# Patient Record
Sex: FEMALE | Race: BLACK OR AFRICAN AMERICAN | NOT HISPANIC OR LATINO | URBAN - METROPOLITAN AREA
[De-identification: names, ages, dates, MRNs, and addresses within clinical notes are randomized per-mention and may not be internally consistent; named-entity substitution may affect disease eponyms.]

---

## 2017-04-08 ENCOUNTER — EMERGENCY (EMERGENCY)
Facility: HOSPITAL | Age: 72
LOS: 0 days | Discharge: HOME | End: 2017-04-09
Attending: EMERGENCY MEDICINE
Payer: MEDICARE

## 2017-04-08 VITALS
OXYGEN SATURATION: 100 % | RESPIRATION RATE: 17 BRPM | WEIGHT: 253.09 LBS | SYSTOLIC BLOOD PRESSURE: 135 MMHG | DIASTOLIC BLOOD PRESSURE: 93 MMHG | HEIGHT: 66 IN | HEART RATE: 60 BPM | TEMPERATURE: 98 F

## 2017-04-08 DIAGNOSIS — R73.03 PREDIABETES: ICD-10-CM

## 2017-04-08 DIAGNOSIS — E78.5 HYPERLIPIDEMIA, UNSPECIFIED: ICD-10-CM

## 2017-04-08 DIAGNOSIS — K52.9 NONINFECTIVE GASTROENTERITIS AND COLITIS, UNSPECIFIED: ICD-10-CM

## 2017-04-08 DIAGNOSIS — E66.9 OBESITY, UNSPECIFIED: ICD-10-CM

## 2017-04-08 DIAGNOSIS — G40.909 EPILEPSY, UNSPECIFIED, NOT INTRACTABLE, WITHOUT STATUS EPILEPTICUS: ICD-10-CM

## 2017-04-08 DIAGNOSIS — K57.90 DIVERTICULOSIS OF INTESTINE, PART UNSPECIFIED, WITHOUT PERFORATION OR ABSCESS WITHOUT BLEEDING: ICD-10-CM

## 2017-04-08 DIAGNOSIS — I10 ESSENTIAL (PRIMARY) HYPERTENSION: ICD-10-CM

## 2017-04-08 DIAGNOSIS — Z91.013 ALLERGY TO SEAFOOD: ICD-10-CM

## 2017-04-08 DIAGNOSIS — Z98.89 OTHER SPECIFIED POSTPROCEDURAL STATES: Chronic | ICD-10-CM

## 2017-04-08 DIAGNOSIS — R10.9 UNSPECIFIED ABDOMINAL PAIN: ICD-10-CM

## 2017-04-08 LAB
ALBUMIN SERPL ELPH-MCNC: 3.5 G/DL — SIGNIFICANT CHANGE UP (ref 3.3–5)
ALP SERPL-CCNC: 139 U/L — HIGH (ref 40–120)
ALT FLD-CCNC: 21 U/L — SIGNIFICANT CHANGE UP (ref 12–78)
ANION GAP SERPL CALC-SCNC: 11 MMOL/L — SIGNIFICANT CHANGE UP (ref 5–17)
ANISOCYTOSIS BLD QL: SLIGHT — SIGNIFICANT CHANGE UP
APTT BLD: 33.8 SEC — SIGNIFICANT CHANGE UP (ref 27.5–37.4)
AST SERPL-CCNC: 16 U/L — SIGNIFICANT CHANGE UP (ref 15–37)
BILIRUB SERPL-MCNC: 0.4 MG/DL — SIGNIFICANT CHANGE UP (ref 0.2–1.2)
BUN SERPL-MCNC: 34 MG/DL — HIGH (ref 7–23)
CALCIUM SERPL-MCNC: 8.8 MG/DL — SIGNIFICANT CHANGE UP (ref 8.5–10.1)
CHLORIDE SERPL-SCNC: 103 MMOL/L — SIGNIFICANT CHANGE UP (ref 96–108)
CK MB BLD-MCNC: <1.1 % — SIGNIFICANT CHANGE UP (ref 0–3.5)
CK MB CFR SERPL CALC: <0.5 NG/ML — SIGNIFICANT CHANGE UP (ref 0.5–3.6)
CK SERPL-CCNC: 46 U/L — SIGNIFICANT CHANGE UP (ref 26–192)
CO2 SERPL-SCNC: 28 MMOL/L — SIGNIFICANT CHANGE UP (ref 22–31)
CREAT SERPL-MCNC: 1.4 MG/DL — HIGH (ref 0.5–1.3)
EOSINOPHIL NFR BLD AUTO: 2 % — SIGNIFICANT CHANGE UP (ref 0–6)
GLUCOSE SERPL-MCNC: 182 MG/DL — HIGH (ref 70–99)
HCT VFR BLD CALC: 34 % — LOW (ref 34.5–45)
HGB BLD-MCNC: 11.7 G/DL — SIGNIFICANT CHANGE UP (ref 11.5–15.5)
HYPOCHROMIA BLD QL: SLIGHT — SIGNIFICANT CHANGE UP
INR BLD: 1.11 RATIO — SIGNIFICANT CHANGE UP (ref 0.88–1.16)
LACTATE SERPL-SCNC: 1 MMOL/L — SIGNIFICANT CHANGE UP (ref 0.7–2)
LIDOCAIN IGE QN: 185 U/L — SIGNIFICANT CHANGE UP (ref 73–393)
LYMPHOCYTES # BLD AUTO: 15 % — SIGNIFICANT CHANGE UP (ref 13–44)
MACROCYTES BLD QL: SLIGHT — SIGNIFICANT CHANGE UP
MCHC RBC-ENTMCNC: 24 PG — LOW (ref 27–34)
MCHC RBC-ENTMCNC: 34.4 GM/DL — SIGNIFICANT CHANGE UP (ref 32–36)
MCV RBC AUTO: 69.7 FL — LOW (ref 80–100)
MICROCYTES BLD QL: SLIGHT — SIGNIFICANT CHANGE UP
MONOCYTES NFR BLD AUTO: 3 % — SIGNIFICANT CHANGE UP (ref 2–14)
NEUTROPHILS NFR BLD AUTO: 76 % — SIGNIFICANT CHANGE UP (ref 43–77)
PLAT MORPH BLD: NORMAL — SIGNIFICANT CHANGE UP
PLATELET # BLD AUTO: 272 K/UL — SIGNIFICANT CHANGE UP (ref 150–400)
POTASSIUM SERPL-MCNC: 4.3 MMOL/L — SIGNIFICANT CHANGE UP (ref 3.5–5.3)
POTASSIUM SERPL-SCNC: 4.3 MMOL/L — SIGNIFICANT CHANGE UP (ref 3.5–5.3)
PROT SERPL-MCNC: 7.9 GM/DL — SIGNIFICANT CHANGE UP (ref 6–8.3)
PROTHROM AB SERPL-ACNC: 12.1 SEC — SIGNIFICANT CHANGE UP (ref 9.8–12.7)
RBC # BLD: 4.87 M/UL — SIGNIFICANT CHANGE UP (ref 3.8–5.2)
RBC # FLD: 16.5 % — HIGH (ref 11–15)
RBC BLD AUTO: ABNORMAL
SODIUM SERPL-SCNC: 142 MMOL/L — SIGNIFICANT CHANGE UP (ref 135–145)
TROPONIN I SERPL-MCNC: <.015 NG/ML — SIGNIFICANT CHANGE UP (ref 0.01–0.04)
VARIANT LYMPHS # BLD: 4 % — SIGNIFICANT CHANGE UP (ref 0–6)
WBC # BLD: 12.1 K/UL — HIGH (ref 3.8–10.5)
WBC # FLD AUTO: 12.1 K/UL — HIGH (ref 3.8–10.5)

## 2017-04-08 PROCEDURE — 99284 EMERGENCY DEPT VISIT MOD MDM: CPT

## 2017-04-08 RX ORDER — ONDANSETRON 8 MG/1
4 TABLET, FILM COATED ORAL ONCE
Qty: 0 | Refills: 0 | Status: COMPLETED | OUTPATIENT
Start: 2017-04-08 | End: 2017-04-08

## 2017-04-08 RX ORDER — IOHEXOL 300 MG/ML
30 INJECTION, SOLUTION INTRAVENOUS ONCE
Qty: 0 | Refills: 0 | Status: COMPLETED | OUTPATIENT
Start: 2017-04-08 | End: 2017-04-08

## 2017-04-08 RX ORDER — LIDOCAINE 4 G/100G
10 CREAM TOPICAL ONCE
Qty: 0 | Refills: 0 | Status: COMPLETED | OUTPATIENT
Start: 2017-04-08 | End: 2017-04-08

## 2017-04-08 RX ORDER — SODIUM CHLORIDE 9 MG/ML
1000 INJECTION INTRAMUSCULAR; INTRAVENOUS; SUBCUTANEOUS ONCE
Qty: 0 | Refills: 0 | Status: COMPLETED | OUTPATIENT
Start: 2017-04-08 | End: 2017-04-08

## 2017-04-08 RX ORDER — PANTOPRAZOLE SODIUM 20 MG/1
40 TABLET, DELAYED RELEASE ORAL ONCE
Qty: 0 | Refills: 0 | Status: COMPLETED | OUTPATIENT
Start: 2017-04-08 | End: 2017-04-08

## 2017-04-08 RX ORDER — SODIUM CHLORIDE 9 MG/ML
3 INJECTION INTRAMUSCULAR; INTRAVENOUS; SUBCUTANEOUS ONCE
Qty: 0 | Refills: 0 | Status: COMPLETED | OUTPATIENT
Start: 2017-04-08 | End: 2017-04-08

## 2017-04-08 RX ADMIN — PANTOPRAZOLE SODIUM 40 MILLIGRAM(S): 20 TABLET, DELAYED RELEASE ORAL at 22:37

## 2017-04-08 RX ADMIN — ONDANSETRON 4 MILLIGRAM(S): 8 TABLET, FILM COATED ORAL at 22:36

## 2017-04-08 RX ADMIN — Medication 30 MILLILITER(S): at 22:37

## 2017-04-08 RX ADMIN — IOHEXOL 30 MILLILITER(S): 300 INJECTION, SOLUTION INTRAVENOUS at 22:34

## 2017-04-08 RX ADMIN — SODIUM CHLORIDE 1000 MILLILITER(S): 9 INJECTION INTRAMUSCULAR; INTRAVENOUS; SUBCUTANEOUS at 22:36

## 2017-04-08 RX ADMIN — LIDOCAINE 10 MILLILITER(S): 4 CREAM TOPICAL at 22:37

## 2017-04-08 RX ADMIN — SODIUM CHLORIDE 3 MILLILITER(S): 9 INJECTION INTRAMUSCULAR; INTRAVENOUS; SUBCUTANEOUS at 22:34

## 2017-04-09 VITALS
RESPIRATION RATE: 17 BRPM | DIASTOLIC BLOOD PRESSURE: 73 MMHG | TEMPERATURE: 99 F | SYSTOLIC BLOOD PRESSURE: 139 MMHG | OXYGEN SATURATION: 97 % | HEART RATE: 72 BPM

## 2017-04-09 PROCEDURE — 74177 CT ABD & PELVIS W/CONTRAST: CPT | Mod: 26

## 2017-04-09 NOTE — ED PROVIDER NOTE - MEDICAL DECISION MAKING DETAILS
Patient with colitis. Labs and imaging reviewed. Patient received zofran, protonix, viscous lidocaine, maalox and ivfs. Patient currently feels well and states she is ready to go home. Patient is to follow up with pmd. Discussed results and outcome of testing with the patient.  Patient advised to please follow up with their primary care doctor within the next 24 hours and return to the Emergency Department for worsening symptoms or any other concerns.  Patient advised that their doctor may call  to follow up on the specific results of the tests performed today in the emergency department.

## 2017-04-09 NOTE — ED PROVIDER NOTE - OBJECTIVE STATEMENT
Pertinent PMH/PSH/FHx/SHx and Review of Systems contained within:  71 yo f with PMH of HTN, HLD and seizures presents in ED c/o 1 hour history of nbnb vomitus associated with watery, nonbloody diarrhea. No aggravating or relieving factors, No fever/chills, No photophobia/eye pain/changes in vision, No ear pain/sore throat/dysphagia, No chest pain/palpitations, no SOB/cough/wheeze/stridor, no dysuria/frequency/discharge, No neck/back pain, no rash, no changes in neurological status/function.

## 2017-07-14 ENCOUNTER — EMERGENCY (EMERGENCY)
Facility: HOSPITAL | Age: 72
LOS: 0 days | Discharge: HOME | End: 2017-07-14
Attending: EMERGENCY MEDICINE
Payer: COMMERCIAL

## 2017-07-14 VITALS
HEIGHT: 66 IN | HEART RATE: 56 BPM | TEMPERATURE: 98 F | WEIGHT: 251.11 LBS | RESPIRATION RATE: 16 BRPM | SYSTOLIC BLOOD PRESSURE: 118 MMHG | OXYGEN SATURATION: 97 % | DIASTOLIC BLOOD PRESSURE: 57 MMHG

## 2017-07-14 DIAGNOSIS — S39.012A STRAIN OF MUSCLE, FASCIA AND TENDON OF LOWER BACK, INITIAL ENCOUNTER: ICD-10-CM

## 2017-07-14 DIAGNOSIS — R73.03 PREDIABETES: ICD-10-CM

## 2017-07-14 DIAGNOSIS — E66.9 OBESITY, UNSPECIFIED: ICD-10-CM

## 2017-07-14 DIAGNOSIS — M54.9 DORSALGIA, UNSPECIFIED: ICD-10-CM
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
## 2017-07-14 DIAGNOSIS — E78.5 HYPERLIPIDEMIA, UNSPECIFIED: ICD-10-CM
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
## 2017-07-14 DIAGNOSIS — Z98.89 OTHER SPECIFIED POSTPROCEDURAL STATES: Chronic | ICD-10-CM

## 2017-07-14 DIAGNOSIS — G40.909 EPILEPSY, UNSPECIFIED, NOT INTRACTABLE, WITHOUT STATUS EPILEPTICUS: ICD-10-CM

## 2017-07-14 DIAGNOSIS — I10 ESSENTIAL (PRIMARY) HYPERTENSION: ICD-10-CM

## 2017-07-14 DIAGNOSIS — Z91.013 ALLERGY TO SEAFOOD: ICD-10-CM

## 2017-07-14 PROCEDURE — 99284 EMERGENCY DEPT VISIT MOD MDM: CPT

## 2017-07-14 RX ORDER — DOCUSATE SODIUM 100 MG
1 CAPSULE ORAL
Qty: 14 | Refills: 0
Start: 2017-07-14 | End: 2017-07-21

## 2017-07-14 RX ORDER — METHOCARBAMOL 500 MG/1
1 TABLET, FILM COATED ORAL
Qty: 15 | Refills: 0 | OUTPATIENT
Start: 2017-07-14 | End: 2017-07-19

## 2017-07-14 RX ORDER — IBUPROFEN 200 MG
1 TABLET ORAL
Qty: 15 | Refills: 0 | OUTPATIENT
Start: 2017-07-14 | End: 2017-07-19

## 2017-07-14 RX ORDER — OXYCODONE AND ACETAMINOPHEN 5; 325 MG/1; MG/1
1 TABLET ORAL ONCE
Qty: 0 | Refills: 0 | Status: DISCONTINUED | OUTPATIENT
Start: 2017-07-14 | End: 2017-07-14

## 2017-07-14 RX ORDER — METHOCARBAMOL 500 MG/1
750 TABLET, FILM COATED ORAL ONCE
Qty: 0 | Refills: 0 | Status: COMPLETED | OUTPATIENT
Start: 2017-07-14 | End: 2017-07-14

## 2017-07-14 RX ORDER — KETOROLAC TROMETHAMINE 30 MG/ML
30 SYRINGE (ML) INJECTION ONCE
Qty: 0 | Refills: 0 | Status: DISCONTINUED | OUTPATIENT
Start: 2017-07-14 | End: 2017-07-14

## 2017-07-14 RX ADMIN — Medication 30 MILLIGRAM(S): at 18:42

## 2017-07-14 RX ADMIN — OXYCODONE AND ACETAMINOPHEN 1 TABLET(S): 5; 325 TABLET ORAL at 18:14

## 2017-07-14 RX ADMIN — Medication 30 MILLIGRAM(S): at 18:14

## 2017-07-14 RX ADMIN — OXYCODONE AND ACETAMINOPHEN 1 TABLET(S): 5; 325 TABLET ORAL at 18:42

## 2017-07-14 RX ADMIN — METHOCARBAMOL 750 MILLIGRAM(S): 500 TABLET, FILM COATED ORAL at 18:13

## 2017-07-14 NOTE — ED PROVIDER NOTE - OBJECTIVE STATEMENT
Pertinent PMH/PSH/FHx/SHx and Review of Systems contained within:  72F hx of chronic back pain, htn, seizure disorder pw back pain for 1 week. 1 week before that she was carrying heavy boxes. no numbness, tingling or weakness of the lower ext  Fh and Sh not otherwise contributory  ROS otherwise negative

## 2017-07-14 NOTE — ED ADULT NURSE NOTE - OBJECTIVE STATEMENT
Patient stated that her back has been hurting for about 7 days, denies injury, denies fall, denies trauma.  Pain of 9 on a scale of 0 to 10

## 2017-07-30 ENCOUNTER — INPATIENT (INPATIENT)
Facility: HOSPITAL | Age: 72
LOS: 4 days | Discharge: HOME | End: 2017-08-04
Attending: INTERNAL MEDICINE | Admitting: INTERNAL MEDICINE
Payer: COMMERCIAL

## 2017-07-30 VITALS
DIASTOLIC BLOOD PRESSURE: 77 MMHG | OXYGEN SATURATION: 97 % | SYSTOLIC BLOOD PRESSURE: 141 MMHG | HEART RATE: 64 BPM | TEMPERATURE: 103 F | WEIGHT: 214.95 LBS | HEIGHT: 66 IN | RESPIRATION RATE: 20 BRPM

## 2017-07-30 DIAGNOSIS — Z98.89 OTHER SPECIFIED POSTPROCEDURAL STATES: Chronic | ICD-10-CM

## 2017-07-30 LAB
ALBUMIN SERPL ELPH-MCNC: 3.2 G/DL — LOW (ref 3.3–5)
ALP SERPL-CCNC: 133 U/L — HIGH (ref 40–120)
ALT FLD-CCNC: 17 U/L — SIGNIFICANT CHANGE UP (ref 12–78)
ANION GAP SERPL CALC-SCNC: 11 MMOL/L — SIGNIFICANT CHANGE UP (ref 5–17)
APPEARANCE UR: CLEAR — SIGNIFICANT CHANGE UP
APTT BLD: 35.2 SEC — SIGNIFICANT CHANGE UP (ref 27.5–37.4)
AST SERPL-CCNC: 18 U/L — SIGNIFICANT CHANGE UP (ref 15–37)
BACTERIA # UR AUTO: ABNORMAL
BASOPHILS # BLD AUTO: 0.1 K/UL — SIGNIFICANT CHANGE UP (ref 0–0.2)
BASOPHILS NFR BLD AUTO: 1.5 % — SIGNIFICANT CHANGE UP (ref 0–2)
BILIRUB SERPL-MCNC: 0.4 MG/DL — SIGNIFICANT CHANGE UP (ref 0.2–1.2)
BILIRUB UR-MCNC: NEGATIVE — SIGNIFICANT CHANGE UP
BUN SERPL-MCNC: 17 MG/DL — SIGNIFICANT CHANGE UP (ref 7–23)
CALCIUM SERPL-MCNC: 8.3 MG/DL — LOW (ref 8.5–10.1)
CHLORIDE SERPL-SCNC: 103 MMOL/L — SIGNIFICANT CHANGE UP (ref 96–108)
CK MB BLD-MCNC: <0.6 % — SIGNIFICANT CHANGE UP (ref 0–3.5)
CK MB CFR SERPL CALC: <0.5 NG/ML — SIGNIFICANT CHANGE UP (ref 0.5–3.6)
CK SERPL-CCNC: 85 U/L — SIGNIFICANT CHANGE UP (ref 26–192)
CO2 SERPL-SCNC: 24 MMOL/L — SIGNIFICANT CHANGE UP (ref 22–31)
COLOR SPEC: YELLOW — SIGNIFICANT CHANGE UP
CREAT SERPL-MCNC: 0.91 MG/DL — SIGNIFICANT CHANGE UP (ref 0.5–1.3)
DIFF PNL FLD: ABNORMAL
EOSINOPHIL # BLD AUTO: 0 K/UL — SIGNIFICANT CHANGE UP (ref 0–0.5)
EOSINOPHIL NFR BLD AUTO: 0.1 % — SIGNIFICANT CHANGE UP (ref 0–6)
EPI CELLS # UR: SIGNIFICANT CHANGE UP
GLUCOSE SERPL-MCNC: 160 MG/DL — HIGH (ref 70–99)
GLUCOSE UR QL: NEGATIVE MG/DL — SIGNIFICANT CHANGE UP
HCT VFR BLD CALC: 37.5 % — SIGNIFICANT CHANGE UP (ref 34.5–45)
HGB BLD-MCNC: 12.1 G/DL — SIGNIFICANT CHANGE UP (ref 11.5–15.5)
KETONES UR-MCNC: NEGATIVE — SIGNIFICANT CHANGE UP
LACTATE SERPL-SCNC: 1.4 MMOL/L — SIGNIFICANT CHANGE UP (ref 0.7–2)
LEUKOCYTE ESTERASE UR-ACNC: ABNORMAL
LYMPHOCYTES # BLD AUTO: 0.8 K/UL — LOW (ref 1–3.3)
LYMPHOCYTES # BLD AUTO: 16.3 % — SIGNIFICANT CHANGE UP (ref 13–44)
MCHC RBC-ENTMCNC: 23.9 PG — LOW (ref 27–34)
MCHC RBC-ENTMCNC: 32.2 GM/DL — SIGNIFICANT CHANGE UP (ref 32–36)
MCV RBC AUTO: 74.1 FL — LOW (ref 80–100)
MONOCYTES # BLD AUTO: 0.6 K/UL — SIGNIFICANT CHANGE UP (ref 0–0.9)
MONOCYTES NFR BLD AUTO: 12.4 % — SIGNIFICANT CHANGE UP (ref 2–14)
NEUTROPHILS # BLD AUTO: 3.6 K/UL — SIGNIFICANT CHANGE UP (ref 1.8–7.4)
NEUTROPHILS NFR BLD AUTO: 69.6 % — SIGNIFICANT CHANGE UP (ref 43–77)
NITRITE UR-MCNC: NEGATIVE — SIGNIFICANT CHANGE UP
NT-PROBNP SERPL-SCNC: 829 PG/ML — HIGH (ref 0–125)
PH UR: 7 — SIGNIFICANT CHANGE UP (ref 5–8)
PLATELET # BLD AUTO: 173 K/UL — SIGNIFICANT CHANGE UP (ref 150–400)
POTASSIUM SERPL-MCNC: 3.7 MMOL/L — SIGNIFICANT CHANGE UP (ref 3.5–5.3)
POTASSIUM SERPL-SCNC: 3.7 MMOL/L — SIGNIFICANT CHANGE UP (ref 3.5–5.3)
PROT SERPL-MCNC: 7.3 GM/DL — SIGNIFICANT CHANGE UP (ref 6–8.3)
PROT UR-MCNC: 30 MG/DL
RBC # BLD: 5.06 M/UL — SIGNIFICANT CHANGE UP (ref 3.8–5.2)
RBC # FLD: 15.7 % — HIGH (ref 11–15)
RBC CASTS # UR COMP ASSIST: ABNORMAL /HPF (ref 0–4)
SODIUM SERPL-SCNC: 138 MMOL/L — SIGNIFICANT CHANGE UP (ref 135–145)
SP GR SPEC: 1 — LOW (ref 1.01–1.02)
TROPONIN I SERPL-MCNC: <.015 NG/ML — SIGNIFICANT CHANGE UP (ref 0.01–0.04)
UROBILINOGEN FLD QL: NEGATIVE MG/DL — SIGNIFICANT CHANGE UP
WBC # BLD: 5.2 K/UL — SIGNIFICANT CHANGE UP (ref 3.8–10.5)
WBC # FLD AUTO: 5.2 K/UL — SIGNIFICANT CHANGE UP (ref 3.8–10.5)
WBC UR QL: SIGNIFICANT CHANGE UP

## 2017-07-30 PROCEDURE — 74177 CT ABD & PELVIS W/CONTRAST: CPT | Mod: 26

## 2017-07-30 PROCEDURE — 99285 EMERGENCY DEPT VISIT HI MDM: CPT

## 2017-07-30 PROCEDURE — 71010: CPT | Mod: 26

## 2017-07-30 RX ORDER — ACETAMINOPHEN 500 MG
650 TABLET ORAL ONCE
Qty: 0 | Refills: 0 | Status: COMPLETED | OUTPATIENT
Start: 2017-07-30 | End: 2017-07-30

## 2017-07-30 RX ORDER — IOHEXOL 300 MG/ML
30 INJECTION, SOLUTION INTRAVENOUS ONCE
Qty: 0 | Refills: 0 | Status: COMPLETED | OUTPATIENT
Start: 2017-07-30 | End: 2017-07-30

## 2017-07-30 RX ORDER — FUROSEMIDE 40 MG
20 TABLET ORAL ONCE
Qty: 0 | Refills: 0 | Status: COMPLETED | OUTPATIENT
Start: 2017-07-30 | End: 2017-07-30

## 2017-07-30 RX ORDER — SODIUM CHLORIDE 9 MG/ML
3 INJECTION INTRAMUSCULAR; INTRAVENOUS; SUBCUTANEOUS ONCE
Qty: 0 | Refills: 0 | Status: COMPLETED | OUTPATIENT
Start: 2017-07-30 | End: 2017-07-30

## 2017-07-30 RX ORDER — PIPERACILLIN AND TAZOBACTAM 4; .5 G/20ML; G/20ML
3.38 INJECTION, POWDER, LYOPHILIZED, FOR SOLUTION INTRAVENOUS ONCE
Qty: 0 | Refills: 0 | Status: COMPLETED | OUTPATIENT
Start: 2017-07-30 | End: 2017-07-30

## 2017-07-30 RX ORDER — SODIUM CHLORIDE 9 MG/ML
1000 INJECTION INTRAMUSCULAR; INTRAVENOUS; SUBCUTANEOUS
Qty: 0 | Refills: 0 | Status: COMPLETED | OUTPATIENT
Start: 2017-07-30 | End: 2017-07-30

## 2017-07-30 RX ADMIN — Medication 650 MILLIGRAM(S): at 19:49

## 2017-07-30 RX ADMIN — SODIUM CHLORIDE 3 MILLILITER(S): 9 INJECTION INTRAMUSCULAR; INTRAVENOUS; SUBCUTANEOUS at 19:57

## 2017-07-30 RX ADMIN — SODIUM CHLORIDE 2000 MILLILITER(S): 9 INJECTION INTRAMUSCULAR; INTRAVENOUS; SUBCUTANEOUS at 20:15

## 2017-07-30 RX ADMIN — PIPERACILLIN AND TAZOBACTAM 200 GRAM(S): 4; .5 INJECTION, POWDER, LYOPHILIZED, FOR SOLUTION INTRAVENOUS at 20:15

## 2017-07-30 RX ADMIN — IOHEXOL 30 MILLILITER(S): 300 INJECTION, SOLUTION INTRAVENOUS at 20:51

## 2017-07-30 RX ADMIN — SODIUM CHLORIDE 2000 MILLILITER(S): 9 INJECTION INTRAMUSCULAR; INTRAVENOUS; SUBCUTANEOUS at 19:40

## 2017-07-30 NOTE — ED ADULT NURSE NOTE - OBJECTIVE STATEMENT
pt c/o abd pain since yesterday.  pt has rectal temp 103.5 71 y/o female PMH HTN, Obesity, Seizure D/O, HLD, OA, Diverticulosis p/w c/o lower abd pain a/w decreased appetite and fever x1dy, dtr reports 99.9 oral temp at home. Patient alert and oriented x 4, respirations even and unlabored, skin non-diaphoretic, abd soft, non-distended, (+)lower abd tenderness, (+)BS x 4. Currently tolerating po well, drinking for CT scan. Lying comfortably on stretcher, daughter at bedside, CT pending, will continue to monitor for safety.

## 2017-07-30 NOTE — ED PROVIDER NOTE - PHYSICAL EXAMINATION
General:     NAD, well-nourished, well-appearing  Head:     NC/AT, EOMI, oral mucosa moist  Neck:     trachea midline  Lungs:     CTA b/l, no w/r/r  CVS:     S1S2, RRR, no m/g/r  Abd:     +BS, ttp @ LLQ = RLQ, no rebound or guarding, s/nd, no organomegaly  Ext:    2+ radial and pedal pulses, no c/c/e  Neuro: AAOx2, no sensory/motor deficits

## 2017-07-30 NOTE — ED ADULT TRIAGE NOTE - CHIEF COMPLAINT QUOTE
pt states, " I have abdominal pain that started yesterday" denies diarrhea, no vomiting, admits to cough, pt with some confusion in triage and high fever

## 2017-07-30 NOTE — ED ADULT TRIAGE NOTE - SPO2 (%)
Aox4. Tejon. bilat hearing aids. RA, lungs clear. Sat's 100%. SB/junctional per tele. HR 40's, occasionally drops to upper 30's. Pt denies chest pain, pressure, or palpitations. Dr Mariah Boateng notified of new consult.  Order to hold bumex and metoprolol at this t 97

## 2017-07-30 NOTE — ED PROVIDER NOTE - OBJECTIVE STATEMENT
72yoF; with pmh signif for HTN, DM, Dementia, HLD, hx of colitis in past; now p/w abd pain.  abd pain--epigastric abd cramping, radiating to bilateral lower abdomen pain x2-3 days, associated with n/v (x2 episodes).  denies diarrhea. c/o fever and chills. denies sick contacts. denies travel. denies unusual PO intake. cough, non-productive. denies headache. family denies ams.

## 2017-07-31 DIAGNOSIS — E78.2 MIXED HYPERLIPIDEMIA: ICD-10-CM

## 2017-07-31 DIAGNOSIS — R73.03 PREDIABETES: ICD-10-CM

## 2017-07-31 DIAGNOSIS — Z29.9 ENCOUNTER FOR PROPHYLACTIC MEASURES, UNSPECIFIED: ICD-10-CM

## 2017-07-31 DIAGNOSIS — R11.2 NAUSEA WITH VOMITING, UNSPECIFIED: ICD-10-CM

## 2017-07-31 DIAGNOSIS — I10 ESSENTIAL (PRIMARY) HYPERTENSION: ICD-10-CM

## 2017-07-31 DIAGNOSIS — E66.09 OTHER OBESITY DUE TO EXCESS CALORIES: ICD-10-CM

## 2017-07-31 DIAGNOSIS — J18.9 PNEUMONIA, UNSPECIFIED ORGANISM: ICD-10-CM

## 2017-07-31 LAB
ANION GAP SERPL CALC-SCNC: 10 MMOL/L — SIGNIFICANT CHANGE UP (ref 5–17)
BUN SERPL-MCNC: 16 MG/DL — SIGNIFICANT CHANGE UP (ref 7–23)
CALCIUM SERPL-MCNC: 8.8 MG/DL — SIGNIFICANT CHANGE UP (ref 8.5–10.1)
CHLORIDE SERPL-SCNC: 102 MMOL/L — SIGNIFICANT CHANGE UP (ref 96–108)
CO2 SERPL-SCNC: 26 MMOL/L — SIGNIFICANT CHANGE UP (ref 22–31)
CREAT SERPL-MCNC: 1.12 MG/DL — SIGNIFICANT CHANGE UP (ref 0.5–1.3)
GLUCOSE SERPL-MCNC: 150 MG/DL — HIGH (ref 70–99)
HCT VFR BLD CALC: 34.4 % — LOW (ref 34.5–45)
HGB BLD-MCNC: 11.4 G/DL — LOW (ref 11.5–15.5)
MCHC RBC-ENTMCNC: 24.3 PG — LOW (ref 27–34)
MCHC RBC-ENTMCNC: 33 GM/DL — SIGNIFICANT CHANGE UP (ref 32–36)
MCV RBC AUTO: 73.5 FL — LOW (ref 80–100)
PLATELET # BLD AUTO: 178 K/UL — SIGNIFICANT CHANGE UP (ref 150–400)
POTASSIUM SERPL-MCNC: 3.7 MMOL/L — SIGNIFICANT CHANGE UP (ref 3.5–5.3)
POTASSIUM SERPL-SCNC: 3.7 MMOL/L — SIGNIFICANT CHANGE UP (ref 3.5–5.3)
RBC # BLD: 4.68 M/UL — SIGNIFICANT CHANGE UP (ref 3.8–5.2)
RBC # FLD: 16.3 % — HIGH (ref 11–15)
SODIUM SERPL-SCNC: 138 MMOL/L — SIGNIFICANT CHANGE UP (ref 135–145)
TROPONIN I SERPL-MCNC: <.015 NG/ML — SIGNIFICANT CHANGE UP (ref 0.01–0.04)
WBC # BLD: 5.1 K/UL — SIGNIFICANT CHANGE UP (ref 3.8–10.5)
WBC # FLD AUTO: 5.1 K/UL — SIGNIFICANT CHANGE UP (ref 3.8–10.5)

## 2017-07-31 PROCEDURE — 93010 ELECTROCARDIOGRAM REPORT: CPT

## 2017-07-31 PROCEDURE — 12345: CPT | Mod: NC

## 2017-07-31 PROCEDURE — 99223 1ST HOSP IP/OBS HIGH 75: CPT

## 2017-07-31 RX ORDER — DEXTROSE 50 % IN WATER 50 %
1 SYRINGE (ML) INTRAVENOUS ONCE
Qty: 0 | Refills: 0 | Status: DISCONTINUED | OUTPATIENT
Start: 2017-07-31 | End: 2017-08-04

## 2017-07-31 RX ORDER — LISINOPRIL 2.5 MG/1
40 TABLET ORAL DAILY
Qty: 0 | Refills: 0 | Status: DISCONTINUED | OUTPATIENT
Start: 2017-07-31 | End: 2017-08-04

## 2017-07-31 RX ORDER — HEPARIN SODIUM 5000 [USP'U]/ML
5000 INJECTION INTRAVENOUS; SUBCUTANEOUS EVERY 8 HOURS
Qty: 0 | Refills: 0 | Status: DISCONTINUED | OUTPATIENT
Start: 2017-07-31 | End: 2017-08-04

## 2017-07-31 RX ORDER — AMLODIPINE BESYLATE 2.5 MG/1
10 TABLET ORAL DAILY
Qty: 0 | Refills: 0 | Status: DISCONTINUED | OUTPATIENT
Start: 2017-07-31 | End: 2017-08-04

## 2017-07-31 RX ORDER — GLUCAGON INJECTION, SOLUTION 0.5 MG/.1ML
1 INJECTION, SOLUTION SUBCUTANEOUS ONCE
Qty: 0 | Refills: 0 | Status: DISCONTINUED | OUTPATIENT
Start: 2017-07-31 | End: 2017-08-04

## 2017-07-31 RX ORDER — FAMOTIDINE 10 MG/ML
20 INJECTION INTRAVENOUS
Qty: 0 | Refills: 0 | Status: DISCONTINUED | OUTPATIENT
Start: 2017-07-31 | End: 2017-08-01

## 2017-07-31 RX ORDER — SIMVASTATIN 20 MG/1
10 TABLET, FILM COATED ORAL AT BEDTIME
Qty: 0 | Refills: 0 | Status: DISCONTINUED | OUTPATIENT
Start: 2017-07-31 | End: 2017-08-04

## 2017-07-31 RX ORDER — INSULIN LISPRO 100/ML
VIAL (ML) SUBCUTANEOUS
Qty: 0 | Refills: 0 | Status: DISCONTINUED | OUTPATIENT
Start: 2017-07-31 | End: 2017-08-04

## 2017-07-31 RX ORDER — IPRATROPIUM/ALBUTEROL SULFATE 18-103MCG
3 AEROSOL WITH ADAPTER (GRAM) INHALATION EVERY 6 HOURS
Qty: 0 | Refills: 0 | Status: DISCONTINUED | OUTPATIENT
Start: 2017-07-31 | End: 2017-08-01

## 2017-07-31 RX ORDER — OXYCODONE AND ACETAMINOPHEN 5; 325 MG/1; MG/1
1 TABLET ORAL EVERY 8 HOURS
Qty: 0 | Refills: 0 | Status: DISCONTINUED | OUTPATIENT
Start: 2017-07-31 | End: 2017-08-01

## 2017-07-31 RX ORDER — DEXTROSE 50 % IN WATER 50 %
25 SYRINGE (ML) INTRAVENOUS ONCE
Qty: 0 | Refills: 0 | Status: DISCONTINUED | OUTPATIENT
Start: 2017-07-31 | End: 2017-08-04

## 2017-07-31 RX ORDER — DEXTROSE 50 % IN WATER 50 %
12.5 SYRINGE (ML) INTRAVENOUS ONCE
Qty: 0 | Refills: 0 | Status: DISCONTINUED | OUTPATIENT
Start: 2017-07-31 | End: 2017-08-04

## 2017-07-31 RX ORDER — ACETAMINOPHEN 500 MG
650 TABLET ORAL EVERY 6 HOURS
Qty: 0 | Refills: 0 | Status: DISCONTINUED | OUTPATIENT
Start: 2017-07-31 | End: 2017-08-04

## 2017-07-31 RX ORDER — OXCARBAZEPINE 300 MG/1
300 TABLET, FILM COATED ORAL
Qty: 0 | Refills: 0 | Status: DISCONTINUED | OUTPATIENT
Start: 2017-07-31 | End: 2017-08-04

## 2017-07-31 RX ORDER — SODIUM CHLORIDE 9 MG/ML
1000 INJECTION INTRAMUSCULAR; INTRAVENOUS; SUBCUTANEOUS
Qty: 0 | Refills: 0 | Status: DISCONTINUED | OUTPATIENT
Start: 2017-07-31 | End: 2017-07-31

## 2017-07-31 RX ORDER — INSULIN LISPRO 100/ML
VIAL (ML) SUBCUTANEOUS AT BEDTIME
Qty: 0 | Refills: 0 | Status: DISCONTINUED | OUTPATIENT
Start: 2017-07-31 | End: 2017-08-04

## 2017-07-31 RX ORDER — DOCUSATE SODIUM 100 MG
100 CAPSULE ORAL
Qty: 0 | Refills: 0 | Status: DISCONTINUED | OUTPATIENT
Start: 2017-07-31 | End: 2017-08-01

## 2017-07-31 RX ORDER — METOCLOPRAMIDE HCL 10 MG
10 TABLET ORAL EVERY 8 HOURS
Qty: 0 | Refills: 0 | Status: DISCONTINUED | OUTPATIENT
Start: 2017-07-31 | End: 2017-08-04

## 2017-07-31 RX ORDER — CARVEDILOL PHOSPHATE 80 MG/1
6.25 CAPSULE, EXTENDED RELEASE ORAL EVERY 12 HOURS
Qty: 0 | Refills: 0 | Status: DISCONTINUED | OUTPATIENT
Start: 2017-07-31 | End: 2017-08-04

## 2017-07-31 RX ORDER — SODIUM CHLORIDE 9 MG/ML
1000 INJECTION, SOLUTION INTRAVENOUS
Qty: 0 | Refills: 0 | Status: DISCONTINUED | OUTPATIENT
Start: 2017-07-31 | End: 2017-08-04

## 2017-07-31 RX ORDER — FUROSEMIDE 40 MG
40 TABLET ORAL DAILY
Qty: 0 | Refills: 0 | Status: DISCONTINUED | OUTPATIENT
Start: 2017-07-31 | End: 2017-08-04

## 2017-07-31 RX ADMIN — Medication 650 MILLIGRAM(S): at 08:41

## 2017-07-31 RX ADMIN — OXCARBAZEPINE 300 MILLIGRAM(S): 300 TABLET, FILM COATED ORAL at 06:52

## 2017-07-31 RX ADMIN — CARVEDILOL PHOSPHATE 6.25 MILLIGRAM(S): 80 CAPSULE, EXTENDED RELEASE ORAL at 18:24

## 2017-07-31 RX ADMIN — Medication 100 MILLIGRAM(S): at 08:38

## 2017-07-31 RX ADMIN — SIMVASTATIN 10 MILLIGRAM(S): 20 TABLET, FILM COATED ORAL at 21:44

## 2017-07-31 RX ADMIN — Medication 1: at 11:14

## 2017-07-31 RX ADMIN — FAMOTIDINE 20 MILLIGRAM(S): 10 INJECTION INTRAVENOUS at 18:24

## 2017-07-31 RX ADMIN — Medication 100 MILLIGRAM(S): at 18:24

## 2017-07-31 RX ADMIN — OXCARBAZEPINE 300 MILLIGRAM(S): 300 TABLET, FILM COATED ORAL at 18:24

## 2017-07-31 RX ADMIN — Medication 100 MILLIGRAM(S): at 06:52

## 2017-07-31 RX ADMIN — SODIUM CHLORIDE 80 MILLILITER(S): 9 INJECTION INTRAMUSCULAR; INTRAVENOUS; SUBCUTANEOUS at 07:20

## 2017-07-31 RX ADMIN — HEPARIN SODIUM 5000 UNIT(S): 5000 INJECTION INTRAVENOUS; SUBCUTANEOUS at 14:01

## 2017-07-31 RX ADMIN — LISINOPRIL 40 MILLIGRAM(S): 2.5 TABLET ORAL at 06:51

## 2017-07-31 RX ADMIN — HEPARIN SODIUM 5000 UNIT(S): 5000 INJECTION INTRAVENOUS; SUBCUTANEOUS at 21:44

## 2017-07-31 RX ADMIN — CARVEDILOL PHOSPHATE 6.25 MILLIGRAM(S): 80 CAPSULE, EXTENDED RELEASE ORAL at 06:52

## 2017-07-31 RX ADMIN — AMLODIPINE BESYLATE 10 MILLIGRAM(S): 2.5 TABLET ORAL at 06:52

## 2017-07-31 NOTE — H&P ADULT - ASSESSMENT
72 year old woman with PMH HTN, HLD, Dementia, and preDM presents with complaint of nausea and vomiting with mild abdominal pain which started yesterday and cough for the last few days; signs, symptoms, and work up consistent with Pneumonia.  Patient will require admission for at least 2 midnights as detailed below:

## 2017-07-31 NOTE — H&P ADULT - PROBLEM SELECTOR PLAN 1
Continue IV Levaquin  O2 via NC@ 2L/Min, keep sat >94% at all times.  Blood Cx 2 sets  Sputum cx   Albuterol/Atrovent 1 unit neb Q6hrs PRN  Monitor CBC Qdaily.  Follow up official CXR report in am.  Tylenol 650mg po q6hrs PRN for fever.  Robitussin 10ml PO Q8hrs PRN for cough.

## 2017-07-31 NOTE — ED ADULT NURSE REASSESSMENT NOTE - NS ED NURSE REASSESS COMMENT FT1
Dr. Montalvo at bedside, admission consult in progress, patient remains in stable condition, safety maintained, will continue to monitor.

## 2017-07-31 NOTE — CHART NOTE - NSCHARTNOTEFT_GEN_A_CORE
Pt seen/examined at bed side  h/p, a/p, labs/imaging , med reconciliation reviewed.    a/p  1- Acute bronchitis  c/w levaquin, f/u cultures.    2. h/o non ischemic CMP     lasix, check TTE    3.HTN     c/w norvasc, coreg and ACEI    4. HLD     c/w statin    d/w patient and grand daughter by bed side.

## 2017-07-31 NOTE — H&P ADULT - NSHPPHYSICALEXAM_GEN_ALL_CORE
GENERAL: NAD, well-groomed, Obese  HEAD:  Atraumatic, Normocephalic  EYES: EOMI, PERRLA, conjunctiva and sclera clear  ENMT: No tonsillar erythema, exudates, or enlargement; Moist mucous membranes, No lesions  NECK: Supple, No JVD, Normal thyroid  NERVOUS SYSTEM:  Alert & Oriented X3, Good concentration  CHEST/LUNG: Clear to auscultation bilaterally; No rales, rhonchi, wheezing, or rubs  HEART: Regular rate and rhythm; No murmurs, rubs, or gallops  ABDOMEN: Soft, mild diffuse tenderness without rebound or guarding, Nondistended; Bowel sounds present  EXTREMITIES: No clubbing, cyanosis or edema  SKIN: no rashes or lesions  PSYCH: normal affect and behavior

## 2017-07-31 NOTE — H&P ADULT - NSHPLABSRESULTS_GEN_ALL_CORE
Vital Signs Last 24 Hrs  T(C): 37.2 (2017 05:28), Max: 39.7 (2017 19:50)  T(F): 98.9 (2017 05:28), Max: 103.5 (2017 19:50)  HR: 61 (2017 05:28) (53 - 83)  BP: 143/65 (2017 05:28) (141/77 - 221/95)  BP(mean): --  RR: 15 (2017 05:28) (12 - 23)  SpO2: 97% (2017 05:28) (95% - 97%)        LABS:                        12.1   5.2   )-----------( 173      ( 2017 19:47 )             37.5         138  |  103  |  17  ----------------------------<  160<H>  3.7   |  24  |  0.91    Ca    8.3<L>      2017 20:55    TPro  7.3  /  Alb  3.2<L>  /  TBili  0.4  /  DBili  x   /  AST  18  /  ALT  17  /  AlkPhos  133<H>      PTT - ( 2017 20:55 )  PTT:35.2 sec  Urinalysis Basic - ( 2017 20:23 )    Color: Yellow / Appearance: Clear / S.005 / pH: x  Gluc: x / Ketone: Negative  / Bili: Negative / Urobili: Negative mg/dL   Blood: x / Protein: 30 mg/dL / Nitrite: Negative   Leuk Esterase: Small / RBC: 6-10 /HPF / WBC 0-2   Sq Epi: x / Non Sq Epi: Few / Bacteria: x    CXR: b/l infiltrates, official reading pending     CT ab/plv:  IMPRESSION: No bowel obstruction, evident bowel inflammation or   discernible acute abnormality.

## 2017-07-31 NOTE — H&P ADULT - HISTORY OF PRESENT ILLNESS
72 year old woman with PMH HTN, HLD, Dementia, and preDM presents with complaint of nausea and vomiting with mild abdominal pain which started yesterday and cough for the last few days.  She also complains of fever and chills while at home.  She denies sputum production, wheezing, chest pain, SOB, neurological complaints, diarrhea.    In the ED, CXR read by [facility] as b/l infiltrates, official read pending.  Febrile, leukocytosis with left shift.

## 2017-08-01 DIAGNOSIS — J40 BRONCHITIS, NOT SPECIFIED AS ACUTE OR CHRONIC: ICD-10-CM

## 2017-08-01 DIAGNOSIS — N17.9 ACUTE KIDNEY FAILURE, UNSPECIFIED: ICD-10-CM

## 2017-08-01 DIAGNOSIS — E11.9 TYPE 2 DIABETES MELLITUS WITHOUT COMPLICATIONS: ICD-10-CM

## 2017-08-01 DIAGNOSIS — I50.9 HEART FAILURE, UNSPECIFIED: ICD-10-CM

## 2017-08-01 DIAGNOSIS — A09 INFECTIOUS GASTROENTERITIS AND COLITIS, UNSPECIFIED: ICD-10-CM

## 2017-08-01 DIAGNOSIS — R10.30 LOWER ABDOMINAL PAIN, UNSPECIFIED: ICD-10-CM

## 2017-08-01 LAB — HBA1C BLD-MCNC: 6.6 % — HIGH (ref 4–5.6)

## 2017-08-01 PROCEDURE — 99233 SBSQ HOSP IP/OBS HIGH 50: CPT

## 2017-08-01 RX ORDER — TIOTROPIUM BROMIDE 18 UG/1
1 CAPSULE ORAL; RESPIRATORY (INHALATION) DAILY
Qty: 0 | Refills: 0 | Status: DISCONTINUED | OUTPATIENT
Start: 2017-08-01 | End: 2017-08-04

## 2017-08-01 RX ORDER — OXYCODONE AND ACETAMINOPHEN 5; 325 MG/1; MG/1
1 TABLET ORAL EVERY 4 HOURS
Qty: 0 | Refills: 0 | Status: DISCONTINUED | OUTPATIENT
Start: 2017-08-01 | End: 2017-08-04

## 2017-08-01 RX ORDER — METRONIDAZOLE 500 MG
TABLET ORAL
Qty: 0 | Refills: 0 | Status: DISCONTINUED | OUTPATIENT
Start: 2017-08-01 | End: 2017-08-02

## 2017-08-01 RX ORDER — PANTOPRAZOLE SODIUM 20 MG/1
40 TABLET, DELAYED RELEASE ORAL ONCE
Qty: 0 | Refills: 0 | Status: COMPLETED | OUTPATIENT
Start: 2017-08-01 | End: 2017-08-01

## 2017-08-01 RX ORDER — METRONIDAZOLE 500 MG
500 TABLET ORAL ONCE
Qty: 0 | Refills: 0 | Status: COMPLETED | OUTPATIENT
Start: 2017-08-01 | End: 2017-08-01

## 2017-08-01 RX ORDER — METRONIDAZOLE 500 MG
500 TABLET ORAL EVERY 8 HOURS
Qty: 0 | Refills: 0 | Status: DISCONTINUED | OUTPATIENT
Start: 2017-08-01 | End: 2017-08-02

## 2017-08-01 RX ORDER — ALBUTEROL 90 UG/1
1 AEROSOL, METERED ORAL EVERY 4 HOURS
Qty: 0 | Refills: 0 | Status: DISCONTINUED | OUTPATIENT
Start: 2017-08-01 | End: 2017-08-04

## 2017-08-01 RX ORDER — PANTOPRAZOLE SODIUM 20 MG/1
40 TABLET, DELAYED RELEASE ORAL
Qty: 0 | Refills: 0 | Status: DISCONTINUED | OUTPATIENT
Start: 2017-08-01 | End: 2017-08-04

## 2017-08-01 RX ORDER — LACTOBACILLUS ACIDOPHILUS 100MM CELL
1 CAPSULE ORAL
Qty: 0 | Refills: 0 | Status: DISCONTINUED | OUTPATIENT
Start: 2017-08-01 | End: 2017-08-04

## 2017-08-01 RX ORDER — IPRATROPIUM/ALBUTEROL SULFATE 18-103MCG
3 AEROSOL WITH ADAPTER (GRAM) INHALATION EVERY 6 HOURS
Qty: 0 | Refills: 0 | Status: DISCONTINUED | OUTPATIENT
Start: 2017-08-01 | End: 2017-08-04

## 2017-08-01 RX ORDER — SODIUM CHLORIDE 9 MG/ML
1000 INJECTION, SOLUTION INTRAVENOUS
Qty: 0 | Refills: 0 | Status: DISCONTINUED | OUTPATIENT
Start: 2017-08-01 | End: 2017-08-03

## 2017-08-01 RX ADMIN — OXCARBAZEPINE 300 MILLIGRAM(S): 300 TABLET, FILM COATED ORAL at 19:01

## 2017-08-01 RX ADMIN — HEPARIN SODIUM 5000 UNIT(S): 5000 INJECTION INTRAVENOUS; SUBCUTANEOUS at 17:37

## 2017-08-01 RX ADMIN — Medication 3 MILLILITER(S): at 02:28

## 2017-08-01 RX ADMIN — Medication 100 MILLIGRAM(S): at 05:21

## 2017-08-01 RX ADMIN — CARVEDILOL PHOSPHATE 6.25 MILLIGRAM(S): 80 CAPSULE, EXTENDED RELEASE ORAL at 05:21

## 2017-08-01 RX ADMIN — OXCARBAZEPINE 300 MILLIGRAM(S): 300 TABLET, FILM COATED ORAL at 05:21

## 2017-08-01 RX ADMIN — Medication 650 MILLIGRAM(S): at 23:44

## 2017-08-01 RX ADMIN — AMLODIPINE BESYLATE 10 MILLIGRAM(S): 2.5 TABLET ORAL at 05:21

## 2017-08-01 RX ADMIN — Medication 650 MILLIGRAM(S): at 00:17

## 2017-08-01 RX ADMIN — Medication 100 MILLIGRAM(S): at 02:17

## 2017-08-01 RX ADMIN — OXYCODONE AND ACETAMINOPHEN 1 TABLET(S): 5; 325 TABLET ORAL at 16:07

## 2017-08-01 RX ADMIN — Medication 1: at 11:38

## 2017-08-01 RX ADMIN — Medication 1: at 16:14

## 2017-08-01 RX ADMIN — OXYCODONE AND ACETAMINOPHEN 1 TABLET(S): 5; 325 TABLET ORAL at 09:35

## 2017-08-01 RX ADMIN — SIMVASTATIN 10 MILLIGRAM(S): 20 TABLET, FILM COATED ORAL at 23:51

## 2017-08-01 RX ADMIN — SODIUM CHLORIDE 75 MILLILITER(S): 9 INJECTION, SOLUTION INTRAVENOUS at 11:31

## 2017-08-01 RX ADMIN — Medication 100 MILLIGRAM(S): at 09:14

## 2017-08-01 RX ADMIN — Medication 1 TABLET(S): at 17:30

## 2017-08-01 RX ADMIN — Medication 100 MILLIGRAM(S): at 21:38

## 2017-08-01 RX ADMIN — HEPARIN SODIUM 5000 UNIT(S): 5000 INJECTION INTRAVENOUS; SUBCUTANEOUS at 05:20

## 2017-08-01 RX ADMIN — Medication 3 MILLILITER(S): at 17:16

## 2017-08-01 RX ADMIN — OXYCODONE AND ACETAMINOPHEN 1 TABLET(S): 5; 325 TABLET ORAL at 10:30

## 2017-08-01 RX ADMIN — LISINOPRIL 40 MILLIGRAM(S): 2.5 TABLET ORAL at 05:21

## 2017-08-01 RX ADMIN — HEPARIN SODIUM 5000 UNIT(S): 5000 INJECTION INTRAVENOUS; SUBCUTANEOUS at 21:38

## 2017-08-01 RX ADMIN — Medication 100 MILLIGRAM(S): at 17:35

## 2017-08-01 RX ADMIN — Medication 40 MILLIGRAM(S): at 05:21

## 2017-08-01 RX ADMIN — CARVEDILOL PHOSPHATE 6.25 MILLIGRAM(S): 80 CAPSULE, EXTENDED RELEASE ORAL at 17:30

## 2017-08-01 RX ADMIN — Medication 1 TABLET(S): at 11:34

## 2017-08-01 RX ADMIN — FAMOTIDINE 20 MILLIGRAM(S): 10 INJECTION INTRAVENOUS at 05:21

## 2017-08-01 RX ADMIN — OXYCODONE AND ACETAMINOPHEN 1 TABLET(S): 5; 325 TABLET ORAL at 17:00

## 2017-08-01 RX ADMIN — PANTOPRAZOLE SODIUM 40 MILLIGRAM(S): 20 TABLET, DELAYED RELEASE ORAL at 11:34

## 2017-08-01 NOTE — PROGRESS NOTE ADULT - PROBLEM SELECTOR PLAN 6
check TTE  LVEF low as per Stress test form 2015, also CXR with congestive changes + cardiomegaly  lasix for diuresis  cardio consult Dr. Marie

## 2017-08-01 NOTE — CONSULT NOTE ADULT - SUBJECTIVE AND OBJECTIVE BOX
Patient is a 72y old  Female who presents with a chief complaint of nausea, vomiting,HPI:  72 year old woman with PMH HTN, HLD, Dementia, and preDM presents with complaint of nausea and vomiting with mild abdominal pain which started one day prior to admission. She said she had no cough until she came to ER. In ER it was very cold and she started to develop cough. Denies any shortness of breath patient also had fever in ER      PAST MEDICAL & SURGICAL HISTORY:  Obesity  Borderline diabetes  Diverticulosis  Hyperlipidemia  Seizure  Hypertension  History of appendectomy  No Past Surgical History    FAMILY HISTORY:  No pertinent family history in first degree relatives  Allergies    allergy to [place] (Swelling)  No Known Drug Allergies  [place] (Unknown)    Intolerances  lactose (Stomach Upset)    MEDICATIONS  (STANDING):  lisinopril 40 milliGRAM(s) Oral daily  OXcarbazepine 300 milliGRAM(s) Oral two times a day  simvastatin 10 milliGRAM(s) Oral at bedtime  carvedilol 6.25 milliGRAM(s) Oral every 12 hours  amLODIPine   Tablet 10 milliGRAM(s) Oral daily  levoFLOXacin IVPB 750 milliGRAM(s) IV Intermittent every 24 hours  heparin  Injectable 5000 Unit(s) SubCutaneous every 8 hours  insulin lispro (HumaLOG) corrective regimen sliding scale   SubCutaneous three times a day before meals  insulin lispro (HumaLOG) corrective regimen sliding scale   SubCutaneous at bedtime  dextrose 5%. 1000 milliLiter(s) (50 mL/Hr) IV Continuous <Continuous>  dextrose 50% Injectable 12.5 Gram(s) IV Push once  dextrose 50% Injectable 25 Gram(s) IV Push once  dextrose 50% Injectable 25 Gram(s) IV Push once  furosemide    Tablet 40 milliGRAM(s) Oral daily  pantoprazole    Tablet 40 milliGRAM(s) Oral before breakfast  lactobacillus acidophilus 1 Tablet(s) Oral three times a day with meals  dextrose 5% + sodium chloride 0.9%. 1000 milliLiter(s) (75 mL/Hr) IV Continuous <Continuous>  ALBUTerol/ipratropium for Nebulization 3 milliLiter(s) Nebulizer every 6 hours  ALBUTerol    90 MICROgram(s) HFA Inhaler 1 Puff(s) Inhalation every 4 hours  tiotropium 18 MICROgram(s) Capsule 1 Capsule(s) Inhalation daily  metroNIDAZOLE  IVPB   IV Intermittent   metroNIDAZOLE  IVPB 500 milliGRAM(s) IV Intermittent every 8 hours    MEDICATIONS  (PRN):  acetaminophen   Tablet 650 milliGRAM(s) Oral every 6 hours PRN For Temp greater than 38 C (100.4 F)  metoclopramide Injectable 10 milliGRAM(s) IV Push every 8 hours PRN nausea/vomiting  dextrose Gel 1 Dose(s) Oral once PRN Blood Glucose LESS THAN 70 milliGRAM(s)/deciliter  glucagon  Injectable 1 milliGRAM(s) IntraMuscular once PRN Glucose LESS THAN 70 milligrams/deciliter  guaiFENesin   Syrup  (Sugar-Free) 100 milliGRAM(s) Oral every 6 hours PRN Cough  oxyCODONE    5 mG/acetaminophen 325 mG 1 Tablet(s) Oral every 4 hours PRN Moderate Pain (4 - 6)    ROS  constitutional - denies any fever, weight loss or weight gain , normal appetite   pulmonary- c/o cough ,non productive , no shortness of breath   cardiovascular- denies any chest pain  GI- c/o abdominal pain  Neuro- denies any dizziness or weakness    Vital Signs Last 24 Hrs  T(C): 37.3 (01 Aug 2017 17:04), Max: 38.3 (01 Aug 2017 00:14)  T(F): 99.2 (01 Aug 2017 17:04), Max: 100.9 (01 Aug 2017 00:14)  HR: 52 (01 Aug 2017 17:18) (52 - 69)  BP: 161/61 (01 Aug 2017 17:04) (98/61 - 167/100)  BP(mean): --  RR: 18 (01 Aug 2017 17:04) (17 - 18)  SpO2: 95% (01 Aug 2017 17:18) (95% - 100%)    Physical Exam  patient lying in bed in no respiratory distress  HEENT - EOMI, PERRLA ,neck supple , No JVD  lungs - decreased BS, no wheezing , ronchi , rales at the base  COR- [id number] , no murmer  Abd- soft, BS+, no tenderness, no guarding   ext- ecc neg, good pulses  Neuro - Alert , oriented X3   Skin- No rashes     PTT - ( 30 Jul 2017 20:55 )  PTT:35.2 secCARDIAC MARKERS ( 31 Jul 2017 08:00 )  <.015 ng/mL / x     / x     / x     / x      CARDIAC MARKERS ( 30 Jul 2017 20:55 )  <.015 ng/mL / x     / 85 U/L / x     / <0.5 ng/mL                          11.4   5.1   )-----------( 178      ( 31 Jul 2017 08:00 )             34.4   07-31    138  |  102  |  16  ----------------------------<  150<H>  3.7   |  26  |  1.12    Ca    8.8      31 Jul 2017 08:00    TPro  7.3  /  Alb  3.2<L>  /  TBili  0.4  /  DBili  x   /  AST  18  /  ALT  17  /  AlkPhos  133<H>  07-30  CXR- increase vascular markings

## 2017-08-01 NOTE — DIETITIAN INITIAL EVALUATION ADULT. - NS AS NUTRI INTERV ED CONTENT
Recommended modifications/Nutrition relationship to health/disease/Nutritional counseling/educational materials provided

## 2017-08-01 NOTE — DIETITIAN INITIAL EVALUATION ADULT. - ENERGY NEEDS
Height (cm): 167.64 (07-30)  Weight (kg): 97.5 (07-30)  BMI (kg/m2): 34.7 (07-30)  IBW:  59.1 +/- 10%     % IBW:  165%      UBW: 113.6 kg (unknown time)   %UBW: 86%

## 2017-08-01 NOTE — DIETITIAN INITIAL EVALUATION ADULT. - NS AS NUTRI INTERV MEDICAL AND FOOD SUPPLEMENTS
When medically appropriate, add Glucerna x 2/day (provides 440 kcal, 20 g protein)/Commercial beverage

## 2017-08-01 NOTE — CONSULT NOTE ADULT - SUBJECTIVE AND OBJECTIVE BOX
HPI:  HPI:  72 year old woman with PMH HTN, HLD, Dementia, and preDM presents with complaint of nausea and vomiting with mild abdominal pain which started yesterday and cough for the last few days.  She also complains of fever and chills while at home.  She denies sputum production, wheezing, chest pain, SOB, neurological complaints, diarrhea.    In the ED, CXR read by [facility] as b/l infiltrates, official read pending.  Febrile, leukocytosis with left shift. (2017 06:17)      Chief Complaint:  Patient is a 72y old  Female who presents with a chief complaint of nausea, vomiting, cough (2017 06:17)      Review of Systems:    General:  No wt loss, fevers, chills, night sweats  see above         Social History/Family History  SOCHX:   tobacco,  -  alcohol    FMHX: FA/MO  - contributory       Discussed with:  PMD, Family    Physical Exam:    Vital Signs:  Vital Signs Last 24 Hrs  T(C): 37.3 (01 Aug 2017 17:04), Max: 38.3 (01 Aug 2017 00:14)  T(F): 99.2 (01 Aug 2017 17:04), Max: 100.9 (01 Aug 2017 00:14)  HR: 52 (01 Aug 2017 17:18) (52 - 69)  BP: 161/61 (01 Aug 2017 17:04) (98/61 - 167/100)  BP(mean): --  RR: 18 (01 Aug 2017 17:04) (17 - 18)  SpO2: 95% (01 Aug 2017 17:18) (95% - 100%)  Daily     Daily Weight in k.1 (01 Aug 2017 14:27)  I&O's Summary    01 Aug 2017 07:01  -  01 Aug 2017 19:54  --------------------------------------------------------  IN: 670 mL / OUT: 0 mL / NET: 670 mL      Chest:  min rhonchi BL bases  Cardiovascular:  Regular rhythm, S1, S2, no murmur/rub/S3/S4, no carotid/femoral/abdominal bruit, radial/pedal pulses 2+, no edema  Abdomen:  Soft, non-tender, non-distended, normoactive bowel sounds, no HSM      Laboratory:                          11.4   5.1   )-----------( 178      ( 2017 08:00 )             34.4         138  |  102  |  16  ----------------------------<  150<H>  3.7   |  26  |  1.12    Ca    8.8      2017 08:00    TPro  7.3  /  Alb  3.2<L>  /  TBili  0.4  /  DBili  x   /  AST  18  /  ALT  17  /  AlkPhos  133<H>  07-      CARDIAC MARKERS ( 2017 08:00 )  <.015 ng/mL / x     / x     / x     / x      CARDIAC MARKERS ( 2017 20:55 )  <.015 ng/mL / x     / 85 U/L / x     / <0.5 ng/mL      CAPILLARY BLOOD GLUCOSE  160 (01 Aug 2017 11:37)  115 (01 Aug 2017 08:03)  161 (2017 21:39)        LIVER FUNCTIONS - ( 2017 20:55 )  Alb: 3.2 g/dL / Pro: 7.3 gm/dL / ALK PHOS: 133 U/L / ALT: 17 U/L / AST: 18 U/L / GGT: x           PTT - ( 2017 20:55 )  PTT:35.2 sec  Urinalysis Basic - ( 2017 20:23 )    Color: Yellow / Appearance: Clear / S.005 / pH: x  Gluc: x / Ketone: Negative  / Bili: Negative / Urobili: Negative mg/dL   Blood: x / Protein: 30 mg/dL / Nitrite: Negative   Leuk Esterase: Small / RBC: 6-10 /HPF / WBC 0-2   Sq Epi: x / Non Sq Epi: Few / Bacteria: x          Assessment:  Clinical CHF, congestive changes on CXRY  Stress test 3/2015 with EF 47%, and states ?cardiomyopathy  No SOB, but abd pain,   BNP is low  No echo recorded in chart, echo ordered  Likely acute on chronic diastolic CHF  Medical management appropriate for now  Patient may benefit from cardiac angiogram given her risk factors.   Await echo first to d/w patient

## 2017-08-01 NOTE — PROGRESS NOTE ADULT - PROBLEM SELECTOR PLAN 10
?diarrhea, r/o c- diff  as per flow sheet RN documented on 7/31, " Dr. king notified of 3 LBM" but I was never notified about it , as daughter also concerned about diarrhea, h/o colitis in past as per daughter, and pt with lower abd. pain, will check stool for c-diff and will add on Flagyl empirically.

## 2017-08-01 NOTE — DIETITIAN INITIAL EVALUATION ADULT. - FACTORS AFF FOOD INTAKE
pain/persistent lack of appetite/persistent nausea/other (specify)/mental status - possible depression

## 2017-08-01 NOTE — DIETITIAN INITIAL EVALUATION ADULT. - PERTINENT LABORATORY DATA
07-31 Na138 mmol/L Glu 150 mg/dL<H> K+ 3.7 mmol/L Cr  1.12 mg/dL BUN 16 mg/dL Phos n/a   Alb n/a   PAB n/a, GFR; (8/1) HgbA1c 6.6H

## 2017-08-01 NOTE — PROGRESS NOTE ADULT - ASSESSMENT
72 year old woman with PMH HTN, HLD, Dementia, and  DM HbA1C 6.6 (not on any meds for dm) presents with complaint of nausea and vomiting with mild abdominal pain x 1 day, + cough for the last few days; being admitted and Rx for clinical pna, although CXR only with cardiomegaly and congestive changes ,  Ct abd with LLL atelectasis,  Pt also with supra pubic pain, nausea, ?etiology of abd. pain

## 2017-08-01 NOTE — DIETITIAN INITIAL EVALUATION ADULT. - OTHER INFO
Pt seen for consult BMI > 40 (not correct). Pt lives c granddaughter who does shopping, cooking, & assists c ADL. Pt daughter reports pt Pre-DM/borderline DM?; no meds taken at this time. Elevated A1c (6.6%) places pt in DM category. Wt loss reported as noted but no specific timeframe provided; decreased appetite also noted; pt daughter concerned about possible depression; lost  3 years ago & some close friends recently; SW made aware of situation.  Discussed DM diet c pt & daughter; what are CHOs, progressive nature of disease, importance of SMBG, portion control, whole grains vs. processed foods, etc. Pt made NPO pending cardiology & pulmonary testing. Denies any chew/swallowing difficulty; some N/V continues; medication given.

## 2017-08-01 NOTE — PROGRESS NOTE ADULT - SUBJECTIVE AND OBJECTIVE BOX
CHIEF COMPLAINT/INTERVAL HISTORY:    Patient is a 72y old  Female who presents with a chief complaint of nausea, vomiting, cough (2017 06:17)      HPI:  72 year old woman with PMH HTN, HLD, Dementia, and preDM presents with complaint of nausea and vomiting with mild abdominal pain which started yesterday and cough for the last few days.  She also complains of fever and chills while at home.  She denies sputum production, wheezing, chest pain, SOB, neurological complaints, diarrhea.    In the ED, CXR read by [facility] as b/l infiltrates, official read pending.  Febrile, leukocytosis with left shift. (2017 06:17)      SUBJECTIVE & OBJECTIVE: Pt seen and examined at bedside.  c/o supra pubic pain 810,this am,  completely relieved with percocet, also gives h/o constipation at home, but had 1 soft BM yesterday, + temp. , + cough, + white phelgm but denies sob, h/o 30 smoking, quit . Denies h/o asthma or copd.  Denies vomiting but c/o nausea and choking on food.  As per daughter by bed side,  diarrhea,  but as per patient only 1 soft BM yesterday, no diarrhea.    Vital Signs Last 24 Hrs  T(C): 36.8 (01 Aug 2017 11:49), Max: 38.3 (01 Aug 2017 00:14)  T(F): 98.2 (01 Aug 2017 11:49), Max: 100.9 (01 Aug 2017 00:14)  HR: 56 (01 Aug 2017 11:49) (56 - 69)  BP: 98/61 (01 Aug 2017 11:49) (98/61 - 187/89)  BP(mean): --  ABP: --  ABP(mean): --  RR: 18 (01 Aug 2017 11:49) (16 - 18)  SpO2: 97% (01 Aug 2017 11:49) (97% - 100%)        MEDICATIONS  (STANDING):  lisinopril 40 milliGRAM(s) Oral daily  OXcarbazepine 300 milliGRAM(s) Oral two times a day  simvastatin 10 milliGRAM(s) Oral at bedtime  carvedilol 6.25 milliGRAM(s) Oral every 12 hours  amLODIPine   Tablet 10 milliGRAM(s) Oral daily  docusate sodium 100 milliGRAM(s) Oral two times a day  levoFLOXacin IVPB 750 milliGRAM(s) IV Intermittent every 24 hours  heparin  Injectable 5000 Unit(s) SubCutaneous every 8 hours  insulin lispro (HumaLOG) corrective regimen sliding scale   SubCutaneous three times a day before meals  insulin lispro (HumaLOG) corrective regimen sliding scale   SubCutaneous at bedtime  dextrose 5%. 1000 milliLiter(s) (50 mL/Hr) IV Continuous <Continuous>  dextrose 50% Injectable 12.5 Gram(s) IV Push once  dextrose 50% Injectable 25 Gram(s) IV Push once  dextrose 50% Injectable 25 Gram(s) IV Push once  furosemide    Tablet 40 milliGRAM(s) Oral daily  pantoprazole    Tablet 40 milliGRAM(s) Oral before breakfast  lactobacillus acidophilus 1 Tablet(s) Oral three times a day with meals  dextrose 5% + sodium chloride 0.9%. 1000 milliLiter(s) (75 mL/Hr) IV Continuous <Continuous>  ALBUTerol/ipratropium for Nebulization 3 milliLiter(s) Nebulizer every 6 hours  ALBUTerol    90 MICROgram(s) HFA Inhaler 1 Puff(s) Inhalation every 4 hours  tiotropium 18 MICROgram(s) Capsule 1 Capsule(s) Inhalation daily    MEDICATIONS  (PRN):  oxyCODONE    5 mG/acetaminophen 325 mG 1 Tablet(s) Oral every 8 hours PRN Moderate Pain (4 - 6)  acetaminophen   Tablet 650 milliGRAM(s) Oral every 6 hours PRN For Temp greater than 38 C (100.4 F)  metoclopramide Injectable 10 milliGRAM(s) IV Push every 8 hours PRN nausea/vomiting  dextrose Gel 1 Dose(s) Oral once PRN Blood Glucose LESS THAN 70 milliGRAM(s)/deciliter  glucagon  Injectable 1 milliGRAM(s) IntraMuscular once PRN Glucose LESS THAN 70 milligrams/deciliter  guaiFENesin   Syrup  (Sugar-Free) 100 milliGRAM(s) Oral every 6 hours PRN Cough        PHYSICAL EXAM:    GENERAL: NAD,  well-developed  HEAD:  Atraumatic, Normocephalic  EYES: EOMI, PERRLA, conjunctiva and sclera clear  ENMT: Moist mucous membranes  NECK: Supple, No JVD  NERVOUS SYSTEM:  Alert & Oriented X3 , Motor Strength 5/5 B/L upper and lower extremities;  CHEST/LUNG: Clear to auscultation bilaterally; No rales, rhonchi, wheezing, or rubs  HEART: Regular rate and rhythm;  ABDOMEN: Soft, Nontender, Nondistended; Bowel sounds present  EXTREMITIES:  No cyanosis, or edema    LABS:                        11.4   5.1   )-----------( 178      ( 2017 08:00 )             34.4         138  |  102  |  16  ----------------------------<  150<H>  3.7   |  26  |  1.12    Ca    8.8      2017 08:00    TPro  7.3  /  Alb  3.2<L>  /  TBili  0.4  /  DBili  x   /  AST  18  /  ALT  17  /  AlkPhos  133<H>      PTT - ( 2017 20:55 )  PTT:35.2 sec  Urinalysis Basic - ( 2017 20:23 )    Color: Yellow / Appearance: Clear / S.005 / pH: x  Gluc: x / Ketone: Negative  / Bili: Negative / Urobili: Negative mg/dL   Blood: x / Protein: 30 mg/dL / Nitrite: Negative   Leuk Esterase: Small / RBC: 6-10 /HPF / WBC 0-2   Sq Epi: x / Non Sq Epi: Few / Bacteria: x        CAPILLARY BLOOD GLUCOSE  160 (01 Aug 2017 11:37)  115 (01 Aug 2017 08:03)  161 (2017 21:39)  115 (2017 16:40)    < from: CT Abdomen and Pelvis w/ Oral Cont and w/ IV Cont (17 @ 22:01) >  LOWER CHEST: Linear subsegmental left lower lobe atelectasis.    HEPATOBILIARY: Stable segment 4A hepatic cyst measuring 5.7 x 3.9 x 5 cm   (TR x AP x CC). Liver, gallbladder and bile ducts otherwise within normal   limits.  PANCREAS: Within normal limits.  SPLEEN: Within normal limits.  ADRENALS:Within normal limits.    KIDNEYS/URETERS/BLADDER: Within normal limits.  REPRODUCTIVE ORGANS: Within normal limits.    BOWEL/PERITONEUM: Portions of the gastrointestinal tract are collapsed,   limiting evaluation. No bowel obstruction, inflammation or   pneumoperitoneum.  Appendectomy.    VESSELS:  Within normal limits.  LYMPHATICS/RETROPERITONEUM: No lymphadenopathy. No retroperitoneal   hematoma.      SOFT TISSUES: Within normal limits.  BONES: Within normal limits.    IMPRESSION: No bowel obstruction, evident bowel inflammation or   discernible acute abnormality.

## 2017-08-01 NOTE — DIETITIAN INITIAL EVALUATION ADULT. - NS AS NUTRI INTERV MEALS SNACK
When medically appropriate, advance to solid diet; recommend CCHO c snack + Low Na diets/Carbohydrate - modified diet

## 2017-08-01 NOTE — DIETITIAN INITIAL EVALUATION ADULT. - PERTINENT MEDS FT
Heparin, Levaquin, Norvasc, Coreg, Colace, Pepcid, Lasix, Humalog, Zestril, Reglan, Zocor, Trileptal

## 2017-08-02 ENCOUNTER — TRANSCRIPTION ENCOUNTER (OUTPATIENT)
Age: 72
End: 2017-08-02

## 2017-08-02 DIAGNOSIS — R78.81 BACTEREMIA: ICD-10-CM

## 2017-08-02 DIAGNOSIS — R50.9 FEVER, UNSPECIFIED: ICD-10-CM

## 2017-08-02 DIAGNOSIS — T39.394D: ICD-10-CM

## 2017-08-02 DIAGNOSIS — I10 ESSENTIAL (PRIMARY) HYPERTENSION: ICD-10-CM

## 2017-08-02 LAB
-  CANDIDA ALBICANS: SIGNIFICANT CHANGE UP
-  CANDIDA GLABRATA: SIGNIFICANT CHANGE UP
-  CANDIDA KRUSEI: SIGNIFICANT CHANGE UP
-  CANDIDA PARAPSILOSIS: SIGNIFICANT CHANGE UP
-  CANDIDA TROPICALIS: SIGNIFICANT CHANGE UP
-  COAGULASE NEGATIVE STAPHYLOCOCCUS: SIGNIFICANT CHANGE UP
-  K. PNEUMONIAE GROUP: SIGNIFICANT CHANGE UP
-  KPC RESISTANCE GENE: SIGNIFICANT CHANGE UP
-  STREPTOCOCCUS SP. (NOT GRP A, B OR S PNEUMONIAE): SIGNIFICANT CHANGE UP
A BAUMANNII DNA SPEC QL NAA+PROBE: SIGNIFICANT CHANGE UP
ALBUMIN SERPL ELPH-MCNC: 3.1 G/DL — LOW (ref 3.3–5)
ALP SERPL-CCNC: 109 U/L — SIGNIFICANT CHANGE UP (ref 40–120)
ALT FLD-CCNC: 54 U/L — SIGNIFICANT CHANGE UP (ref 12–78)
ANION GAP SERPL CALC-SCNC: 13 MMOL/L — SIGNIFICANT CHANGE UP (ref 5–17)
AST SERPL-CCNC: 79 U/L — HIGH (ref 15–37)
BILIRUB SERPL-MCNC: 0.5 MG/DL — SIGNIFICANT CHANGE UP (ref 0.2–1.2)
BUN SERPL-MCNC: 29 MG/DL — HIGH (ref 7–23)
CALCIUM SERPL-MCNC: 8.8 MG/DL — SIGNIFICANT CHANGE UP (ref 8.5–10.1)
CHLORIDE SERPL-SCNC: 100 MMOL/L — SIGNIFICANT CHANGE UP (ref 96–108)
CO2 SERPL-SCNC: 25 MMOL/L — SIGNIFICANT CHANGE UP (ref 22–31)
CREAT SERPL-MCNC: 1.58 MG/DL — HIGH (ref 0.5–1.3)
E CLOAC COMP DNA BLD POS QL NAA+PROBE: SIGNIFICANT CHANGE UP
E COLI DNA BLD POS QL NAA+NON-PROBE: SIGNIFICANT CHANGE UP
ENTEROCOC DNA BLD POS QL NAA+NON-PROBE: SIGNIFICANT CHANGE UP
ENTEROCOC DNA BLD POS QL NAA+NON-PROBE: SIGNIFICANT CHANGE UP
GLUCOSE SERPL-MCNC: 175 MG/DL — HIGH (ref 70–99)
GP B STREP DNA BLD POS QL NAA+NON-PROBE: SIGNIFICANT CHANGE UP
HAEM INFLU DNA BLD POS QL NAA+NON-PROBE: SIGNIFICANT CHANGE UP
HCT VFR BLD CALC: 34.4 % — LOW (ref 34.5–45)
HGB BLD-MCNC: 11.2 G/DL — LOW (ref 11.5–15.5)
K OXYTOCA DNA BLD POS QL NAA+NON-PROBE: SIGNIFICANT CHANGE UP
L MONOCYTOG DNA BLD POS QL NAA+NON-PROBE: SIGNIFICANT CHANGE UP
MCHC RBC-ENTMCNC: 24.2 PG — LOW (ref 27–34)
MCHC RBC-ENTMCNC: 32.6 GM/DL — SIGNIFICANT CHANGE UP (ref 32–36)
MCV RBC AUTO: 74 FL — LOW (ref 80–100)
METHOD TYPE: SIGNIFICANT CHANGE UP
MRSA SPEC QL CULT: SIGNIFICANT CHANGE UP
MSSA DNA SPEC QL NAA+PROBE: SIGNIFICANT CHANGE UP
N MEN ISLT CULT: SIGNIFICANT CHANGE UP
P AERUGINOSA DNA BLD POS NAA+NON-PROBE: SIGNIFICANT CHANGE UP
PLATELET # BLD AUTO: 142 K/UL — LOW (ref 150–400)
POTASSIUM SERPL-MCNC: 3.6 MMOL/L — SIGNIFICANT CHANGE UP (ref 3.5–5.3)
POTASSIUM SERPL-SCNC: 3.6 MMOL/L — SIGNIFICANT CHANGE UP (ref 3.5–5.3)
PROCALCITONIN SERPL-MCNC: 0.58 NG/ML — HIGH (ref 0–0.04)
PROT SERPL-MCNC: 7 GM/DL — SIGNIFICANT CHANGE UP (ref 6–8.3)
PROTEUS SP DNA BLD POS QL NAA+NON-PROBE: SIGNIFICANT CHANGE UP
RBC # BLD: 4.64 M/UL — SIGNIFICANT CHANGE UP (ref 3.8–5.2)
RBC # FLD: 15.1 % — HIGH (ref 11–15)
S MARCESCENS DNA BLD POS NAA+NON-PROBE: SIGNIFICANT CHANGE UP
S PNEUM DNA BLD POS QL NAA+NON-PROBE: SIGNIFICANT CHANGE UP
S PYO DNA BLD POS QL NAA+NON-PROBE: SIGNIFICANT CHANGE UP
SODIUM SERPL-SCNC: 138 MMOL/L — SIGNIFICANT CHANGE UP (ref 135–145)
WBC # BLD: 4 K/UL — SIGNIFICANT CHANGE UP (ref 3.8–10.5)
WBC # FLD AUTO: 4 K/UL — SIGNIFICANT CHANGE UP (ref 3.8–10.5)

## 2017-08-02 PROCEDURE — 99223 1ST HOSP IP/OBS HIGH 75: CPT

## 2017-08-02 PROCEDURE — 76856 US EXAM PELVIC COMPLETE: CPT | Mod: 26

## 2017-08-02 PROCEDURE — 99233 SBSQ HOSP IP/OBS HIGH 50: CPT

## 2017-08-02 PROCEDURE — 76700 US EXAM ABDOM COMPLETE: CPT | Mod: 26

## 2017-08-02 RX ORDER — VANCOMYCIN HCL 1 G
1000 VIAL (EA) INTRAVENOUS ONCE
Qty: 0 | Refills: 0 | Status: DISCONTINUED | OUTPATIENT
Start: 2017-08-02 | End: 2017-08-02

## 2017-08-02 RX ORDER — CEFTRIAXONE 500 MG/1
1 INJECTION, POWDER, FOR SOLUTION INTRAMUSCULAR; INTRAVENOUS EVERY 24 HOURS
Qty: 0 | Refills: 0 | Status: DISCONTINUED | OUTPATIENT
Start: 2017-08-03 | End: 2017-08-03

## 2017-08-02 RX ADMIN — AMLODIPINE BESYLATE 10 MILLIGRAM(S): 2.5 TABLET ORAL at 05:28

## 2017-08-02 RX ADMIN — Medication 1 TABLET(S): at 17:08

## 2017-08-02 RX ADMIN — Medication 1: at 13:06

## 2017-08-02 RX ADMIN — Medication 40 MILLIGRAM(S): at 05:28

## 2017-08-02 RX ADMIN — Medication 3 MILLILITER(S): at 00:13

## 2017-08-02 RX ADMIN — Medication 3 MILLILITER(S): at 05:42

## 2017-08-02 RX ADMIN — HEPARIN SODIUM 5000 UNIT(S): 5000 INJECTION INTRAVENOUS; SUBCUTANEOUS at 05:27

## 2017-08-02 RX ADMIN — CARVEDILOL PHOSPHATE 6.25 MILLIGRAM(S): 80 CAPSULE, EXTENDED RELEASE ORAL at 17:08

## 2017-08-02 RX ADMIN — OXYCODONE AND ACETAMINOPHEN 1 TABLET(S): 5; 325 TABLET ORAL at 11:00

## 2017-08-02 RX ADMIN — PANTOPRAZOLE SODIUM 40 MILLIGRAM(S): 20 TABLET, DELAYED RELEASE ORAL at 08:15

## 2017-08-02 RX ADMIN — CARVEDILOL PHOSPHATE 6.25 MILLIGRAM(S): 80 CAPSULE, EXTENDED RELEASE ORAL at 05:28

## 2017-08-02 RX ADMIN — OXCARBAZEPINE 300 MILLIGRAM(S): 300 TABLET, FILM COATED ORAL at 05:28

## 2017-08-02 RX ADMIN — OXYCODONE AND ACETAMINOPHEN 1 TABLET(S): 5; 325 TABLET ORAL at 10:07

## 2017-08-02 RX ADMIN — Medication 1 TABLET(S): at 08:16

## 2017-08-02 RX ADMIN — LISINOPRIL 40 MILLIGRAM(S): 2.5 TABLET ORAL at 05:28

## 2017-08-02 RX ADMIN — HEPARIN SODIUM 5000 UNIT(S): 5000 INJECTION INTRAVENOUS; SUBCUTANEOUS at 13:11

## 2017-08-02 RX ADMIN — OXCARBAZEPINE 300 MILLIGRAM(S): 300 TABLET, FILM COATED ORAL at 17:08

## 2017-08-02 RX ADMIN — SODIUM CHLORIDE 75 MILLILITER(S): 9 INJECTION, SOLUTION INTRAVENOUS at 05:28

## 2017-08-02 RX ADMIN — HEPARIN SODIUM 5000 UNIT(S): 5000 INJECTION INTRAVENOUS; SUBCUTANEOUS at 22:08

## 2017-08-02 RX ADMIN — Medication 3 MILLILITER(S): at 23:16

## 2017-08-02 RX ADMIN — Medication 1 TABLET(S): at 13:09

## 2017-08-02 RX ADMIN — Medication 1: at 08:14

## 2017-08-02 RX ADMIN — Medication 3 MILLILITER(S): at 17:01

## 2017-08-02 RX ADMIN — Medication 100 MILLIGRAM(S): at 05:27

## 2017-08-02 RX ADMIN — SIMVASTATIN 10 MILLIGRAM(S): 20 TABLET, FILM COATED ORAL at 22:08

## 2017-08-02 RX ADMIN — Medication 100 MILLIGRAM(S): at 05:29

## 2017-08-02 NOTE — CONSULT NOTE ADULT - PROBLEM SELECTOR RECOMMENDATION 9
continue diuretics   get ECHO
Avoid NSAIDS medications,   Recommend Protonix IV.  Maalox.  pain control
with N,V,D and abdo pain  with normal WBC  liekly viral   d/c current antibiotics  add rocephin from tomorrow  blood c/s coag neg staph -contaminant  CT abdo -no colitis/diverticulitis etc

## 2017-08-02 NOTE — CONSULT NOTE ADULT - PROBLEM SELECTOR PROBLEM 1
Other congestive heart failure
Gastritis due to nonsteroidal anti-inflammatory drug (NSAID), undetermined intent, subsequent encounter
Fever

## 2017-08-02 NOTE — CONSULT NOTE ADULT - PROBLEM SELECTOR PROBLEM 3
Bronchitis
Type 2 diabetes mellitus without complication, without long-term current use of insulin
Coagulase negative Staphylococcus bacteremia

## 2017-08-02 NOTE — CONSULT NOTE ADULT - PROBLEM SELECTOR RECOMMENDATION 4
patient feeling better   continue Antibiotics as per primary team   Thank you for this consult  will f/u the patient with you
Medical management.
today   follow trend

## 2017-08-02 NOTE — PROGRESS NOTE ADULT - SUBJECTIVE AND OBJECTIVE BOX
Assessment:  Clinical CHF, congestive changes on CXRY  Stress test 3/2015 with EF 47%, and states ?cardiomyopathy  No SOB, but abd pain,   BNP is low  No echo recorded in chart, echo ordered  Likely acute on chronic diastolic CHF  Medical management appropriate for now  Await echo first to d/w patient

## 2017-08-02 NOTE — CONSULT NOTE ADULT - PROBLEM SELECTOR RECOMMENDATION 3
no evidence of Pneumonia on CXR   patient c/o cough - likely brchitis  continue levaquin   patient afebrile now
Medical management.
contaminant  d/c vanco

## 2017-08-02 NOTE — CONSULT NOTE ADULT - PROBLEM SELECTOR RECOMMENDATION 2
weight reduction
recommend Sputum culture.  R/O early Pneumonia.Patient wit + blood culture Gram+ cocci.  ID consult
possible

## 2017-08-02 NOTE — PROGRESS NOTE ADULT - PROBLEM SELECTOR PLAN 9
?diarrhea, r/o c- diff  resolved as per staff  pt started on IV flagyl empirically for ?diarrhea, h/o colitis in past as per daughter but no e/o colitis on CT abd.

## 2017-08-02 NOTE — CONSULT NOTE ADULT - SUBJECTIVE AND OBJECTIVE BOX
Surgeon:  Lokesh Evans MD,DO.    [phone]    Consult requesting by: Sanjuana Read.    HISTORY OF PRESENT ILLNESS:  72y Obese female patient  with multiple co-morbidities came to Hospital  with complaint of abdominal pain, associated with nausea and vomiting and diarrhea for 2 times, she mention one episode of black stool.  Patient  states she visited ER on 7/14 for back pain and was prescribed     PAST MEDICAL & SURGICAL HISTORY:  Obesity  Borderline diabetes  Diverticulosis  Hyperlipidemia  Seizure  Hypertension  History of appendectomy  No Past Surgical History      MEDICATIONS  (STANDING):  lisinopril 40 milliGRAM(s) Oral daily  OXcarbazepine 300 milliGRAM(s) Oral two times a day  simvastatin 10 milliGRAM(s) Oral at bedtime  carvedilol 6.25 milliGRAM(s) Oral every 12 hours  amLODIPine   Tablet 10 milliGRAM(s) Oral daily  levoFLOXacin IVPB 750 milliGRAM(s) IV Intermittent every 24 hours  heparin  Injectable 5000 Unit(s) SubCutaneous every 8 hours  insulin lispro (HumaLOG) corrective regimen sliding scale   SubCutaneous three times a day before meals  insulin lispro (HumaLOG) corrective regimen sliding scale   SubCutaneous at bedtime  dextrose 5%. 1000 milliLiter(s) (50 mL/Hr) IV Continuous <Continuous>  dextrose 50% Injectable 12.5 Gram(s) IV Push once  dextrose 50% Injectable 25 Gram(s) IV Push once  dextrose 50% Injectable 25 Gram(s) IV Push once  furosemide    Tablet 40 milliGRAM(s) Oral daily  pantoprazole    Tablet 40 milliGRAM(s) Oral before breakfast  lactobacillus acidophilus 1 Tablet(s) Oral three times a day with meals  dextrose 5% + sodium chloride 0.9%. 1000 milliLiter(s) (75 mL/Hr) IV Continuous <Continuous>  ALBUTerol/ipratropium for Nebulization 3 milliLiter(s) Nebulizer every 6 hours  ALBUTerol    90 MICROgram(s) HFA Inhaler 1 Puff(s) Inhalation every 4 hours  tiotropium 18 MICROgram(s) Capsule 1 Capsule(s) Inhalation daily  metroNIDAZOLE  IVPB   IV Intermittent   metroNIDAZOLE  IVPB 500 milliGRAM(s) IV Intermittent every 8 hours    MEDICATIONS  (PRN):  acetaminophen   Tablet 650 milliGRAM(s) Oral every 6 hours PRN For Temp greater than 38 C (100.4 F)  metoclopramide Injectable 10 milliGRAM(s) IV Push every 8 hours PRN nausea/vomiting  dextrose Gel 1 Dose(s) Oral once PRN Blood Glucose LESS THAN 70 milliGRAM(s)/deciliter  glucagon  Injectable 1 milliGRAM(s) IntraMuscular once PRN Glucose LESS THAN 70 milligrams/deciliter  guaiFENesin   Syrup  (Sugar-Free) 100 milliGRAM(s) Oral every 6 hours PRN Cough  oxyCODONE    5 mG/acetaminophen 325 mG 1 Tablet(s) Oral every 4 hours PRN Moderate Pain (4 - 6)    Antiplatelet therapy:                               Allergies    allergy to Lobster (Swelling)  No Known Drug Allergies  seaford (Unknown)    Intolerances    lactose (Stomach Upset)      SOCIAL HISTORY:  Smoker: [ ] Yes  [ ] No        PACK YEARS:                           ETOH use: [ ] Yes  [ ] No              FREQUENCY / QUANTITY:  elicit Drug use:  [ ] Yes  [ ] No  Occupation:  FAMILY HISTORY:  No pertinent family history in first degree relatives        REVIEW OF SYSTEMS:  CONSTITUTIONAL: No fever, weight loss, or fatigue  EYES: No eye pain, visual disturbances, or discharge  ENT:  No difficulty hearing, tinnitus, vertigo; No sinus or throat pain  NECK: No pain or stiffness  BREASTS: No pain, masses, or nipple discharge  RESPIRATORY: No cough, wheezing, chills or hemoptysis; No shortness of breath  CARDIOVASCULAR: No chest pain, palpitations, dizziness, or leg swelling  GASTROINTESTINAL: No abdominal or epigastric pain. No nausea, vomiting, or hematemesis; No diarrhea or constipation. No melena or hematochezia.  GENITOURINARY: No dysuria, frequency, hematuria, or incontinence  NEUROLOGICAL: No headaches, memory loss, loss of strength, numbness, or tremors  SKIN: No itching, burning, rashes, or lesions   LYMPH NODES: No enlarged glands  ENDOCRINE: No heat or cold intolerance; No hair loss  MUSCULOSKELETAL: No joint pain or swelling; No muscle, back, or extremity pain  PSYCHIATRIC: No depression, anxiety, mood swings, or difficulty sleeping  HEME/LYMPH: No easy bruising, or bleeding gums  ALLERGY AND IMMUNOLOGIC: No hives or eczema    PHYSICAL EXAM  Vital Signs Last 24 Hrs  T(C): 37.4 (02 Aug 2017 11:00), Max: 38.1 (01 Aug 2017 23:12)  T(F): 99.4 (02 Aug 2017 11:00), Max: 100.5 (01 Aug 2017 23:12)  HR: 60 (02 Aug 2017 11:00) (52 - 63)  BP: 148/78 (02 Aug 2017 11:00) (127/87 - 161/61)  BP(mean): --  RR: 17 (02 Aug 2017 11:00) (17 - 18)  SpO2: 100% (02 Aug 2017 11:00) (95% - 100%)  I&O's Summary    01 Aug 2017 07:01  -  02 Aug 2017 07:00  --------------------------------------------------------  IN: 1770 mL / OUT: 0 mL / NET: 1770 mL      General: Patient alert. Oriented, on NAD  HEENT: Normo cephalus, Conjuntiva anicteric, No JVD, Neck supple.  LUNGS: CTA B/L, no rales ,no wheezing  HEART: S1 S2 RRR, normal PMI  ABDOMEN:   RECTAL:   EXTREMITIES:  NEURO:    LABS:                        11.2   4.0   )-----------( 142      ( 02 Aug 2017 06:38 )             34.4     08-02    138  |  100  |  29<H>  ----------------------------<  175<H>  3.6   |  25  |  1.58<H>    Ca    8.8      02 Aug 2017 06:38    TPro  7.0  /  Alb  3.1<L>  /  TBili  0.5  /  DBili  x   /  AST  79<H>  /  ALT  54  /  AlkPhos  109  08-02        LIVER FUNCTIONS - ( 02 Aug 2017 06:38 )  Alb: 3.1 g/dL / Pro: 7.0 gm/dL / ALK PHOS: 109 U/L / ALT: 54 U/L / AST: 79 U/L / GGT: x             RADIOLOGY:    HEALTH ISSUES - PROBLEM Dx:  Bronchitis: Bronchitis  Diarrhea of infectious origin: Diarrhea of infectious origin  Lower abdominal pain: Lower abdominal pain  JOSE LUIS (acute kidney injury): JOSE LUIS (acute kidney injury)  Type 2 diabetes mellitus without complication, without long-term current use of insulin: Type 2 diabetes mellitus without complication, without long-term current use of insulin  Other congestive heart failure: Other congestive heart failure  Preventive measure: Preventive measure  Non morbid obesity due to excess calories: Non morbid obesity due to excess calories  Borderline diabetes: Borderline diabetes  Mixed hyperlipidemia: Mixed hyperlipidemia  Primary hypertension: Primary hypertension  Non-intractable vomiting with nausea, unspecified vomiting type: Non-intractable vomiting with nausea, unspecified vomiting type  Pneumonia of both lungs due to infectious organism, unspecified part of lung: Pneumonia of both lungs due to infectious organism, unspecified part of lung Surgeon:  Lokesh Evans MD,DO.    [phone]    Consult requesting by: Sanjuana Read.    HISTORY OF PRESENT ILLNESS:    72y Obese female patient  with multiple co-morbidities came to Hospital  with complaint of abdominal pain, associated with nausea and vomiting and diarrhea for 2 times, she mention one episode of black stool.  Patient  states she visited ER on 7/14 for back pain and was prescribed Ibuprofen 800 mg TID for 7 days and muscle relaxant medication.  She was feeling stomach upset since, abdominal pain after meal with nausea.  Denies fever or chills, pain has improved and has no more diarrhea.  Patient also mention non productive cough for few days.    PAST MEDICAL & SURGICAL HISTORY:  Obesity  DM, taking Metformin  Diverticulosis. Admitted to Hospital before due to Bleeding diverticular disease.  Last Colonoscopy two  years ago  Hyperlipidemia  Seizure  Hypertension.    History of appendectomy        MEDICATIONS  (STANDING):  lisinopril 40 milliGRAM(s) Oral daily  OXcarbazepine 300 milliGRAM(s) Oral two times a day  simvastatin 10 milliGRAM(s) Oral at bedtime  carvedilol 6.25 milliGRAM(s) Oral every 12 hours  amLODIPine   Tablet 10 milliGRAM(s) Oral daily  levoFLOXacin IVPB 750 milliGRAM(s) IV Intermittent every 24 hours  heparin  Injectable 5000 Unit(s) SubCutaneous every 8 hours  insulin lispro (HumaLOG) corrective regimen sliding scale   SubCutaneous three times a day before meals  insulin lispro (HumaLOG) corrective regimen sliding scale   SubCutaneous at bedtime  dextrose 5%. 1000 milliLiter(s) (50 mL/Hr) IV Continuous <Continuous>  dextrose 50% Injectable 12.5 Gram(s) IV Push once  dextrose 50% Injectable 25 Gram(s) IV Push once  dextrose 50% Injectable 25 Gram(s) IV Push once  furosemide    Tablet 40 milliGRAM(s) Oral daily  pantoprazole    Tablet 40 milliGRAM(s) Oral before breakfast  lactobacillus acidophilus 1 Tablet(s) Oral three times a day with meals  dextrose 5% + sodium chloride 0.9%. 1000 milliLiter(s) (75 mL/Hr) IV Continuous <Continuous>  ALBUTerol/ipratropium for Nebulization 3 milliLiter(s) Nebulizer every 6 hours  ALBUTerol    90 MICROgram(s) HFA Inhaler 1 Puff(s) Inhalation every 4 hours  tiotropium 18 MICROgram(s) Capsule 1 Capsule(s) Inhalation daily  metroNIDAZOLE  IVPB   IV Intermittent   metroNIDAZOLE  IVPB 500 milliGRAM(s) IV Intermittent every 8 hours    MEDICATIONS  (PRN):  acetaminophen   Tablet 650 milliGRAM(s) Oral every 6 hours PRN For Temp greater than 38 C (100.4 F)  metoclopramide Injectable 10 milliGRAM(s) IV Push every 8 hours PRN nausea/vomiting  dextrose Gel 1 Dose(s) Oral once PRN Blood Glucose LESS THAN 70 milliGRAM(s)/deciliter  glucagon  Injectable 1 milliGRAM(s) IntraMuscular once PRN Glucose LESS THAN 70 milligrams/deciliter  guaiFENesin   Syrup  (Sugar-Free) 100 milliGRAM(s) Oral every 6 hours PRN Cough  oxyCODONE    5 mG/acetaminophen 325 mG 1 Tablet(s) Oral every 4 hours PRN Moderate Pain (4 - 6)                            Allergies    allergy to Lobster (Swelling)  No Known Drug Allergies  seaford (Unknown)    Intolerances    lactose (Stomach Upset)      SOCIAL HISTORY:  Smoker: [ ] Yes  [ x] No        PACK YEARS:                           ETOH use: [ ] Yes  [x ] No              FREQUENCY / QUANTITY:  elicit Drug use:  [ ] Yes  x[ ] No  Occupation: retired  FAMILY HISTORY:  No pertinent family history in first degree relatives        REVIEW OF SYSTEMS:    CONSTITUTIONAL: No fever, weight loss, or fatigue,   EYES: No eye pain, visual disturbances, or discharge  ENT:  No difficulty hearing, tinnitus, vertigo; No sinus or throat pain  NECK: No pain or stiffness  BREASTS: No pain, masses, or nipple discharge  RESPIRATORY:  + cough, no wheezing, chills or hemoptysis; No shortness of breath  CARDIOVASCULAR: No chest pain, palpitations, dizziness, or leg swelling  GASTROINTESTINAL:  + abdominal epigastric and michael-umbilical pain. + nausea, + vomiting, no hematemesis;  + diarrhea.  ?? Melena ,  no hematochezia.  GENITOURINARY: No dysuria, frequency, hematuria, or incontinence  NEUROLOGICAL: No headaches, memory loss, loss of strength, numbness, or tremors  SKIN: No itching, burning, rashes, or lesions   LYMPH NODES: No enlarged glands  ENDOCRINE: No heat or cold intolerance; No hair loss  MUSCULOSKELETAL: No joint pain or swelling; + muscle, + back pain, no extremity pain  PSYCHIATRIC: No depression, anxiety, mood swings, or difficulty sleeping  HEME/LYMPH: No easy bruising, or bleeding gums  ALLERGY AND IMMUNOLOGIC: No hives or eczema    PHYSICAL EXAM  Vital Signs Last 24 Hrs  T(C): 37.4 (02 Aug 2017 11:00), Max: 38.1 (01 Aug 2017 23:12)  T(F): 99.4 (02 Aug 2017 11:00), Max: 100.5 (01 Aug 2017 23:12)  HR: 60 (02 Aug 2017 11:00) (52 - 63)  BP: 148/78 (02 Aug 2017 11:00) (127/87 - 161/61)  BP(mean): --  RR: 17 (02 Aug 2017 11:00) (17 - 18)  SpO2: 100% (02 Aug 2017 11:00) (95% - 100%)  I&O's Summary    01 Aug 2017 07:01  -  02 Aug 2017 07:00  --------------------------------------------------------  IN: 1770 mL / OUT: 0 mL / NET: 1770 mL      GENERAL :  Patient alert. Oriented, Obese patient on NAD  HEENT: Normo cephalus, Conjunctiva  anicteric, No JVD, Neck supple.  LUNGS: CTA B/L, no rales ,no wheezing  HEART: S1 S2 RRR, normal PMI  ABDOMEN: Obese, soft, + normoactive BS, Mild discomfort, tenderness at epigastrium, no guarding, no rebound, no Organomegaly.  RECTAL: Patient refused.  EXTREMITIES: No edema, no calf tenderness,  NEURO: AxAxOx 3    LABS:                        11.2   4.0   )-----------( 142      ( 02 Aug 2017 06:38 )             34.4     08-02    138  |  100  |  29<H>  ----------------------------<  175<H>  3.6   |  25  |  1.58<H>    Ca    8.8      02 Aug 2017 06:38    TPro  7.0  /  Alb  3.1<L>  /  TBili  0.5  /  DBili  x   /  AST  79<H>  /  ALT  54  /  AlkPhos  109  08-02        LIVER FUNCTIONS - ( 02 Aug 2017 06:38 )  Alb: 3.1 g/dL / Pro: 7.0 gm/dL / ALK PHOS: 109 U/L / ALT: 54 U/L / AST: 79 U/L / GGT: x             RADIOLOGY:  < from: CT Abdomen and Pelvis w/ Oral Cont and w/ IV Cont (07.30.17 @ 22:01) >  EXAM:  CT ABDOMEN AND PELVIS OC IC                            PROCEDURE DATE:  07/30/2017          INTERPRETATION:  CLINICAL INFORMATION: Abdominal pain.    TECHNIQUE: Contrast enhanced CT of the abdomen and pelvis was performed   with coronal and sagittal reformats. 96 cc Omnipaque 350 were   administered intravenously and 4 cc were discarded. Omnipaque 300 was   administered orally.     COMPARISON: CT abdomen and pelvis 4/8/2017.    FINDINGS:    LOWER CHEST: Linear subsegmental left lower lobe atelectasis.    HEPATOBILIARY: Stable segment 4A hepatic cyst measuring 5.7 x 3.9 x 5 cm   (TR x AP x CC). Liver, gallbladder and bile ducts otherwise within normal   limits.  PANCREAS: Within normal limits.  SPLEEN: Within normal limits.  ADRENALS:Within normal limits.    KIDNEYS/URETERS/BLADDER: Within normal limits.  REPRODUCTIVE ORGANS: Within normal limits.    BOWEL/PERITONEUM: Portions of the gastrointestinal tract are collapsed,   limiting evaluation. No bowel obstruction, inflammation or   pneumoperitoneum.  Appendectomy.    VESSELS:  Within normal limits.  LYMPHATICS/RETROPERITONEUM: No lymphadenopathy. No retroperitoneal   hematoma.      SOFT TISSUES: Within normal limits.  BONES: Within normal limits.    IMPRESSION: No bowel obstruction, evident bowel inflammation or   discernible acute abnormality.      PHILLIP SHIELDS M.D., ATTENDING RADIOLOGIST  This document has been electronically signed. Jul 30 2017 10:40PM        < from: Xray Chest 1 View AP/PA. (07.30.17 @ 20:00) >  EXAM:  CHEST SINGLE VIEW                            PROCEDURE DATE:  07/30/2017          INTERPRETATION:  cough      A single portable radiograph suggests cardiomegaly, although there may be   magnification from technique..     No acute congestive changes are seen.     Interstitial markings are increased    No pleural fluid is evident.     The osseous structures are grossly intact.    IMPRESSION:    Increased interstitial markings. Cardiomegaly..      BRENTON SALSE M.D., ATTENDING RADIOLOGIST  This document has been electronically signed. Jul 31 2017  7:53AM        < end of copied text >          HEALTH ISSUES - PROBLEM Dx:  Bronchitis: Bronchitis  Diarrhea of infectious origin: Diarrhea of infectious origin  Lower abdominal pain: Lower abdominal pain  JOSE LUIS (acute kidney injury): JOSE LUIS (acute kidney injury)  Type 2 diabetes mellitus without complication, without long-term current use of insulin: Type 2 diabetes mellitus without complication, without long-term current use of insulin  Other congestive heart failure: Other congestive heart failure  Preventive measure: Preventive measure  Non morbid obesity due to excess calories: Non morbid obesity due to excess calories  Borderline diabetes: Borderline diabetes  Mixed hyperlipidemia: Mixed hyperlipidemia  Primary hypertension: Primary hypertension  Non-intractable vomiting with nausea, unspecified vomiting type: Non-intractable vomiting with nausea, unspecified vomiting type  Pneumonia of both lungs due to infectious organism, unspecified part of lung: Pneumonia of both lungs due to infectious organism, unspecified part of lung

## 2017-08-02 NOTE — PROGRESS NOTE ADULT - SUBJECTIVE AND OBJECTIVE BOX
CHIEF COMPLAINT/INTERVAL HISTORY:    Patient is a 72y old  Female who presents with a chief complaint of nausea, vomiting, cough (31 Jul 2017 06:17)      HPI:  72 year old woman with PMH HTN, HLD, Dementia, and preDM presents with complaint of nausea and vomiting with mild abdominal pain which started yesterday and cough for the last few days.  She also complains of fever and chills while at home.  She denies sputum production, wheezing, chest pain, SOB, neurological complaints, diarrhea.    In the ED, CXR read by ileana as b/l infiltrates, official read pending.  Febrile, leukocytosis with left shift. (31 Jul 2017 06:17)      SUBJECTIVE & OBJECTIVE: Pt seen and examined at bedside.  c/o upper abdominal pain, crying and moaning due to pain,  No evidence of SOB.  As per RN no LBM since yesterday.  + temp spikes.     Vital Signs Last 24 Hrs  T(C): 37.4 (02 Aug 2017 11:00), Max: 38.1 (01 Aug 2017 23:12)  T(F): 99.4 (02 Aug 2017 11:00), Max: 100.5 (01 Aug 2017 23:12)  HR: 60 (02 Aug 2017 11:00) (52 - 63)  BP: 148/78 (02 Aug 2017 11:00) (127/87 - 161/61)  BP(mean): --  ABP: --  ABP(mean): --  RR: 17 (02 Aug 2017 11:00) (17 - 18)  SpO2: 100% (02 Aug 2017 11:00) (95% - 100%)        MEDICATIONS  (STANDING):  lisinopril 40 milliGRAM(s) Oral daily  OXcarbazepine 300 milliGRAM(s) Oral two times a day  simvastatin 10 milliGRAM(s) Oral at bedtime  carvedilol 6.25 milliGRAM(s) Oral every 12 hours  amLODIPine   Tablet 10 milliGRAM(s) Oral daily  levoFLOXacin IVPB 750 milliGRAM(s) IV Intermittent every 24 hours  heparin  Injectable 5000 Unit(s) SubCutaneous every 8 hours  insulin lispro (HumaLOG) corrective regimen sliding scale   SubCutaneous three times a day before meals  insulin lispro (HumaLOG) corrective regimen sliding scale   SubCutaneous at bedtime  dextrose 5%. 1000 milliLiter(s) (50 mL/Hr) IV Continuous <Continuous>  dextrose 50% Injectable 12.5 Gram(s) IV Push once  dextrose 50% Injectable 25 Gram(s) IV Push once  dextrose 50% Injectable 25 Gram(s) IV Push once  furosemide    Tablet 40 milliGRAM(s) Oral daily  pantoprazole    Tablet 40 milliGRAM(s) Oral before breakfast  lactobacillus acidophilus 1 Tablet(s) Oral three times a day with meals  dextrose 5% + sodium chloride 0.9%. 1000 milliLiter(s) (75 mL/Hr) IV Continuous <Continuous>  ALBUTerol/ipratropium for Nebulization 3 milliLiter(s) Nebulizer every 6 hours  ALBUTerol    90 MICROgram(s) HFA Inhaler 1 Puff(s) Inhalation every 4 hours  tiotropium 18 MICROgram(s) Capsule 1 Capsule(s) Inhalation daily  metroNIDAZOLE  IVPB   IV Intermittent   metroNIDAZOLE  IVPB 500 milliGRAM(s) IV Intermittent every 8 hours    MEDICATIONS  (PRN):  acetaminophen   Tablet 650 milliGRAM(s) Oral every 6 hours PRN For Temp greater than 38 C (100.4 F)  metoclopramide Injectable 10 milliGRAM(s) IV Push every 8 hours PRN nausea/vomiting  dextrose Gel 1 Dose(s) Oral once PRN Blood Glucose LESS THAN 70 milliGRAM(s)/deciliter  glucagon  Injectable 1 milliGRAM(s) IntraMuscular once PRN Glucose LESS THAN 70 milligrams/deciliter  guaiFENesin   Syrup  (Sugar-Free) 100 milliGRAM(s) Oral every 6 hours PRN Cough  oxyCODONE    5 mG/acetaminophen 325 mG 1 Tablet(s) Oral every 4 hours PRN Moderate Pain (4 - 6)        PHYSICAL EXAM:      GENERAL: NAD,  well-developed  HEAD:  Atraumatic, Normocephalic  EYES: EOMI, PERRLA, conjunctiva and sclera clear  ENMT: Moist mucous membranes  NECK: Supple, No JVD  NERVOUS SYSTEM:  Alert & Oriented X3 , Motor Strength 5/5 B/L upper and lower extremities;  CHEST/LUNG: Clear to auscultation bilaterally; No rales, rhonchi, wheezing, or rubs  HEART: Regular rate and rhythm;  ABDOMEN: Soft, Nontender, Nondistended; Bowel sounds present  EXTREMITIES:  No cyanosis, or edema      LABS:                        11.2   4.0   )-----------( 142      ( 02 Aug 2017 06:38 )             34.4     08-02    138  |  100  |  29<H>  ----------------------------<  175<H>  3.6   |  25  |  1.58<H>    Ca    8.8      02 Aug 2017 06:38    TPro  7.0  /  Alb  3.1<L>  /  TBili  0.5  /  DBili  x   /  AST  79<H>  /  ALT  54  /  AlkPhos  109  08-02          CAPILLARY BLOOD GLUCOSE  160 (02 Aug 2017 08:13)  130 (01 Aug 2017 21:36)

## 2017-08-02 NOTE — PROGRESS NOTE ADULT - ASSESSMENT
72 year old woman with PMH HTN, HLD, Dementia, and  DM HbA1C 6.6 (not on any meds for dm) presents with complaint of nausea and vomiting with mild abdominal pain x 1 day, + cough for the last few days;  admitted and Rx for clinical pna, although CXR only with cardiomegaly and congestive changes , likely bronchitis, Ct abd with LLL atelectasis,  Pt also with supra pubic pain, nausea, ?etiology of abd. pain, now with upper abd. pain ?likely gastritis

## 2017-08-02 NOTE — CHART NOTE - NSCHARTNOTEFT_GEN_A_CORE
Requested by RN to place IV brigitte patient. Pt difficult stick, unsuccessful attempted by RNs.   Pt tolerated procedure well. No hematoma or bleeding noted.   However, unable to get the IV brigitte  Pt and family agreed to take po fluids for now. diet restarted and pt tolerating well.

## 2017-08-02 NOTE — CONSULT NOTE ADULT - SUBJECTIVE AND OBJECTIVE BOX
Infectious Diseases - Attending at Dr. Carmichael    HPI:  72 year old woman with PMH HTN, HLD, Dementia, and preDM presents with complaint of nausea and vomiting (denied to me  but said yes on admission )with mild abdominal pain(lower abdomen )which started 5 days prior and fever for 4 days She denies sputum production, wheezing, chest pain, SOB, neurological complaints, also had two episodes of diarrhea which has resolved now. Abdo pain is much better today.ongoing shotrness of breath since february .Was taking NSAIDS and MR for back pain prior to admission     In the ED, CXR read by ileana as b/l infiltrates, official read pending.  Febrile, leukocytosis with left shift. (31 Jul 2017 06:17)      PAST MEDICAL & SURGICAL HISTORY:  Obesity  Borderline diabetes  Diverticulosis  Hyperlipidemia  Seizure  Hypertension  History of appendectomy  No Past Surgical History      Allergies    allergy to Lobster (Swelling)  No Known Drug Allergies  seafood (Unknown)    Intolerances    lactose (Stomach Upset)      FAMILY HISTORY:  No pertinent family history in first degree relatives      SOCIAL HISTORY:  no smoker  REVIEW OF SYSTEMS:    Constitutional: No fever, weight loss or fatigue  Eyes: No eye pain, visual disturbances, or discharge  ENT:  No difficulty hearing, tinnitus, vertigo; No sinus or throat pain  Neck: No pain or stiffness  Respiratory: No cough, wheezing, chills or hemoptysis  Cardiovascular: No chest pain, palpitations, shortness of breath, dizziness or leg swelling  Gastrointestinal: Lower abdo pain + ? nausea, vomiting + diarrhea (2 -3 episodes)No melena or hematochezia.  Genitourinary: No dysuria, frequency, hematuria or incontinence  Neurological: No headaches, memory loss, loss of strength, numbness or tremors  Skin: No itching, burning, rashes or lesions       MEDICATIONS  (STANDING):  lisinopril 40 milliGRAM(s) Oral daily  OXcarbazepine 300 milliGRAM(s) Oral two times a day  simvastatin 10 milliGRAM(s) Oral at bedtime  carvedilol 6.25 milliGRAM(s) Oral every 12 hours  amLODIPine   Tablet 10 milliGRAM(s) Oral daily  heparin  Injectable 5000 Unit(s) SubCutaneous every 8 hours  insulin lispro (HumaLOG) corrective regimen sliding scale   SubCutaneous three times a day before meals  insulin lispro (HumaLOG) corrective regimen sliding scale   SubCutaneous at bedtime  dextrose 5%. 1000 milliLiter(s) (50 mL/Hr) IV Continuous <Continuous>  dextrose 50% Injectable 12.5 Gram(s) IV Push once  dextrose 50% Injectable 25 Gram(s) IV Push once  dextrose 50% Injectable 25 Gram(s) IV Push once  furosemide    Tablet 40 milliGRAM(s) Oral daily  pantoprazole    Tablet 40 milliGRAM(s) Oral before breakfast  lactobacillus acidophilus 1 Tablet(s) Oral three times a day with meals  dextrose 5% + sodium chloride 0.9%. 1000 milliLiter(s) (75 mL/Hr) IV Continuous <Continuous>  ALBUTerol/ipratropium for Nebulization 3 milliLiter(s) Nebulizer every 6 hours  ALBUTerol    90 MICROgram(s) HFA Inhaler 1 Puff(s) Inhalation every 4 hours  tiotropium 18 MICROgram(s) Capsule 1 Capsule(s) Inhalation daily    MEDICATIONS  (PRN):  acetaminophen   Tablet 650 milliGRAM(s) Oral every 6 hours PRN For Temp greater than 38 C (100.4 F)  metoclopramide Injectable 10 milliGRAM(s) IV Push every 8 hours PRN nausea/vomiting  dextrose Gel 1 Dose(s) Oral once PRN Blood Glucose LESS THAN 70 milliGRAM(s)/deciliter  glucagon  Injectable 1 milliGRAM(s) IntraMuscular once PRN Glucose LESS THAN 70 milligrams/deciliter  guaiFENesin   Syrup  (Sugar-Free) 100 milliGRAM(s) Oral every 6 hours PRN Cough  oxyCODONE    5 mG/acetaminophen 325 mG 1 Tablet(s) Oral every 4 hours PRN Moderate Pain (4 - 6)  aluminum hydroxide/magnesium hydroxide/simethicone Suspension 30 milliLiter(s) Oral every 6 hours PRN Dyspepsia      Vital Signs Last 24 Hrs  T(C): 37.4 (02 Aug 2017 11:00), Max: 38.1 (01 Aug 2017 23:12)  T(F): 99.4 (02 Aug 2017 11:00), Max: 100.5 (01 Aug 2017 23:12)  HR: 60 (02 Aug 2017 11:00) (52 - 63)  BP: 148/78 (02 Aug 2017 11:00) (127/87 - 161/61)  BP(mean): --  RR: 17 (02 Aug 2017 11:00) (17 - 18)  SpO2: 100% (02 Aug 2017 11:00) (95% - 100%)    PHYSICAL EXAM:    Constitutional: NAD, well-groomed, well-developed  HEENT: PERRLA, EOMI, Normal Hearing, MMM  Neck: No LAD, No JVD  Back: Normal spine flexure, No CVA tenderness  Respiratory: CTAB/L  Cardiovascular: S1 and S2, RRR, no M/G/R  Gastrointestinal: BS+, soft, NT/ND  Extremities: No peripheral edema  Vascular: 2+ peripheral pulses  Neurological: A/O x 3, no focal deficits  Skin: No rashes      LABS:                        11.2   4.0   )-----------( 142      ( 02 Aug 2017 06:38 )             34.4     08-02    138  |  100  |  29<H>  ----------------------------<  175<H>  3.6   |  25  |  1.58<H>    Ca    8.8      02 Aug 2017 06:38    TPro  7.0  /  Alb  3.1<L>  /  TBili  0.5  /  DBili  x   /  AST  79<H>  /  ALT  54  /  AlkPhos  109  08-02          MICROBIOLOGY:  RECENT CULTURES:    Blood culture 1/4 coag neg staph    RADIOLOGY & ADDITIONAL STUDIES:  Ct reviewed

## 2017-08-02 NOTE — CONSULT NOTE ADULT - PROBLEM SELECTOR PROBLEM 2
Non morbid obesity due to excess calories
Bronchitis
Gastritis due to nonsteroidal anti-inflammatory drug (NSAID), undetermined intent, subsequent encounter

## 2017-08-03 ENCOUNTER — TRANSCRIPTION ENCOUNTER (OUTPATIENT)
Age: 72
End: 2017-08-03

## 2017-08-03 DIAGNOSIS — I50.23 ACUTE ON CHRONIC SYSTOLIC (CONGESTIVE) HEART FAILURE: ICD-10-CM

## 2017-08-03 LAB
ANION GAP SERPL CALC-SCNC: 10 MMOL/L — SIGNIFICANT CHANGE UP (ref 5–17)
BUN SERPL-MCNC: 25 MG/DL — HIGH (ref 7–23)
CALCIUM SERPL-MCNC: 8.7 MG/DL — SIGNIFICANT CHANGE UP (ref 8.5–10.1)
CHLORIDE SERPL-SCNC: 98 MMOL/L — SIGNIFICANT CHANGE UP (ref 96–108)
CO2 SERPL-SCNC: 27 MMOL/L — SIGNIFICANT CHANGE UP (ref 22–31)
CREAT SERPL-MCNC: 1.46 MG/DL — HIGH (ref 0.5–1.3)
CULTURE RESULTS: SIGNIFICANT CHANGE UP
GLUCOSE SERPL-MCNC: 190 MG/DL — HIGH (ref 70–99)
GRAM STN FLD: SIGNIFICANT CHANGE UP
HCT VFR BLD CALC: 35.6 % — SIGNIFICANT CHANGE UP (ref 34.5–45)
HGB BLD-MCNC: 11.3 G/DL — LOW (ref 11.5–15.5)
MCHC RBC-ENTMCNC: 23.2 PG — LOW (ref 27–34)
MCHC RBC-ENTMCNC: 31.8 GM/DL — LOW (ref 32–36)
MCV RBC AUTO: 73 FL — LOW (ref 80–100)
ORGANISM # SPEC MICROSCOPIC CNT: SIGNIFICANT CHANGE UP
ORGANISM # SPEC MICROSCOPIC CNT: SIGNIFICANT CHANGE UP
PLATELET # BLD AUTO: 151 K/UL — SIGNIFICANT CHANGE UP (ref 150–400)
POTASSIUM SERPL-MCNC: 3.4 MMOL/L — LOW (ref 3.5–5.3)
POTASSIUM SERPL-SCNC: 3.4 MMOL/L — LOW (ref 3.5–5.3)
RBC # BLD: 4.88 M/UL — SIGNIFICANT CHANGE UP (ref 3.8–5.2)
RBC # FLD: 15.8 % — HIGH (ref 11–15)
SODIUM SERPL-SCNC: 135 MMOL/L — SIGNIFICANT CHANGE UP (ref 135–145)
SPECIMEN SOURCE: SIGNIFICANT CHANGE UP
WBC # BLD: 3.9 K/UL — SIGNIFICANT CHANGE UP (ref 3.8–10.5)
WBC # FLD AUTO: 3.9 K/UL — SIGNIFICANT CHANGE UP (ref 3.8–10.5)

## 2017-08-03 PROCEDURE — 99233 SBSQ HOSP IP/OBS HIGH 50: CPT

## 2017-08-03 PROCEDURE — 93306 TTE W/DOPPLER COMPLETE: CPT | Mod: 26

## 2017-08-03 RX ORDER — PANTOPRAZOLE SODIUM 20 MG/1
1 TABLET, DELAYED RELEASE ORAL
Qty: 30 | Refills: 0 | OUTPATIENT
Start: 2017-08-03 | End: 2017-09-02

## 2017-08-03 RX ORDER — HYDRALAZINE HCL 50 MG
25 TABLET ORAL THREE TIMES A DAY
Qty: 0 | Refills: 0 | Status: DISCONTINUED | OUTPATIENT
Start: 2017-08-03 | End: 2017-08-04

## 2017-08-03 RX ORDER — SULINDAC 200 MG/1
1 TABLET ORAL
Qty: 0 | Refills: 0 | COMMUNITY

## 2017-08-03 RX ORDER — LACTOBACILLUS ACIDOPHILUS 100MM CELL
1 CAPSULE ORAL
Qty: 30 | Refills: 0 | OUTPATIENT
Start: 2017-08-03 | End: 2017-08-13

## 2017-08-03 RX ORDER — FUROSEMIDE 40 MG
1 TABLET ORAL
Qty: 15 | Refills: 0 | OUTPATIENT
Start: 2017-08-03 | End: 2017-08-18

## 2017-08-03 RX ORDER — POTASSIUM CHLORIDE 20 MEQ
20 PACKET (EA) ORAL ONCE
Qty: 0 | Refills: 0 | Status: COMPLETED | OUTPATIENT
Start: 2017-08-03 | End: 2017-08-03

## 2017-08-03 RX ADMIN — Medication 20 MILLIEQUIVALENT(S): at 18:55

## 2017-08-03 RX ADMIN — HEPARIN SODIUM 5000 UNIT(S): 5000 INJECTION INTRAVENOUS; SUBCUTANEOUS at 21:44

## 2017-08-03 RX ADMIN — HEPARIN SODIUM 5000 UNIT(S): 5000 INJECTION INTRAVENOUS; SUBCUTANEOUS at 05:55

## 2017-08-03 RX ADMIN — OXCARBAZEPINE 300 MILLIGRAM(S): 300 TABLET, FILM COATED ORAL at 05:54

## 2017-08-03 RX ADMIN — OXYCODONE AND ACETAMINOPHEN 1 TABLET(S): 5; 325 TABLET ORAL at 05:53

## 2017-08-03 RX ADMIN — LISINOPRIL 40 MILLIGRAM(S): 2.5 TABLET ORAL at 05:55

## 2017-08-03 RX ADMIN — CARVEDILOL PHOSPHATE 6.25 MILLIGRAM(S): 80 CAPSULE, EXTENDED RELEASE ORAL at 18:51

## 2017-08-03 RX ADMIN — OXYCODONE AND ACETAMINOPHEN 1 TABLET(S): 5; 325 TABLET ORAL at 01:25

## 2017-08-03 RX ADMIN — OXYCODONE AND ACETAMINOPHEN 1 TABLET(S): 5; 325 TABLET ORAL at 00:29

## 2017-08-03 RX ADMIN — Medication 1 TABLET(S): at 18:50

## 2017-08-03 RX ADMIN — SIMVASTATIN 10 MILLIGRAM(S): 20 TABLET, FILM COATED ORAL at 21:43

## 2017-08-03 RX ADMIN — Medication 3 MILLILITER(S): at 05:59

## 2017-08-03 RX ADMIN — Medication 1: at 11:47

## 2017-08-03 RX ADMIN — OXYCODONE AND ACETAMINOPHEN 1 TABLET(S): 5; 325 TABLET ORAL at 06:45

## 2017-08-03 RX ADMIN — Medication 1 TABLET(S): at 11:48

## 2017-08-03 RX ADMIN — PANTOPRAZOLE SODIUM 40 MILLIGRAM(S): 20 TABLET, DELAYED RELEASE ORAL at 11:49

## 2017-08-03 RX ADMIN — CEFTRIAXONE 100 GRAM(S): 500 INJECTION, POWDER, FOR SOLUTION INTRAMUSCULAR; INTRAVENOUS at 00:30

## 2017-08-03 RX ADMIN — OXCARBAZEPINE 300 MILLIGRAM(S): 300 TABLET, FILM COATED ORAL at 18:50

## 2017-08-03 RX ADMIN — Medication 40 MILLIGRAM(S): at 05:55

## 2017-08-03 RX ADMIN — SODIUM CHLORIDE 75 MILLILITER(S): 9 INJECTION, SOLUTION INTRAVENOUS at 00:29

## 2017-08-03 RX ADMIN — Medication 1: at 18:51

## 2017-08-03 RX ADMIN — AMLODIPINE BESYLATE 10 MILLIGRAM(S): 2.5 TABLET ORAL at 05:55

## 2017-08-03 RX ADMIN — HEPARIN SODIUM 5000 UNIT(S): 5000 INJECTION INTRAVENOUS; SUBCUTANEOUS at 14:10

## 2017-08-03 NOTE — DISCHARGE NOTE ADULT - CARE PROVIDER_API CALL
Erick Marie), Cardiology  [address] [facility]  [address] [place] [address]  Phone: [phone]  Fax: [phone]    PCP,   Follow with PCP Dr. Ping Pleitez at [facility]  Phone: (   )    -  Fax: (   )    -

## 2017-08-03 NOTE — PROGRESS NOTE ADULT - PROBLEM SELECTOR PLAN 8
JOSE LUIS vs CKD  monitor creat  pt advised to avoid NSAIDs as she has bene taking it chronically for Back pain. resolved, no e/o colitis on CT abd.

## 2017-08-03 NOTE — DISCHARGE NOTE ADULT - ADDITIONAL INSTRUCTIONS
Follow with PCP x 3 days  Follow with Cardiology x 1 week  Your hemoglobin A1C is 6.6, please follow with PCP and discuss with her about starting diabetes medicines, and eat diabetic diet. Follow with PCP x 3 days  Get BMP checked by PCP in 3 days to follow on kidney functions as you are on water pill and sometimes it can worsen kidney functions.  Follow with Cardiology/Dr. Marie x 1 week  Your hemoglobin A1C is 6.6, please follow with PCP and discuss with her about starting diabetes medicines, and eat diabetic diet.

## 2017-08-03 NOTE — PROGRESS NOTE ADULT - PROBLEM SELECTOR PLAN 10
s/p 1 dose of vanco  d/w ID, coag neg staph aureus, likely contaminant follow TTE , for chronic systolic CHf, no e/o acute decompensation at this time.  LVEF low as per Stress test form 2015, also CXR with congestive changes + cardiomegaly  lasix for diuresis  cardio consult Dr. Marie appreciated.  Supplement Potassium.

## 2017-08-03 NOTE — PROGRESS NOTE ADULT - SUBJECTIVE AND OBJECTIVE BOX
Patient is a 72y old  Female who presents with a chief complaint of nausea, vomiting, cough (31 Jul 2017 06:17)      INTERVAL HPI / OVERNIGHT EVENTS: fever and abdo pain resolved    MEDICATIONS  (STANDING):  lisinopril 40 milliGRAM(s) Oral daily  OXcarbazepine 300 milliGRAM(s) Oral two times a day  simvastatin 10 milliGRAM(s) Oral at bedtime  carvedilol 6.25 milliGRAM(s) Oral every 12 hours  amLODIPine   Tablet 10 milliGRAM(s) Oral daily  heparin  Injectable 5000 Unit(s) SubCutaneous every 8 hours  insulin lispro (HumaLOG) corrective regimen sliding scale   SubCutaneous three times a day before meals  insulin lispro (HumaLOG) corrective regimen sliding scale   SubCutaneous at bedtime  dextrose 5%. 1000 milliLiter(s) (50 mL/Hr) IV Continuous <Continuous>  dextrose 50% Injectable 12.5 Gram(s) IV Push once  dextrose 50% Injectable 25 Gram(s) IV Push once  dextrose 50% Injectable 25 Gram(s) IV Push once  furosemide    Tablet 40 milliGRAM(s) Oral daily  pantoprazole    Tablet 40 milliGRAM(s) Oral before breakfast  lactobacillus acidophilus 1 Tablet(s) Oral three times a day with meals  ALBUTerol/ipratropium for Nebulization 3 milliLiter(s) Nebulizer every 6 hours  ALBUTerol    90 MICROgram(s) HFA Inhaler 1 Puff(s) Inhalation every 4 hours  tiotropium 18 MICROgram(s) Capsule 1 Capsule(s) Inhalation daily  cefTRIAXone   IVPB 1 Gram(s) IV Intermittent every 24 hours  potassium chloride    Tablet ER 20 milliEquivalent(s) Oral once    MEDICATIONS  (PRN):  acetaminophen   Tablet 650 milliGRAM(s) Oral every 6 hours PRN For Temp greater than 38 C (100.4 F)  metoclopramide Injectable 10 milliGRAM(s) IV Push every 8 hours PRN nausea/vomiting  dextrose Gel 1 Dose(s) Oral once PRN Blood Glucose LESS THAN 70 milliGRAM(s)/deciliter  glucagon  Injectable 1 milliGRAM(s) IntraMuscular once PRN Glucose LESS THAN 70 milligrams/deciliter  guaiFENesin   Syrup  (Sugar-Free) 100 milliGRAM(s) Oral every 6 hours PRN Cough  oxyCODONE    5 mG/acetaminophen 325 mG 1 Tablet(s) Oral every 4 hours PRN Moderate Pain (4 - 6)  aluminum hydroxide/magnesium hydroxide/simethicone Suspension 30 milliLiter(s) Oral every 6 hours PRN Dyspepsia  hydrALAZINE 25 milliGRAM(s) Oral three times a day PRN Systolic blood pressure > 140      Vital Signs Last 24 Hrs  T(C): 36.7 (03 Aug 2017 11:32), Max: 37.7 (03 Aug 2017 00:05)  T(F): 98 (03 Aug 2017 11:32), Max: 99.8 (03 Aug 2017 00:05)  HR: 52 (03 Aug 2017 15:00) (50 - 65)  BP: 172/64 (03 Aug 2017 15:00) (129/59 - 183/67)  BP(mean): --  RR: 18 (03 Aug 2017 15:00) (17 - 18)  SpO2: 98% (03 Aug 2017 15:00) (95% - 99%)    PHYSICAL EXAM:  General :NAD  Constitutional:  well-groomed, well-developed  Respiratory: CTAB/L  Cardiovascular: S1 and S2, RRR, no M/G/R  Gastrointestinal: BS+, soft, NT/ND  Extremities: No peripheral edema  Vascular: 2+ peripheral pulses  Skin: No rashes      LABS:                        11.3   3.9   )-----------( 151      ( 03 Aug 2017 12:54 )             35.6     08-03    135  |  98  |  25<H>  ----------------------------<  190<H>  3.4<L>   |  27  |  1.46<H>    Ca    8.7      03 Aug 2017 12:54    TPro  7.0  /  Alb  3.1<L>  /  TBili  0.5  /  DBili  x   /  AST  79<H>  /  ALT  54  /  AlkPhos  109  08-02          MICROBIOLOGY:  RECENT CULTURES:        RADIOLOGY & ADDITIONAL STUDIES:

## 2017-08-03 NOTE — DISCHARGE NOTE ADULT - PRINCIPAL DIAGNOSIS
Acute on chronic systolic congestive heart failure Acute on chronic diastolic congestive heart failure

## 2017-08-03 NOTE — PROGRESS NOTE ADULT - PROBLEM SELECTOR PLAN 1
IV larissa Silva  PT eval
Pna vs acute bronchitis  IV levaquin pending cultures  Duoneb  Pulm. consult Dr. Lucia group  Check PCT in am.  Tylenol 650mg po q6hrs PRN for fever.  Robitussin 10ml PO Q8hrs PRN for cough.
s/p IV Rocephin, d/w ID , will DC Abx as likely viral illness.  Duoneb  PT eval pending
likely viral syndrome   resolved  d/c antibiotics   cleared for d/c home

## 2017-08-03 NOTE — PROGRESS NOTE ADULT - PROBLEM SELECTOR PROBLEM 7
Type 2 diabetes mellitus without complication, without long-term current use of insulin JOSE LUIS (acute kidney injury)

## 2017-08-03 NOTE — PROGRESS NOTE ADULT - SUBJECTIVE AND OBJECTIVE BOX
Patient seen and examined seated in chair.  Reports "I have not felt like eating" but denies n/v. Tolerating a small amount of liquids.  Pt states LLQ abdominal discomfort has subsided. No BMs, diarrhea or rectal bleeding.  Pt c/o dizziness at the present time. Denies cp, dyspnea, cough.    T(F): 98.4 (08-03-17 @ 05:37), Max: 99.8 (08-03-17 @ 00:05)  HR: 50 (08-03-17 @ 06:01) (50 - 65)  BP: 155/88 (08-03-17 @ 05:37) (129/59 - 183/67)  RR: 17 (08-03-17 @ 05:37) (17 - 18)  SpO2: 98% (08-03-17 @ 06:01) (95% - 100%)  Wt(kg): --  CAPILLARY BLOOD GLUCOSE  128 (02 Aug 2017 22:06)  113 (02 Aug 2017 16:49)  159 (02 Aug 2017 13:04)    PHYSICAL EXAM:  General: NAD, WDWN  Neuro:  Alert & oriented x 3  HEENT: NCAT, EOMI, conjunctiva clear  CV: +S1+S2 regular rate and rhythm  Lung: good air entry b/l lungs, b/l basilar crackles, no respiratory distress  Abdomen: soft, NTND. Normoactive BS  Extremities: no pedal edema or calf tenderness noted     LABS:                        11.2   4.0   )-----------( 142      ( 02 Aug 2017 06:38 )             34.4     08-02    138  |  100  |  29<H>  ----------------------------<  175<H>  3.6   |  25  |  1.58<H>    Ca    8.8      02 Aug 2017 06:38    TPro  7.0  /  Alb  3.1<L>  /  TBili  0.5  /  DBili  x   /  AST  79<H>  /  ALT  54  /  AlkPhos  109  08-02    BCx c CNS contaminant x 1    Impression: 72y Female PMH T2DM, HTN, admitted with abdominal pain likely 2/2 NSAID induced gastritis, bronchitis  No acute pathology seen on Abd US, CT or pelvic US    Plan:  -continue present medical management, VTE ppx heparin, IVF, abx per ID  -cont diet as tolerated, PPI, maalox prn. Monitor clinically  -cardio Follow up noted, patient to have Echo today  -Increase activity with PT (consult ordered), OOB, Ambulate  -Follow up am lab results - pending collection  -discussed with Dr Evans Patient seen and examined seated in chair.  Reports "I have not felt like eating" but denies n/v, or abdominal pain. Tolerating a small amount of liquids.  Pt states LLQ abdominal discomfort has subsided. No BMs, diarrhea or rectal bleeding.  Pt c/o dizziness at the present time. Denies cp, dyspnea, cough.    T(F): 98.4 (08-03-17 @ 05:37), Max: 99.8 (08-03-17 @ 00:05)  HR: 50 (08-03-17 @ 06:01) (50 - 65)  BP: 155/88 (08-03-17 @ 05:37) (129/59 - 183/67)  RR: 17 (08-03-17 @ 05:37) (17 - 18)  SpO2: 98% (08-03-17 @ 06:01) (95% - 100%)  Wt(kg): --  CAPILLARY BLOOD GLUCOSE  128 (02 Aug 2017 22:06)  113 (02 Aug 2017 16:49)  159 (02 Aug 2017 13:04)    PHYSICAL EXAM:  General: NAD, WDWN  Neuro:  Alert & oriented x 3  HEENT: NCAT, EOMI, conjunctiva clear  CV: +S1+S2 regular rate and rhythm  Lung: good air entry b/l lungs, b/l basilar crackles, no respiratory distress  Abdomen: soft, NTND. Normoactive BS  Extremities: no pedal edema or calf tenderness noted     LABS:                        11.2   4.0   )-----------( 142      ( 02 Aug 2017 06:38 )             34.4     08-02    138  |  100  |  29<H>  ----------------------------<  175<H>  3.6   |  25  |  1.58<H>    Ca    8.8      02 Aug 2017 06:38    TPro  7.0  /  Alb  3.1<L>  /  TBili  0.5  /  DBili  x   /  AST  79<H>  /  ALT  54  /  AlkPhos  109  08-02    BCx c CNS contaminant x 1    Impression: 72y Female PMH T2DM, HTN, admitted with abdominal pain likely 2/2 NSAID induced gastritis, bronchitis  No acute pathology seen on Abd US, CT or pelvic US    Plan:  -continue present medical management, VTE ppx heparin, IVF, abx per ID  -cont diet as tolerated, PPI, maalox prn. Monitor clinically  -cardio Follow up noted, patient to have Echo today  -Increase activity with PT (consult ordered), OOB, Ambulate  -Follow up am lab results - pending collection  -discussed with Dr Evans

## 2017-08-03 NOTE — PROGRESS NOTE ADULT - PROBLEM SELECTOR PLAN 6
follow TTE , for chronic systolic CHf, no e/o acute decompensation at this time.  LVEF low as per Stress test form 2015, also CXR with congestive changes + cardiomegaly  lasix for diuresis  cardio consult Dr. Marie appreciated.  Supplement Potassium. HbA1C 6.6, as per pt pre diabetic, not on any meds  monitor fsbs, insulin on SS

## 2017-08-03 NOTE — DISCHARGE NOTE ADULT - PLAN OF CARE
avoid fluid overload in future take medicines as directed  Follow with PCP and cardiology likely contaminant  Not active infection Take medicines as directed take medicines as directed  Avoid NSAIDs like motrin/aleve/advil likely viral bronchitis  follow with PCP Likely viral illness, resolved Follow with PCP x for hepatic cyst. take medicines as directed  Follow with PCP and cardiology  Get BMP checked by PCP in 3 days to follow on kidney functions as you are on water pill and sometimes it can worsen kidney functions.  Daily weight.

## 2017-08-03 NOTE — PHYSICAL THERAPY INITIAL EVALUATION ADULT - MODIFIED CLINICAL TEST OF SENSORY INTEGRATION IN BALANCE TEST
Barthel Index: Feeding Score _10__, Bathing Score _5__, Grooming Score _5__, Dressing Score _10__, Bowels Score _10__, Bladder Score _10__, Toilet Score _10__, Transfers Score _15__, Mobility Score _0__, Stairs Score _0__,     Total Score _75__

## 2017-08-03 NOTE — PROGRESS NOTE ADULT - PROBLEM SELECTOR PLAN 3
Continue with home meds lisinopril/norvasc/coreg
resolved

## 2017-08-03 NOTE — PROGRESS NOTE ADULT - PROBLEM SELECTOR PLAN 9
?diarrhea, r/o c- diff  resolved as per staff  pt started on IV flagyl empirically for ?diarrhea, h/o colitis in past as per daughter but no e/o colitis on CT abd. s/p 1 dose of vanco  d/w ID, coag neg staph aureus, likely contaminant

## 2017-08-03 NOTE — DISCHARGE NOTE ADULT - HOSPITAL COURSE
72 year old woman with PMH HTN, HLD, Dementia, and  DM HbA1C 6.6 (not on any meds for dm) presents with complaint of nausea and vomiting with mild abdominal pain x 1 day, + cough for the last few days;  admitted and Rx for clinical pna, started on Iv Abx, although CXR only with cardiomegaly and congestive changes , likely viral bronchitis, Ct abd with LLL atelectasis, pt with abd pain, but negative imaging except hepatic cyst, now abd pain improved with PPI and maalox, cardiology consulted,   Pt also seen by pulmonary/surgery/ID  now s/s resolved  Pt tolerating PO well.  stable for DC home with outpatient f/u. 72 year old woman with PMH HTN, HLD, Dementia, and h/o pre DM(  DM HbA1C 6.6 (not on any meds for dm) presents with complaint of nausea and vomiting with mild abdominal pain x 1 day, + cough for the last few days;  admitted and Rx for clinical pna, started on Iv Abx, although CXR only with cardiomegaly and congestive changes , likely viral bronchitis, started on diuretics for chf, Ct abd with LLL atelectasis, pt with abd pain, but negative imaging except hepatic cyst, surgery consulted, no surgical pathology found, now abd pain improved with PPI and maalox, cardiology consulted, echo done, Iv Abx d/c'd as per ID likely viral bronchitis and viral fever, no e/o bacterial infection, Blood cs + findings also likely contamination.  Pt also seen by pulmonary/surgery/ID  now s/s resolved  Pt tolerating PO well. Pt refusing to start any medicines for dm at this time, wants to follow and discuss with PCP  As per cardio/ID stable for DC home with outpatient f/u.  Pt eval, recommend DC home

## 2017-08-03 NOTE — PHYSICAL THERAPY INITIAL EVALUATION ADULT - ADDITIONAL COMMENTS
There are 5 steps to enter home with 1 handrail. There is a full flight to second floor where bedroom is with handrail. Pt crawls to ascend steps and uses step to pattern with 1 handrail to descend. Pt ambulates short distances in the community, uses a straight cane when out doors and drives.

## 2017-08-03 NOTE — DISCHARGE NOTE ADULT - PATIENT PORTAL LINK FT
“You can access the [facility] Patient Portal, offered by [facility], by registering with the following website: [email or web address]”

## 2017-08-03 NOTE — PROGRESS NOTE ADULT - PROBLEM SELECTOR PLAN 2
Reglan PRN  IVF  Protonix
Reglan PRN  IVF  Protonix  Advance diet as tolerated as Us abd/Ct abd no e/o any acute pathology
Resolved  sec to acute gastritis/NSAID induced  c/w PPI and maalox  DC IVF as now tolerates Po
contaminant

## 2017-08-03 NOTE — PROGRESS NOTE ADULT - ASSESSMENT
72 year old woman with PMH HTN, HLD, Dementia, and  DM HbA1C 6.6 (not on any meds for dm) presents with complaint of nausea and vomiting with mild abdominal pain x 1 day, + cough for the last few days;  admitted and Rx for clinical pna, started on Iv Abx, although CXR only with cardiomegaly and congestive changes , likely viral bronchitis, Ct abd with LLL atelectasis, pt with abd pain, but negative imaging except hepatic cyst, now abd pain improved with PPI and maalox, cardiology following for chronic chf, echo pending.

## 2017-08-03 NOTE — DISCHARGE NOTE ADULT - CARE PLAN
Principal Discharge DX:	Acute on chronic systolic congestive heart failure  Goal:	avoid fluid overload in future  Instructions for follow-up, activity and diet:	take medicines as directed  Follow with PCP and cardiology  Secondary Diagnosis:	Coagulase negative Staphylococcus bacteremia  Instructions for follow-up, activity and diet:	likely contaminant  Not active infection  Secondary Diagnosis:	Essential hypertension  Instructions for follow-up, activity and diet:	Take medicines as directed  Secondary Diagnosis:	Gastritis due to nonsteroidal anti-inflammatory drug (NSAID), undetermined intent, subsequent encounter  Instructions for follow-up, activity and diet:	take medicines as directed  Avoid NSAIDs like motrin/aleve/advil  Secondary Diagnosis:	Bronchitis  Instructions for follow-up, activity and diet:	likely viral bronchitis  follow with PCP  Secondary Diagnosis:	Fever, unspecified fever cause  Instructions for follow-up, activity and diet:	Likely viral illness, resolved  Secondary Diagnosis:	Hepatic cyst  Instructions for follow-up, activity and diet:	Follow with PCP x for hepatic cyst. Principal Discharge DX:	Acute on chronic diastolic congestive heart failure  Goal:	avoid fluid overload in future  Instructions for follow-up, activity and diet:	take medicines as directed  Follow with PCP and cardiology  Get BMP checked by PCP in 3 days to follow on kidney functions as you are on water pill and sometimes it can worsen kidney functions.  Daily weight.  Secondary Diagnosis:	Coagulase negative Staphylococcus bacteremia  Instructions for follow-up, activity and diet:	likely contaminant  Not active infection  Secondary Diagnosis:	Essential hypertension  Instructions for follow-up, activity and diet:	Take medicines as directed  Secondary Diagnosis:	Gastritis due to nonsteroidal anti-inflammatory drug (NSAID), undetermined intent, subsequent encounter  Instructions for follow-up, activity and diet:	take medicines as directed  Avoid NSAIDs like motrin/aleve/advil  Secondary Diagnosis:	Bronchitis  Instructions for follow-up, activity and diet:	likely viral bronchitis  follow with PCP  Secondary Diagnosis:	Fever, unspecified fever cause  Instructions for follow-up, activity and diet:	Likely viral illness, resolved  Secondary Diagnosis:	Hepatic cyst  Instructions for follow-up, activity and diet:	Follow with PCP x for hepatic cyst.

## 2017-08-03 NOTE — DISCHARGE NOTE ADULT - MEDICATION SUMMARY - MEDICATIONS TO STOP TAKING
I will STOP taking the medications listed below when I get home from the hospital:     mg oral tablet  -- 1 tab(s) by mouth every 8 hours, As Needed -for severe pain  -- Do not take this drug if you are pregnant.  It is very important that you take or use this exactly as directed.  Do not skip doses or discontinue unless directed by your doctor.  May cause drowsiness or dizziness.  Obtain medical advice before taking any non-prescription drugs as some may affect the action of this medication.  Take with food or milk.    Robaxin-750 oral tablet  -- 1 tab(s) by mouth every 8 hours, As Needed -for muscle spasm  -- May cause drowsiness.  Alcohol may intensify this effect.  Use care when operating dangerous machinery.    sulindac 200 mg oral tablet  -- 1 tab(s) by mouth 2 times a day

## 2017-08-03 NOTE — PROGRESS NOTE ADULT - PROBLEM SELECTOR PROBLEM 2
Coagulase negative Staphylococcus bacteremia
Non-intractable vomiting with nausea, unspecified vomiting type

## 2017-08-03 NOTE — PHYSICAL THERAPY INITIAL EVALUATION ADULT - GENERAL OBSERVATIONS, REHAB EVAL
Pt encountered seated in bedside chair, alert and oriented x4, IV in place, NAD, grand daughter present.

## 2017-08-03 NOTE — DISCHARGE NOTE ADULT - SECONDARY DIAGNOSIS.
Coagulase negative Staphylococcus bacteremia Essential hypertension Gastritis due to nonsteroidal anti-inflammatory drug (NSAID), undetermined intent, subsequent encounter Bronchitis Fever, unspecified fever cause Hepatic cyst

## 2017-08-03 NOTE — PROGRESS NOTE ADULT - SUBJECTIVE AND OBJECTIVE BOX
CHIEF COMPLAINT/INTERVAL HISTORY:    Patient is a 72y old  Female who presents with a chief complaint of nausea, vomiting, cough (31 Jul 2017 06:17)      HPI:  72 year old woman with PMH HTN, HLD, Dementia, and preDM presents with complaint of nausea and vomiting with mild abdominal pain which started yesterday and cough for the last few days.  She also complains of fever and chills while at home.  She denies sputum production, wheezing, chest pain, SOB, neurological complaints, diarrhea.    In the ED, CXR read by ileana as b/l infiltrates, official read pending.  Febrile, leukocytosis with left shift. (31 Jul 2017 06:17)      SUBJECTIVE & OBJECTIVE: Pt seen and examined at bedside.   feels much better today  Tolerating PO, started on regular diet this am.  Abd pain resolved  no more fever  no LBM  denies nausea/vomiting.    Vital Signs Last 24 Hrs  T(C): 36.7 (03 Aug 2017 11:32), Max: 37.7 (03 Aug 2017 00:05)  T(F): 98 (03 Aug 2017 11:32), Max: 99.8 (03 Aug 2017 00:05)  HR: 60 (03 Aug 2017 11:32) (50 - 65)  BP: 130/61 (03 Aug 2017 11:32) (129/59 - 183/67)  BP(mean): --  ABP: --  ABP(mean): --  RR: 18 (03 Aug 2017 11:32) (17 - 18)  SpO2: 98% (03 Aug 2017 11:32) (95% - 99%)        MEDICATIONS  (STANDING):  lisinopril 40 milliGRAM(s) Oral daily  OXcarbazepine 300 milliGRAM(s) Oral two times a day  simvastatin 10 milliGRAM(s) Oral at bedtime  carvedilol 6.25 milliGRAM(s) Oral every 12 hours  amLODIPine   Tablet 10 milliGRAM(s) Oral daily  heparin  Injectable 5000 Unit(s) SubCutaneous every 8 hours  insulin lispro (HumaLOG) corrective regimen sliding scale   SubCutaneous three times a day before meals  insulin lispro (HumaLOG) corrective regimen sliding scale   SubCutaneous at bedtime  dextrose 5%. 1000 milliLiter(s) (50 mL/Hr) IV Continuous <Continuous>  dextrose 50% Injectable 12.5 Gram(s) IV Push once  dextrose 50% Injectable 25 Gram(s) IV Push once  dextrose 50% Injectable 25 Gram(s) IV Push once  furosemide    Tablet 40 milliGRAM(s) Oral daily  pantoprazole    Tablet 40 milliGRAM(s) Oral before breakfast  lactobacillus acidophilus 1 Tablet(s) Oral three times a day with meals  dextrose 5% + sodium chloride 0.9%. 1000 milliLiter(s) (75 mL/Hr) IV Continuous <Continuous>  ALBUTerol/ipratropium for Nebulization 3 milliLiter(s) Nebulizer every 6 hours  ALBUTerol    90 MICROgram(s) HFA Inhaler 1 Puff(s) Inhalation every 4 hours  tiotropium 18 MICROgram(s) Capsule 1 Capsule(s) Inhalation daily  cefTRIAXone   IVPB 1 Gram(s) IV Intermittent every 24 hours    MEDICATIONS  (PRN):  acetaminophen   Tablet 650 milliGRAM(s) Oral every 6 hours PRN For Temp greater than 38 C (100.4 F)  metoclopramide Injectable 10 milliGRAM(s) IV Push every 8 hours PRN nausea/vomiting  dextrose Gel 1 Dose(s) Oral once PRN Blood Glucose LESS THAN 70 milliGRAM(s)/deciliter  glucagon  Injectable 1 milliGRAM(s) IntraMuscular once PRN Glucose LESS THAN 70 milligrams/deciliter  guaiFENesin   Syrup  (Sugar-Free) 100 milliGRAM(s) Oral every 6 hours PRN Cough  oxyCODONE    5 mG/acetaminophen 325 mG 1 Tablet(s) Oral every 4 hours PRN Moderate Pain (4 - 6)  aluminum hydroxide/magnesium hydroxide/simethicone Suspension 30 milliLiter(s) Oral every 6 hours PRN Dyspepsia    PHYSICAL EXAM:    GENERAL: NAD,  well-developed  HEAD:  Atraumatic, Normocephalic  EYES: EOMI, PERRLA, conjunctiva and sclera clear  ENMT: Moist mucous membranes  NECK: Supple, No JVD  NERVOUS SYSTEM:  Alert & Oriented X3 , Motor Strength 5/5 B/L upper and lower extremities;  CHEST/LUNG: Clear to auscultation bilaterally; No rales, rhonchi, wheezing, or rubs  HEART: Regular rate and rhythm;  ABDOMEN: Soft, Nontender, Nondistended; Bowel sounds present  EXTREMITIES:  No cyanosis, or edema    LABS:                        11.3   3.9   )-----------( 151      ( 03 Aug 2017 12:54 )             35.6     08-03    135  |  98  |  25<H>  ----------------------------<  190<H>  3.4<L>   |  27  |  1.46<H>    Ca    8.7      03 Aug 2017 12:54    TPro  7.0  /  Alb  3.1<L>  /  TBili  0.5  /  DBili  x   /  AST  79<H>  /  ALT  54  /  AlkPhos  109  08-02          CAPILLARY BLOOD GLUCOSE  128 (02 Aug 2017 22:06)  113 (02 Aug 2017 16:49)

## 2017-08-03 NOTE — PROGRESS NOTE ADULT - PROBLEM SELECTOR PLAN 7
HbA1C 6.6, as per pt pre diabetic, not on any meds  monitor fsbs, insulin on SS JOSE LUIS vs CKD  monitor creat  pt advised to avoid NSAIDs as she has bene taking it chronically for Back pain.

## 2017-08-03 NOTE — DISCHARGE NOTE ADULT - CONDITION (STATED IN TERMS THAT PERMIT A SPECIFIC MEASURABLE COMPARISON WITH CONDITION ON ADMISSION):
Patient is stable: tolerating diet, voiding, ambulating, pain improved.  Vital Signs Last 24 Hrs  T(C): 36.4 (04 Aug 2017 11:53), Max: 37.7 (03 Aug 2017 17:22)  T(F): 97.6 (04 Aug 2017 11:53), Max: 99.8 (03 Aug 2017 17:22)  HR: 51 (04 Aug 2017 11:53) (51 - 60)  BP: 149/60 (04 Aug 2017 11:53) (118/58 - 181/81)  BP(mean): --  RR: 16 (04 Aug 2017 11:53) (16 - 18)  SpO2: 98% (04 Aug 2017 11:53) (95% - 99%)  75 minutes spent in direct patient care including physical examination, discharge instructions , medication instructions and follow up, patient verbalized understanding and agreed.

## 2017-08-03 NOTE — DISCHARGE NOTE ADULT - PROVIDER TOKENS
TOKEN:'[id number]',FREE:[LAST:[PCP],PHONE:[(   )    -],FAX:[(   )    -],ADDRESS:[Follow with PCP Dr. Feng Nurse at [facility]]]

## 2017-08-04 VITALS
SYSTOLIC BLOOD PRESSURE: 149 MMHG | TEMPERATURE: 98 F | OXYGEN SATURATION: 98 % | RESPIRATION RATE: 16 BRPM | DIASTOLIC BLOOD PRESSURE: 60 MMHG | HEART RATE: 51 BPM

## 2017-08-04 PROCEDURE — 99239 HOSP IP/OBS DSCHRG MGMT >30: CPT

## 2017-08-04 RX ORDER — ASPIRIN/CALCIUM CARB/MAGNESIUM 324 MG
1 TABLET ORAL
Qty: 30 | Refills: 0
Start: 2017-08-04 | End: 2017-09-03

## 2017-08-04 RX ORDER — POTASSIUM CHLORIDE 20 MEQ
1 PACKET (EA) ORAL
Qty: 10 | Refills: 0 | OUTPATIENT
Start: 2017-08-04 | End: 2017-08-14

## 2017-08-04 RX ADMIN — Medication 1 TABLET(S): at 07:47

## 2017-08-04 RX ADMIN — LISINOPRIL 40 MILLIGRAM(S): 2.5 TABLET ORAL at 05:08

## 2017-08-04 RX ADMIN — PANTOPRAZOLE SODIUM 40 MILLIGRAM(S): 20 TABLET, DELAYED RELEASE ORAL at 07:47

## 2017-08-04 RX ADMIN — OXYCODONE AND ACETAMINOPHEN 1 TABLET(S): 5; 325 TABLET ORAL at 00:01

## 2017-08-04 RX ADMIN — OXCARBAZEPINE 300 MILLIGRAM(S): 300 TABLET, FILM COATED ORAL at 05:08

## 2017-08-04 RX ADMIN — HEPARIN SODIUM 5000 UNIT(S): 5000 INJECTION INTRAVENOUS; SUBCUTANEOUS at 05:11

## 2017-08-04 RX ADMIN — Medication 2: at 11:18

## 2017-08-04 RX ADMIN — AMLODIPINE BESYLATE 10 MILLIGRAM(S): 2.5 TABLET ORAL at 05:08

## 2017-08-04 RX ADMIN — Medication 1 TABLET(S): at 11:18

## 2017-08-04 RX ADMIN — CARVEDILOL PHOSPHATE 6.25 MILLIGRAM(S): 80 CAPSULE, EXTENDED RELEASE ORAL at 05:08

## 2017-08-04 RX ADMIN — Medication 25 MILLIGRAM(S): at 00:01

## 2017-08-04 RX ADMIN — Medication 40 MILLIGRAM(S): at 05:08

## 2017-08-04 RX ADMIN — OXYCODONE AND ACETAMINOPHEN 1 TABLET(S): 5; 325 TABLET ORAL at 01:01

## 2017-08-04 NOTE — PROGRESS NOTE ADULT - SUBJECTIVE AND OBJECTIVE BOX
Assessment:  Clinical CHF, congestive changes on CXRY  Stress test 3/2015 with EF 47%, and states ?cardiomyopathy  No SOB, but abd pain,   BNP is low  Likely acute on chronic diastolic CHF  Medical management appropriate for now  echo normal EF, but with noted valvular heart disease  Medical management to continue, no further cardiology intervention at this time

## 2017-08-05 LAB
CULTURE RESULTS: SIGNIFICANT CHANGE UP
SPECIMEN SOURCE: SIGNIFICANT CHANGE UP

## 2017-08-08 DIAGNOSIS — I11.0 HYPERTENSIVE HEART DISEASE WITH HEART FAILURE: ICD-10-CM

## 2017-08-08 DIAGNOSIS — K29.70 GASTRITIS, UNSPECIFIED, WITHOUT BLEEDING: ICD-10-CM

## 2017-08-08 DIAGNOSIS — Z91.013 ALLERGY TO SEAFOOD: ICD-10-CM

## 2017-08-08 DIAGNOSIS — J20.8 ACUTE BRONCHITIS DUE TO OTHER SPECIFIED ORGANISMS: ICD-10-CM

## 2017-08-08 DIAGNOSIS — R73.03 PREDIABETES: ICD-10-CM

## 2017-08-08 DIAGNOSIS — Z90.49 ACQUIRED ABSENCE OF OTHER SPECIFIED PARTS OF DIGESTIVE TRACT: ICD-10-CM

## 2017-08-08 DIAGNOSIS — I50.33 ACUTE ON CHRONIC DIASTOLIC (CONGESTIVE) HEART FAILURE: ICD-10-CM

## 2017-08-08 DIAGNOSIS — E78.2 MIXED HYPERLIPIDEMIA: ICD-10-CM

## 2017-08-08 DIAGNOSIS — I10 ESSENTIAL (PRIMARY) HYPERTENSION: ICD-10-CM

## 2017-08-08 DIAGNOSIS — G40.909 EPILEPSY, UNSPECIFIED, NOT INTRACTABLE, WITHOUT STATUS EPILEPTICUS: ICD-10-CM
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
## 2017-08-08 DIAGNOSIS — N17.9 ACUTE KIDNEY FAILURE, UNSPECIFIED: ICD-10-CM

## 2017-08-08 DIAGNOSIS — K57.90 DIVERTICULOSIS OF INTESTINE, PART UNSPECIFIED, WITHOUT PERFORATION OR ABSCESS WITHOUT BLEEDING: ICD-10-CM

## 2017-08-08 DIAGNOSIS — T39.395A ADVERSE EFFECT OF OTHER NONSTEROIDAL ANTI-INFLAMMATORY DRUGS [NSAID], INITIAL ENCOUNTER: ICD-10-CM

## 2017-08-08 DIAGNOSIS — E66.9 OBESITY, UNSPECIFIED: ICD-10-CM

## 2017-08-08 DIAGNOSIS — F03.90 UNSPECIFIED DEMENTIA, UNSPECIFIED SEVERITY, WITHOUT BEHAVIORAL DISTURBANCE, PSYCHOTIC DISTURBANCE, MOOD DISTURBANCE, AND ANXIETY: ICD-10-CM

## 2017-11-21 NOTE — PROGRESS NOTE ADULT - PROBLEM SELECTOR PROBLEM 1
Bronchitis
Bronchitis
Fever
Pneumonia of both lungs due to infectious organism, unspecified part of lung
21-Nov-2017 11:56

## 2017-11-30 NOTE — PROGRESS NOTE ADULT - NSHPATTENDINGPLANDISCUSS_GEN_ALL_CORE
Patient, Daughter, grand daughter. MD.
Patient and grand daughter by bed side.
patient and daughter by bed side.
patient's daughter and grand daughter by bed side.
Attending Only

## 2018-05-09 ENCOUNTER — APPOINTMENT (OUTPATIENT)
Dept: ORTHOPEDIC SURGERY | Facility: CLINIC | Age: 73
End: 2018-05-09

## 2018-05-10 ENCOUNTER — APPOINTMENT (OUTPATIENT)
Dept: ORTHOPEDIC SURGERY | Facility: CLINIC | Age: 73
End: 2018-05-10
Payer: MEDICARE

## 2018-05-10 VITALS
HEART RATE: 55 BPM | SYSTOLIC BLOOD PRESSURE: 145 MMHG | BODY MASS INDEX: 41.99 KG/M2 | WEIGHT: 252 LBS | DIASTOLIC BLOOD PRESSURE: 73 MMHG | HEIGHT: 65 IN

## 2018-05-10 DIAGNOSIS — Z86.39 PERSONAL HISTORY OF OTHER ENDOCRINE, NUTRITIONAL AND METABOLIC DISEASE: ICD-10-CM

## 2018-05-10 DIAGNOSIS — Z84.89 FAMILY HISTORY OF OTHER SPECIFIED CONDITIONS: ICD-10-CM

## 2018-05-10 DIAGNOSIS — Z80.9 FAMILY HISTORY OF MALIGNANT NEOPLASM, UNSPECIFIED: ICD-10-CM

## 2018-05-10 DIAGNOSIS — Z82.61 FAMILY HISTORY OF ARTHRITIS: ICD-10-CM

## 2018-05-10 DIAGNOSIS — Z78.9 OTHER SPECIFIED HEALTH STATUS: ICD-10-CM

## 2018-05-10 DIAGNOSIS — Z86.79 PERSONAL HISTORY OF OTHER DISEASES OF THE CIRCULATORY SYSTEM: ICD-10-CM

## 2018-05-10 DIAGNOSIS — Z87.19 PERSONAL HISTORY OF OTHER DISEASES OF THE DIGESTIVE SYSTEM: ICD-10-CM

## 2018-05-10 PROCEDURE — 73140 X-RAY EXAM OF FINGER(S): CPT | Mod: F5

## 2018-05-10 PROCEDURE — 99204 OFFICE O/P NEW MOD 45 MIN: CPT

## 2018-05-10 RX ORDER — PANTOPRAZOLE 40 MG/1
TABLET, DELAYED RELEASE ORAL
Refills: 0 | Status: ACTIVE | COMMUNITY

## 2018-05-10 RX ORDER — PROMETHAZINE HYDROCHLORIDE 50 MG/1
TABLET ORAL
Refills: 0 | Status: ACTIVE | COMMUNITY

## 2018-05-10 RX ORDER — OXCARBAZEPINE 150 MG/1
TABLET, FILM COATED ORAL
Refills: 0 | Status: ACTIVE | COMMUNITY

## 2018-05-25 ENCOUNTER — APPOINTMENT (OUTPATIENT)
Dept: ORTHOPEDIC SURGERY | Facility: CLINIC | Age: 73
End: 2018-05-25
Payer: MEDICARE

## 2018-05-25 VITALS
BODY MASS INDEX: 41.99 KG/M2 | WEIGHT: 252 LBS | HEART RATE: 56 BPM | HEIGHT: 65 IN | DIASTOLIC BLOOD PRESSURE: 67 MMHG | SYSTOLIC BLOOD PRESSURE: 134 MMHG

## 2018-05-25 PROCEDURE — 99213 OFFICE O/P EST LOW 20 MIN: CPT

## 2018-06-12 NOTE — PROGRESS NOTE ADULT - PROBLEM SELECTOR PROBLEM 6
36.5 Other congestive heart failure Type 2 diabetes mellitus without complication, without long-term current use of insulin

## 2018-06-15 ENCOUNTER — APPOINTMENT (OUTPATIENT)
Dept: ORTHOPEDIC SURGERY | Facility: CLINIC | Age: 73
End: 2018-06-15
Payer: MEDICARE

## 2018-06-15 VITALS
SYSTOLIC BLOOD PRESSURE: 150 MMHG | WEIGHT: 252 LBS | HEART RATE: 55 BPM | BODY MASS INDEX: 41.99 KG/M2 | HEIGHT: 65 IN | DIASTOLIC BLOOD PRESSURE: 70 MMHG

## 2018-06-15 DIAGNOSIS — M79.641 PAIN IN RIGHT HAND: ICD-10-CM

## 2018-06-15 PROCEDURE — 99213 OFFICE O/P EST LOW 20 MIN: CPT

## 2018-06-25 ENCOUNTER — RX RENEWAL (OUTPATIENT)
Age: 73
End: 2018-06-25

## 2018-07-07 NOTE — PROGRESS NOTE ADULT - PROBLEM SELECTOR PLAN 4
Patient states she has had multiple falls since being discharged and the last fall was 3 weeks ago where she did hit her head       Chrissy Madrigal RN  07/07/18 [id number]
Telebox : [phone] Sheri Cook, RN  07/07/18 1522
Continue with statin

## 2018-08-03 ENCOUNTER — RX RENEWAL (OUTPATIENT)
Age: 73
End: 2018-08-03

## 2018-08-13 ENCOUNTER — EMERGENCY (EMERGENCY)
Facility: HOSPITAL | Age: 73
LOS: 0 days | Discharge: HOME | End: 2018-08-13
Attending: EMERGENCY MEDICINE
Payer: COMMERCIAL

## 2018-08-13 VITALS
RESPIRATION RATE: 18 BRPM | TEMPERATURE: 98 F | WEIGHT: 255.96 LBS | OXYGEN SATURATION: 100 % | DIASTOLIC BLOOD PRESSURE: 74 MMHG | HEIGHT: 67 IN | SYSTOLIC BLOOD PRESSURE: 151 MMHG | HEART RATE: 60 BPM

## 2018-08-13 DIAGNOSIS — I10 ESSENTIAL (PRIMARY) HYPERTENSION: ICD-10-CM

## 2018-08-13 DIAGNOSIS — K57.90 DIVERTICULOSIS OF INTESTINE, PART UNSPECIFIED, WITHOUT PERFORATION OR ABSCESS WITHOUT BLEEDING: ICD-10-CM

## 2018-08-13 DIAGNOSIS — K52.9 NONINFECTIVE GASTROENTERITIS AND COLITIS, UNSPECIFIED: ICD-10-CM

## 2018-08-13 DIAGNOSIS — R10.9 UNSPECIFIED ABDOMINAL PAIN: ICD-10-CM

## 2018-08-13 DIAGNOSIS — Z91.013 ALLERGY TO SEAFOOD: ICD-10-CM

## 2018-08-13 DIAGNOSIS — E78.5 HYPERLIPIDEMIA, UNSPECIFIED: ICD-10-CM

## 2018-08-13 DIAGNOSIS — F03.90 UNSPECIFIED DEMENTIA WITHOUT BEHAVIORAL DISTURBANCE: ICD-10-CM

## 2018-08-13 DIAGNOSIS — Z98.89 OTHER SPECIFIED POSTPROCEDURAL STATES: Chronic | ICD-10-CM

## 2018-08-13 DIAGNOSIS — R73.03 PREDIABETES: ICD-10-CM

## 2018-08-13 LAB
ALBUMIN SERPL ELPH-MCNC: 3.6 G/DL — SIGNIFICANT CHANGE UP (ref 3.3–5)
ALP SERPL-CCNC: 133 U/L — HIGH (ref 40–120)
ALT FLD-CCNC: 19 U/L — SIGNIFICANT CHANGE UP (ref 12–78)
ANION GAP SERPL CALC-SCNC: 7 MMOL/L — SIGNIFICANT CHANGE UP (ref 5–17)
APPEARANCE UR: CLEAR — SIGNIFICANT CHANGE UP
AST SERPL-CCNC: 20 U/L — SIGNIFICANT CHANGE UP (ref 15–37)
BASOPHILS # BLD AUTO: 0.02 K/UL — SIGNIFICANT CHANGE UP (ref 0–0.2)
BASOPHILS NFR BLD AUTO: 0.2 % — SIGNIFICANT CHANGE UP (ref 0–2)
BILIRUB SERPL-MCNC: 0.4 MG/DL — SIGNIFICANT CHANGE UP (ref 0.2–1.2)
BILIRUB UR-MCNC: NEGATIVE — SIGNIFICANT CHANGE UP
BUN SERPL-MCNC: 28 MG/DL — HIGH (ref 7–23)
CALCIUM SERPL-MCNC: 8.6 MG/DL — SIGNIFICANT CHANGE UP (ref 8.5–10.1)
CHLORIDE SERPL-SCNC: 103 MMOL/L — SIGNIFICANT CHANGE UP (ref 96–108)
CO2 SERPL-SCNC: 30 MMOL/L — SIGNIFICANT CHANGE UP (ref 22–31)
COLOR SPEC: YELLOW — SIGNIFICANT CHANGE UP
CREAT SERPL-MCNC: 1.27 MG/DL — SIGNIFICANT CHANGE UP (ref 0.5–1.3)
DIFF PNL FLD: ABNORMAL
EOSINOPHIL # BLD AUTO: 0.05 K/UL — SIGNIFICANT CHANGE UP (ref 0–0.5)
EOSINOPHIL NFR BLD AUTO: 0.6 % — SIGNIFICANT CHANGE UP (ref 0–6)
GLUCOSE SERPL-MCNC: 130 MG/DL — HIGH (ref 70–99)
GLUCOSE UR QL: NEGATIVE MG/DL — SIGNIFICANT CHANGE UP
HCT VFR BLD CALC: 33.6 % — LOW (ref 34.5–45)
HGB BLD-MCNC: 11.4 G/DL — LOW (ref 11.5–15.5)
IMM GRANULOCYTES NFR BLD AUTO: 0.2 % — SIGNIFICANT CHANGE UP (ref 0–1.5)
KETONES UR-MCNC: NEGATIVE — SIGNIFICANT CHANGE UP
LACTATE SERPL-SCNC: 0.8 MMOL/L — SIGNIFICANT CHANGE UP (ref 0.7–2)
LEUKOCYTE ESTERASE UR-ACNC: ABNORMAL
LIDOCAIN IGE QN: 98 U/L — SIGNIFICANT CHANGE UP (ref 73–393)
LYMPHOCYTES # BLD AUTO: 1.35 K/UL — SIGNIFICANT CHANGE UP (ref 1–3.3)
LYMPHOCYTES # BLD AUTO: 16.4 % — SIGNIFICANT CHANGE UP (ref 13–44)
MCHC RBC-ENTMCNC: 23.8 PG — LOW (ref 27–34)
MCHC RBC-ENTMCNC: 33.9 GM/DL — SIGNIFICANT CHANGE UP (ref 32–36)
MCV RBC AUTO: 70 FL — LOW (ref 80–100)
MONOCYTES # BLD AUTO: 0.43 K/UL — SIGNIFICANT CHANGE UP (ref 0–0.9)
MONOCYTES NFR BLD AUTO: 5.2 % — SIGNIFICANT CHANGE UP (ref 2–14)
NEUTROPHILS # BLD AUTO: 6.34 K/UL — SIGNIFICANT CHANGE UP (ref 1.8–7.4)
NEUTROPHILS NFR BLD AUTO: 77.4 % — HIGH (ref 43–77)
NITRITE UR-MCNC: NEGATIVE — SIGNIFICANT CHANGE UP
PH UR: 7 — SIGNIFICANT CHANGE UP (ref 5–8)
PLATELET # BLD AUTO: 268 K/UL — SIGNIFICANT CHANGE UP (ref 150–400)
POTASSIUM SERPL-MCNC: 4 MMOL/L — SIGNIFICANT CHANGE UP (ref 3.5–5.3)
POTASSIUM SERPL-SCNC: 4 MMOL/L — SIGNIFICANT CHANGE UP (ref 3.5–5.3)
PROT SERPL-MCNC: 8.2 GM/DL — SIGNIFICANT CHANGE UP (ref 6–8.3)
PROT UR-MCNC: 15 MG/DL
RBC # BLD: 4.8 M/UL — SIGNIFICANT CHANGE UP (ref 3.8–5.2)
RBC # FLD: 17.1 % — HIGH (ref 10.3–14.5)
SODIUM SERPL-SCNC: 140 MMOL/L — SIGNIFICANT CHANGE UP (ref 135–145)
SP GR SPEC: 1 — LOW (ref 1.01–1.02)
UROBILINOGEN FLD QL: NEGATIVE MG/DL — SIGNIFICANT CHANGE UP
WBC # BLD: 8.21 K/UL — SIGNIFICANT CHANGE UP (ref 3.8–10.5)
WBC # FLD AUTO: 8.21 K/UL — SIGNIFICANT CHANGE UP (ref 3.8–10.5)

## 2018-08-13 PROCEDURE — 99284 EMERGENCY DEPT VISIT MOD MDM: CPT

## 2018-08-13 PROCEDURE — 74177 CT ABD & PELVIS W/CONTRAST: CPT | Mod: 26

## 2018-08-13 RX ORDER — SODIUM CHLORIDE 9 MG/ML
1000 INJECTION INTRAMUSCULAR; INTRAVENOUS; SUBCUTANEOUS ONCE
Qty: 0 | Refills: 0 | Status: COMPLETED | OUTPATIENT
Start: 2018-08-13 | End: 2018-08-13

## 2018-08-13 RX ORDER — ONDANSETRON 8 MG/1
1 TABLET, FILM COATED ORAL
Qty: 6 | Refills: 0 | OUTPATIENT
Start: 2018-08-13 | End: 2018-08-15

## 2018-08-13 RX ORDER — CIPROFLOXACIN LACTATE 400MG/40ML
500 VIAL (ML) INTRAVENOUS ONCE
Qty: 0 | Refills: 0 | Status: COMPLETED | OUTPATIENT
Start: 2018-08-13 | End: 2018-08-13

## 2018-08-13 RX ORDER — METRONIDAZOLE 500 MG
1 TABLET ORAL
Qty: 30 | Refills: 0 | OUTPATIENT
Start: 2018-08-13 | End: 2018-08-22

## 2018-08-13 RX ORDER — MOXIFLOXACIN HYDROCHLORIDE TABLETS, 400 MG 400 MG/1
1 TABLET, FILM COATED ORAL
Qty: 20 | Refills: 0
Start: 2018-08-13 | End: 2018-08-22

## 2018-08-13 RX ORDER — PANTOPRAZOLE SODIUM 20 MG/1
40 TABLET, DELAYED RELEASE ORAL ONCE
Qty: 0 | Refills: 0 | Status: COMPLETED | OUTPATIENT
Start: 2018-08-13 | End: 2018-08-13

## 2018-08-13 RX ORDER — ONDANSETRON 8 MG/1
8 TABLET, FILM COATED ORAL ONCE
Qty: 0 | Refills: 0 | Status: COMPLETED | OUTPATIENT
Start: 2018-08-13 | End: 2018-08-13

## 2018-08-13 RX ORDER — METRONIDAZOLE 500 MG
500 TABLET ORAL ONCE
Qty: 0 | Refills: 0 | Status: COMPLETED | OUTPATIENT
Start: 2018-08-13 | End: 2018-08-13

## 2018-08-13 RX ADMIN — Medication 500 MILLIGRAM(S): at 22:33

## 2018-08-13 RX ADMIN — SODIUM CHLORIDE 2000 MILLILITER(S): 9 INJECTION INTRAMUSCULAR; INTRAVENOUS; SUBCUTANEOUS at 20:00

## 2018-08-13 RX ADMIN — PANTOPRAZOLE SODIUM 40 MILLIGRAM(S): 20 TABLET, DELAYED RELEASE ORAL at 20:00

## 2018-08-13 RX ADMIN — ONDANSETRON 8 MILLIGRAM(S): 8 TABLET, FILM COATED ORAL at 20:00

## 2018-08-13 NOTE — ED PROVIDER NOTE - MEDICAL DECISION MAKING DETAILS
pt here with abd pain, n/v/d, hx colitis, will check labs, lactate, lipase, ivf, zofran, protonix, CT abd/pel

## 2018-08-13 NOTE — ED PROVIDER NOTE - OBJECTIVE STATEMENT
74 y/o female with PMH HTN, HLD, dementia, preDM, colitis here c/o abd pain, vomiting, diarrhea x 1 day. pt states she woke up this am with the sx. describes pain as constant, sharp, localized to LLQ, took pepto bismol with mild relief. pt states she gets these colitis flare ups about once a year, and sx feel similar to prior episdoes. pt also reports about 8 episodes of diarrhea +blood in stool, and 4 episodes of vomit, no blood. states her last colonoscopy was normal. denies recent abd surgeries. no fevers or chills. no recent travel or sick contacts.    ROS: No fever/chills. No eye pain/changes in vision, No ear pain/sore throat/dysphagia, No chest pain/palpitations. No SOB/cough/. ++ abdominal pain, N/V/D, . No dysuria/frequency/discharge, No headache. No Dizziness.    No rashes or breaks in skin. No numbness/tingling/weakness.

## 2018-08-13 NOTE — ED ADULT NURSE NOTE - NSIMPLEMENTINTERV_GEN_ALL_ED
Implemented All Universal Safety Interventions:  [place] to call system. Call bell, personal items and telephone within reach. Instruct patient to call for assistance. Room bathroom lighting operational. Non-slip footwear when patient is off stretcher. Physically safe environment: no spills, clutter or unnecessary equipment. Stretcher in lowest position, wheels locked, appropriate side rails in place.

## 2018-08-13 NOTE — ED PROVIDER NOTE - PROGRESS NOTE DETAILS
CT with colitis, labs otherwise ok, no white count, neg lactate, lipase wnl, pt given cipro/flagyl, pt states feeling better, tolerating PO, no vomiting episodes in ed, pt ok for dc with GI f/u  Discussed results and outcome of testing with the patient.  Patient advised to please follow up with their primary care doctor within the next 24 hours and return to the Emergency Department for worsening symptoms or any other concerns.  Patient advised that their doctor may call  to follow up on the specific results of the tests performed today in the emergency department.

## 2018-08-13 NOTE — ED PROVIDER NOTE - ATTENDING CONTRIBUTION TO CARE
Patient evaluated and seen with BISHNU Zapata agree with above history and physical - pt examined and seen by me personally - findings as seen:  Pt with colitis hx left lower quadrant abd pain, +nausea/vomiting otherwise with diarrhea. LLQ abd tenderness- CT noted colitis will dc with GI follow up with Dr. Magallanes

## 2018-08-13 NOTE — ED ADULT NURSE NOTE - OBJECTIVE STATEMENT
Pt hx of colitis presents to ED with lower abdominal pain, diarrhea ( 8 episodes) / bloody stools and vomiting onset 1pm. Unknown last colostomy. pt denies blood in stool, blood in urine

## 2018-08-13 NOTE — ED PROVIDER NOTE - PHYSICAL EXAMINATION
Gen: Alert, NAD, not toxic  Head: NC, AT, PERRL, EOMI, normal lids/conjunctiva  ENT: normal hearing, patent oropharynx without erythema/exudate, uvula midline  Neck: +supple, no tenderness/meningismus/JVD, +Trachea midline  Pulm: Bilateral BS, normal resp effort, no wheeze/stridor/retractions  CV: RRR, no M/R/G, +dist pulses  Abd: soft, ND, +BS, no hepatosplenomegaly +tenderness LLQ, no guarding  Mskel: no edema/erythema/cyanosis  Skin: no rash  Neuro: AAOx3, no sensory/motor deficits, CN 2-12 intact

## 2018-08-13 NOTE — ED ADULT TRIAGE NOTE - CHIEF COMPLAINT QUOTE
Pt hx of colitis presents to ED with lower abdominal pain, diarrhea ( 8 episodes) / bloody stools and vomiting onset 1pm.

## 2018-08-15 LAB
CULTURE RESULTS: SIGNIFICANT CHANGE UP
SPECIMEN SOURCE: SIGNIFICANT CHANGE UP

## 2018-09-10 ENCOUNTER — EMERGENCY (EMERGENCY)
Facility: HOSPITAL | Age: 73
LOS: 0 days | Discharge: HOME | End: 2018-09-10
Attending: EMERGENCY MEDICINE
Payer: COMMERCIAL

## 2018-09-10 VITALS
DIASTOLIC BLOOD PRESSURE: 69 MMHG | OXYGEN SATURATION: 99 % | RESPIRATION RATE: 14 BRPM | TEMPERATURE: 98 F | HEART RATE: 50 BPM | SYSTOLIC BLOOD PRESSURE: 140 MMHG

## 2018-09-10 VITALS
OXYGEN SATURATION: 97 % | DIASTOLIC BLOOD PRESSURE: 73 MMHG | TEMPERATURE: 97 F | RESPIRATION RATE: 17 BRPM | WEIGHT: 253.97 LBS | HEIGHT: 65 IN | HEART RATE: 55 BPM | SYSTOLIC BLOOD PRESSURE: 157 MMHG

## 2018-09-10 DIAGNOSIS — I10 ESSENTIAL (PRIMARY) HYPERTENSION: ICD-10-CM

## 2018-09-10 DIAGNOSIS — Z98.89 OTHER SPECIFIED POSTPROCEDURAL STATES: Chronic | ICD-10-CM

## 2018-09-10 DIAGNOSIS — R00.1 BRADYCARDIA, UNSPECIFIED: ICD-10-CM

## 2018-09-10 DIAGNOSIS — R42 DIZZINESS AND GIDDINESS: ICD-10-CM

## 2018-09-10 DIAGNOSIS — Z91.013 ALLERGY TO SEAFOOD: ICD-10-CM

## 2018-09-10 DIAGNOSIS — R73.03 PREDIABETES: ICD-10-CM

## 2018-09-10 DIAGNOSIS — E78.5 HYPERLIPIDEMIA, UNSPECIFIED: ICD-10-CM

## 2018-09-10 LAB
ACETONE SERPL-MCNC: NEGATIVE — SIGNIFICANT CHANGE UP
ALBUMIN SERPL ELPH-MCNC: 3.3 G/DL — SIGNIFICANT CHANGE UP (ref 3.3–5)
ALP SERPL-CCNC: 116 U/L — SIGNIFICANT CHANGE UP (ref 40–120)
ALT FLD-CCNC: 15 U/L — SIGNIFICANT CHANGE UP (ref 12–78)
ANION GAP SERPL CALC-SCNC: 8 MMOL/L — SIGNIFICANT CHANGE UP (ref 5–17)
APTT BLD: 36.3 SEC — SIGNIFICANT CHANGE UP (ref 27.5–37.4)
AST SERPL-CCNC: 10 U/L — LOW (ref 15–37)
BASOPHILS # BLD AUTO: 0.01 K/UL — SIGNIFICANT CHANGE UP (ref 0–0.2)
BASOPHILS NFR BLD AUTO: 0.2 % — SIGNIFICANT CHANGE UP (ref 0–2)
BILIRUB SERPL-MCNC: 0.4 MG/DL — SIGNIFICANT CHANGE UP (ref 0.2–1.2)
BUN SERPL-MCNC: 20 MG/DL — SIGNIFICANT CHANGE UP (ref 7–23)
CALCIUM SERPL-MCNC: 8.3 MG/DL — LOW (ref 8.5–10.1)
CHLORIDE SERPL-SCNC: 103 MMOL/L — SIGNIFICANT CHANGE UP (ref 96–108)
CK MB BLD-MCNC: <2.3 % — SIGNIFICANT CHANGE UP (ref 0–3.5)
CK MB CFR SERPL CALC: <1 NG/ML — SIGNIFICANT CHANGE UP (ref 0.5–3.6)
CK SERPL-CCNC: 44 U/L — SIGNIFICANT CHANGE UP (ref 26–192)
CO2 SERPL-SCNC: 30 MMOL/L — SIGNIFICANT CHANGE UP (ref 22–31)
CREAT SERPL-MCNC: 1.33 MG/DL — HIGH (ref 0.5–1.3)
D DIMER BLD IA.RAPID-MCNC: 232 NG/ML DDU — HIGH
EOSINOPHIL # BLD AUTO: 0.13 K/UL — SIGNIFICANT CHANGE UP (ref 0–0.5)
EOSINOPHIL NFR BLD AUTO: 2.3 % — SIGNIFICANT CHANGE UP (ref 0–6)
GLUCOSE BLDC GLUCOMTR-MCNC: 205 MG/DL — HIGH (ref 70–99)
GLUCOSE SERPL-MCNC: 226 MG/DL — HIGH (ref 70–99)
HCT VFR BLD CALC: 32.7 % — LOW (ref 34.5–45)
HGB BLD-MCNC: 10.9 G/DL — LOW (ref 11.5–15.5)
IMM GRANULOCYTES NFR BLD AUTO: 0.2 % — SIGNIFICANT CHANGE UP (ref 0–1.5)
INR BLD: 1.17 RATIO — HIGH (ref 0.88–1.16)
LYMPHOCYTES # BLD AUTO: 1.37 K/UL — SIGNIFICANT CHANGE UP (ref 1–3.3)
LYMPHOCYTES # BLD AUTO: 24.6 % — SIGNIFICANT CHANGE UP (ref 13–44)
MCHC RBC-ENTMCNC: 23.2 PG — LOW (ref 27–34)
MCHC RBC-ENTMCNC: 33.3 GM/DL — SIGNIFICANT CHANGE UP (ref 32–36)
MCV RBC AUTO: 69.6 FL — LOW (ref 80–100)
MONOCYTES # BLD AUTO: 0.4 K/UL — SIGNIFICANT CHANGE UP (ref 0–0.9)
MONOCYTES NFR BLD AUTO: 7.2 % — SIGNIFICANT CHANGE UP (ref 2–14)
NEUTROPHILS # BLD AUTO: 3.66 K/UL — SIGNIFICANT CHANGE UP (ref 1.8–7.4)
NEUTROPHILS NFR BLD AUTO: 65.5 % — SIGNIFICANT CHANGE UP (ref 43–77)
NRBC # BLD: 0 /100 WBCS — SIGNIFICANT CHANGE UP (ref 0–0)
NT-PROBNP SERPL-SCNC: 826 PG/ML — HIGH (ref 0–450)
PLATELET # BLD AUTO: 229 K/UL — SIGNIFICANT CHANGE UP (ref 150–400)
POTASSIUM SERPL-MCNC: 3.9 MMOL/L — SIGNIFICANT CHANGE UP (ref 3.5–5.3)
POTASSIUM SERPL-SCNC: 3.9 MMOL/L — SIGNIFICANT CHANGE UP (ref 3.5–5.3)
PROT SERPL-MCNC: 7.6 GM/DL — SIGNIFICANT CHANGE UP (ref 6–8.3)
PROTHROM AB SERPL-ACNC: 12.8 SEC — HIGH (ref 9.8–12.7)
RBC # BLD: 4.7 M/UL — SIGNIFICANT CHANGE UP (ref 3.8–5.2)
RBC # FLD: 17.7 % — HIGH (ref 10.3–14.5)
SODIUM SERPL-SCNC: 141 MMOL/L — SIGNIFICANT CHANGE UP (ref 135–145)
TROPONIN I SERPL-MCNC: <.015 NG/ML — SIGNIFICANT CHANGE UP (ref 0.01–0.04)
TROPONIN I SERPL-MCNC: <.015 NG/ML — SIGNIFICANT CHANGE UP (ref 0.01–0.04)
WBC # BLD: 5.58 K/UL — SIGNIFICANT CHANGE UP (ref 3.8–10.5)
WBC # FLD AUTO: 5.58 K/UL — SIGNIFICANT CHANGE UP (ref 3.8–10.5)

## 2018-09-10 PROCEDURE — 93010 ELECTROCARDIOGRAM REPORT: CPT

## 2018-09-10 PROCEDURE — 99285 EMERGENCY DEPT VISIT HI MDM: CPT

## 2018-09-10 PROCEDURE — 71275 CT ANGIOGRAPHY CHEST: CPT | Mod: 26

## 2018-09-10 PROCEDURE — 71045 X-RAY EXAM CHEST 1 VIEW: CPT | Mod: 26

## 2018-09-10 PROCEDURE — 70450 CT HEAD/BRAIN W/O DYE: CPT | Mod: 26

## 2018-09-10 RX ORDER — SODIUM CHLORIDE 9 MG/ML
250 INJECTION INTRAMUSCULAR; INTRAVENOUS; SUBCUTANEOUS ONCE
Qty: 0 | Refills: 0 | Status: COMPLETED | OUTPATIENT
Start: 2018-09-10 | End: 2018-09-10

## 2018-09-10 RX ORDER — ASPIRIN/CALCIUM CARB/MAGNESIUM 324 MG
325 TABLET ORAL ONCE
Qty: 0 | Refills: 0 | Status: COMPLETED | OUTPATIENT
Start: 2018-09-10 | End: 2018-09-10

## 2018-09-10 RX ADMIN — Medication 325 MILLIGRAM(S): at 14:25

## 2018-09-10 RX ADMIN — SODIUM CHLORIDE 250 MILLILITER(S): 9 INJECTION INTRAMUSCULAR; INTRAVENOUS; SUBCUTANEOUS at 14:25

## 2018-09-10 NOTE — ED PROVIDER NOTE - ST/T WAVE
No changes from the ekg on 9/10/18 and on 1/25/18 from the pmd's office No changes from the first ekg

## 2018-09-10 NOTE — ED PROVIDER NOTE - CONSTITUTIONAL, MLM
normal... Well appearing, well nourished, awake, alert, oriented to person, place, time/situation and in no apparent distress. Speaking in clear full sentences no nasal flaring no shoulders retraction no diaphoresis not holding her head/chest/abdomen, smiling appears very comfortable sitting up in the stretcher in a bright light room.

## 2018-09-10 NOTE — ED ADULT NURSE NOTE - OBJECTIVE STATEMENT
received pt sitting up on stretcher laert and oriented x4 c/o dizziness , denies any HA, or dubon in vision

## 2018-09-10 NOTE — ED ADULT NURSE NOTE - NURSING ED SKIN COLOR
Add 52 Modifier (Optional): no Medical Necessity Information: It is in your best interest to select a reason for this procedure from the list below. All of these items fulfill various CMS LCD requirements except the new and changing color options. Detail Level: Detailed Medical Necessity Clause: This procedure was medically necessary because the lesions that were treated were: itchy, and enlarging Anesthesia Volume In Cc: 1.5 normal for race Treatment Number (Will Not Render If 0): 0

## 2018-09-10 NOTE — ED PROVIDER NOTE - OBJECTIVE STATEMENT
73 years old male walked with grand daughter for low heart rate. Pt was visiting pmd for regular check up and became dizziness at office and pt had ekg showed sinus jayla 45. Pt however sts she is feeling better now more dizziness. Pt denies headache, recent trauma, blurred visions, light sensitivities, focal/distal weakness or numbness, cough, sob, chest pain, nausea, vomiting, fever, chills, abd pain, dysuria or irregular bowel movements. Pt also c/o generalized weakness for three to four weeks and sts she was diagnosed with colitis. 73 years old male walked with grand daughter for low heart rate. Pt was visiting pmd for regular check up and became dizziness at office and pt had ekg showed sinus jayla 45. Pt however sts she is feeling better now more dizziness. Pt denies headache, recent trauma, blurred visions, light sensitivities, focal/distal weakness or numbness, cough, sob, chest pain, nausea, vomiting, fever, chills, abd pain, dysuria or irregular bowel movements. Pt also c/o generalized weakness for three to four weeks and sts she was diagnosed with colitis. pt brought in two old ekg from 1/2018 and 9/2018 show sinus jayla no changes from the ekg here.

## 2018-09-10 NOTE — ED PROVIDER NOTE - NONTENDER LOCATION
periumbilical/left costovertebral angle/left upper quadrant/right upper quadrant/umbilical/right costovertebral angle/suprapubic/left lower quadrant/right lower quadrant

## 2018-09-10 NOTE — ED ADULT NURSE NOTE - NSIMPLEMENTINTERV_GEN_ALL_ED
Implemented All Fall Risk Interventions:  [place] to call system. Call bell, personal items and telephone within reach. Instruct patient to call for assistance. Room bathroom lighting operational. Non-slip footwear when patient is off stretcher. Physically safe environment: no spills, clutter or unnecessary equipment. Stretcher in lowest position, wheels locked, appropriate side rails in place. Provide visual cue, wrist band, yellow gown, etc. Monitor gait and stability. Monitor for mental status changes and reorient to person, place, and time. Review medications for side effects contributing to fall risk. Reinforce activity limits and safety measures with patient and family.

## 2018-09-10 NOTE — ED PROVIDER NOTE - PROGRESS NOTE DETAILS
Pt is smiling sitting up at the edge of the stretcher eating and tolerating full dinner now. Pt denies headache, dizziness, focal/distal weakness or numbness, cough, sob, chest pain, nausea, vomiting, abd pain. HR 56 bp 146/89. ct chest is negative for pulmonary embolism ct of head is normal 1st trop is negative and 2nd trop is pending. Pt sts she is feeling much better wants to be discharged pt is given and explained all test reports and advised to follow up with her pmd and Dr. Curtis cardiologist as soon as possible.

## 2018-09-10 NOTE — ED ADULT TRIAGE NOTE - CHIEF COMPLAINT QUOTE
pt was seen at her PCP office with c/o of dizziness and ekg revealed sinus jayla with flat T waves as per MD's note advised to come to the ED for further evaluation, denies CP + SOB acute onset

## 2019-02-08 ENCOUNTER — INPATIENT (INPATIENT)
Facility: HOSPITAL | Age: 74
LOS: 3 days | Discharge: HOME | End: 2019-02-12
Attending: INTERNAL MEDICINE | Admitting: INTERNAL MEDICINE
Payer: MEDICARE

## 2019-02-08 VITALS
RESPIRATION RATE: 16 BRPM | OXYGEN SATURATION: 100 % | DIASTOLIC BLOOD PRESSURE: 73 MMHG | HEART RATE: 51 BPM | TEMPERATURE: 98 F | SYSTOLIC BLOOD PRESSURE: 127 MMHG

## 2019-02-08 DIAGNOSIS — Z98.89 OTHER SPECIFIED POSTPROCEDURAL STATES: Chronic | ICD-10-CM

## 2019-02-08 LAB
ALBUMIN SERPL ELPH-MCNC: 3.8 G/DL — SIGNIFICANT CHANGE UP (ref 3.3–5)
ALP SERPL-CCNC: 113 U/L — SIGNIFICANT CHANGE UP (ref 40–120)
ALT FLD-CCNC: 11 U/L — SIGNIFICANT CHANGE UP (ref 4–33)
ANION GAP SERPL CALC-SCNC: 13 MMO/L — SIGNIFICANT CHANGE UP (ref 7–14)
APPEARANCE UR: SIGNIFICANT CHANGE UP
AST SERPL-CCNC: 29 U/L — SIGNIFICANT CHANGE UP (ref 4–32)
BACTERIA # UR AUTO: NEGATIVE — SIGNIFICANT CHANGE UP
BASOPHILS # BLD AUTO: 0.02 K/UL — SIGNIFICANT CHANGE UP (ref 0–0.2)
BASOPHILS NFR BLD AUTO: 0.3 % — SIGNIFICANT CHANGE UP (ref 0–2)
BILIRUB SERPL-MCNC: 0.3 MG/DL — SIGNIFICANT CHANGE UP (ref 0.2–1.2)
BILIRUB UR-MCNC: NEGATIVE — SIGNIFICANT CHANGE UP
BLOOD UR QL VISUAL: SIGNIFICANT CHANGE UP
BUN SERPL-MCNC: 23 MG/DL — SIGNIFICANT CHANGE UP (ref 7–23)
CALCIUM SERPL-MCNC: 9.1 MG/DL — SIGNIFICANT CHANGE UP (ref 8.4–10.5)
CHLORIDE SERPL-SCNC: 99 MMOL/L — SIGNIFICANT CHANGE UP (ref 98–107)
CO2 SERPL-SCNC: 28 MMOL/L — SIGNIFICANT CHANGE UP (ref 22–31)
COLOR SPEC: YELLOW — SIGNIFICANT CHANGE UP
CREAT SERPL-MCNC: 1.05 MG/DL — SIGNIFICANT CHANGE UP (ref 0.5–1.3)
EOSINOPHIL # BLD AUTO: 0.14 K/UL — SIGNIFICANT CHANGE UP (ref 0–0.5)
EOSINOPHIL NFR BLD AUTO: 2.2 % — SIGNIFICANT CHANGE UP (ref 0–6)
GLUCOSE SERPL-MCNC: 131 MG/DL — HIGH (ref 70–99)
GLUCOSE UR-MCNC: NEGATIVE — SIGNIFICANT CHANGE UP
HCT VFR BLD CALC: 33.8 % — LOW (ref 34.5–45)
HGB BLD-MCNC: 11.5 G/DL — SIGNIFICANT CHANGE UP (ref 11.5–15.5)
HYALINE CASTS # UR AUTO: NEGATIVE — SIGNIFICANT CHANGE UP
IMM GRANULOCYTES NFR BLD AUTO: 0.5 % — SIGNIFICANT CHANGE UP (ref 0–1.5)
KETONES UR-MCNC: NEGATIVE — SIGNIFICANT CHANGE UP
LEUKOCYTE ESTERASE UR-ACNC: SIGNIFICANT CHANGE UP
LYMPHOCYTES # BLD AUTO: 1.35 K/UL — SIGNIFICANT CHANGE UP (ref 1–3.3)
LYMPHOCYTES # BLD AUTO: 20.9 % — SIGNIFICANT CHANGE UP (ref 13–44)
MCHC RBC-ENTMCNC: 24 PG — LOW (ref 27–34)
MCHC RBC-ENTMCNC: 34 % — SIGNIFICANT CHANGE UP (ref 32–36)
MCV RBC AUTO: 70.6 FL — LOW (ref 80–100)
MONOCYTES # BLD AUTO: 0.47 K/UL — SIGNIFICANT CHANGE UP (ref 0–0.9)
MONOCYTES NFR BLD AUTO: 7.3 % — SIGNIFICANT CHANGE UP (ref 2–14)
NEUTROPHILS # BLD AUTO: 4.44 K/UL — SIGNIFICANT CHANGE UP (ref 1.8–7.4)
NEUTROPHILS NFR BLD AUTO: 68.8 % — SIGNIFICANT CHANGE UP (ref 43–77)
NITRITE UR-MCNC: NEGATIVE — SIGNIFICANT CHANGE UP
NRBC # FLD: 0 K/UL — LOW (ref 25–125)
PH UR: 6 — SIGNIFICANT CHANGE UP (ref 5–8)
PLATELET # BLD AUTO: 242 K/UL — SIGNIFICANT CHANGE UP (ref 150–400)
PMV BLD: 9.3 FL — SIGNIFICANT CHANGE UP (ref 7–13)
POTASSIUM SERPL-MCNC: 5 MMOL/L — SIGNIFICANT CHANGE UP (ref 3.5–5.3)
POTASSIUM SERPL-SCNC: 5 MMOL/L — SIGNIFICANT CHANGE UP (ref 3.5–5.3)
PROT SERPL-MCNC: 7.4 G/DL — SIGNIFICANT CHANGE UP (ref 6–8.3)
PROT UR-MCNC: 30 — SIGNIFICANT CHANGE UP
RBC # BLD: 4.79 M/UL — SIGNIFICANT CHANGE UP (ref 3.8–5.2)
RBC # FLD: 17.4 % — HIGH (ref 10.3–14.5)
RBC CASTS # UR COMP ASSIST: HIGH (ref 0–?)
SODIUM SERPL-SCNC: 140 MMOL/L — SIGNIFICANT CHANGE UP (ref 135–145)
SP GR SPEC: 1.03 — SIGNIFICANT CHANGE UP (ref 1–1.04)
SQUAMOUS # UR AUTO: SIGNIFICANT CHANGE UP
TROPONIN T, HIGH SENSITIVITY: 8 NG/L — SIGNIFICANT CHANGE UP (ref ?–14)
TROPONIN T, HIGH SENSITIVITY: 8 NG/L — SIGNIFICANT CHANGE UP (ref ?–14)
TSH SERPL-MCNC: 0.96 UIU/ML — SIGNIFICANT CHANGE UP (ref 0.27–4.2)
UROBILINOGEN FLD QL: NORMAL — SIGNIFICANT CHANGE UP
WBC # BLD: 6.45 K/UL — SIGNIFICANT CHANGE UP (ref 3.8–10.5)
WBC # FLD AUTO: 6.45 K/UL — SIGNIFICANT CHANGE UP (ref 3.8–10.5)
WBC UR QL: HIGH (ref 0–?)

## 2019-02-08 PROCEDURE — 70450 CT HEAD/BRAIN W/O DYE: CPT | Mod: 26

## 2019-02-08 PROCEDURE — 71046 X-RAY EXAM CHEST 2 VIEWS: CPT | Mod: 26

## 2019-02-08 RX ORDER — CARVEDILOL PHOSPHATE 80 MG/1
6.25 CAPSULE, EXTENDED RELEASE ORAL EVERY 12 HOURS
Qty: 0 | Refills: 0 | Status: DISCONTINUED | OUTPATIENT
Start: 2019-02-08 | End: 2019-02-12

## 2019-02-08 RX ORDER — LISINOPRIL 2.5 MG/1
40 TABLET ORAL DAILY
Qty: 0 | Refills: 0 | Status: DISCONTINUED | OUTPATIENT
Start: 2019-02-08 | End: 2019-02-10

## 2019-02-08 RX ORDER — ONDANSETRON 8 MG/1
4 TABLET, FILM COATED ORAL ONCE
Qty: 0 | Refills: 0 | Status: COMPLETED | OUTPATIENT
Start: 2019-02-08 | End: 2019-02-08

## 2019-02-08 RX ORDER — SODIUM CHLORIDE 9 MG/ML
500 INJECTION INTRAMUSCULAR; INTRAVENOUS; SUBCUTANEOUS ONCE
Qty: 0 | Refills: 0 | Status: COMPLETED | OUTPATIENT
Start: 2019-02-08 | End: 2019-02-08

## 2019-02-08 RX ORDER — SIMVASTATIN 20 MG/1
10 TABLET, FILM COATED ORAL AT BEDTIME
Qty: 0 | Refills: 0 | Status: DISCONTINUED | OUTPATIENT
Start: 2019-02-08 | End: 2019-02-12

## 2019-02-08 RX ORDER — OXCARBAZEPINE 300 MG/1
300 TABLET, FILM COATED ORAL
Qty: 0 | Refills: 0 | Status: DISCONTINUED | OUTPATIENT
Start: 2019-02-08 | End: 2019-02-12

## 2019-02-08 RX ORDER — AMLODIPINE BESYLATE 2.5 MG/1
10 TABLET ORAL DAILY
Qty: 0 | Refills: 0 | Status: DISCONTINUED | OUTPATIENT
Start: 2019-02-08 | End: 2019-02-10

## 2019-02-08 RX ADMIN — OXCARBAZEPINE 300 MILLIGRAM(S): 300 TABLET, FILM COATED ORAL at 18:21

## 2019-02-08 RX ADMIN — SIMVASTATIN 10 MILLIGRAM(S): 20 TABLET, FILM COATED ORAL at 21:54

## 2019-02-08 RX ADMIN — SODIUM CHLORIDE 500 MILLILITER(S): 9 INJECTION INTRAMUSCULAR; INTRAVENOUS; SUBCUTANEOUS at 12:39

## 2019-02-08 RX ADMIN — ONDANSETRON 4 MILLIGRAM(S): 8 TABLET, FILM COATED ORAL at 14:37

## 2019-02-08 RX ADMIN — Medication 100 MILLIGRAM(S): at 22:29

## 2019-02-08 NOTE — ED PROVIDER NOTE - PHYSICAL EXAMINATION
Gen: No acute distress, alert, cooperative  HENT: Normocephalic, atraumatic. External ears normal, no rhinorrhea, moist mucous membranes, normal conjunctiva  Eyes: PERRLA, EOMI    Resp: CTAB, non-labored, speaking without difficulty on room air, no wheeze  CV: rrr, no murmur  Abd: soft, NTND, no rebound or guarding  MSK: No CVAT bilaterally, no midline ttp  Skin: warm and dry  Neuro: AOx3, speech is fluent and appropriate, no focal deficits  Psych: Normal affect

## 2019-02-08 NOTE — ED CDU PROVIDER INITIAL DAY NOTE - PROGRESS NOTE DETAILS
Patient received at sign out from BISHNU Rahman and Dr. Aviles, patient transferred to CDU for tele, vitals q4, Echo in AM and frequent re-evals. Patient states she is feeling well at this time, has no complaints. Patient received at sign out from BISHNU Rahman and Dr. Aviles, patient transferred to CDU for tele, vitals q4, Echo in AM and frequent re-evals. Patient states she is feeling well at this time, has no complaints. Admits earlier today while at nail salon she felt lightheaded and little nauseous so rested her head on table at nail salon, supposedly patient lost consciousness so 911 called and patient transferred to ER. Admits to one episode of NBNB emesis. Pt denies cp, sob, headache, changes in vision, abdominal pain, dysuria, hematuria, LE swelling, recent travel, diarrhea, melena, hx of smoking or fhx of CAD. Pt states sx not consistent with prior seizures, last seizure many years ago as per patient. Denies any complaints. Will continue with current tx plan for tele, echo, vitals q4 and frequent re-evals.

## 2019-02-08 NOTE — ED CDU PROVIDER INITIAL DAY NOTE - OBJECTIVE STATEMENT
72 yo F, nonsmoker with PMH of HTN, HLD, bradycardia, seizures presents to ER c/o syncope episode while sitting at a nail salon. Pt states she was getting her nails done, felt sudden dizziness a/w nausea, vomited x1, and passed out for about 5 minutes, no seizure activity, no tongue biting, no fall, no head injury, not on anticoagulation. Denies any fever, chills, headache, neck pain, chest pain, sob, back pain, abdominal pain, dysuria, hematuria, recent travel, hx of DVT/PE, or any other complaints.

## 2019-02-08 NOTE — ED ADULT NURSE NOTE - OBJECTIVE STATEMENT
Pt BIBEMS from nail salon for syncopal episode. Pt currently Aox3. Ambulatory with cane at baseline. States she was feeling unwell x 2 weeks intermittently. Today had nausea and vomiting x 1 episode after waking up. Went to nail salon and "passed out" while sitting and getting nails done. Denies head injury. Pt doesn't recall the incident. Currently denies CP. C/o SOB, dizziness, and nausea. Hx: HTN, HLD, Seizures but hasn't had a seizures "in years." Evaluated by MD. Labs drawn & sent. 20g IV placed to R ac. Will continue to monitor.

## 2019-02-08 NOTE — ED ADULT NURSE NOTE - NSIMPLEMENTINTERV_GEN_ALL_ED
Implemented All Fall Risk Interventions:  [place] to call system. Call bell, personal items and telephone within reach. Instruct patient to call for assistance. Room bathroom lighting operational. Non-slip footwear when patient is off stretcher. Physically safe environment: no spills, clutter or unnecessary equipment. Stretcher in lowest position, wheels locked, appropriate side rails in place. Provide visual cue, wrist band, yellow gown, etc. Monitor gait and stability. Monitor for mental status changes and reorient to person, place, and time. Review medications for side effects contributing to fall risk. Reinforce activity limits and safety measures with patient and family.

## 2019-02-08 NOTE — ED CDU PROVIDER INITIAL DAY NOTE - ATTENDING CONTRIBUTION TO CARE
72 yo F, nonsmoker with PMH of HTN, HLD, bradycardia, seizures presents to ER c/o syncope episode while sitting at a nail salon. trop 8, CXR neg, EKG non-ischemic, sent to CDU for tele and echo.

## 2019-02-08 NOTE — ED ADULT TRIAGE NOTE - CHIEF COMPLAINT QUOTE
brought in by EMS for c/o dizziness, N/V, diaphoresis and SOB while at nail salon. Hx HTN, HLD, Seizures. Breathing unlabored. Speaking full sentences.

## 2019-02-08 NOTE — ED PROVIDER NOTE - MEDICAL DECISION MAKING DETAILS
72yo F hx htn, hld, bradycardia, seizures presenting after syncope with sob/nausea/dizzy before syncope. Feeling much better, just a bit "off". EKG, labs, trop, CTH, urine to look for cause. 72yo F hx htn, hld, bradycardia, seizures presenting after syncope with sob/nausea/dizzy before syncope. Feeling much better, just a bit "off". EKG, labs, trop, CTH, urine to look for infections cause.

## 2019-02-08 NOTE — ED CDU PROVIDER INITIAL DAY NOTE - MEDICAL DECISION MAKING DETAILS
74 yo F, nonsmoker with PMH of HTN, HLD, bradycardia, seizures presents to ER c/o syncope episode while sitting at a nail salon. trop 8, CXR neg, EKG non-ischemic, sent to CDU for tele and echo.

## 2019-02-08 NOTE — ED PROVIDER NOTE - ATTENDING CONTRIBUTION TO CARE
Pt was seen and evaluated by me. Pt is a 72 y/o female with PMHx of HTN, HLD, Bradycardia, and Seizures who presented to the ED today for syncope. Pt states over the past 2 weeks she has not been feeling well. Pt admits to intermittent dizziness and weakness, worse with stress. Pt notes today she was at the nail salon and was feeling nauseous when she passed out for around 1 minute she was told with no shaking or convulsions. Pt was sitting at the time and did not hit her head. Pt states after waking up having an episode of vomiting. Pt denies any headache, neck pain, back pain, fever, chills, SOB, chest pain, or abd pain. Lungs CTA b/l. RRR. Abd soft, non-tender. No focal deficits. PERRL.

## 2019-02-08 NOTE — ED PROVIDER NOTE - OBJECTIVE STATEMENT
72yo F hx htn, hld, bradycardia, seizures presenting after syncope at [facility]. Was sitting down, felt dizzy, nauseous, increased sweating, sob. Lost consciousness for 1 minute. Now feels "off", otherwise denies cp, sob, abdominal pain, dizzy, headache, dysuria, fevers. 72yo F hx htn, hld, bradycardia, seizures presenting after syncope at \[facility]". Was sitting down, felt dizzy, nauseous, increased sweating, vomiting x1. Lost consciousness for 1 minute. Now feels "off" and nauseous, otherwise denies cp, sob, abdominal pain, dizzy, headache, dysuria, fevers. Hasn't been feeling well for a couple weeks with +cough, but no fevers.

## 2019-02-09 DIAGNOSIS — R56.9 UNSPECIFIED CONVULSIONS: ICD-10-CM

## 2019-02-09 DIAGNOSIS — E78.5 HYPERLIPIDEMIA, UNSPECIFIED: ICD-10-CM

## 2019-02-09 DIAGNOSIS — R94.30 ABNORMAL RESULT OF CARDIOVASCULAR FUNCTION STUDY, UNSPECIFIED: ICD-10-CM

## 2019-02-09 DIAGNOSIS — I10 ESSENTIAL (PRIMARY) HYPERTENSION: ICD-10-CM

## 2019-02-09 DIAGNOSIS — Z29.9 ENCOUNTER FOR PROPHYLACTIC MEASURES, UNSPECIFIED: ICD-10-CM

## 2019-02-09 DIAGNOSIS — R55 SYNCOPE AND COLLAPSE: ICD-10-CM

## 2019-02-09 DIAGNOSIS — R00.1 BRADYCARDIA, UNSPECIFIED: ICD-10-CM

## 2019-02-09 DIAGNOSIS — R73.03 PREDIABETES: ICD-10-CM

## 2019-02-09 PROCEDURE — 74177 CT ABD & PELVIS W/CONTRAST: CPT | Mod: 26

## 2019-02-09 PROCEDURE — 93306 TTE W/DOPPLER COMPLETE: CPT | Mod: 26

## 2019-02-09 RX ORDER — HEPARIN SODIUM 5000 [USP'U]/ML
5000 INJECTION INTRAVENOUS; SUBCUTANEOUS EVERY 12 HOURS
Qty: 0 | Refills: 0 | Status: DISCONTINUED | OUTPATIENT
Start: 2019-02-09 | End: 2019-02-12

## 2019-02-09 RX ORDER — ASPIRIN/CALCIUM CARB/MAGNESIUM 324 MG
81 TABLET ORAL DAILY
Qty: 0 | Refills: 0 | Status: DISCONTINUED | OUTPATIENT
Start: 2019-02-09 | End: 2019-02-12

## 2019-02-09 RX ORDER — CEFTRIAXONE 500 MG/1
1 INJECTION, POWDER, FOR SOLUTION INTRAMUSCULAR; INTRAVENOUS ONCE
Qty: 0 | Refills: 0 | Status: COMPLETED | OUTPATIENT
Start: 2019-02-09 | End: 2019-02-09

## 2019-02-09 RX ADMIN — OXCARBAZEPINE 300 MILLIGRAM(S): 300 TABLET, FILM COATED ORAL at 19:36

## 2019-02-09 RX ADMIN — CEFTRIAXONE 100 GRAM(S): 500 INJECTION, POWDER, FOR SOLUTION INTRAMUSCULAR; INTRAVENOUS at 02:53

## 2019-02-09 RX ADMIN — OXCARBAZEPINE 300 MILLIGRAM(S): 300 TABLET, FILM COATED ORAL at 06:52

## 2019-02-09 RX ADMIN — HEPARIN SODIUM 5000 UNIT(S): 5000 INJECTION INTRAVENOUS; SUBCUTANEOUS at 22:25

## 2019-02-09 RX ADMIN — CARVEDILOL PHOSPHATE 6.25 MILLIGRAM(S): 80 CAPSULE, EXTENDED RELEASE ORAL at 19:02

## 2019-02-09 RX ADMIN — Medication 100 MILLIGRAM(S): at 22:48

## 2019-02-09 RX ADMIN — Medication 81 MILLIGRAM(S): at 22:24

## 2019-02-09 RX ADMIN — SIMVASTATIN 10 MILLIGRAM(S): 20 TABLET, FILM COATED ORAL at 22:24

## 2019-02-09 NOTE — ED CDU PROVIDER SUBSEQUENT DAY NOTE - HISTORY
As per Initial CDU note:  74 yo F, nonsmoker with PMH of HTN, HLD, bradycardia, seizures presents to ER c/o syncope episode while sitting at a nail salon. Pt states she was getting her nails done and felt lightheaded, so she rested her head on table, bystander called 911, patient does not recall loc. Admits afterward in ambulance had +nausea, vomited x1, no seizure activity, no tongue biting, no fall, no head injury, not on anticoagulation. Denies any fever, chills, headache, neck pain, chest pain, sob, back pain, abdominal pain, dysuria, hematuria, recent travel, hx of DVT/PE, or any other complaints. Patient currently feeling well without any complaints at this time. Pt transferred to CDU for; tele, vitals q4, echo in am and frequent re-evals.

## 2019-02-09 NOTE — H&P ADULT - HISTORY OF PRESENT ILLNESS
77F, ambulates wit5h a cane at times, history of HTN, Dyslipidemia, bradycardia, seizures, last seizure 10 years ago, experienced an atraumatic syncopal episode while sitting at a nail salon. Pt states felt lightheaded, so she rested her head on table, bystander called 911. After coming too the pt experienced nausea and vomit x 1 episode. Denies bowel/ bladder incontinence, tongue biting, fall, head/ body injury, chest pain, palpitations, chills, HA, diaphoresis, dysuria. The pt has never had a syncopal episode in the past. In the Ed, the pt had Trop = 8--8, EKG SB @ 57b/ min,   CT HEAD = No acute findings.  CT Abd & Pelvis= No acute findings  TTE = 1. Mitral annular calcification, otherwise normal mitral valve. Mild mitral regurgitation.  2. Mildly calcified trileaflet aortic valve with normal opening. Moderate aortic regurgitation.  3. Mildly dilated left atrium.  4. Normal left ventricular internal dimensions and wall thicknesses.  5. Mild global left ventricular systolic dysfunction. LVEF 45-50%.  The TTE shows worsening EF from TTE 8/3/17 and the pt is admitted to the hospital. Currently the pt has no complaints    6. Mild diastolic dysfunction (Stage I).  7. The right ventricle is not well visualized; grossly normal right ventricular systolic function  The TTE shows worsening EF from 8/3/17. The pt is admitted to the hospital and currently has no complaints.

## 2019-02-09 NOTE — H&P ADULT - NEGATIVE MUSCULOSKELETAL SYMPTOMS
no arthritis/no muscle cramps/no joint swelling/no muscle weakness/no myalgia/no stiffness/no neck pain/no arm pain R/no arm pain L

## 2019-02-09 NOTE — ED CDU PROVIDER SUBSEQUENT DAY NOTE - PHYSICAL EXAMINATION
Vital signs reviewed.   CONSTITUTIONAL: Well-appearing; well-nourished; in no apparent distress. Non-toxic appearing.   HEAD: Normocephalic, atraumatic.  EYES: PERRL, EOM intact, conjunctiva and sclera WNL.  ENT: normal nose; no rhinorrhea;   NECK:Trachea midline   CARD: Normal S1, S2; no murmurs, rubs, or gallops noted.  RESP: Normal chest excursion with respiration; breath sounds clear and equal bilaterally; no wheezes, rhonchi, or rales.  ABD/GI: soft, non-distended; non-tender  EXT/MS: moves all extremities; distal pulses are normal, no pedal edema.  SKIN: Normal for age and race; warm; dry; good turgor; no apparent lesions or exudate noted.  NEURO: Awake, alert, oriented x 3, no gross deficits  PSYCH: Normal mood; appropriate affect.

## 2019-02-09 NOTE — ED CDU PROVIDER DISPOSITION NOTE - CLINICAL COURSE
Impression:  syncope with TTE demonstrates diminished cardiac function.    Plan:  Case d/w cardiology, who will admit for further evaluation.

## 2019-02-09 NOTE — ED CDU PROVIDER SUBSEQUENT DAY NOTE - PROGRESS NOTE DETAILS
Patient resting comfortably, no complaints at this time. Remains sinus jayla on tele,  stable. Pt UA +, patient denies any complaints. Rocephin given. Pt pending Echo in AM. Will continue with current tx plan and monitor closely. pt feeling well, has been ambulating. given echo read called cards to discuss case. Dr. John states can admit to his service. pt agrees with plan, currently asymptomatic. denies cp , sob, cough, palpitations.

## 2019-02-09 NOTE — ED CDU PROVIDER SUBSEQUENT DAY NOTE - MEDICAL DECISION MAKING DETAILS
73 y.o female with PMHx of HTN, HLD, DM, seizure disorder who presents to ED for syncopal episode. Transferred to CDU for; tele, echo, vitals q4 and frequent re-eval.

## 2019-02-09 NOTE — H&P ADULT - NEGATIVE OPHTHALMOLOGIC SYMPTOMS
no discharge L/no blurred vision R/no discharge R/no lacrimation L/no lacrimation R/no photophobia/no blurred vision L

## 2019-02-09 NOTE — H&P ADULT - GASTROINTESTINAL DETAILS
no rebound tenderness/no masses palpable/nontender/soft/bowel sounds normal/no rigidity/no guarding/no bruit

## 2019-02-09 NOTE — ED CDU PROVIDER DISPOSITION NOTE - ATTENDING CONTRIBUTION TO CARE
MD Pickard:  I have personally performed a face to face diagnostic evaluation on this patient with the PA.  I have reviewed the ACP note and agree with the history, exam, and plan of care, except as noted.  History and Exam by me shows

## 2019-02-09 NOTE — H&P ADULT - NSHPLABSRESULTS_GEN_ALL_CORE
CT HEAD = No acute findings.  CT Abd & Pelvis= No acute findings  TTE = 1. Mitral annular calcification, otherwise normal mitral valve. Mild mitral regurgitation.  2. Mildly calcified trileaflet aortic valve with normal opening. Moderate aortic regurgitation.  3. Mildly dilated left atrium.  4. Normal left ventricular internal dimensions and wall thicknesses.  5. Mild global left ventricular systolic dysfunction. LVEF 45-50%.  6. Mild diastolic dysfunction (Stage I).  7. The right ventricle is not well visualized; grossly normal right ventricular systolic function.  Trop = 8--> 8  Glucose= 131  A1C= 5.7  BUN/ Creatinine= 23/ 1.05   SB @ 57b/ min, QT/ QTC= 458/ 445

## 2019-02-09 NOTE — H&P ADULT - RS GEN PE MLT RESP DETAILS PC
good air movement/no subcutaneous emphysema/breath sounds equal/respirations non-labored/no wheezes/no chest wall tenderness/no intercostal retractions/airway patent/clear to auscultation bilaterally/no rhonchi/no rales

## 2019-02-09 NOTE — H&P ADULT - NEGATIVE NEUROLOGICAL SYMPTOMS
no focal seizures/no hemiparesis/no facial palsy/no paresthesias/no transient paralysis/no weakness/no generalized seizures/no headache/no confusion/no difficulty walking

## 2019-02-09 NOTE — ED CDU PROVIDER SUBSEQUENT DAY NOTE - ATTENDING CONTRIBUTION TO CARE
MD Pickard:  I have personally performed a face to face diagnostic evaluation on this patient with the PA.  I have reviewed the ACP note and agree with the history, exam, and plan of care, except as noted.  History and Exam by me shows 72yo F, sent to CDU [facility] echo [facility]p [facility]ope [facility] hospitals.  Patient has MHx HTN, HLD, bradycardia, Sz on oxcarbazepine, who passed out while getting her nails done.  Onset 10am.  Was sitting down, felt dizzy, nauseous, diaphoretic, vomiting x1.  Duration LOC ~ 1 minute.  Still feels slightly light-headed, but otherwise denies cp, sob, abdominal pain, dizzy, headache, dysuria, fevers.   While in main ED:  HST-Trop = 8 x 2, CT head = microvascular changes, CXR unremarkable, CMP/CBC unremarkable, UA = ++UTI (received 1 dose ceftriaxone).  Sent to CDU for echo and tele.  TTE is grossly wnl, with grade 1 diastolic disfunction.  VS: wnl.  Physical Exam: elderly adult F, obese, NAD, NCAT, PERRL, EOMI, neck supple, RRR, CTA B, Abd: +RLQ ttp,  Ext: no edema, Neuro: AAOx3, ambulates w/o diff, strength 5/5 & symmetric throughout.   Impression:  possible vasovagal syncope in patient with UTI.  Although patient's HR 50s, she has never been hypotensive in the ED.  No new BP meds.  Patient has significant RLQ pain on my exam.  Plan:  agree with abx for UTI, but RLQ pain merits CT abd to r/o appy.  If CT does not show any acute abnormality, we will d/c home on PO abx, f/u PMD, strict return precautions. MD Pickard:  I have personally performed a face to face diagnostic evaluation on this patient with the PA.  I have reviewed the ACP note and agree with the history, exam, and plan of care, except as noted.  History and Exam by me shows 74yo F, sent to CDU for echo s/p syncope at [facility].  Patient has MHx HTN, HLD, bradycardia, Sz on oxcarbazepine, who passed out while getting her nails done.  Onset 10am.  Was sitting down, felt dizzy, nauseous, diaphoretic, vomiting x1.  Duration LOC ~ 1 minute.  Still feels slightly light-headed, but otherwise denies cp, sob, abdominal pain, dizzy, headache, dysuria, fevers.   While in main ED:  HST-Trop = 8 x 2, CT head = microvascular changes, CXR unremarkable, CMP/CBC unremarkable, UA = ++UTI (received 1 dose ceftriaxone).  Sent to CDU for echo and tele.    VS: wnl.  Physical Exam: elderly adult F, obese, NAD, NCAT, PERRL, EOMI, neck supple, RRR, CTA B, Abd: +RLQ ttp,  Ext: no edema, Neuro: AAOx3, ambulates w/o diff, strength 5/5 & symmetric throughout.   Impression:  possible vasovagal syncope in patient with UTI.  Although patient's HR 50s, she has never been hypotensive in the ED.  No new BP meds.  Patient has significant RLQ pain on my exam.  Plan:  CT abd unremarkable, but TTE demonstrates diminished cardiac function.  Case d/w cardiology, who will admit for further evaluation.

## 2019-02-09 NOTE — H&P ADULT - PROBLEM SELECTOR PLAN 2
F/U TSH.  Consider EP for PPM consult if pt become continues to be bradycardic with dizziness, another syncopal episode or chest pain.

## 2019-02-10 LAB
BACTERIA UR CULT: SIGNIFICANT CHANGE UP
CHOLEST SERPL-MCNC: 142 MG/DL — SIGNIFICANT CHANGE UP (ref 120–199)
HBA1C BLD-MCNC: 5.7 % — HIGH (ref 4–5.6)
HCT VFR BLD CALC: 32.9 % — LOW (ref 34.5–45)
HDLC SERPL-MCNC: 39 MG/DL — LOW (ref 45–65)
HGB BLD-MCNC: 10.7 G/DL — LOW (ref 11.5–15.5)
LIPID PNL WITH DIRECT LDL SERPL: 101 MG/DL — SIGNIFICANT CHANGE UP
MCHC RBC-ENTMCNC: 23.1 PG — LOW (ref 27–34)
MCHC RBC-ENTMCNC: 32.5 % — SIGNIFICANT CHANGE UP (ref 32–36)
MCV RBC AUTO: 70.9 FL — LOW (ref 80–100)
NRBC # FLD: 0 K/UL — LOW (ref 25–125)
PLATELET # BLD AUTO: 207 K/UL — SIGNIFICANT CHANGE UP (ref 150–400)
PMV BLD: 9.2 FL — SIGNIFICANT CHANGE UP (ref 7–13)
RBC # BLD: 4.64 M/UL — SIGNIFICANT CHANGE UP (ref 3.8–5.2)
RBC # FLD: 17.1 % — HIGH (ref 10.3–14.5)
SPECIMEN SOURCE: SIGNIFICANT CHANGE UP
TRIGL SERPL-MCNC: 80 MG/DL — SIGNIFICANT CHANGE UP (ref 10–149)
TSH SERPL-MCNC: 0.74 UIU/ML — SIGNIFICANT CHANGE UP (ref 0.27–4.2)
WBC # BLD: 4.59 K/UL — SIGNIFICANT CHANGE UP (ref 3.8–10.5)
WBC # FLD AUTO: 4.59 K/UL — SIGNIFICANT CHANGE UP (ref 3.8–10.5)

## 2019-02-10 RX ORDER — LISINOPRIL 2.5 MG/1
40 TABLET ORAL ONCE
Qty: 0 | Refills: 0 | Status: COMPLETED | OUTPATIENT
Start: 2019-02-10 | End: 2019-02-10

## 2019-02-10 RX ORDER — LISINOPRIL 2.5 MG/1
40 TABLET ORAL DAILY
Qty: 0 | Refills: 0 | Status: DISCONTINUED | OUTPATIENT
Start: 2019-02-11 | End: 2019-02-12

## 2019-02-10 RX ORDER — ACETAMINOPHEN 500 MG
650 TABLET ORAL ONCE
Qty: 0 | Refills: 0 | Status: COMPLETED | OUTPATIENT
Start: 2019-02-10 | End: 2019-02-10

## 2019-02-10 RX ORDER — AMLODIPINE BESYLATE 2.5 MG/1
10 TABLET ORAL DAILY
Qty: 0 | Refills: 0 | Status: DISCONTINUED | OUTPATIENT
Start: 2019-02-10 | End: 2019-02-12

## 2019-02-10 RX ORDER — LISINOPRIL 2.5 MG/1
40 TABLET ORAL DAILY
Qty: 0 | Refills: 0 | Status: DISCONTINUED | OUTPATIENT
Start: 2019-02-10 | End: 2019-02-10

## 2019-02-10 RX ORDER — SODIUM CHLORIDE 9 MG/ML
500 INJECTION INTRAMUSCULAR; INTRAVENOUS; SUBCUTANEOUS ONCE
Qty: 0 | Refills: 0 | Status: COMPLETED | OUTPATIENT
Start: 2019-02-10 | End: 2019-02-10

## 2019-02-10 RX ORDER — SODIUM CHLORIDE 9 MG/ML
500 INJECTION INTRAMUSCULAR; INTRAVENOUS; SUBCUTANEOUS
Qty: 0 | Refills: 0 | Status: DISCONTINUED | OUTPATIENT
Start: 2019-02-10 | End: 2019-02-11

## 2019-02-10 RX ADMIN — SODIUM CHLORIDE 500 MILLILITER(S): 9 INJECTION INTRAMUSCULAR; INTRAVENOUS; SUBCUTANEOUS at 06:07

## 2019-02-10 RX ADMIN — HEPARIN SODIUM 5000 UNIT(S): 5000 INJECTION INTRAVENOUS; SUBCUTANEOUS at 19:10

## 2019-02-10 RX ADMIN — SODIUM CHLORIDE 75 MILLILITER(S): 9 INJECTION INTRAMUSCULAR; INTRAVENOUS; SUBCUTANEOUS at 10:24

## 2019-02-10 RX ADMIN — HEPARIN SODIUM 5000 UNIT(S): 5000 INJECTION INTRAVENOUS; SUBCUTANEOUS at 07:08

## 2019-02-10 RX ADMIN — SODIUM CHLORIDE 75 MILLILITER(S): 9 INJECTION INTRAMUSCULAR; INTRAVENOUS; SUBCUTANEOUS at 21:52

## 2019-02-10 RX ADMIN — LISINOPRIL 40 MILLIGRAM(S): 2.5 TABLET ORAL at 23:15

## 2019-02-10 RX ADMIN — OXCARBAZEPINE 300 MILLIGRAM(S): 300 TABLET, FILM COATED ORAL at 19:10

## 2019-02-10 RX ADMIN — Medication 650 MILLIGRAM(S): at 23:15

## 2019-02-10 RX ADMIN — OXCARBAZEPINE 300 MILLIGRAM(S): 300 TABLET, FILM COATED ORAL at 07:09

## 2019-02-10 RX ADMIN — CARVEDILOL PHOSPHATE 6.25 MILLIGRAM(S): 80 CAPSULE, EXTENDED RELEASE ORAL at 19:10

## 2019-02-10 RX ADMIN — Medication 81 MILLIGRAM(S): at 11:49

## 2019-02-10 RX ADMIN — SIMVASTATIN 10 MILLIGRAM(S): 20 TABLET, FILM COATED ORAL at 21:52

## 2019-02-10 NOTE — CHART NOTE - NSCHARTNOTEFT_GEN_A_CORE
ANISA HOMEROALEXANDRA  [id number] 73y    Provider called by nurse stating patient was orthostatic positive with a hx of decreased EF. Provider evaluated patient for volume status and fluid overload. Patient appeared to be euvolemic on clinical exam. Patient was not in respiratory distress or with any dyspnea. 500ccs ordered for orthostasis. Will continue to monitor patient.

## 2019-02-10 NOTE — CHART NOTE - NSCHARTNOTEFT_GEN_A_CORE
Patient noted to have /78 with repeat of 182/88, she was asymptomatic at the time.  Did not receive her daily Lisinopril during the day.  Will order Lisinopril 40 mg x 1 and monitor for reaction to Rx.    T(C): 36.6 (02-10-19 @ 21:37), Max: 36.7 (02-10-19 @ 05:00)  HR: 54 (02-10-19 @ 22:17) (52 - 64)  BP: 182/88 (02-10-19 @ 22:17) (131/54 - 182/88)  RR: 18 (02-10-19 @ 21:37) (16 - 18)  SpO2: 100% (02-10-19 @ 21:37) (98% - 100%)  Wt(kg): --

## 2019-02-11 LAB
ANION GAP SERPL CALC-SCNC: 9 MMO/L — SIGNIFICANT CHANGE UP (ref 7–14)
BUN SERPL-MCNC: 19 MG/DL — SIGNIFICANT CHANGE UP (ref 7–23)
CALCIUM SERPL-MCNC: 8.9 MG/DL — SIGNIFICANT CHANGE UP (ref 8.4–10.5)
CHLORIDE SERPL-SCNC: 104 MMOL/L — SIGNIFICANT CHANGE UP (ref 98–107)
CO2 SERPL-SCNC: 27 MMOL/L — SIGNIFICANT CHANGE UP (ref 22–31)
CREAT SERPL-MCNC: 0.89 MG/DL — SIGNIFICANT CHANGE UP (ref 0.5–1.3)
GLUCOSE SERPL-MCNC: 94 MG/DL — SIGNIFICANT CHANGE UP (ref 70–99)
HCT VFR BLD CALC: 32.5 % — LOW (ref 34.5–45)
HGB BLD-MCNC: 11 G/DL — LOW (ref 11.5–15.5)
MAGNESIUM SERPL-MCNC: 2.4 MG/DL — SIGNIFICANT CHANGE UP (ref 1.6–2.6)
MCHC RBC-ENTMCNC: 24 PG — LOW (ref 27–34)
MCHC RBC-ENTMCNC: 33.8 % — SIGNIFICANT CHANGE UP (ref 32–36)
MCV RBC AUTO: 70.8 FL — LOW (ref 80–100)
NRBC # FLD: 0 K/UL — LOW (ref 25–125)
PHOSPHATE SERPL-MCNC: 2.9 MG/DL — SIGNIFICANT CHANGE UP (ref 2.5–4.5)
PLATELET # BLD AUTO: 210 K/UL — SIGNIFICANT CHANGE UP (ref 150–400)
PMV BLD: 9.2 FL — SIGNIFICANT CHANGE UP (ref 7–13)
POTASSIUM SERPL-MCNC: 4 MMOL/L — SIGNIFICANT CHANGE UP (ref 3.5–5.3)
POTASSIUM SERPL-SCNC: 4 MMOL/L — SIGNIFICANT CHANGE UP (ref 3.5–5.3)
RBC # BLD: 4.59 M/UL — SIGNIFICANT CHANGE UP (ref 3.8–5.2)
RBC # FLD: 17.2 % — HIGH (ref 10.3–14.5)
SODIUM SERPL-SCNC: 140 MMOL/L — SIGNIFICANT CHANGE UP (ref 135–145)
WBC # BLD: 4.22 K/UL — SIGNIFICANT CHANGE UP (ref 3.8–10.5)
WBC # FLD AUTO: 4.22 K/UL — SIGNIFICANT CHANGE UP (ref 3.8–10.5)

## 2019-02-11 RX ORDER — BENZOCAINE AND MENTHOL 5; 1 G/100ML; G/100ML
1 LIQUID ORAL THREE TIMES A DAY
Qty: 0 | Refills: 0 | Status: DISCONTINUED | OUTPATIENT
Start: 2019-02-11 | End: 2019-02-12

## 2019-02-11 RX ADMIN — SODIUM CHLORIDE 75 MILLILITER(S): 9 INJECTION INTRAMUSCULAR; INTRAVENOUS; SUBCUTANEOUS at 08:33

## 2019-02-11 RX ADMIN — OXCARBAZEPINE 300 MILLIGRAM(S): 300 TABLET, FILM COATED ORAL at 17:17

## 2019-02-11 RX ADMIN — SIMVASTATIN 10 MILLIGRAM(S): 20 TABLET, FILM COATED ORAL at 21:54

## 2019-02-11 RX ADMIN — Medication 81 MILLIGRAM(S): at 14:16

## 2019-02-11 RX ADMIN — BENZOCAINE AND MENTHOL 1 LOZENGE: 5; 1 LIQUID ORAL at 21:54

## 2019-02-11 RX ADMIN — CARVEDILOL PHOSPHATE 6.25 MILLIGRAM(S): 80 CAPSULE, EXTENDED RELEASE ORAL at 17:44

## 2019-02-11 RX ADMIN — OXCARBAZEPINE 300 MILLIGRAM(S): 300 TABLET, FILM COATED ORAL at 05:22

## 2019-02-11 RX ADMIN — Medication 200 MILLIGRAM(S): at 08:32

## 2019-02-11 RX ADMIN — LISINOPRIL 40 MILLIGRAM(S): 2.5 TABLET ORAL at 05:22

## 2019-02-11 RX ADMIN — HEPARIN SODIUM 5000 UNIT(S): 5000 INJECTION INTRAVENOUS; SUBCUTANEOUS at 05:22

## 2019-02-11 RX ADMIN — AMLODIPINE BESYLATE 10 MILLIGRAM(S): 2.5 TABLET ORAL at 05:22

## 2019-02-11 RX ADMIN — CARVEDILOL PHOSPHATE 6.25 MILLIGRAM(S): 80 CAPSULE, EXTENDED RELEASE ORAL at 14:16

## 2019-02-11 RX ADMIN — Medication 650 MILLIGRAM(S): at 00:15

## 2019-02-11 NOTE — CHART NOTE - NSCHARTNOTEFT_GEN_A_CORE
Called by Cardio fellow in stress lab, Dr. Triana. Patient will require resting images in the AM for nuclear stress test.

## 2019-02-11 NOTE — PROGRESS NOTE ADULT - PROBLEM SELECTOR PLAN 1
< from: Transthoracic Echocardiogram (02.09.19 @ 08:48) >    Mitral annular calcification, otherwise normal mitral valve. Mild mitral regurgitation.  2. Mildly calcified trileaflet aortic valve with normal opening. Moderate aortic regurgitation.  Mildly dilated left atrium. Normal left ventricular internal dimensions and wall thicknesses.  Mild global left ventricular systolic dysfunction. LVEF  45-50% . Mild diastolic dysfunction (Stage I). The right ventricle is not well visualized; grossly normal right ventricular systolic function.  ct head neg

## 2019-02-11 NOTE — PROGRESS NOTE ADULT - SUBJECTIVE AND OBJECTIVE BOX
SUBJECTIVE / OVERNIGHT EVENTS: pt denies chest pain, shortness of breath, want togo home      MEDICATIONS  (STANDING):  amLODIPine   Tablet 10 milliGRAM(s) Oral daily  aspirin enteric coated 81 milliGRAM(s) Oral daily  carvedilol 6.25 milliGRAM(s) Oral every 12 hours  heparin  Injectable 5000 Unit(s) SubCutaneous every 12 hours  lisinopril 40 milliGRAM(s) Oral daily  OXcarbazepine 300 milliGRAM(s) Oral two times a day  simvastatin 10 milliGRAM(s) Oral at bedtime    MEDICATIONS  (PRN):  benzocaine 15 mG/menthol 3.6 mG (Sugar-Free) Lozenge 1 Lozenge Oral three times a day PRN cough  guaiFENesin    Syrup 200 milliGRAM(s) Oral every 6 hours PRN Cough    Vital Signs Last 24 Hrs  T(C): 36.7 (11 Feb 2019 17:21), Max: 36.7 (11 Feb 2019 08:20)  T(F): 98 (11 Feb 2019 17:21), Max: 98 (11 Feb 2019 08:20)  HR: 56 (11 Feb 2019 17:21) (41 - 64)  BP: 170/81 (11 Feb 2019 17:21) (160/96 - 191/88)  BP(mean): --  RR: 18 (11 Feb 2019 17:21) (17 - 18)  SpO2: 100% (11 Feb 2019 17:21) (99% - 100%)    CAPILLARY BLOOD GLUCOSE        I&O's Summary      Constitutional: No fever, fatigue  Skin: No rash.  Eyes: No recent vision problems or eye pain.  ENT: No congestion, ear pain, or sore throat.  Cardiovascular: No chest pain or palpation.  Respiratory: No cough, shortness of breath, congestion, or wheezing.  Gastrointestinal: No abdominal pain, nausea, vomiting, or diarrhea.  Genitourinary: No dysuria.  Musculoskeletal: No joint swelling.  Neurologic: No headache.    PHYSICAL EXAM:  GENERAL: NAD  EYES: EOMI, PERRLA  NECK: Supple, No JVD  CHEST/LUNG: dec breath sounds at bases  HEART:  S1 , S2 +  ABDOMEN: soft , bs+  EXTREMITIES:trace edema+    NEUROLOGY:alert awake oriented      LABS:                        11.0   4.22  )-----------( 210      ( 11 Feb 2019 06:08 )             32.5     02-11    140  |  104  |  19  ----------------------------<  94  4.0   |  27  |  0.89    Ca    8.9      11 Feb 2019 06:08  Phos  2.9     02-11  Mg     2.4     02-11                RADIOLOGY & ADDITIONAL TESTS:    Imaging Personally Reviewed:    Consultant(s) Notes Reviewed:      Care Discussed with Consultants/Other Providers:

## 2019-02-12 ENCOUNTER — TRANSCRIPTION ENCOUNTER (OUTPATIENT)
Age: 74
End: 2019-02-12

## 2019-02-12 VITALS — WEIGHT: 255.96 LBS

## 2019-02-12 LAB
ANION GAP SERPL CALC-SCNC: 13 MMO/L — SIGNIFICANT CHANGE UP (ref 7–14)
BACTERIA UR CULT: SIGNIFICANT CHANGE UP
BUN SERPL-MCNC: 22 MG/DL — SIGNIFICANT CHANGE UP (ref 7–23)
CALCIUM SERPL-MCNC: 9.3 MG/DL — SIGNIFICANT CHANGE UP (ref 8.4–10.5)
CHLORIDE SERPL-SCNC: 102 MMOL/L — SIGNIFICANT CHANGE UP (ref 98–107)
CO2 SERPL-SCNC: 27 MMOL/L — SIGNIFICANT CHANGE UP (ref 22–31)
CREAT SERPL-MCNC: 0.96 MG/DL — SIGNIFICANT CHANGE UP (ref 0.5–1.3)
GLUCOSE SERPL-MCNC: 111 MG/DL — HIGH (ref 70–99)
HCT VFR BLD CALC: 34.5 % — SIGNIFICANT CHANGE UP (ref 34.5–45)
HGB BLD-MCNC: 11.6 G/DL — SIGNIFICANT CHANGE UP (ref 11.5–15.5)
MAGNESIUM SERPL-MCNC: 2.4 MG/DL — SIGNIFICANT CHANGE UP (ref 1.6–2.6)
MCHC RBC-ENTMCNC: 23.8 PG — LOW (ref 27–34)
MCHC RBC-ENTMCNC: 33.6 % — SIGNIFICANT CHANGE UP (ref 32–36)
MCV RBC AUTO: 70.7 FL — LOW (ref 80–100)
NRBC # FLD: 0 K/UL — LOW (ref 25–125)
PHOSPHATE SERPL-MCNC: 2.8 MG/DL — SIGNIFICANT CHANGE UP (ref 2.5–4.5)
PLATELET # BLD AUTO: 226 K/UL — SIGNIFICANT CHANGE UP (ref 150–400)
PMV BLD: 8.9 FL — SIGNIFICANT CHANGE UP (ref 7–13)
POTASSIUM SERPL-MCNC: 4 MMOL/L — SIGNIFICANT CHANGE UP (ref 3.5–5.3)
POTASSIUM SERPL-SCNC: 4 MMOL/L — SIGNIFICANT CHANGE UP (ref 3.5–5.3)
RBC # BLD: 4.88 M/UL — SIGNIFICANT CHANGE UP (ref 3.8–5.2)
RBC # FLD: 17 % — HIGH (ref 10.3–14.5)
SODIUM SERPL-SCNC: 142 MMOL/L — SIGNIFICANT CHANGE UP (ref 135–145)
SPECIMEN SOURCE: SIGNIFICANT CHANGE UP
WBC # BLD: 5.87 K/UL — SIGNIFICANT CHANGE UP (ref 3.8–10.5)
WBC # FLD AUTO: 5.87 K/UL — SIGNIFICANT CHANGE UP (ref 3.8–10.5)

## 2019-02-12 RX ORDER — CARVEDILOL PHOSPHATE 80 MG/1
6.25 CAPSULE, EXTENDED RELEASE ORAL EVERY 12 HOURS
Qty: 0 | Refills: 0 | Status: DISCONTINUED | OUTPATIENT
Start: 2019-02-12 | End: 2019-02-12

## 2019-02-12 RX ADMIN — OXCARBAZEPINE 300 MILLIGRAM(S): 300 TABLET, FILM COATED ORAL at 05:00

## 2019-02-12 RX ADMIN — Medication 81 MILLIGRAM(S): at 13:39

## 2019-02-12 RX ADMIN — AMLODIPINE BESYLATE 10 MILLIGRAM(S): 2.5 TABLET ORAL at 05:00

## 2019-02-12 RX ADMIN — HEPARIN SODIUM 5000 UNIT(S): 5000 INJECTION INTRAVENOUS; SUBCUTANEOUS at 05:00

## 2019-02-12 RX ADMIN — LISINOPRIL 40 MILLIGRAM(S): 2.5 TABLET ORAL at 05:00

## 2019-02-12 NOTE — DISCHARGE NOTE ADULT - PLAN OF CARE
Prevent repeat syncopal events Please follow up with your primary doctor in 1-2 weeks. Continue your medications as prescribed. Low sodium and fat diet, continue anti-hypertensive medications, and follow up with primary care physician. Low fat diet, exercise daily and continue current medications. Follow up with primary care physician and cardiologist for management. Low salt diet, fluid restriction to 1500 ml daily, monitor your fluid intake and weight daily, exercise as tolerated 30 minutes daily, and follow up with your physician within 1 to 2 weeks.

## 2019-02-12 NOTE — DISCHARGE NOTE ADULT - MEDICATION SUMMARY - MEDICATIONS TO TAKE
I will START or STAY ON the medications listed below when I get home from the hospital:    Aspir 81 oral delayed release tablet  -- 1 tab(s) by mouth once a day, Take with food  -- Swallow whole.  Do not crush.  Take with food or milk.    -- Indication: For Need for prophylactic measure    lisinopril 40 mg oral tablet  -- 1 tab(s) by mouth once a day  -- Indication: For Essential hypertension    OXcarbazepine 300 mg oral tablet  -- 1 tab(s) by mouth 2 times a day  -- Indication: For Seizure    simvastatin 10 mg oral tablet  -- 1 tab(s) by mouth once a day (at bedtime)  -- Indication: For Hyperlipidemia    carvedilol 6.25 mg oral tablet  -- 1 tab(s) by mouth 2 times a day  -- Indication: For Hypertension    amLODIPine 10 mg oral tablet  -- 1 tab(s) by mouth once a day  -- Indication: For Hypertension

## 2019-02-12 NOTE — DISCHARGE NOTE ADULT - SECONDARY DIAGNOSIS.
Essential hypertension Hyperlipidemia, unspecified hyperlipidemia type Chronic systolic heart failure

## 2019-02-12 NOTE — DISCHARGE NOTE ADULT - CARE PROVIDER_API CALL
Raji John)  Medicine  Dept Director  [address] [place], [place] [address]  Phone: [phone]  Fax: [phone]  Follow Up Time: 2 weeks

## 2019-02-12 NOTE — DISCHARGE NOTE ADULT - PATIENT PORTAL LINK FT
You can access the [facility] Patient Portal, offered by [facility], by registering with the following website: [email or web address]

## 2019-02-12 NOTE — PROGRESS NOTE ADULT - PROBLEM SELECTOR PLAN 1
< from: Transthoracic Echocardiogram (02.09.19 @ 08:48) >    Mitral annular calcification, otherwise normal mitral valve. Mild mitral regurgitation.  2. Mildly calcified trileaflet aortic valve with normal opening. Moderate aortic regurgitation.  Mildly dilated left atrium. Normal left ventricular internal dimensions and wall thicknesses.  Mild global left ventricular systolic dysfunction. LVEF  45-50% . Mild diastolic dysfunction (Stage I). The right ventricle is not well visualized; grossly normal right ventricular systolic function.  ct head neg  waiting for stress test results

## 2019-02-12 NOTE — CHART NOTE - NSCHARTNOTEFT_GEN_A_CORE
Reviewed patient with Dr. John and no plan for further cardiac evaluation status post stress test. No changes to medications. Cleared for discharge home.

## 2019-02-12 NOTE — DISCHARGE NOTE ADULT - MEDICATION SUMMARY - MEDICATIONS TO STOP TAKING
I will STOP taking the medications listed below when I get home from the hospital:    furosemide 40 mg oral tablet  -- 1 tab(s) by mouth once a day    Cipro 500 mg oral tablet  -- 1 tab(s) by mouth 2 times a day  -- Avoid prolonged or excessive exposure to direct and/or artificial sunlight while taking this medication.  Check with your doctor before becoming pregnant.  Do not take dairy products, antacids, or iron preparations within one hour of this medication.  Finish all this medication unless otherwise directed by prescriber.  Medication should be taken with plenty of water.    Flagyl 500 mg oral tablet  -- 1 tab(s) by mouth every 8 hours  -- Do not drink alcoholic beverages when taking this medication.  Finish all this medication unless otherwise directed by prescriber.  May discolor urine or feces.

## 2019-02-12 NOTE — PROGRESS NOTE ADULT - SUBJECTIVE AND OBJECTIVE BOX
SUBJECTIVE / OVERNIGHT EVENTS: pt denies chest pain, shortness of breath, want togo home    MEDICATIONS  (STANDING):  amLODIPine   Tablet 10 milliGRAM(s) Oral daily  aspirin enteric coated 81 milliGRAM(s) Oral daily  carvedilol 6.25 milliGRAM(s) Oral every 12 hours  heparin  Injectable 5000 Unit(s) SubCutaneous every 12 hours  lisinopril 40 milliGRAM(s) Oral daily  OXcarbazepine 300 milliGRAM(s) Oral two times a day  simvastatin 10 milliGRAM(s) Oral at bedtime    MEDICATIONS  (PRN):  benzocaine 15 mG/menthol 3.6 mG (Sugar-Free) Lozenge 1 Lozenge Oral three times a day PRN cough  guaiFENesin    Syrup 200 milliGRAM(s) Oral every 6 hours PRN Cough    Vital Signs Last 24 Hrs  T(C): 36.8 (2019 14:00), Max: 36.9 (2019 21:36)  T(F): 98.2 (2019 14:00), Max: 98.5 (2019 21:36)  HR: 62 (2019 14:00) (53 - 62)  BP: 147/72 (2019 14:00) (119/60 - 195/89)  BP(mean): --  RR: 19 (2019 14:00) (17 - 19)  SpO2: 100% (2019 14:00) (99% - 100%)I&O's Summary      Constitutional: No fever, fatigue  Skin: No rash.  Eyes: No recent vision problems or eye pain.  ENT: No congestion, ear pain, or sore throat.  Cardiovascular: No chest pain or palpation.  Respiratory: No cough, shortness of breath, congestion, or wheezing.  Gastrointestinal: No abdominal pain, nausea, vomiting, or diarrhea.  Genitourinary: No dysuria.  Musculoskeletal: No joint swelling.  Neurologic: No headache.    PHYSICAL EXAM:  GENERAL: NAD  EYES: EOMI, PERRLA  NECK: Supple, No JVD  CHEST/LUNG: dec breath sounds at bases  HEART:  S1 , S2 +  ABDOMEN: soft , bs+  EXTREMITIES:trace edema+    NEUROLOGY:alert awake oriented    LABS:      142  |  102  |  22  ----------------------------<  111<H>  4.0   |  27  |  0.96    Ca    9.3      2019 07:30  Phos  2.8     02-12  Mg     2.4     02-12      Creatinine Trend: 0.96 <--, 0.89 <--, 1.05 <--                        11.6   5.87  )-----------( 226      ( 2019 07:30 )             34.5     Urine Studies:  Urinalysis Basic - ( 2019 22:20 )    Color: YELLOW / Appearance: Lt TURBID / S.026 / pH: 6.0  Gluc: NEGATIVE / Ketone: NEGATIVE  / Bili: NEGATIVE / Urobili: NORMAL   Blood: TRACE / Protein: 30 / Nitrite: NEGATIVE   Leuk Esterase: LARGE / RBC: 11-25 / WBC 26-50   Sq Epi: OCC / Non Sq Epi:  / Bacteria: NEGATIVE

## 2019-02-12 NOTE — DISCHARGE NOTE ADULT - HOSPITAL COURSE
HPI:  77F, ambulates wit5h a cane at times, history of HTN, Dyslipidemia, bradycardia, seizures, last seizure 10 years ago, experienced an atraumatic syncopal episode while sitting at a nail salon. Pt states felt lightheaded, so she rested her head on table, bystander called 911. After coming too the pt experienced nausea and vomit x 1 episode. Denies bowel/ bladder incontinence, tongue biting, fall, head/ body injury, chest pain, palpitations, chills, HA, diaphoresis, dysuria. The pt has never had a syncopal episode in the past. In the Ed, the pt had Trop = 8--8, EKG SB @ 57b/ min,   CT HEAD = No acute findings.  CT Abd & Pelvis= No acute findings  TTE = 1. Mitral annular calcification, otherwise normal mitral valve. Mild mitral regurgitation.  2. Mildly calcified trileaflet aortic valve with normal opening. Moderate aortic regurgitation.  3. Mildly dilated left atrium.  4. Normal left ventricular internal dimensions and wall thicknesses.  5. Mild global left ventricular systolic dysfunction. LVEF 45-50%.  The TTE shows worsening EF from TTE 8/3/17 and the pt is admitted to the hospital. Currently the pt has no complaints    6. Mild diastolic dysfunction (Stage I).  7. The right ventricle is not well visualized; grossly normal right ventricular systolic function  The TTE shows worsening EF from 8/3/17. The pt is admitted to the hospital and currently has no complaints. (09 Feb 2019 20:53)    Abnormal Study  * Myocardial Perfusion SPECT results are abnormal.  * The left ventricle was markdely dilated at baseline.  There are small, mild to moderate defects in anteroseptal,  apical walls that are partially reversible consistent with  mild anteroseptal ischemia with focal apical scarring  (infarct). The LV time activity curve suggested diastolic  dysfunction.  * Post-stress gated wall motion analysis was performed  (LVEF = 47 %;LVEDV = 123 ml.), revealing mild overall  hypokinesis.There was focal apical akinesis. The remaining  segments contract well.  ------------------------------------------------    Case reviewed with Yao Burciaga who cleared patient for discharge.

## 2019-02-12 NOTE — DISCHARGE NOTE ADULT - CARE PLAN
Principal Discharge DX:	Syncope, unspecified syncope type  Goal:	Prevent repeat syncopal events  Assessment and plan of treatment:	Please follow up with your primary doctor in 1-2 weeks. Continue your medications as prescribed.  Secondary Diagnosis:	Essential hypertension  Assessment and plan of treatment:	Low sodium and fat diet, continue anti-hypertensive medications, and follow up with primary care physician.  Secondary Diagnosis:	Hyperlipidemia, unspecified hyperlipidemia type  Assessment and plan of treatment:	Low fat diet, exercise daily and continue current medications. Follow up with primary care physician and cardiologist for management.  Secondary Diagnosis:	Chronic systolic heart failure  Assessment and plan of treatment:	Low salt diet, fluid restriction to 1500 ml daily, monitor your fluid intake and weight daily, exercise as tolerated 30 minutes daily, and follow up with your physician within 1 to 2 weeks.

## 2019-02-13 NOTE — ED ADULT TRIAGE NOTE - HEART RATE (BEATS/MIN)
56 PGY1/MD Estuardo.   VITALS: reviewed  GEN: No apparent distress, A & O x 4  HEAD/EYES: NC/AT, anicteric sclerae, no conjunctival pallor  ENT: mucus membranes moist, oropharynx WNL, trachea midline, no JVD, neck is supple  RESP: lungs CTA with equal breath sounds bilaterally, chest wall nontender and atraumatic  CV: heart with reg rhythm S1, S2, no murmur; distal pulses intact and symmetric bilaterally  ABDOMEN: normoactive bowel sounds, soft, nondistended, nontender,   MSK: extremities atraumatic and nontender, no edema, no asymmetry.  SKIN: warm, dry, no rash, no bruising, no cyanosis. color appropriate for ethnicity, right calf with no erythema or swelling, Brice: not significant  NEURO: alert, mentating appropriately, no facial asymmetry.  PSYCH: Affect appropriate

## 2019-02-21 LAB — OXCARBAZEPINE SERPL-MCNC: 20 — SIGNIFICANT CHANGE UP

## 2019-07-24 ENCOUNTER — EMERGENCY (EMERGENCY)
Facility: HOSPITAL | Age: 74
LOS: 1 days | Discharge: HOME | End: 2019-07-24
Attending: EMERGENCY MEDICINE
Payer: COMMERCIAL

## 2019-07-24 ENCOUNTER — EMERGENCY (EMERGENCY)
Facility: HOSPITAL | Age: 74
LOS: 1 days | Discharge: LEFT BEFORE DISCHARGE | End: 2019-07-24
Admitting: EMERGENCY MEDICINE

## 2019-07-24 VITALS
HEIGHT: 65 IN | DIASTOLIC BLOOD PRESSURE: 83 MMHG | SYSTOLIC BLOOD PRESSURE: 146 MMHG | TEMPERATURE: 99 F | RESPIRATION RATE: 16 BRPM | HEART RATE: 67 BPM | OXYGEN SATURATION: 96 % | WEIGHT: 250 LBS

## 2019-07-24 DIAGNOSIS — Z98.89 OTHER SPECIFIED POSTPROCEDURAL STATES: Chronic | ICD-10-CM

## 2019-07-24 PROCEDURE — 99284 EMERGENCY DEPT VISIT MOD MDM: CPT | Mod: GC

## 2019-07-25 VITALS
OXYGEN SATURATION: 99 % | DIASTOLIC BLOOD PRESSURE: 74 MMHG | TEMPERATURE: 98 F | HEART RATE: 57 BPM | RESPIRATION RATE: 18 BRPM | SYSTOLIC BLOOD PRESSURE: 177 MMHG

## 2019-07-25 LAB
ALBUMIN SERPL ELPH-MCNC: 4.3 G/DL — SIGNIFICANT CHANGE UP (ref 3.3–5)
ALP SERPL-CCNC: 129 U/L — HIGH (ref 40–120)
ALT FLD-CCNC: 7 U/L — LOW (ref 10–45)
ANION GAP SERPL CALC-SCNC: 12 MMOL/L — SIGNIFICANT CHANGE UP (ref 5–17)
APPEARANCE UR: ABNORMAL
AST SERPL-CCNC: 11 U/L — SIGNIFICANT CHANGE UP (ref 10–40)
BACTERIA # UR AUTO: NEGATIVE — SIGNIFICANT CHANGE UP
BASOPHILS # BLD AUTO: 0 K/UL — SIGNIFICANT CHANGE UP (ref 0–0.2)
BASOPHILS NFR BLD AUTO: 0 % — SIGNIFICANT CHANGE UP (ref 0–2)
BILIRUB SERPL-MCNC: 0.5 MG/DL — SIGNIFICANT CHANGE UP (ref 0.2–1.2)
BILIRUB UR-MCNC: ABNORMAL
BUN SERPL-MCNC: 18 MG/DL — SIGNIFICANT CHANGE UP (ref 7–23)
CALCIUM SERPL-MCNC: 9.5 MG/DL — SIGNIFICANT CHANGE UP (ref 8.4–10.5)
CHLORIDE SERPL-SCNC: 98 MMOL/L — SIGNIFICANT CHANGE UP (ref 96–108)
CO2 SERPL-SCNC: 28 MMOL/L — SIGNIFICANT CHANGE UP (ref 22–31)
COLOR SPEC: YELLOW — SIGNIFICANT CHANGE UP
CREAT SERPL-MCNC: 1.14 MG/DL — SIGNIFICANT CHANGE UP (ref 0.5–1.3)
DIFF PNL FLD: ABNORMAL
EOSINOPHIL # BLD AUTO: 0 K/UL — SIGNIFICANT CHANGE UP (ref 0–0.5)
EOSINOPHIL NFR BLD AUTO: 0.4 % — SIGNIFICANT CHANGE UP (ref 0–6)
EPI CELLS # UR: 2 /HPF — SIGNIFICANT CHANGE UP
GLUCOSE SERPL-MCNC: 130 MG/DL — HIGH (ref 70–99)
GLUCOSE UR QL: NEGATIVE — SIGNIFICANT CHANGE UP
HCT VFR BLD CALC: 34.9 % — SIGNIFICANT CHANGE UP (ref 34.5–45)
HGB BLD-MCNC: 12 G/DL — SIGNIFICANT CHANGE UP (ref 11.5–15.5)
HYALINE CASTS # UR AUTO: 61 /LPF — HIGH (ref 0–2)
KETONES UR-MCNC: NEGATIVE — SIGNIFICANT CHANGE UP
LEUKOCYTE ESTERASE UR-ACNC: ABNORMAL
LYMPHOCYTES # BLD AUTO: 1.7 K/UL — SIGNIFICANT CHANGE UP (ref 1–3.3)
LYMPHOCYTES # BLD AUTO: 18.3 % — SIGNIFICANT CHANGE UP (ref 13–44)
MCHC RBC-ENTMCNC: 24.5 PG — LOW (ref 27–34)
MCHC RBC-ENTMCNC: 34.4 GM/DL — SIGNIFICANT CHANGE UP (ref 32–36)
MCV RBC AUTO: 71.2 FL — LOW (ref 80–100)
MICROCYTES BLD QL: SLIGHT — SIGNIFICANT CHANGE UP
MONOCYTES # BLD AUTO: 0.8 K/UL — SIGNIFICANT CHANGE UP (ref 0–0.9)
MONOCYTES NFR BLD AUTO: 7.9 % — SIGNIFICANT CHANGE UP (ref 2–14)
NEUTROPHILS # BLD AUTO: 6.9 K/UL — SIGNIFICANT CHANGE UP (ref 1.8–7.4)
NEUTROPHILS NFR BLD AUTO: 73.3 % — SIGNIFICANT CHANGE UP (ref 43–77)
NITRITE UR-MCNC: NEGATIVE — SIGNIFICANT CHANGE UP
PH UR: 6 — SIGNIFICANT CHANGE UP (ref 5–8)
PLAT MORPH BLD: NORMAL — SIGNIFICANT CHANGE UP
PLATELET # BLD AUTO: 279 K/UL — SIGNIFICANT CHANGE UP (ref 150–400)
POTASSIUM SERPL-MCNC: 4.6 MMOL/L — SIGNIFICANT CHANGE UP (ref 3.5–5.3)
POTASSIUM SERPL-SCNC: 4.6 MMOL/L — SIGNIFICANT CHANGE UP (ref 3.5–5.3)
PROT SERPL-MCNC: 8 G/DL — SIGNIFICANT CHANGE UP (ref 6–8.3)
PROT UR-MCNC: ABNORMAL
RBC # BLD: 4.91 M/UL — SIGNIFICANT CHANGE UP (ref 3.8–5.2)
RBC # FLD: 16.2 % — HIGH (ref 10.3–14.5)
RBC BLD AUTO: ABNORMAL
RBC CASTS # UR COMP ASSIST: 5 /HPF — HIGH (ref 0–4)
SCHISTOCYTES BLD QL AUTO: SLIGHT — SIGNIFICANT CHANGE UP
SODIUM SERPL-SCNC: 138 MMOL/L — SIGNIFICANT CHANGE UP (ref 135–145)
SP GR SPEC: 1.04 — HIGH (ref 1.01–1.02)
UROBILINOGEN FLD QL: ABNORMAL
WBC # BLD: 9.5 K/UL — SIGNIFICANT CHANGE UP (ref 3.8–10.5)
WBC # FLD AUTO: 9.5 K/UL — SIGNIFICANT CHANGE UP (ref 3.8–10.5)
WBC UR QL: 136 /HPF — HIGH (ref 0–5)

## 2019-07-25 PROCEDURE — 74177 CT ABD & PELVIS W/CONTRAST: CPT | Mod: 26

## 2019-07-25 PROCEDURE — 87086 URINE CULTURE/COLONY COUNT: CPT

## 2019-07-25 PROCEDURE — 80053 COMPREHEN METABOLIC PANEL: CPT

## 2019-07-25 PROCEDURE — 99284 EMERGENCY DEPT VISIT MOD MDM: CPT | Mod: 25

## 2019-07-25 PROCEDURE — 74177 CT ABD & PELVIS W/CONTRAST: CPT

## 2019-07-25 PROCEDURE — 71046 X-RAY EXAM CHEST 2 VIEWS: CPT

## 2019-07-25 PROCEDURE — 81001 URINALYSIS AUTO W/SCOPE: CPT

## 2019-07-25 PROCEDURE — 96374 THER/PROPH/DIAG INJ IV PUSH: CPT | Mod: XU

## 2019-07-25 PROCEDURE — 85027 COMPLETE CBC AUTOMATED: CPT

## 2019-07-25 PROCEDURE — 71046 X-RAY EXAM CHEST 2 VIEWS: CPT | Mod: 26

## 2019-07-25 RX ORDER — CEPHALEXIN 500 MG
1 CAPSULE ORAL
Qty: 14 | Refills: 0
Start: 2019-07-25 | End: 2019-07-31

## 2019-07-25 RX ORDER — CEPHALEXIN 500 MG
500 CAPSULE ORAL ONCE
Refills: 0 | Status: COMPLETED | OUTPATIENT
Start: 2019-07-25 | End: 2019-07-25

## 2019-07-25 RX ORDER — ONDANSETRON 8 MG/1
4 TABLET, FILM COATED ORAL ONCE
Refills: 0 | Status: COMPLETED | OUTPATIENT
Start: 2019-07-25 | End: 2019-07-25

## 2019-07-25 RX ADMIN — ONDANSETRON 4 MILLIGRAM(S): 8 TABLET, FILM COATED ORAL at 04:34

## 2019-07-25 RX ADMIN — Medication 500 MILLIGRAM(S): at 05:58

## 2019-07-25 NOTE — ED ADULT NURSE REASSESSMENT NOTE - NS ED NURSE REASSESS COMMENT FT1
Pt not tolerating oral contrast, pt with episode of emesis, MD Escalante aware, zofran given per MD orders

## 2019-07-25 NOTE — ED PROVIDER NOTE - OBJECTIVE STATEMENT
75yo F h/o HTN, HLD, sz, colitis p/w lower abd pain that started yesterday. Feels like her colitis. + chills, + fever. Last normal BM yesterday. also endorsing pt has been coughing more frequently. 75yo F h/o HTN, HLD, sz, colitis p/w lower abd pain that started yesterday. Feels like her colitis. + chills, + fever. Last normal BM yesterday. also endorsing pt has been coughing more frequently. Foul smelling dark urine. No N/V/D.

## 2019-07-25 NOTE — ED PROVIDER NOTE - NSFOLLOWUPINSTRUCTIONS_ED_ALL_ED_FT
1) Please take keflex twice a day as prescribed for UTI. Please follow-up with your primary care doctor within the next 3 days.  Please call today or tomorrow for an appointment.  If you cannot follow-up with your doctor(s), please return to the ED for any urgent issues.  2) If you have any worsening of symptoms or any other concerns please return to the ED immediately.  3) Please continue taking your home medications as directed.  4) You may have been given a copy of your labs and/or imaging.  Please go over these with your primary care doctor.

## 2019-07-25 NOTE — ED ADULT NURSE NOTE - NSIMPLEMENTINTERV_GEN_ALL_ED
Implemented All Universal Safety Interventions:  [place] to call system. Call bell, personal items and telephone within reach. Instruct patient to call for assistance. Room bathroom lighting operational. Non-slip footwear when patient is off stretcher. Physically safe environment: no spills, clutter or unnecessary equipment. Stretcher in lowest position, wheels locked, appropriate side rails in place.

## 2019-07-25 NOTE — ED ADULT NURSE NOTE - OBJECTIVE STATEMENT
74y Female PMH Htn, borderline Diabetic, seizures, Colitis, presents to the ED c/o abd pain. Pt reports onset of lower abd pain yesterday afternoon around 2:30. Pt states she had a fever of 100.2 yesterday and took Tylenol around 4pm and again at midnight while in the waiting room, pt also reports mild nausea. Pt states this feels like her colitis but "not as bad as it normally is." Pt also reports dysuria X2 days and thinks she may have a UTI. Pt a&oX4, airway intact, breathing spontaneously and unlabored, lung sounds clear bilaterally, abd soft nondistended, tender to palpation in lower quadrants (L >R), skin intact, moving all extremities, cap refill <3 seconds, pt denies ha, dizziness, CP, SOB, hematuria, blood in stool, v/d. MD at bedside for eval. safety maintained.

## 2019-07-25 NOTE — ED ADULT NURSE REASSESSMENT NOTE - NS ED NURSE REASSESS COMMENT FT1
Pt given oral contrast; verbalizes understanding that she  needs to drink it within an hour. The patient is at high risk for a choroidal neovascular membrane. NO CNV SEEN TODAY. Dry ARMD is responsible for some decrease in vision.

## 2019-07-25 NOTE — ED PROVIDER NOTE - PHYSICAL EXAMINATION
Vitals: WNL  Gen: laying uncomfortably in NAD  Head: NCAT  ENT: sclerae white, anicterus, moist mucous membranes. No exudates. Neck supple, no LAD,  no carotid bruits, no JVD  CV: RRR. Audible S1 and S2. No murmurs, rubs, gallops, S3, nor S4, 2+ radial and DP pulses   Pulm: Clear to auscultation bilaterally. No wheezes, rales, or rhonchi  Abd: soft, normoactive BS x4, +LLQ/suprapubic ttp, no rebound, no guarding, no rashes  Musculoskeletal:  No peripheral edema  Skin: no lesions or scars noted  Neurologic: AAOx3  : no CVA tenderness  Psych: no SI/HI

## 2019-07-26 ENCOUNTER — EMERGENCY (EMERGENCY)
Facility: HOSPITAL | Age: 74
LOS: 1 days | Discharge: HOME | End: 2019-07-26
Attending: EMERGENCY MEDICINE
Payer: COMMERCIAL

## 2019-07-26 VITALS
TEMPERATURE: 99 F | SYSTOLIC BLOOD PRESSURE: 161 MMHG | HEART RATE: 72 BPM | RESPIRATION RATE: 20 BRPM | WEIGHT: 255.07 LBS | DIASTOLIC BLOOD PRESSURE: 83 MMHG | OXYGEN SATURATION: 96 %

## 2019-07-26 VITALS
HEART RATE: 62 BPM | OXYGEN SATURATION: 98 % | RESPIRATION RATE: 18 BRPM | SYSTOLIC BLOOD PRESSURE: 146 MMHG | DIASTOLIC BLOOD PRESSURE: 78 MMHG

## 2019-07-26 DIAGNOSIS — Z98.89 OTHER SPECIFIED POSTPROCEDURAL STATES: Chronic | ICD-10-CM

## 2019-07-26 LAB
ALBUMIN SERPL ELPH-MCNC: 4 G/DL — SIGNIFICANT CHANGE UP (ref 3.3–5)
ALP SERPL-CCNC: 115 U/L — SIGNIFICANT CHANGE UP (ref 40–120)
ALT FLD-CCNC: 8 U/L — LOW (ref 10–45)
ANION GAP SERPL CALC-SCNC: 13 MMOL/L — SIGNIFICANT CHANGE UP (ref 5–17)
APPEARANCE UR: ABNORMAL
AST SERPL-CCNC: 14 U/L — SIGNIFICANT CHANGE UP (ref 10–40)
BACTERIA # UR AUTO: NEGATIVE — SIGNIFICANT CHANGE UP
BASOPHILS # BLD AUTO: 0 K/UL — SIGNIFICANT CHANGE UP (ref 0–0.2)
BASOPHILS NFR BLD AUTO: 0.2 % — SIGNIFICANT CHANGE UP (ref 0–2)
BILIRUB SERPL-MCNC: 0.5 MG/DL — SIGNIFICANT CHANGE UP (ref 0.2–1.2)
BILIRUB UR-MCNC: ABNORMAL
BUN SERPL-MCNC: 19 MG/DL — SIGNIFICANT CHANGE UP (ref 7–23)
CALCIUM SERPL-MCNC: 9.4 MG/DL — SIGNIFICANT CHANGE UP (ref 8.4–10.5)
CHLORIDE SERPL-SCNC: 98 MMOL/L — SIGNIFICANT CHANGE UP (ref 96–108)
CO2 SERPL-SCNC: 28 MMOL/L — SIGNIFICANT CHANGE UP (ref 22–31)
COLOR SPEC: ABNORMAL
CREAT SERPL-MCNC: 1.15 MG/DL — SIGNIFICANT CHANGE UP (ref 0.5–1.3)
CULTURE RESULTS: SIGNIFICANT CHANGE UP
DIFF PNL FLD: ABNORMAL
EOSINOPHIL # BLD AUTO: 0 K/UL — SIGNIFICANT CHANGE UP (ref 0–0.5)
EOSINOPHIL NFR BLD AUTO: 0.4 % — SIGNIFICANT CHANGE UP (ref 0–6)
EPI CELLS # UR: 4 /HPF — SIGNIFICANT CHANGE UP
GAS PNL BLDV: SIGNIFICANT CHANGE UP
GLUCOSE SERPL-MCNC: 113 MG/DL — HIGH (ref 70–99)
GLUCOSE UR QL: NEGATIVE — SIGNIFICANT CHANGE UP
HCT VFR BLD CALC: 33 % — LOW (ref 34.5–45)
HGB BLD-MCNC: 11.2 G/DL — LOW (ref 11.5–15.5)
HYALINE CASTS # UR AUTO: 4 /LPF — HIGH (ref 0–2)
KETONES UR-MCNC: SIGNIFICANT CHANGE UP
LEUKOCYTE ESTERASE UR-ACNC: ABNORMAL
LYMPHOCYTES # BLD AUTO: 1.7 K/UL — SIGNIFICANT CHANGE UP (ref 1–3.3)
LYMPHOCYTES # BLD AUTO: 14.9 % — SIGNIFICANT CHANGE UP (ref 13–44)
MCHC RBC-ENTMCNC: 24.1 PG — LOW (ref 27–34)
MCHC RBC-ENTMCNC: 33.9 GM/DL — SIGNIFICANT CHANGE UP (ref 32–36)
MCV RBC AUTO: 70.9 FL — LOW (ref 80–100)
MONOCYTES # BLD AUTO: 0.8 K/UL — SIGNIFICANT CHANGE UP (ref 0–0.9)
MONOCYTES NFR BLD AUTO: 6.8 % — SIGNIFICANT CHANGE UP (ref 2–14)
NEUTROPHILS # BLD AUTO: 8.9 K/UL — HIGH (ref 1.8–7.4)
NEUTROPHILS NFR BLD AUTO: 77.6 % — HIGH (ref 43–77)
NITRITE UR-MCNC: NEGATIVE — SIGNIFICANT CHANGE UP
PH UR: 6 — SIGNIFICANT CHANGE UP (ref 5–8)
PLATELET # BLD AUTO: 261 K/UL — SIGNIFICANT CHANGE UP (ref 150–400)
POTASSIUM SERPL-MCNC: 4.1 MMOL/L — SIGNIFICANT CHANGE UP (ref 3.5–5.3)
POTASSIUM SERPL-SCNC: 4.1 MMOL/L — SIGNIFICANT CHANGE UP (ref 3.5–5.3)
PROT SERPL-MCNC: 7.6 G/DL — SIGNIFICANT CHANGE UP (ref 6–8.3)
PROT UR-MCNC: ABNORMAL
RBC # BLD: 4.65 M/UL — SIGNIFICANT CHANGE UP (ref 3.8–5.2)
RBC # FLD: 16.1 % — HIGH (ref 10.3–14.5)
RBC CASTS # UR COMP ASSIST: 3 /HPF — SIGNIFICANT CHANGE UP (ref 0–4)
SODIUM SERPL-SCNC: 139 MMOL/L — SIGNIFICANT CHANGE UP (ref 135–145)
SP GR SPEC: 1.04 — HIGH (ref 1.01–1.02)
SPECIMEN SOURCE: SIGNIFICANT CHANGE UP
UROBILINOGEN FLD QL: ABNORMAL
WBC # BLD: 11.5 K/UL — HIGH (ref 3.8–10.5)
WBC # FLD AUTO: 11.5 K/UL — HIGH (ref 3.8–10.5)
WBC UR QL: 634 /HPF — HIGH (ref 0–5)

## 2019-07-26 PROCEDURE — 83605 ASSAY OF LACTIC ACID: CPT

## 2019-07-26 PROCEDURE — 99284 EMERGENCY DEPT VISIT MOD MDM: CPT | Mod: 25

## 2019-07-26 PROCEDURE — 99284 EMERGENCY DEPT VISIT MOD MDM: CPT

## 2019-07-26 PROCEDURE — 85027 COMPLETE CBC AUTOMATED: CPT

## 2019-07-26 PROCEDURE — 74177 CT ABD & PELVIS W/CONTRAST: CPT | Mod: 26

## 2019-07-26 PROCEDURE — 74177 CT ABD & PELVIS W/CONTRAST: CPT

## 2019-07-26 PROCEDURE — 71046 X-RAY EXAM CHEST 2 VIEWS: CPT | Mod: 26

## 2019-07-26 PROCEDURE — 82330 ASSAY OF CALCIUM: CPT

## 2019-07-26 PROCEDURE — 82435 ASSAY OF BLOOD CHLORIDE: CPT

## 2019-07-26 PROCEDURE — 87086 URINE CULTURE/COLONY COUNT: CPT

## 2019-07-26 PROCEDURE — 96365 THER/PROPH/DIAG IV INF INIT: CPT | Mod: XU

## 2019-07-26 PROCEDURE — 87040 BLOOD CULTURE FOR BACTERIA: CPT

## 2019-07-26 PROCEDURE — 82803 BLOOD GASES ANY COMBINATION: CPT

## 2019-07-26 PROCEDURE — 85014 HEMATOCRIT: CPT

## 2019-07-26 PROCEDURE — 82947 ASSAY GLUCOSE BLOOD QUANT: CPT

## 2019-07-26 PROCEDURE — 80053 COMPREHEN METABOLIC PANEL: CPT

## 2019-07-26 PROCEDURE — 81001 URINALYSIS AUTO W/SCOPE: CPT

## 2019-07-26 PROCEDURE — 84295 ASSAY OF SERUM SODIUM: CPT

## 2019-07-26 PROCEDURE — 71046 X-RAY EXAM CHEST 2 VIEWS: CPT

## 2019-07-26 PROCEDURE — 84132 ASSAY OF SERUM POTASSIUM: CPT

## 2019-07-26 RX ORDER — CEFTRIAXONE 500 MG/1
1000 INJECTION, POWDER, FOR SOLUTION INTRAMUSCULAR; INTRAVENOUS ONCE
Refills: 0 | Status: COMPLETED | OUTPATIENT
Start: 2019-07-26 | End: 2019-07-26

## 2019-07-26 RX ORDER — CEFPODOXIME PROXETIL 100 MG
1 TABLET ORAL
Qty: 20 | Refills: 0
Start: 2019-07-26 | End: 2019-08-04

## 2019-07-26 RX ADMIN — CEFTRIAXONE 1000 MILLIGRAM(S): 500 INJECTION, POWDER, FOR SOLUTION INTRAMUSCULAR; INTRAVENOUS at 19:22

## 2019-07-26 RX ADMIN — CEFTRIAXONE 100 MILLIGRAM(S): 500 INJECTION, POWDER, FOR SOLUTION INTRAMUSCULAR; INTRAVENOUS at 18:53

## 2019-07-26 RX ADMIN — Medication 200 MILLIGRAM(S): at 20:24

## 2019-07-26 NOTE — ED PROVIDER NOTE - CLINICAL SUMMARY MEDICAL DECISION MAKING FREE TEXT BOX
Arpit Michaels, PGY-2 - 75 yo F PMH seizures, HTN, HLD presents with cough and chills. She was seen in ED yesterday for UTI and being treated with Keflex, second dose taken today. Patient was given 1 dose of ceftriaxone IV. Patient reports improvement. CXR was clear, and CTAP was negative for pyelonephritis. Will prescribe cefpodoxime 200 mg PO BID for 10 days. Patient is to follow up with primary care doctor within 1 week of discharge. Arpit Michaels, PGY-2 - 75 yo F PMH seizures, HTN, HLD presents with cough and chills. She was seen in ED yesterday for UTI and being treated with Keflex, second dose taken today. UA was positive for UTI. Patient was given 1 dose of ceftriaxone IV. Patient reports improvement. CXR was clear, and CTAP was negative for pyelonephritis. Will prescribe cefpodoxime 200 mg PO BID for 10 days. Patient is to follow up with primary care doctor within 1 week of discharge. Arpit Michaels, PGY-2 - 73 yo F PMH seizures, HTN, HLD presents with cough and chills. She was seen in ED yesterday for UTI and being treated with Keflex, second dose taken today. UA was positive for UTI. Patient was given 1 dose of ceftriaxone IV. Patient reports improvement. CXR was clear, and CTAP was negative for pyelonephritis. Will prescribe cefpodoxime 200 mg PO BID for 10 days. Patient is to follow up with primary care doctor within 1 week of discharge.    ANI Calle MD: Pt is a 75 y/o F with PMH seizures, HTN, HLD who p/w c/o cough and chills. Pt was seen in ED yesterday for UTI and being treated with Keflex, second dose taken today. Brought in by daughter today to make sure UTI is not getting worse because patient is still having RLQ abdominal pain and to r/o sepsis due to simultaneous cough. Cough began Wednesday with yellow phlegm, no blood. Took cough syrup OTC last today 12PM with no relief. DDx: UTI, pyelo, PNA, bronchitis, URI, other intraabdominal pathology. Plan: basic labs, u/a, ucx, CXR, CTAP, pain control, reassess

## 2019-07-26 NOTE — ED PROVIDER NOTE - NS ED ROS FT
CONSTITUTIONAL: +chills  EYES/ENT: No visual changes;  No  throat pain   NECK: No pain   RESPIRATORY: +cough. No wheezing, hemoptysis; No shortness of breath  CARDIOVASCULAR: No chest pain or palpitations  GASTROINTESTINAL: +suprapubic pain. . No nausea, vomiting, or hematemesis; No diarrhea or constipation. No melena or hematochezia.  GENITOURINARY: No hematuria  NEUROLOGICAL: No numbness or weakness  SKIN: No itching, rashes

## 2019-07-26 NOTE — ED ADULT NURSE NOTE - OBJECTIVE STATEMENT
74y female coming in from home c/o cough. A&Ox3 and VSS. Hx of HTN, HLD, diverticulosis and seizures. Recent visit to ED on Wednesday where noted to have UTI, prescribed keflex, pt reports having taken 2 doses. Pt reports continued burning upon urination but denies hematuria and urinary urgency. Pt presents to ED today c/o productive cough with yellow sputum x2 days. Pt denies fever/chills at home since being d/c from ED. Denies chest pain, sob, n/v/d. Respirations even and unlabored. Slight crackles noted to b/l lung bases.

## 2019-07-26 NOTE — ED PROVIDER NOTE - OBJECTIVE STATEMENT
75 yo F PMH seizures, HTN, HLD seen in ED yesterday for UTI and being treated with Keflex, second dose taken today. Brought in by daughter today to make sure UTI is not getting worse because patient is still having RLQ abdominal pain and to r/o sepsis due to simultaneous cough. Cough began Wednesday with yellow phlegm, no blood. Took cough syrup OTC last today 12PM with no relief.  (+) headache, chills, dysphagia, hoarseness  (-) fever, chest pain, shortness of breath, urinary frequency/hematuria/dysuria. 73 yo F PMH seizures, HTN, HLD presents with cough and chills. She was seen in ED yesterday for UTI and being treated with Keflex, second dose taken today. Brought in by daughter today to make sure UTI is not getting worse because patient is still having RLQ abdominal pain and to r/o sepsis due to simultaneous cough. Cough began Wednesday with yellow phlegm, no blood. Took cough syrup OTC last today 12PM with no relief.  (+) headache, chills, dysphagia, hoarseness, suprapubic pain  (-) fever, chest pain, shortness of breath, urinary frequency/hematuria/dysuria.

## 2019-07-26 NOTE — ED PROVIDER NOTE - CARE PLAN
Principal Discharge DX:	Urinary tract infection  Goal:	Please follow up with your primary care doctor within 1 week of discharge.  Assessment and plan of treatment:	You presented with urinary tract infection. You were given 1 dose of IV antibiotics (Ceftriaxone). Please stop taking your previous antibiotics (Keflex). Instead, take cefpodoxime 200 mg every 12 hours for 10 days. Please follow up with your primary care doctor within 1 week of discharge.

## 2019-07-26 NOTE — ED ADULT NURSE NOTE - NSIMPLEMENTINTERV_GEN_ALL_ED
Implemented All Universal Safety Interventions:  [place] to call system. Call bell, personal items and telephone within reach. Instruct patient to call for assistance. Room bathroom lighting operational. Non-slip footwear when patient is off stretcher. Physically safe environment: no spills, clutter or unnecessary equipment. Stretcher in lowest position, wheels locked, appropriate side rails in place.

## 2019-07-26 NOTE — ED PROVIDER NOTE - ATTENDING CONTRIBUTION TO CARE
I saw and evaluated patient with resident. I discussed H+P and MDM with resident. I agree with the statements made by the resident unless otherwise noted.     I saw and evaluated patient with medical student. I discussed H+P and MDM with medical student. I agree with the statements made by the medical student unless otherwise noted.

## 2019-07-26 NOTE — ED PROVIDER NOTE - PLAN OF CARE
Please follow up with your primary care doctor within 1 week of discharge. You presented with urinary tract infection. You were given 1 dose of IV antibiotics (Ceftriaxone). Please stop taking your previous antibiotics (Keflex). Instead, take cefpodoxime 200 mg every 12 hours for 10 days. Please follow up with your primary care doctor within 1 week of discharge.

## 2019-07-27 LAB
CULTURE RESULTS: SIGNIFICANT CHANGE UP
SPECIMEN SOURCE: SIGNIFICANT CHANGE UP

## 2019-07-31 LAB
CULTURE RESULTS: SIGNIFICANT CHANGE UP
CULTURE RESULTS: SIGNIFICANT CHANGE UP
SPECIMEN SOURCE: SIGNIFICANT CHANGE UP
SPECIMEN SOURCE: SIGNIFICANT CHANGE UP

## 2019-12-12 ENCOUNTER — APPOINTMENT (OUTPATIENT)
Dept: PULMONOLOGY | Facility: CLINIC | Age: 74
End: 2019-12-12
Payer: MEDICARE

## 2019-12-12 VITALS
BODY MASS INDEX: 41.32 KG/M2 | TEMPERATURE: 97.6 F | SYSTOLIC BLOOD PRESSURE: 142 MMHG | RESPIRATION RATE: 17 BRPM | HEIGHT: 65 IN | OXYGEN SATURATION: 98 % | DIASTOLIC BLOOD PRESSURE: 68 MMHG | WEIGHT: 248 LBS | HEART RATE: 58 BPM

## 2019-12-12 PROCEDURE — 94727 GAS DIL/WSHOT DETER LNG VOL: CPT

## 2019-12-12 PROCEDURE — 94729 DIFFUSING CAPACITY: CPT

## 2019-12-12 PROCEDURE — 94060 EVALUATION OF WHEEZING: CPT

## 2019-12-12 PROCEDURE — 99204 OFFICE O/P NEW MOD 45 MIN: CPT | Mod: 25

## 2019-12-12 RX ORDER — SIMVASTATIN 10 MG/1
10 TABLET, FILM COATED ORAL
Refills: 0 | Status: ACTIVE | COMMUNITY

## 2019-12-12 RX ORDER — GLIMEPIRIDE 4 MG/1
TABLET ORAL
Refills: 0 | Status: COMPLETED | COMMUNITY
End: 2019-12-12

## 2019-12-12 RX ORDER — CARVEDILOL 3.12 MG/1
TABLET, FILM COATED ORAL
Refills: 0 | Status: COMPLETED | COMMUNITY
End: 2019-12-12

## 2019-12-12 RX ORDER — MELOXICAM 15 MG/1
15 TABLET ORAL DAILY
Qty: 30 | Refills: 0 | Status: COMPLETED | COMMUNITY
Start: 2018-06-15 | End: 2019-12-12

## 2019-12-12 RX ORDER — AMLODIPINE BESYLATE 10 MG/1
10 TABLET ORAL
Refills: 0 | Status: ACTIVE | COMMUNITY

## 2019-12-12 RX ORDER — AMLODIPINE BESYLATE 5 MG/1
TABLET ORAL
Refills: 0 | Status: COMPLETED | COMMUNITY
End: 2019-12-12

## 2019-12-12 RX ORDER — GLIMEPIRIDE 2 MG/1
2 TABLET ORAL
Refills: 0 | Status: ACTIVE | COMMUNITY

## 2019-12-12 RX ORDER — FUROSEMIDE 20 MG/1
20 TABLET ORAL
Refills: 0 | Status: ACTIVE | COMMUNITY

## 2019-12-12 NOTE — REVIEW OF SYSTEMS
[Fever] : no fever [Chills] : no chills [Cough] : no cough [Dyspnea] : no dyspnea [Wheezing] : no wheezing [Nasal Discharge] : no nasal discharge [Edema] : ~T edema was not present [Heartburn] : no heartburn [Reflux] : no reflux [Back Pain] : ~T no back pain [Arthralgias] : arthralgias [Diabetes] : diabetes mellitus [DVT] : no DVT [Difficulty Initiating Sleep] : no difficulty falling asleep [Difficulty Maintaining Sleep] : no difficulty maintaining sleep

## 2019-12-12 NOTE — PHYSICAL EXAM
[General Appearance - In No Acute Distress] : no acute distress [Erythema] : no erythema of the pharynx [III] : III [Neck Cervical Mass (___cm)] : no neck mass was observed [Heart Sounds] : normal S1 and S2 [Auscultation Breath Sounds / Voice Sounds] : lungs were clear to auscultation bilaterally [] : no hepato-splenomegaly [Cyanosis, Localized] : no localized cyanosis [Abnormal Walk] : normal gait [Skin Turgor] : normal skin turgor [No Focal Deficits] : no focal deficits [Oriented To Time, Place, And Person] : oriented to person, place, and time

## 2020-01-01 ENCOUNTER — TRANSCRIPTION ENCOUNTER (OUTPATIENT)
Age: 75
End: 2020-01-01

## 2020-01-07 ENCOUNTER — EMERGENCY (EMERGENCY)
Facility: HOSPITAL | Age: 75
LOS: 1 days | Discharge: HOME | End: 2020-01-07
Attending: EMERGENCY MEDICINE | Admitting: EMERGENCY MEDICINE
Payer: MEDICARE

## 2020-01-07 VITALS
TEMPERATURE: 98 F | HEIGHT: 65 IN | OXYGEN SATURATION: 97 % | DIASTOLIC BLOOD PRESSURE: 59 MMHG | SYSTOLIC BLOOD PRESSURE: 151 MMHG | RESPIRATION RATE: 18 BRPM | HEART RATE: 60 BPM

## 2020-01-07 DIAGNOSIS — Z98.89 OTHER SPECIFIED POSTPROCEDURAL STATES: Chronic | ICD-10-CM

## 2020-01-07 PROCEDURE — 99284 EMERGENCY DEPT VISIT MOD MDM: CPT

## 2020-01-07 NOTE — ED ADULT TRIAGE NOTE - CHIEF COMPLAINT QUOTE
Pt. c/o SOB, generalized abd pain, fever, chills x 1 week. Endorses nausea. Denies vomiting. Pt. c/o SOB, generalized abd pain, fever, chills x 1 week. Endorses nausea. Denies vomiting.    Addendum: Pt. c/o dizziness after standing up from sitting position. Awake & alert. Ambulatory at present. .

## 2020-01-08 VITALS
RESPIRATION RATE: 16 BRPM | DIASTOLIC BLOOD PRESSURE: 58 MMHG | SYSTOLIC BLOOD PRESSURE: 173 MMHG | OXYGEN SATURATION: 97 % | HEART RATE: 56 BPM | TEMPERATURE: 97 F

## 2020-01-08 LAB
ALBUMIN SERPL ELPH-MCNC: 3.9 G/DL — SIGNIFICANT CHANGE UP (ref 3.3–5)
ALP SERPL-CCNC: 107 U/L — SIGNIFICANT CHANGE UP (ref 40–120)
ALT FLD-CCNC: 9 U/L — SIGNIFICANT CHANGE UP (ref 4–33)
ANION GAP SERPL CALC-SCNC: 13 MMO/L — SIGNIFICANT CHANGE UP (ref 7–14)
ANISOCYTOSIS BLD QL: SLIGHT — SIGNIFICANT CHANGE UP
APPEARANCE UR: CLEAR — SIGNIFICANT CHANGE UP
AST SERPL-CCNC: 9 U/L — SIGNIFICANT CHANGE UP (ref 4–32)
BACTERIA # UR AUTO: SIGNIFICANT CHANGE UP
BASE EXCESS BLDV CALC-SCNC: 8.8 MMOL/L — SIGNIFICANT CHANGE UP
BASOPHILS # BLD AUTO: 0.02 K/UL — SIGNIFICANT CHANGE UP (ref 0–0.2)
BASOPHILS NFR BLD AUTO: 0.3 % — SIGNIFICANT CHANGE UP (ref 0–2)
BASOPHILS NFR SPEC: 0 % — SIGNIFICANT CHANGE UP (ref 0–2)
BILIRUB SERPL-MCNC: 0.3 MG/DL — SIGNIFICANT CHANGE UP (ref 0.2–1.2)
BILIRUB UR-MCNC: NEGATIVE — SIGNIFICANT CHANGE UP
BLASTS # FLD: 0 % — SIGNIFICANT CHANGE UP (ref 0–0)
BLOOD GAS VENOUS - CREATININE: 0.9 MG/DL — SIGNIFICANT CHANGE UP (ref 0.5–1.3)
BLOOD GAS VENOUS - FIO2: 21 — SIGNIFICANT CHANGE UP
BLOOD UR QL VISUAL: NEGATIVE — SIGNIFICANT CHANGE UP
BUN SERPL-MCNC: 22 MG/DL — SIGNIFICANT CHANGE UP (ref 7–23)
CALCIUM SERPL-MCNC: 9.4 MG/DL — SIGNIFICANT CHANGE UP (ref 8.4–10.5)
CHLORIDE BLDV-SCNC: 100 MMOL/L — SIGNIFICANT CHANGE UP (ref 96–108)
CHLORIDE SERPL-SCNC: 97 MMOL/L — LOW (ref 98–107)
CO2 SERPL-SCNC: 28 MMOL/L — SIGNIFICANT CHANGE UP (ref 22–31)
COLOR SPEC: YELLOW — SIGNIFICANT CHANGE UP
CREAT SERPL-MCNC: 1.06 MG/DL — SIGNIFICANT CHANGE UP (ref 0.5–1.3)
EOSINOPHIL # BLD AUTO: 0.11 K/UL — SIGNIFICANT CHANGE UP (ref 0–0.5)
EOSINOPHIL NFR BLD AUTO: 1.9 % — SIGNIFICANT CHANGE UP (ref 0–6)
EOSINOPHIL NFR FLD: 1.8 % — SIGNIFICANT CHANGE UP (ref 0–6)
GAS PNL BLDV: 136 MMOL/L — SIGNIFICANT CHANGE UP (ref 136–146)
GIANT PLATELETS BLD QL SMEAR: PRESENT — SIGNIFICANT CHANGE UP
GLUCOSE BLDV-MCNC: 160 MG/DL — HIGH (ref 70–99)
GLUCOSE SERPL-MCNC: 163 MG/DL — HIGH (ref 70–99)
GLUCOSE UR-MCNC: NEGATIVE — SIGNIFICANT CHANGE UP
HCO3 BLDV-SCNC: 30 MMOL/L — HIGH (ref 20–27)
HCT VFR BLD CALC: 34.6 % — SIGNIFICANT CHANGE UP (ref 34.5–45)
HCT VFR BLDV CALC: 37 % — SIGNIFICANT CHANGE UP (ref 34.5–45)
HGB BLD-MCNC: 11.6 G/DL — SIGNIFICANT CHANGE UP (ref 11.5–15.5)
HGB BLDV-MCNC: 12 G/DL — SIGNIFICANT CHANGE UP (ref 11.5–15.5)
HYPOCHROMIA BLD QL: SLIGHT — SIGNIFICANT CHANGE UP
IMM GRANULOCYTES NFR BLD AUTO: 0.2 % — SIGNIFICANT CHANGE UP (ref 0–1.5)
KETONES UR-MCNC: NEGATIVE — SIGNIFICANT CHANGE UP
LACTATE BLDV-MCNC: 1.4 MMOL/L — SIGNIFICANT CHANGE UP (ref 0.5–2)
LEUKOCYTE ESTERASE UR-ACNC: SIGNIFICANT CHANGE UP
LYMPHOCYTES # BLD AUTO: 2.07 K/UL — SIGNIFICANT CHANGE UP (ref 1–3.3)
LYMPHOCYTES # BLD AUTO: 36 % — SIGNIFICANT CHANGE UP (ref 13–44)
LYMPHOCYTES NFR SPEC AUTO: 23.7 % — SIGNIFICANT CHANGE UP (ref 13–44)
MCHC RBC-ENTMCNC: 23.2 PG — LOW (ref 27–34)
MCHC RBC-ENTMCNC: 33.5 % — SIGNIFICANT CHANGE UP (ref 32–36)
MCV RBC AUTO: 69.1 FL — LOW (ref 80–100)
METAMYELOCYTES # FLD: 0 % — SIGNIFICANT CHANGE UP (ref 0–1)
MICROCYTES BLD QL: SLIGHT — SIGNIFICANT CHANGE UP
MONOCYTES # BLD AUTO: 0.55 K/UL — SIGNIFICANT CHANGE UP (ref 0–0.9)
MONOCYTES NFR BLD AUTO: 9.6 % — SIGNIFICANT CHANGE UP (ref 2–14)
MONOCYTES NFR BLD: 8.2 % — SIGNIFICANT CHANGE UP (ref 2–9)
MYELOCYTES NFR BLD: 0 % — SIGNIFICANT CHANGE UP (ref 0–0)
NEUTROPHIL AB SER-ACNC: 62.7 % — SIGNIFICANT CHANGE UP (ref 43–77)
NEUTROPHILS # BLD AUTO: 2.99 K/UL — SIGNIFICANT CHANGE UP (ref 1.8–7.4)
NEUTROPHILS NFR BLD AUTO: 52 % — SIGNIFICANT CHANGE UP (ref 43–77)
NEUTS BAND # BLD: 0 % — SIGNIFICANT CHANGE UP (ref 0–6)
NITRITE UR-MCNC: NEGATIVE — SIGNIFICANT CHANGE UP
NRBC # BLD: 1 /100WBC — SIGNIFICANT CHANGE UP
NRBC # FLD: 0 K/UL — SIGNIFICANT CHANGE UP (ref 0–0)
NT-PROBNP SERPL-SCNC: 274.4 PG/ML — SIGNIFICANT CHANGE UP
OTHER - HEMATOLOGY %: 0 — SIGNIFICANT CHANGE UP
PCO2 BLDV: 53 MMHG — HIGH (ref 41–51)
PH BLDV: 7.42 PH — SIGNIFICANT CHANGE UP (ref 7.32–7.43)
PH UR: 6.5 — SIGNIFICANT CHANGE UP (ref 5–8)
PLATELET # BLD AUTO: 274 K/UL — SIGNIFICANT CHANGE UP (ref 150–400)
PLATELET COUNT - ESTIMATE: NORMAL — SIGNIFICANT CHANGE UP
PMV BLD: 8.8 FL — SIGNIFICANT CHANGE UP (ref 7–13)
PO2 BLDV: < 24 MMHG — LOW (ref 35–40)
POIKILOCYTOSIS BLD QL AUTO: SLIGHT — SIGNIFICANT CHANGE UP
POLYCHROMASIA BLD QL SMEAR: SLIGHT — SIGNIFICANT CHANGE UP
POTASSIUM BLDV-SCNC: 3.7 MMOL/L — SIGNIFICANT CHANGE UP (ref 3.4–4.5)
POTASSIUM SERPL-MCNC: 3.7 MMOL/L — SIGNIFICANT CHANGE UP (ref 3.5–5.3)
POTASSIUM SERPL-SCNC: 3.7 MMOL/L — SIGNIFICANT CHANGE UP (ref 3.5–5.3)
PROMYELOCYTES # FLD: 0 % — SIGNIFICANT CHANGE UP (ref 0–0)
PROT SERPL-MCNC: 7.4 G/DL — SIGNIFICANT CHANGE UP (ref 6–8.3)
PROT UR-MCNC: 50 — SIGNIFICANT CHANGE UP
RBC # BLD: 5.01 M/UL — SIGNIFICANT CHANGE UP (ref 3.8–5.2)
RBC # FLD: 17.4 % — HIGH (ref 10.3–14.5)
RBC CASTS # UR COMP ASSIST: SIGNIFICANT CHANGE UP (ref 0–?)
REVIEW TO FOLLOW: YES — SIGNIFICANT CHANGE UP
SAO2 % BLDV: 20.8 % — LOW (ref 60–85)
SCHISTOCYTES BLD QL AUTO: SLIGHT — SIGNIFICANT CHANGE UP
SMUDGE CELLS # BLD: PRESENT — SIGNIFICANT CHANGE UP
SODIUM SERPL-SCNC: 138 MMOL/L — SIGNIFICANT CHANGE UP (ref 135–145)
SP GR SPEC: 1.03 — SIGNIFICANT CHANGE UP (ref 1–1.04)
SQUAMOUS # UR AUTO: SIGNIFICANT CHANGE UP
TROPONIN T, HIGH SENSITIVITY: 10 NG/L — SIGNIFICANT CHANGE UP (ref ?–14)
TROPONIN T, HIGH SENSITIVITY: 9 NG/L — SIGNIFICANT CHANGE UP (ref ?–14)
UROBILINOGEN FLD QL: SIGNIFICANT CHANGE UP
VARIANT LYMPHS # BLD: 3.6 % — SIGNIFICANT CHANGE UP
WBC # BLD: 5.75 K/UL — SIGNIFICANT CHANGE UP (ref 3.8–10.5)
WBC # FLD AUTO: 5.75 K/UL — SIGNIFICANT CHANGE UP (ref 3.8–10.5)
WBC UR QL: HIGH (ref 0–?)

## 2020-01-08 PROCEDURE — 74177 CT ABD & PELVIS W/CONTRAST: CPT | Mod: 26

## 2020-01-08 PROCEDURE — 71046 X-RAY EXAM CHEST 2 VIEWS: CPT | Mod: 26

## 2020-01-08 NOTE — ED PROVIDER NOTE - CLINICAL SUMMARY MEDICAL DECISION MAKING FREE TEXT BOX
75 y/o F presenting with SOB and LLQ abd pain. symptom may be 2/2 to FLU like symptoms vs PNA vs CHF. Plan for routine labs including trop, proBNP, CT abd/pelvis

## 2020-01-08 NOTE — ED PROVIDER NOTE - ADDITIONAL NOTES AND INSTRUCTIONS:
Please excuse MsLorena Deanna Arellano from work on 1/8/2020. She accompanied her mother to [facility] Emergency Room

## 2020-01-08 NOTE — ED PROVIDER NOTE - CARE PLAN
Principal Discharge DX:	LLQ pain  Secondary Diagnosis:	Shortness of breath Principal Discharge DX:	URI (upper respiratory infection)  Secondary Diagnosis:	LLQ pain

## 2020-01-08 NOTE — ED ADULT NURSE NOTE - CHIEF COMPLAINT QUOTE
Pt. c/o SOB, generalized abd pain, fever, chills x 1 week. Endorses nausea. Denies vomiting.    Addendum: Pt. c/o dizziness after standing up from sitting position. Awake & alert. Ambulatory at present. .

## 2020-01-08 NOTE — ED ADULT NURSE NOTE - OBJECTIVE STATEMENT
Break Coverage RN: Received pt in room 27, ambulatory with a cane at baseline, respirations even and unlabored b/l. Pt c/o abd pain since Jan 1st with nausea. Denies vomiting, diarrhea. Also c/o clear productive cough, intermittent fevers. Denies chills, body aches. Reports heat is broken at her house. Also endorsing dizziness/lightheadedness. Abdomen soft, nondistended, slightly tender LLQ. Daughter at bedside. Awaiting MD vasquez. Will continue to monitor. 104 Break Coverage RN: Received pt in room 27, ambulatory with a cane at baseline, respirations even and unlabored b/l. Pt c/o abd pain since Jan 1st with nausea. Denies vomiting, diarrhea. Also c/o clear productive cough, intermittent fevers. Denies chills, body aches. Reports heat is broken at her house. Also endorsing dizziness/lightheadedness. Abdomen soft, nondistended, slightly tender LLQ. IVL 20g Angiocath placed on right AC. Labs sent. Daughter at bedside. Awaiting MD vasquez. Will continue to monitor.

## 2020-01-08 NOTE — ED PROVIDER NOTE - NS ED MD EM SELECTION
Hematology/Oncology  Progress Note        NAME: Soumya Cortesby - ROOM: 89/785-J - MRN: [id number] - Age: 77year old - : 4/10/1950    Subjective:  No acute events overnight.   In bed , still with headaches, unable to walk, very weak     Medications:  • di no edema   Skin: No rashes or lesions.     Recent Labs   Lab  04/27/18   0605   RBC  3.67*   HGB  11.9*   HCT  34.8*   MCV  94.7   MCH  32.5*   MCHC  34.3   RDW  14.4   WBC  9.5   PLT  416*     Recent Labs   Lab  04/25/18   0538  04/26/18   0607  04/27/18 [id number] Comprehensive

## 2020-01-08 NOTE — ED PROVIDER NOTE - ATTENDING CONTRIBUTION TO CARE
75 y/o F with h/o HTN, DM p/w 1 week of URI sxs - cough, sob, cp with coughing, nausea, abd pain.  No reported fever, chills, back pain, swelling, rash, urinary sxs, vomiting or diarrhea.  Well appearing, lying comfortably in stretcher, awake and alert, nontoxic.  VSS.  Lungs cta bl.  Cards nl S1/S2, RRR, no MRG.  Abd soft with suprapubic and LLQ tenderness, no rebound or guarding.  No pedal edema or calf tenderness.  Likely viral sxs, will r/o pna, pt also has significant abd tenderness concerning for uti/stone/diverticulitis.  Will obtain labs, ua, ekg, cxr, ct abd/pel, symptomatic relief and reassess.

## 2020-01-08 NOTE — ED PROVIDER NOTE - PATIENT PORTAL LINK FT
You can access the FollowMyHealth Patient Portal offered by [facility] by registering at the following website: [email or web address]. By joining [employer]’s FollowMyHealth portal, you will also be able to view your health information using other applications (apps) compatible with our system.

## 2020-01-08 NOTE — ED ADULT NURSE NOTE - NSIMPLEMENTINTERV_GEN_ALL_ED
Implemented All Fall Risk Interventions:  [place] to call system. Call bell, personal items and telephone within reach. Instruct patient to call for assistance. Room bathroom lighting operational. Non-slip footwear when patient is off stretcher. Physically safe environment: no spills, clutter or unnecessary equipment. Stretcher in lowest position, wheels locked, appropriate side rails in place. Provide visual cue, wrist band, yellow gown, etc. Monitor gait and stability. Monitor for mental status changes and reorient to person, place, and time. Review medications for side effects contributing to fall risk. Reinforce activity limits and safety measures with patient and family.

## 2020-01-08 NOTE — ED PROVIDER NOTE - OBJECTIVE STATEMENT
Patient is a 73 y/o F with hx of HTN, DM, diverticulosis and colitis presents with c/o cough and SOB of breath as well as LLQ abdominal pain x 1 week. Patient states that sob was preceded by Flu like symptoms, which have resolved. She notes worsening tiredness while walking. The LLQ pain is cramping in nature, nonradiating, no associated n/v/d/, melena, BRBPR. She denies chest pain, palpitations, HA, dizziness, fever, chills.

## 2020-01-08 NOTE — ED PROVIDER NOTE - NSFOLLOWUPINSTRUCTIONS_ED_ALL_ED_FT
Your were evaluated for short of breath and abdominal pain. Your labs, chest xray and Cat Scan of Abdomen were negative. You are clinically stable for discharge. Should you experience chest pain, shortness of breath, coughing up blood, nausea, vomiting, blood in stool return to ED immediately. Please follow up with PCP in 2-3 days.

## 2020-01-24 ENCOUNTER — APPOINTMENT (OUTPATIENT)
Dept: PULMONOLOGY | Facility: CLINIC | Age: 75
End: 2020-01-24
Payer: MEDICARE

## 2020-01-24 VITALS
OXYGEN SATURATION: 99 % | RESPIRATION RATE: 16 BRPM | WEIGHT: 230 LBS | DIASTOLIC BLOOD PRESSURE: 80 MMHG | BODY MASS INDEX: 38.27 KG/M2 | HEART RATE: 75 BPM | SYSTOLIC BLOOD PRESSURE: 122 MMHG

## 2020-01-24 DIAGNOSIS — R05 COUGH: ICD-10-CM

## 2020-01-24 PROCEDURE — 99214 OFFICE O/P EST MOD 30 MIN: CPT

## 2020-01-24 RX ORDER — CEFPODOXIME PROXETIL 200 MG/1
200 TABLET, FILM COATED ORAL
Qty: 20 | Refills: 0 | Status: COMPLETED | COMMUNITY
Start: 2019-07-26

## 2020-01-24 RX ORDER — CARVEDILOL 6.25 MG/1
6.25 TABLET, FILM COATED ORAL
Refills: 0 | Status: ACTIVE | COMMUNITY

## 2020-01-24 RX ORDER — NAPROXEN 500 MG/1
500 TABLET ORAL
Qty: 30 | Refills: 0 | Status: COMPLETED | COMMUNITY
Start: 2019-11-14

## 2020-01-24 RX ORDER — AMOXICILLIN AND CLAVULANATE POTASSIUM 500; 125 MG/1; MG/1
500-125 TABLET, FILM COATED ORAL
Qty: 20 | Refills: 0 | Status: COMPLETED | COMMUNITY
Start: 2019-11-02

## 2020-01-24 RX ORDER — MELOXICAM 15 MG/1
15 TABLET ORAL DAILY
Qty: 30 | Refills: 0 | Status: DISCONTINUED | COMMUNITY
Start: 2018-05-25 | End: 2020-01-24

## 2020-01-24 RX ORDER — AZITHROMYCIN 250 MG/1
250 TABLET, FILM COATED ORAL
Qty: 6 | Refills: 0 | Status: COMPLETED | COMMUNITY
Start: 2020-01-13

## 2020-01-24 RX ORDER — ALBUTEROL SULFATE 2.5 MG/3ML
(2.5 MG/3ML) SOLUTION RESPIRATORY (INHALATION)
Qty: 75 | Refills: 0 | Status: COMPLETED | COMMUNITY
Start: 2020-01-13

## 2020-01-24 RX ORDER — AMOXICILLIN 875 MG/1
TABLET, FILM COATED ORAL
Refills: 0 | Status: DISCONTINUED | COMMUNITY
End: 2020-01-24

## 2020-01-24 RX ORDER — CEPHALEXIN 500 MG/1
500 CAPSULE ORAL
Qty: 14 | Refills: 0 | Status: COMPLETED | COMMUNITY
Start: 2019-07-25

## 2020-01-24 RX ORDER — BENZONATATE 200 MG/1
200 CAPSULE ORAL
Qty: 20 | Refills: 0 | Status: COMPLETED | COMMUNITY
Start: 2020-01-13

## 2020-01-24 NOTE — PHYSICAL EXAM
[General Appearance - In No Acute Distress] : no acute distress [Erythema] : no erythema of the pharynx [III] : III [Neck Cervical Mass (___cm)] : no neck mass was observed [Auscultation Breath Sounds / Voice Sounds] : lungs were clear to auscultation bilaterally [Heart Sounds] : normal S1 and S2 [Abnormal Walk] : normal gait [Cyanosis, Localized] : no localized cyanosis [No Focal Deficits] : no focal deficits [] : no rash [Skin Turgor] : normal skin turgor [Oriented To Time, Place, And Person] : oriented to person, place, and time

## 2020-01-24 NOTE — PROCEDURE
[FreeTextEntry1] : CXR 8/13/19: No active pulmonary disease. \par \par CXR 11/5/19: No acute disease. \par \par PFT 12/12/19: No obstruction noted. Moderate restriction present. There was a moderate reduction in diffusion which was in relation to the loss of lung volume. Mild response to bronchodilator noted.

## 2020-01-24 NOTE — REVIEW OF SYSTEMS
[Fever] : no fever [Hemoptysis] : no hemoptysis [Cough] : cough [Sputum] : not coughing up ~M sputum [Chest Tightness] : no chest tightness [Dyspnea] : no dyspnea [Wheezing] : no wheezing [Chest Discomfort] : no chest discomfort [Edema] : ~T edema was not present [Nasal Discharge] : no nasal discharge [Heartburn] : no heartburn [Reflux] : no reflux [Back Pain] : ~T no back pain [Arthralgias] : arthralgias [Diabetes] : diabetes mellitus [Difficulty Initiating Sleep] : no difficulty falling asleep [DVT] : no DVT [Difficulty Maintaining Sleep] : no difficulty maintaining sleep

## 2020-01-24 NOTE — DISCUSSION/SUMMARY
[FreeTextEntry1] : She is a 74 year old woman with a history of hypertension, diabetes, hyperlipidemia, obesity and back pain who was referred her for a chronic cough. She stated that her cough started in July of 2019.\par \par She is on lisinopril 40 mg per day. This be the cause of her cough. She was advised to contact her primary care provider and see an alternative medicine can be used.\par \par A recent chest radiograph demonstrated a possible opacity at the left lung base. A CT was recommended. \par \par Followup in one month after the CT has been done.

## 2020-02-20 ENCOUNTER — EMERGENCY (EMERGENCY)
Facility: HOSPITAL | Age: 75
LOS: 0 days | Discharge: HOME | End: 2020-02-20
Attending: EMERGENCY MEDICINE
Payer: COMMERCIAL

## 2020-02-20 VITALS
DIASTOLIC BLOOD PRESSURE: 63 MMHG | TEMPERATURE: 98 F | OXYGEN SATURATION: 98 % | RESPIRATION RATE: 14 BRPM | HEART RATE: 66 BPM | SYSTOLIC BLOOD PRESSURE: 167 MMHG

## 2020-02-20 VITALS
HEART RATE: 54 BPM | OXYGEN SATURATION: 97 % | RESPIRATION RATE: 18 BRPM | DIASTOLIC BLOOD PRESSURE: 68 MMHG | SYSTOLIC BLOOD PRESSURE: 141 MMHG | WEIGHT: 212.97 LBS | TEMPERATURE: 98 F

## 2020-02-20 DIAGNOSIS — R55 SYNCOPE AND COLLAPSE: ICD-10-CM

## 2020-02-20 DIAGNOSIS — E78.5 HYPERLIPIDEMIA, UNSPECIFIED: ICD-10-CM

## 2020-02-20 DIAGNOSIS — K57.90 DIVERTICULOSIS OF INTESTINE, PART UNSPECIFIED, WITHOUT PERFORATION OR ABSCESS WITHOUT BLEEDING: ICD-10-CM

## 2020-02-20 DIAGNOSIS — I10 ESSENTIAL (PRIMARY) HYPERTENSION: ICD-10-CM

## 2020-02-20 DIAGNOSIS — R73.03 PREDIABETES: ICD-10-CM

## 2020-02-20 DIAGNOSIS — Z98.89 OTHER SPECIFIED POSTPROCEDURAL STATES: Chronic | ICD-10-CM

## 2020-02-20 DIAGNOSIS — Z91.013 ALLERGY TO SEAFOOD: ICD-10-CM

## 2020-02-20 LAB
ALBUMIN SERPL ELPH-MCNC: 3.3 G/DL — SIGNIFICANT CHANGE UP (ref 3.3–5)
ALP SERPL-CCNC: 116 U/L — SIGNIFICANT CHANGE UP (ref 40–120)
ALT FLD-CCNC: 14 U/L — SIGNIFICANT CHANGE UP (ref 12–78)
ANION GAP SERPL CALC-SCNC: 3 MMOL/L — LOW (ref 5–17)
ANISOCYTOSIS BLD QL: SLIGHT — SIGNIFICANT CHANGE UP
APTT BLD: 33.1 SEC — SIGNIFICANT CHANGE UP (ref 28.5–37)
AST SERPL-CCNC: 15 U/L — SIGNIFICANT CHANGE UP (ref 15–37)
BASOPHILS # BLD AUTO: 0.02 K/UL — SIGNIFICANT CHANGE UP (ref 0–0.2)
BASOPHILS NFR BLD AUTO: 0.3 % — SIGNIFICANT CHANGE UP (ref 0–2)
BILIRUB SERPL-MCNC: 0.3 MG/DL — SIGNIFICANT CHANGE UP (ref 0.2–1.2)
BUN SERPL-MCNC: 26 MG/DL — HIGH (ref 7–23)
CALCIUM SERPL-MCNC: 9 MG/DL — SIGNIFICANT CHANGE UP (ref 8.5–10.1)
CHLORIDE SERPL-SCNC: 103 MMOL/L — SIGNIFICANT CHANGE UP (ref 96–108)
CO2 SERPL-SCNC: 32 MMOL/L — HIGH (ref 22–31)
CREAT SERPL-MCNC: 1.07 MG/DL — SIGNIFICANT CHANGE UP (ref 0.5–1.3)
EOSINOPHIL # BLD AUTO: 0.1 K/UL — SIGNIFICANT CHANGE UP (ref 0–0.5)
EOSINOPHIL NFR BLD AUTO: 1.7 % — SIGNIFICANT CHANGE UP (ref 0–6)
GLUCOSE SERPL-MCNC: 133 MG/DL — HIGH (ref 70–99)
HCT VFR BLD CALC: 32 % — LOW (ref 34.5–45)
HGB BLD-MCNC: 10.7 G/DL — LOW (ref 11.5–15.5)
IMM GRANULOCYTES NFR BLD AUTO: 0.2 % — SIGNIFICANT CHANGE UP (ref 0–1.5)
INR BLD: 1.12 RATIO — SIGNIFICANT CHANGE UP (ref 0.88–1.16)
LG PLATELETS BLD QL AUTO: SLIGHT — SIGNIFICANT CHANGE UP
LYMPHOCYTES # BLD AUTO: 1.42 K/UL — SIGNIFICANT CHANGE UP (ref 1–3.3)
LYMPHOCYTES # BLD AUTO: 24.3 % — SIGNIFICANT CHANGE UP (ref 13–44)
MCHC RBC-ENTMCNC: 23.4 PG — LOW (ref 27–34)
MCHC RBC-ENTMCNC: 33.4 GM/DL — SIGNIFICANT CHANGE UP (ref 32–36)
MCV RBC AUTO: 69.9 FL — LOW (ref 80–100)
MICROCYTES BLD QL: SLIGHT — SIGNIFICANT CHANGE UP
MONOCYTES # BLD AUTO: 0.51 K/UL — SIGNIFICANT CHANGE UP (ref 0–0.9)
MONOCYTES NFR BLD AUTO: 8.7 % — SIGNIFICANT CHANGE UP (ref 2–14)
NEUTROPHILS # BLD AUTO: 3.78 K/UL — SIGNIFICANT CHANGE UP (ref 1.8–7.4)
NEUTROPHILS NFR BLD AUTO: 64.8 % — SIGNIFICANT CHANGE UP (ref 43–77)
NRBC # BLD: 0 /100 WBCS — SIGNIFICANT CHANGE UP (ref 0–0)
OVALOCYTES BLD QL SMEAR: SLIGHT — SIGNIFICANT CHANGE UP
PLAT MORPH BLD: NORMAL — SIGNIFICANT CHANGE UP
PLATELET # BLD AUTO: 277 K/UL — SIGNIFICANT CHANGE UP (ref 150–400)
PLATELET COUNT - ESTIMATE: NORMAL — SIGNIFICANT CHANGE UP
POIKILOCYTOSIS BLD QL AUTO: SLIGHT — SIGNIFICANT CHANGE UP
POTASSIUM SERPL-MCNC: 3.7 MMOL/L — SIGNIFICANT CHANGE UP (ref 3.5–5.3)
POTASSIUM SERPL-SCNC: 3.7 MMOL/L — SIGNIFICANT CHANGE UP (ref 3.5–5.3)
PROT SERPL-MCNC: 7.7 GM/DL — SIGNIFICANT CHANGE UP (ref 6–8.3)
PROTHROM AB SERPL-ACNC: 12.6 SEC — SIGNIFICANT CHANGE UP (ref 10–12.9)
RBC # BLD: 4.58 M/UL — SIGNIFICANT CHANGE UP (ref 3.8–5.2)
RBC # FLD: 18.6 % — HIGH (ref 10.3–14.5)
RBC BLD AUTO: ABNORMAL
SODIUM SERPL-SCNC: 138 MMOL/L — SIGNIFICANT CHANGE UP (ref 135–145)
TARGETS BLD QL SMEAR: SLIGHT — SIGNIFICANT CHANGE UP
TROPONIN I SERPL-MCNC: <.015 NG/ML — SIGNIFICANT CHANGE UP (ref 0.01–0.04)
WBC # BLD: 5.84 K/UL — SIGNIFICANT CHANGE UP (ref 3.8–10.5)
WBC # FLD AUTO: 5.84 K/UL — SIGNIFICANT CHANGE UP (ref 3.8–10.5)

## 2020-02-20 PROCEDURE — 93010 ELECTROCARDIOGRAM REPORT: CPT

## 2020-02-20 PROCEDURE — 71045 X-RAY EXAM CHEST 1 VIEW: CPT | Mod: 26

## 2020-02-20 PROCEDURE — 70450 CT HEAD/BRAIN W/O DYE: CPT | Mod: 26

## 2020-02-20 PROCEDURE — 99285 EMERGENCY DEPT VISIT HI MDM: CPT

## 2020-02-20 NOTE — ED PROVIDER NOTE - NONTENDER LOCATION
left lower quadrant/right costovertebral angle/right lower quadrant/periumbilical/left costovertebral angle/left upper quadrant/right upper quadrant/umbilical/suprapubic

## 2020-02-20 NOTE — ED PROVIDER NOTE - OBJECTIVE STATEMENT
75 years old female by ems c/o near syncope while waiting in line at the store. pt sts she slowly went down and did not hit her head. Pt sts she is feeling much better now wants to go home. Pt sts she has hx of passing out 3 to 4 times in the past years last was one year ago. Pt also sts she has hx of seizure since 2007 and she has not had any seizure for a long time and she has been taking medicine for it. Pt denies headache, dizziness, blurred visions, light sensitivities, focal/distal weakness or numbness, neck/back/hips pain, cough, sob, chest pain, nausea, vomiting, fever, chills, abd pain, dysuria, hematuria, vaginal spotting or discharge, or abnormal stool color.

## 2020-02-20 NOTE — ED ADULT TRIAGE NOTE - CHIEF COMPLAINT QUOTE
pt BIBA with c/o syncopal episode,  in the field, denies CP or SOB, hx of syncopal episodes in the past, denies striking her head was lowered to the ground

## 2020-02-20 NOTE — ED ADULT NURSE NOTE - OBJECTIVE STATEMENT
patient A&Ox3 in no  acute distress c/o of dizziness , patient  had an episode of near syncope today , denied chest pain denied back pain denied difficulty breathings , denied headache denied N/V denied  blurred vision denied photophobia a this time , patient on cardiac monitor continue to monitor, family by bed side

## 2020-02-20 NOTE — ED ADULT NURSE REASSESSMENT NOTE - NS ED NURSE REASSESS COMMENT FT1
patient A&Ox3 in no acute distress denied dizziness no chest pain no difficulty breathing , discharge as orders patient eat dinner , heplock remove , left ER self ambulated with reggie Grissom

## 2020-02-20 NOTE — ED PROVIDER NOTE - CONSTITUTIONAL, MLM
normal... Well appearing, awake, alert, oriented to person, place, time/situation and in no apparent distress. Speaking in clear full sentences no nasal flaring no Well appearing, awake, alert, oriented to person, place, time/situation and in no apparent distress. Speaking in clear full sentences no nasal flaring no shoulders retractions no diaphoresis, smiling pleasant appears very comfortable

## 2020-02-20 NOTE — ED PROVIDER NOTE - PATIENT PORTAL LINK FT
You can access the FollowMyHealth Patient Portal offered by [facility] by registering at the following website: [email or web address]. By joining [employer]’s FollowMyHealth portal, you will also be able to view your health information using other applications (apps) compatible with our system.

## 2020-02-20 NOTE — ED ADULT NURSE NOTE - NS_SISCREENINGSR_GEN_ALL_ED
13 yr old patient admitted to 3c,Grandma drove patient to the ER was there till  quite late and left., Received consent via phone from grandma at 0139.Pt was on 3 c in February and in March of 2018. Pt states could not be safe at home and told her family therapist this and her grandma. On 5/9 pt states grandma woke her up for school  banging pots and pans in her room she became upset and started slamming doors. Pt states when she went outside grandma  locked the door and would not let her in , so she  banged/punched a window, her ride came and she went to school. Pt states when she came home after schoolher family therapist was there and she broke down. Pt appears very happy to be back on the unit, she contracts to be safe on the unit Xray of right hand done in [place] and is negative, . Pt has some redness slight swelling right side of  Right hand, but she denies any pain   Negative

## 2020-02-20 NOTE — ED PROVIDER NOTE - PROGRESS NOTE DETAILS
Pt has been very pleasant smiling laughing denies headache, dizziness, blurred visions, light sensitivities focal/distal weakness or numbness cough, sob, chest pain, nausea, vomiting, fever, chills, abd pain bp 167/67 hr 57 at bed side now. pt and daughter are given and explained all test reports and advised to follow up with her doctor and dr. Luevano neurologist  and Dr. Curtis cardiologist and return if symptoms persist or worsenh.

## 2020-02-20 NOTE — ED PROVIDER NOTE - MUSCULOSKELETAL NECK EXAM
supple/no deformity, pain or tenderness. no restriction of movement/trachea midline/No vertebral point tenderness no pain on movements

## 2020-02-20 NOTE — ED PROVIDER NOTE - TOBACCO USE
Father  Still living? Unknown  Family history of diabetes mellitus, Age at diagnosis: Age Unknown     Mother  Still living? Unknown  Family history of diabetes mellitus, Age at diagnosis: Age Unknown
Never smoker

## 2020-06-11 ENCOUNTER — APPOINTMENT (OUTPATIENT)
Dept: PULMONOLOGY | Facility: CLINIC | Age: 75
End: 2020-06-11

## 2020-06-19 ENCOUNTER — APPOINTMENT (OUTPATIENT)
Dept: PULMONOLOGY | Facility: CLINIC | Age: 75
End: 2020-06-19
Payer: COMMERCIAL

## 2020-06-19 VITALS
WEIGHT: 245 LBS | BODY MASS INDEX: 40.77 KG/M2 | HEART RATE: 56 BPM | DIASTOLIC BLOOD PRESSURE: 72 MMHG | SYSTOLIC BLOOD PRESSURE: 140 MMHG | TEMPERATURE: 98.9 F | OXYGEN SATURATION: 97 %

## 2020-06-19 DIAGNOSIS — R93.89 ABNORMAL FINDINGS ON DIAGNOSTIC IMAGING OF OTHER SPECIFIED BODY STRUCTURES: ICD-10-CM

## 2020-06-19 PROCEDURE — 99214 OFFICE O/P EST MOD 30 MIN: CPT

## 2020-06-19 RX ORDER — LISINOPRIL 40 MG/1
40 TABLET ORAL
Refills: 0 | Status: COMPLETED | COMMUNITY
End: 2020-06-19

## 2020-06-19 NOTE — HISTORY OF PRESENT ILLNESS
[Former] : former [Never] : never [YearQuit] : 1991 [FreeTextEntry1] : She is a 75 year old woman with a history of hypertension, diabetes, hyperlipidemia, obesity and back pain was referred her for a chronic cough. She developed a cough in the summer of 2019 when she was at a wedding in [place]. The cough lasted for several months. \par \par The cough is now better. She denied any prior history of pulmonary disease. She stopped smoking in 1990. Her occupational history is noncontributory.\par

## 2020-06-19 NOTE — PHYSICAL EXAM
[General Appearance - In No Acute Distress] : no acute distress [III] : III [Heart Sounds] : normal S1 and S2 [Neck Cervical Mass (___cm)] : no neck mass was observed [Auscultation Breath Sounds / Voice Sounds] : lungs were clear to auscultation bilaterally [Abnormal Walk] : normal gait [Cyanosis, Localized] : no localized cyanosis [No Focal Deficits] : no focal deficits [Oriented To Time, Place, And Person] : oriented to person, place, and time [Skin Turgor] : normal skin turgor [] : no respiratory distress [Erythema] : no erythema of the pharynx

## 2020-06-19 NOTE — DISCUSSION/SUMMARY
[FreeTextEntry1] : She is a 75 year old woman with a history of hypertension, diabetes, hyperlipidemia, obesity and back pain who was referred her for a chronic cough. She stated that her cough started in July of 2019.\par \par She was on lisinopril 40 mg per day. This was stopped and her cough has resolved. \par \par Chest CT as noted showed small nodules. Follow at one year advised. \par \par Also had a left breast nodule. According to her she had a US guided biopsy at [facility] in [place]. The result is pending. She is to follow up with her new PCP, Dr. NELI John regarding the breast biopsy results. \par \par Follow up in six months. Sooner if necessary.

## 2020-06-19 NOTE — REVIEW OF SYSTEMS
[Arthralgias] : arthralgias [Diabetes] : diabetes mellitus [Fever] : no fever [Cough] : no cough [Chills] : no chills [Dyspnea] : no dyspnea [Sputum] : not coughing up ~M sputum [Wheezing] : no wheezing [Chest Discomfort] : no chest discomfort [Nasal Discharge] : no nasal discharge [Edema] : ~T edema was not present [Heartburn] : no heartburn [Reflux] : no reflux [DVT] : no DVT [Back Pain] : ~T no back pain [Difficulty Initiating Sleep] : no difficulty falling asleep [Difficulty Maintaining Sleep] : no difficulty maintaining sleep

## 2020-06-19 NOTE — PROCEDURE
[FreeTextEntry1] : CXR 8/13/19: No active pulmonary disease. \par \par CXR 11/5/19: No acute disease. \par \par PFT 12/12/19: No obstruction noted. Moderate restriction present. There was a moderate reduction in diffusion which was in relation to the loss of lung volume. Mild response to bronchodilator noted.\par \par CT Chest 3/3/20 ([facility]): Borderline axillary nodes. Small pulmonary nodules, largest 5 mm. Left breast nodule 9 mm. \par \par

## 2020-07-21 ENCOUNTER — APPOINTMENT (OUTPATIENT)
Dept: ORTHOPEDIC SURGERY | Facility: CLINIC | Age: 75
End: 2020-07-21
Payer: MEDICARE

## 2020-07-21 VITALS
HEART RATE: 70 BPM | WEIGHT: 240 LBS | DIASTOLIC BLOOD PRESSURE: 77 MMHG | BODY MASS INDEX: 39.99 KG/M2 | SYSTOLIC BLOOD PRESSURE: 168 MMHG | HEIGHT: 65 IN

## 2020-07-21 VITALS — TEMPERATURE: 97.5 F

## 2020-07-21 DIAGNOSIS — M79.631 PAIN IN RIGHT FOREARM: ICD-10-CM

## 2020-07-21 DIAGNOSIS — M25.531 PAIN IN RIGHT WRIST: ICD-10-CM

## 2020-07-21 PROCEDURE — 73110 X-RAY EXAM OF WRIST: CPT | Mod: RT

## 2020-07-21 PROCEDURE — 73090 X-RAY EXAM OF FOREARM: CPT | Mod: RT

## 2020-07-21 PROCEDURE — 99214 OFFICE O/P EST MOD 30 MIN: CPT

## 2020-07-22 ENCOUNTER — APPOINTMENT (OUTPATIENT)
Dept: ORTHOPEDIC SURGERY | Facility: CLINIC | Age: 75
End: 2020-07-22

## 2020-08-14 ENCOUNTER — APPOINTMENT (OUTPATIENT)
Dept: ORTHOPEDIC SURGERY | Facility: CLINIC | Age: 75
End: 2020-08-14
Payer: MEDICARE

## 2020-08-14 VITALS
SYSTOLIC BLOOD PRESSURE: 138 MMHG | DIASTOLIC BLOOD PRESSURE: 76 MMHG | BODY MASS INDEX: 40.82 KG/M2 | WEIGHT: 245 LBS | HEART RATE: 52 BPM | HEIGHT: 65 IN | OXYGEN SATURATION: 95 %

## 2020-08-14 DIAGNOSIS — M11.231 OTHER CHONDROCALCINOSIS, RIGHT WRIST: ICD-10-CM

## 2020-08-14 PROCEDURE — 73110 X-RAY EXAM OF WRIST: CPT | Mod: RT

## 2020-08-14 PROCEDURE — 99214 OFFICE O/P EST MOD 30 MIN: CPT

## 2020-08-30 NOTE — DISCUSSION/SUMMARY
[FreeTextEntry1] : She is a 74 year old woman with a history of hypertension, diabetes, hyperlipidemia, obesity and back pain who was referred her for a chronic cough. \par \par Her cough started in July of 2019 and lasted for several months and it has now resolved.\par \par Has no evidence of any active pulmonary disease at this time. Her examination was unremarkable.She has moderate restriction on PFT. This is most likely due to her excess body weight. Weight loss and exercise should be beneficial.\par \par Followup in 6 months advised.
DISPLAY PLAN FREE TEXT

## 2020-09-16 ENCOUNTER — RX RENEWAL (OUTPATIENT)
Age: 75
End: 2020-09-16

## 2020-09-16 RX ORDER — NAPROXEN 500 MG/1
500 TABLET ORAL
Qty: 60 | Refills: 0 | Status: ACTIVE | COMMUNITY
Start: 2020-08-14 | End: 1900-01-01

## 2020-09-23 ENCOUNTER — INPATIENT (INPATIENT)
Facility: HOSPITAL | Age: 75
LOS: 2 days | Discharge: HOME HEALTH | End: 2020-09-26
Attending: INTERNAL MEDICINE | Admitting: INTERNAL MEDICINE
Payer: MEDICARE

## 2020-09-23 VITALS
HEART RATE: 70 BPM | SYSTOLIC BLOOD PRESSURE: 187 MMHG | RESPIRATION RATE: 16 BRPM | OXYGEN SATURATION: 100 % | TEMPERATURE: 98 F | HEIGHT: 65 IN | DIASTOLIC BLOOD PRESSURE: 101 MMHG

## 2020-09-23 DIAGNOSIS — R56.9 UNSPECIFIED CONVULSIONS: ICD-10-CM

## 2020-09-23 DIAGNOSIS — R26.81 UNSTEADINESS ON FEET: ICD-10-CM

## 2020-09-23 DIAGNOSIS — Z98.89 OTHER SPECIFIED POSTPROCEDURAL STATES: Chronic | ICD-10-CM
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
## 2020-09-23 DIAGNOSIS — I10 ESSENTIAL (PRIMARY) HYPERTENSION: ICD-10-CM

## 2020-09-23 LAB
ALBUMIN SERPL ELPH-MCNC: 4.4 G/DL — SIGNIFICANT CHANGE UP (ref 3.3–5)
ALP SERPL-CCNC: 112 U/L — SIGNIFICANT CHANGE UP (ref 40–120)
ALT FLD-CCNC: 9 U/L — SIGNIFICANT CHANGE UP (ref 4–33)
ANION GAP SERPL CALC-SCNC: 14 MMO/L — SIGNIFICANT CHANGE UP (ref 7–14)
APPEARANCE UR: CLEAR — SIGNIFICANT CHANGE UP
APTT BLD: 36.3 SEC — SIGNIFICANT CHANGE UP (ref 27–36.3)
AST SERPL-CCNC: 17 U/L — SIGNIFICANT CHANGE UP (ref 4–32)
BACTERIA # UR AUTO: NEGATIVE — SIGNIFICANT CHANGE UP
BASOPHILS # BLD AUTO: 0.01 K/UL — SIGNIFICANT CHANGE UP (ref 0–0.2)
BASOPHILS NFR BLD AUTO: 0.1 % — SIGNIFICANT CHANGE UP (ref 0–2)
BILIRUB SERPL-MCNC: 0.7 MG/DL — SIGNIFICANT CHANGE UP (ref 0.2–1.2)
BILIRUB UR-MCNC: NEGATIVE — SIGNIFICANT CHANGE UP
BLOOD UR QL VISUAL: NEGATIVE — SIGNIFICANT CHANGE UP
BUN SERPL-MCNC: 29 MG/DL — HIGH (ref 7–23)
CALCIUM SERPL-MCNC: 10 MG/DL — SIGNIFICANT CHANGE UP (ref 8.4–10.5)
CHLORIDE SERPL-SCNC: 99 MMOL/L — SIGNIFICANT CHANGE UP (ref 98–107)
CK SERPL-CCNC: 253 U/L — HIGH (ref 25–170)
CO2 SERPL-SCNC: 27 MMOL/L — SIGNIFICANT CHANGE UP (ref 22–31)
COLOR SPEC: SIGNIFICANT CHANGE UP
CREAT SERPL-MCNC: 0.94 MG/DL — SIGNIFICANT CHANGE UP (ref 0.5–1.3)
EOSINOPHIL # BLD AUTO: 0.02 K/UL — SIGNIFICANT CHANGE UP (ref 0–0.5)
EOSINOPHIL NFR BLD AUTO: 0.3 % — SIGNIFICANT CHANGE UP (ref 0–6)
GLUCOSE SERPL-MCNC: 127 MG/DL — HIGH (ref 70–99)
GLUCOSE UR-MCNC: NEGATIVE — SIGNIFICANT CHANGE UP
HCT VFR BLD CALC: 34 % — LOW (ref 34.5–45)
HGB BLD-MCNC: 11.5 G/DL — SIGNIFICANT CHANGE UP (ref 11.5–15.5)
HYALINE CASTS # UR AUTO: NEGATIVE — SIGNIFICANT CHANGE UP
IMM GRANULOCYTES NFR BLD AUTO: 0.3 % — SIGNIFICANT CHANGE UP (ref 0–1.5)
INR BLD: 1.16 — SIGNIFICANT CHANGE UP (ref 0.88–1.16)
KETONES UR-MCNC: SIGNIFICANT CHANGE UP
LEUKOCYTE ESTERASE UR-ACNC: SIGNIFICANT CHANGE UP
LIDOCAIN IGE QN: 22 U/L — SIGNIFICANT CHANGE UP (ref 7–60)
LYMPHOCYTES # BLD AUTO: 1.72 K/UL — SIGNIFICANT CHANGE UP (ref 1–3.3)
LYMPHOCYTES # BLD AUTO: 23.6 % — SIGNIFICANT CHANGE UP (ref 13–44)
MCHC RBC-ENTMCNC: 23.3 PG — LOW (ref 27–34)
MCHC RBC-ENTMCNC: 33.8 % — SIGNIFICANT CHANGE UP (ref 32–36)
MCV RBC AUTO: 69 FL — LOW (ref 80–100)
MONOCYTES # BLD AUTO: 0.58 K/UL — SIGNIFICANT CHANGE UP (ref 0–0.9)
MONOCYTES NFR BLD AUTO: 7.9 % — SIGNIFICANT CHANGE UP (ref 2–14)
NEUTROPHILS # BLD AUTO: 4.95 K/UL — SIGNIFICANT CHANGE UP (ref 1.8–7.4)
NEUTROPHILS NFR BLD AUTO: 67.8 % — SIGNIFICANT CHANGE UP (ref 43–77)
NITRITE UR-MCNC: NEGATIVE — SIGNIFICANT CHANGE UP
NRBC # FLD: 0 K/UL — SIGNIFICANT CHANGE UP (ref 0–0)
PH UR: 6.5 — SIGNIFICANT CHANGE UP (ref 5–8)
PLATELET # BLD AUTO: 245 K/UL — SIGNIFICANT CHANGE UP (ref 150–400)
PMV BLD: 9.1 FL — SIGNIFICANT CHANGE UP (ref 7–13)
POTASSIUM SERPL-MCNC: 4 MMOL/L — SIGNIFICANT CHANGE UP (ref 3.5–5.3)
POTASSIUM SERPL-SCNC: 4 MMOL/L — SIGNIFICANT CHANGE UP (ref 3.5–5.3)
PROT SERPL-MCNC: 7.7 G/DL — SIGNIFICANT CHANGE UP (ref 6–8.3)
PROT UR-MCNC: 20 — SIGNIFICANT CHANGE UP
PROTHROM AB SERPL-ACNC: 13.3 SEC — SIGNIFICANT CHANGE UP (ref 10.6–13.6)
RBC # BLD: 4.93 M/UL — SIGNIFICANT CHANGE UP (ref 3.8–5.2)
RBC # FLD: 17.5 % — HIGH (ref 10.3–14.5)
RBC CASTS # UR COMP ASSIST: SIGNIFICANT CHANGE UP (ref 0–?)
SODIUM SERPL-SCNC: 140 MMOL/L — SIGNIFICANT CHANGE UP (ref 135–145)
SP GR SPEC: 1.02 — SIGNIFICANT CHANGE UP (ref 1–1.04)
SQUAMOUS # UR AUTO: SIGNIFICANT CHANGE UP
TROPONIN T, HIGH SENSITIVITY: 11 NG/L — SIGNIFICANT CHANGE UP (ref ?–14)
TROPONIN T, HIGH SENSITIVITY: 9 NG/L — SIGNIFICANT CHANGE UP (ref ?–14)
UROBILINOGEN FLD QL: NORMAL — SIGNIFICANT CHANGE UP
WBC # BLD: 7.3 K/UL — SIGNIFICANT CHANGE UP (ref 3.8–10.5)
WBC # FLD AUTO: 7.3 K/UL — SIGNIFICANT CHANGE UP (ref 3.8–10.5)
WBC UR QL: HIGH (ref 0–?)

## 2020-09-23 PROCEDURE — 99223 1ST HOSP IP/OBS HIGH 75: CPT

## 2020-09-23 PROCEDURE — 99285 EMERGENCY DEPT VISIT HI MDM: CPT

## 2020-09-23 PROCEDURE — 70450 CT HEAD/BRAIN W/O DYE: CPT | Mod: 26

## 2020-09-23 PROCEDURE — 74177 CT ABD & PELVIS W/CONTRAST: CPT | Mod: 26

## 2020-09-23 PROCEDURE — 73100 X-RAY EXAM OF WRIST: CPT | Mod: 26,50

## 2020-09-23 PROCEDURE — 71045 X-RAY EXAM CHEST 1 VIEW: CPT | Mod: 26

## 2020-09-23 RX ORDER — LABETALOL HCL 100 MG
100 TABLET ORAL ONCE
Refills: 0 | Status: COMPLETED | OUTPATIENT
Start: 2020-09-23 | End: 2020-09-23

## 2020-09-23 RX ORDER — AMLODIPINE BESYLATE 2.5 MG/1
10 TABLET ORAL ONCE
Refills: 0 | Status: COMPLETED | OUTPATIENT
Start: 2020-09-23 | End: 2020-09-23

## 2020-09-23 RX ORDER — ACETAMINOPHEN 500 MG
650 TABLET ORAL EVERY 6 HOURS
Refills: 0 | Status: DISCONTINUED | OUTPATIENT
Start: 2020-09-23 | End: 2020-09-26

## 2020-09-23 RX ORDER — CEFTRIAXONE 500 MG/1
1000 INJECTION, POWDER, FOR SOLUTION INTRAMUSCULAR; INTRAVENOUS ONCE
Refills: 0 | Status: COMPLETED | OUTPATIENT
Start: 2020-09-23 | End: 2020-09-23

## 2020-09-23 RX ORDER — ENOXAPARIN SODIUM 100 MG/ML
40 INJECTION SUBCUTANEOUS DAILY
Refills: 0 | Status: DISCONTINUED | OUTPATIENT
Start: 2020-09-23 | End: 2020-09-26

## 2020-09-23 RX ORDER — SODIUM CHLORIDE 9 MG/ML
1000 INJECTION INTRAMUSCULAR; INTRAVENOUS; SUBCUTANEOUS ONCE
Refills: 0 | Status: COMPLETED | OUTPATIENT
Start: 2020-09-23 | End: 2020-09-23

## 2020-09-23 RX ORDER — CARVEDILOL PHOSPHATE 80 MG/1
6.25 CAPSULE, EXTENDED RELEASE ORAL ONCE
Refills: 0 | Status: DISCONTINUED | OUTPATIENT
Start: 2020-09-23 | End: 2020-09-23

## 2020-09-23 RX ADMIN — SODIUM CHLORIDE 1000 MILLILITER(S): 9 INJECTION INTRAMUSCULAR; INTRAVENOUS; SUBCUTANEOUS at 16:17

## 2020-09-23 RX ADMIN — CEFTRIAXONE 100 MILLIGRAM(S): 500 INJECTION, POWDER, FOR SOLUTION INTRAMUSCULAR; INTRAVENOUS at 21:00

## 2020-09-23 RX ADMIN — AMLODIPINE BESYLATE 10 MILLIGRAM(S): 2.5 TABLET ORAL at 15:53

## 2020-09-23 RX ADMIN — Medication 100 MILLIGRAM(S): at 15:53

## 2020-09-23 NOTE — H&P ADULT - HISTORY OF PRESENT ILLNESS
76 y/o F with HTN, HLD, seizures, and DM presents after fall.  Pt reports she rolled out of bed around 1 am last night.  She fell, striking head on the floor.  She states she was unable to stand back up, and crawled around her house.  He family became concerned when they did not hear from her and found her on the floor.  Pt reports pain in upper midline back, lower abdomen, knees, and R wrist after falling.  She reports no recent illness.  No recent fevers, chills, cough, dysuria, n/v/d.  She reports 3 prior similar events of falling out of bed and having trouble standing back up.  She normally ambulates with a cane and states she does not have difficulty getting around her home and neighborhood.      In the ED, pt was given 1L NS, ceftriaxone, labetalol for elevated BP, and amlodipine.

## 2020-09-23 NOTE — H&P ADULT - NSHPPHYSICALEXAM_GEN_ALL_CORE
Vital Signs Last 24 Hrs  T(C): 36.8 (23 Sep 2020 18:51), Max: 36.8 (23 Sep 2020 18:51)  T(F): 98.2 (23 Sep 2020 18:51), Max: 98.2 (23 Sep 2020 18:51)  HR: 81 (23 Sep 2020 18:51) (70 - 81)  BP: 167/67 (23 Sep 2020 18:51) (167/67 - 231/84)  BP(mean): --  RR: 18 (23 Sep 2020 18:51) (16 - 18)  SpO2: 100% (23 Sep 2020 18:51) (100% - 100%)    PHYSICAL EXAM:  GENERAL: No Acute Distress  EYES: conjunctiva and sclera clear  ENMT: Moist mucous membranes   NECK: Supple  PULMONARY: Clear to auscultation bilaterally  CARDIAC: Regular rate and rhythm; No murmurs, rubs, or gallops  GASTROINTESTINAL: Abdomen soft, Nontender, Nondistended; Bowel sounds normal  EXTREMITIES:   No clubbing, cyanosis, or pedal edema  PSYCH: Normal Affect, Normal Behavior  NEUROLOGY:   - Mental status A&O x 3  SKIN: No rashes or lesions  MUSCULOSKELETAL: No joint swelling Vital Signs Last 24 Hrs  T(C): 36.8 (23 Sep 2020 18:51), Max: 36.8 (23 Sep 2020 18:51)  T(F): 98.2 (23 Sep 2020 18:51), Max: 98.2 (23 Sep 2020 18:51)  HR: 81 (23 Sep 2020 18:51) (70 - 81)  BP: 167/67 (23 Sep 2020 18:51) (167/67 - 231/84)  BP(mean): --  RR: 18 (23 Sep 2020 18:51) (16 - 18)  SpO2: 100% (23 Sep 2020 18:51) (100% - 100%)    PHYSICAL EXAM:  GENERAL: No Acute Distress  EYES: conjunctiva and sclera clear  ENMT: Moist mucous membranes   NECK: Supple  PULMONARY: Clear to auscultation bilaterally  CARDIAC: Regular rate and rhythm; No murmurs, rubs, or gallops  GASTROINTESTINAL: Abdomen soft, Nontender, Nondistended; Bowel sounds normal  EXTREMITIES:   No clubbing, cyanosis, or pedal edema  PSYCH: Normal Affect, Normal Behavior  NEUROLOGY:   - Mental status A&O x 3  SKIN: No rashes or lesions  MUSCULOSKELETAL: Bilateral knees tender with ROM, and mild swelling.

## 2020-09-23 NOTE — ED PROVIDER NOTE - OBJECTIVE STATEMENT
75y F w/ PMHx DM, HTN, HLD BIBEMS s/p unwitnessed fall. Patient is poor historian. Patient states ~1am she was lying in bed, then remembers falling onto floor, but does not remember cause of her fall. Endorses pain in B/L wrists and LLQ abdominal pain described as "sharp" since falling. States she "crawled to the bathroom" and was "found by someone" who called EMS. Denies prodromal symptoms of dizziness, lightheadedness, cp, sob prior to fall. States she lives at home alone, ambulates without assistance at baseline in the home. Denies recent fever, chills, cough, headache, visual changes, urinary symptoms, bloody stools, diarrhea, rash, focal weakness/numbness.

## 2020-09-23 NOTE — H&P ADULT - NSICDXPASTMEDICALHX_GEN_ALL_CORE_FT
PAST MEDICAL HISTORY:  Borderline diabetes     Diverticulosis     Hyperlipidemia     Hypertension     Obesity     Seizure

## 2020-09-23 NOTE — ED PROVIDER NOTE - NS ED ROS FT
CONSTITUTIONAL: No fevers, no chills, no lightheadedness, no dizziness  EYES: no visual changes, no eye pain  NOSE: no nasal congestion  MOUTH/THROAT: no sore throat  CV: No chest pain, no palpitations  RESP: No SOB, no cough  GI: No n/v/d, no abd pain  : no dysuria, no hematuria, no flank pain  MSK: no back pain, no extremity pain  SKIN: no rashes  NEURO: no headache, no focal weakness, no decreased sensation/paresthesias

## 2020-09-23 NOTE — ED PROVIDER NOTE - PHYSICAL EXAMINATION
Physical Exam:  Gen: NAD, AOx3, non-toxic appearing  Head: NCAT  HEENT: EOMI, PEERL, normal conjunctiva, tongue midline, oral mucosa moist  Lung: CTAB, no respiratory distress, no wheezes/rhonchi/rales B/L, speaking in full sentences  CV: RRR, no murmurs, rubs or gallops  Abd: soft, +ttp in LLQ, ND, no guarding, no rigidity, no rebound tenderness, no CVA tenderness   MSK: no visible deformities, ROM normal in UE/LE, no back pain  Neuro: No focal sensory or motor deficits  Skin: +small superficial abrasion to R knee, skin warm, well perfused, no leg swelling  ~Harriett Ojeda D.O. -Resident

## 2020-09-23 NOTE — ED ADULT TRIAGE NOTE - CHIEF COMPLAINT QUOTE
Pt found on floor at home at 1 am. States that she rolled out of bed and hit her head. c/o b.l arm pain and right knee and lower back pain. Pt has not taken meds in 3 weeks.

## 2020-09-23 NOTE — ED PROVIDER NOTE - CLINICAL SUMMARY MEDICAL DECISION MAKING FREE TEXT BOX
76 y/o F with PMH HTN, DM, HLD p/w unwitnessed fall. pt lives alone does not remember how she ended up on the ground. Possible syncope,. pt w/ fall from bed lives alone, will evaluate for uti, ekg, cbc, cmp, trop, pt down on ground for few hours r/o rhabdo. ck, tsh, cxr, xray wrists, ct abdomen ct head, admission w/ tele pain control

## 2020-09-23 NOTE — H&P ADULT - NSHPLABSRESULTS_GEN_ALL_CORE
140  |  99  |  29<H>  ----------------------------<  127<H>  4.0   |  27  |  0.94    Ca    10.0      23 Sep 2020 15:45    TPro  7.7  /  Alb  4.4  /  TBili  0.7  /  DBili  x   /  AST  17  /  ALT  9   /  AlkPhos  112                              11.5   7.30  )-----------( 245      ( 23 Sep 2020 15:45 )             34.0       CARDIAC MARKERS ( 23 Sep 2020 19:50 )  x     / x     / 253 u/L / x     / x            PT/INR - ( 23 Sep 2020 15:45 )   PT: 13.3 SEC;   INR: 1.16          PTT - ( 23 Sep 2020 15:45 )  PTT:36.3 SEC    Urinalysis Basic - ( 23 Sep 2020 19:12 )    Color: LIGHT YELLOW / Appearance: CLEAR / S.025 / pH: 6.5  Gluc: NEGATIVE / Ketone: SMALL  / Bili: NEGATIVE / Urobili: NORMAL   Blood: NEGATIVE / Protein: 20 / Nitrite: NEGATIVE   Leuk Esterase: SMALL / RBC: 3-5 / WBC 11-25   Sq Epi: OCC / Non Sq Epi: x / Bacteria: NEGATIVE    CXR film reviewed: Clear lungs    < from: CT Abdomen and Pelvis w/ IV Cont (20 @ 18:02) >    LOWER CHEST: Cardiomegaly. Left basilar linear atelectasis.    LIVER: Left hepatic cyst.  BILE DUCTS: Normal caliber.  GALLBLADDER: Within normal limits.  SPLEEN: Within normal limits.  PANCREAS: Within normal limits.  ADRENALS: Within normal limits.  KIDNEYS/URETERS: Left lower pole renal subcentimeter hypodensity, too small to characterize. Left renal cortical scarring. Symmetric enhancement of the bilateral kidneys. No hydronephrosis.    BLADDER: Within normal limits.  REPRODUCTIVE ORGANS: Uterus and adnexa within normal limits.    BOWEL: Tiny hiatal hernia. Nobowel obstruction. Appendectomy. Colonic diverticulosis.  PERITONEUM: No ascites.  VESSELS: Atherosclerotic changes.  RETROPERITONEUM/LYMPH NODES: No lymphadenopathy.  ABDOMINAL WALL: Tiny fat-containing umbilical hernia.  BONES: Degenerative changes.    < end of copied text >    `< from: CT Abdomen and Pelvis w/ IV Cont (09.23.20 @ 18:02) >    LOWER CHEST: Cardiomegaly. Left basilar linear atelectasis.    LIVER: Left hepatic cyst.  BILE DUCTS: Normal caliber.  GALLBLADDER: Within normal limits.  SPLEEN: Within normal limits.  PANCREAS: Within normal limits.  ADRENALS: Within normal limits.  KIDNEYS/URETERS: Left lower pole renal subcentimeter hypodensity, too small to characterize. Left renal cortical scarring. Symmetric enhancement of the bilateral kidneys. No hydronephrosis.    BLADDER: Within normal limits.  REPRODUCTIVE ORGANS: Uterus and adnexa within normal limits.    BOWEL: Tiny hiatal hernia. Nobowel obstruction. Appendectomy. Colonic diverticulosis.  PERITONEUM: No ascites.  VESSELS: Atherosclerotic changes.  RETROPERITONEUM/LYMPH NODES: No lymphadenopathy.  ABDOMINAL WALL: Tiny fat-containing umbilical hernia.  BONES: Degenerative changes.    < end of copied text >    `< from: Xray Wrist 2 Views, Bilateral (20 @ 17:17) >     No acute fracture or dislocation    < end of copied text >

## 2020-09-23 NOTE — ED ADULT NURSE NOTE - OBJECTIVE STATEMENT
A&Ox3. Pt. states that she doesn't know how but she ended up on the floor last night. Denies hitting head or LOC. She states that she was able to drawl to the bathroom and was found there on the floor. She states that she has pain to bilateral knees but that it's chronic. Endorses LLQ pain as well. Denies dizziness, N/V/D or CP.

## 2020-09-23 NOTE — H&P ADULT - NSICDXFAMILYHX_GEN_ALL_CORE_FT
FAMILY HISTORY:  Sibling  Still living? Unknown  Family history of cerebrovascular accident (CVA), Age at diagnosis: Age Unknown

## 2020-09-23 NOTE — ED PROVIDER NOTE - ATTENDING CONTRIBUTION TO CARE
76 y/o F with PMH HTN, DM, HLD p/w unwitnessed fall. pt lives alone does not remember how she ended up on the ground. Pt states someone found her at home. she reports pain in her lower abdomen since the fall . Pt states she rolled out of her bed but does not remember the exact mechanism of the fall. She states she has pain in both wrists and her lower back. She states she was able to crawl around her house. She reports falling at about 1am. She states she has had some chills, denies fever. She did not take her blood pressure medications today. denies numbness weakness  denies fever, chills, chest pain, SOB, +abdominal pain, diarrhea, dysuria, ?syncope, bleeding, new rash, weakness, numbness, blurred vision   + fall + wrist pain  ROS  otherwise negative as per HPI  Gen: Awake, Alert, WD, WN, NAD  Head:  NC/AT  Eyes:  PERRL, EOMI, Conjunctiva pink, lids normal, no scleral icterus  ENT: OP clear, no exudates, no erythema, uvula midline,  moist mucus membranes  Neck: supple, nontender, no meningismus, no JVD, trachea midline  Cardiac/CV:  S1 S2, RRR, no M/G/R  Respiratory/Pulm:  CTAB, good air movement,   Gastrointestinal/Abdomen:  Soft, supra pubic tenderness, nondistended, +BS, no rebound/guarding  Back:  no CVAT, no MLT paraspinal tenderness lower back, left upper chest lipoma chronic per pt   Ext:  warm, well perfused, moving all extremities spontaneously, no peripheral edema, distal pulses intact  Skin: intact, no rash  Neuro:  AAOx3, sensation intact, motor 5/5 x 4 extremities  normal finger to nose,  speech clear

## 2020-09-23 NOTE — H&P ADULT - NSHPREVIEWOFSYSTEMS_GEN_ALL_CORE
REVIEW OF SYSTEMS    General:  Poor appetite  Skin/Breast:  Negative  Ophthalmologic:  Negative  ENMT:  Negative  Respiratory and Thorax:  Negative  Cardiovascular:  Negative  Gastrointestinal:  abd pain, no n/v/d  Genitourinary:  Negative  Musculoskeletal:  knee, wrist, back pain  Neurological:  Negative  Psychiatric:  Negative  Hematology/Lymphatics:  Negative	  Endocrine:	  Negative  Allergic/Immunologic:	 Negative

## 2020-09-23 NOTE — ED PROVIDER NOTE - CHIEF COMPLAINT
The patient is a 75y Female complaining of The patient is a 75y Female complaining of unwitnessed fall

## 2020-09-24 LAB
ANION GAP SERPL CALC-SCNC: 12 MMO/L — SIGNIFICANT CHANGE UP (ref 7–14)
BUN SERPL-MCNC: 22 MG/DL — SIGNIFICANT CHANGE UP (ref 7–23)
CALCIUM SERPL-MCNC: 9.5 MG/DL — SIGNIFICANT CHANGE UP (ref 8.4–10.5)
CHLORIDE SERPL-SCNC: 98 MMOL/L — SIGNIFICANT CHANGE UP (ref 98–107)
CK SERPL-CCNC: 220 U/L — HIGH (ref 25–170)
CO2 SERPL-SCNC: 25 MMOL/L — SIGNIFICANT CHANGE UP (ref 22–31)
CREAT SERPL-MCNC: 0.91 MG/DL — SIGNIFICANT CHANGE UP (ref 0.5–1.3)
CULTURE RESULTS: SIGNIFICANT CHANGE UP
GLUCOSE SERPL-MCNC: 197 MG/DL — HIGH (ref 70–99)
HCT VFR BLD CALC: 31.8 % — LOW (ref 34.5–45)
HGB BLD-MCNC: 10.7 G/DL — LOW (ref 11.5–15.5)
MAGNESIUM SERPL-MCNC: 2.4 MG/DL — SIGNIFICANT CHANGE UP (ref 1.6–2.6)
MCHC RBC-ENTMCNC: 23.3 PG — LOW (ref 27–34)
MCHC RBC-ENTMCNC: 33.6 % — SIGNIFICANT CHANGE UP (ref 32–36)
MCV RBC AUTO: 69.1 FL — LOW (ref 80–100)
NRBC # FLD: 0 K/UL — SIGNIFICANT CHANGE UP (ref 0–0)
PHOSPHATE SERPL-MCNC: 3.1 MG/DL — SIGNIFICANT CHANGE UP (ref 2.5–4.5)
PLATELET # BLD AUTO: 244 K/UL — SIGNIFICANT CHANGE UP (ref 150–400)
PMV BLD: 8.9 FL — SIGNIFICANT CHANGE UP (ref 7–13)
POTASSIUM SERPL-MCNC: 3.7 MMOL/L — SIGNIFICANT CHANGE UP (ref 3.5–5.3)
POTASSIUM SERPL-SCNC: 3.7 MMOL/L — SIGNIFICANT CHANGE UP (ref 3.5–5.3)
RBC # BLD: 4.6 M/UL — SIGNIFICANT CHANGE UP (ref 3.8–5.2)
RBC # FLD: 17.6 % — HIGH (ref 10.3–14.5)
SARS-COV-2 RNA SPEC QL NAA+PROBE: SIGNIFICANT CHANGE UP
SODIUM SERPL-SCNC: 135 MMOL/L — SIGNIFICANT CHANGE UP (ref 135–145)
SPECIMEN SOURCE: SIGNIFICANT CHANGE UP
TROPONIN T, HIGH SENSITIVITY: 12 NG/L — SIGNIFICANT CHANGE UP (ref ?–14)
WBC # BLD: 5.81 K/UL — SIGNIFICANT CHANGE UP (ref 3.8–10.5)
WBC # FLD AUTO: 5.81 K/UL — SIGNIFICANT CHANGE UP (ref 3.8–10.5)

## 2020-09-24 RX ORDER — SIMVASTATIN 20 MG/1
1 TABLET, FILM COATED ORAL
Qty: 0 | Refills: 0 | DISCHARGE

## 2020-09-24 RX ORDER — LISINOPRIL 2.5 MG/1
1 TABLET ORAL
Qty: 0 | Refills: 0 | DISCHARGE

## 2020-09-24 RX ORDER — CEFTRIAXONE 500 MG/1
1000 INJECTION, POWDER, FOR SOLUTION INTRAMUSCULAR; INTRAVENOUS EVERY 24 HOURS
Refills: 0 | Status: COMPLETED | OUTPATIENT
Start: 2020-09-24 | End: 2020-09-25

## 2020-09-24 RX ORDER — OXCARBAZEPINE 300 MG/1
300 TABLET, FILM COATED ORAL
Refills: 0 | Status: DISCONTINUED | OUTPATIENT
Start: 2020-09-24 | End: 2020-09-26

## 2020-09-24 RX ORDER — LISINOPRIL 2.5 MG/1
40 TABLET ORAL DAILY
Refills: 0 | Status: DISCONTINUED | OUTPATIENT
Start: 2020-09-24 | End: 2020-09-26

## 2020-09-24 RX ORDER — HYDRALAZINE HCL 50 MG
5 TABLET ORAL ONCE
Refills: 0 | Status: DISCONTINUED | OUTPATIENT
Start: 2020-09-24 | End: 2020-09-26

## 2020-09-24 RX ORDER — SODIUM CHLORIDE 9 MG/ML
1000 INJECTION INTRAMUSCULAR; INTRAVENOUS; SUBCUTANEOUS
Refills: 0 | Status: COMPLETED | OUTPATIENT
Start: 2020-09-24 | End: 2020-09-24

## 2020-09-24 RX ORDER — OXCARBAZEPINE 300 MG/1
1 TABLET, FILM COATED ORAL
Qty: 0 | Refills: 0 | DISCHARGE

## 2020-09-24 RX ADMIN — ENOXAPARIN SODIUM 40 MILLIGRAM(S): 100 INJECTION SUBCUTANEOUS at 09:01

## 2020-09-24 RX ADMIN — Medication 650 MILLIGRAM(S): at 10:00

## 2020-09-24 RX ADMIN — CEFTRIAXONE 100 MILLIGRAM(S): 500 INJECTION, POWDER, FOR SOLUTION INTRAMUSCULAR; INTRAVENOUS at 21:41

## 2020-09-24 RX ADMIN — SODIUM CHLORIDE 50 MILLILITER(S): 9 INJECTION INTRAMUSCULAR; INTRAVENOUS; SUBCUTANEOUS at 10:04

## 2020-09-24 RX ADMIN — LISINOPRIL 40 MILLIGRAM(S): 2.5 TABLET ORAL at 06:43

## 2020-09-24 RX ADMIN — Medication 650 MILLIGRAM(S): at 09:00

## 2020-09-24 RX ADMIN — OXCARBAZEPINE 300 MILLIGRAM(S): 300 TABLET, FILM COATED ORAL at 11:26

## 2020-09-24 RX ADMIN — OXCARBAZEPINE 300 MILLIGRAM(S): 300 TABLET, FILM COATED ORAL at 17:52

## 2020-09-24 NOTE — CONSULT NOTE ADULT - SUBJECTIVE AND OBJECTIVE BOX
Neurology consult    ADOLPH [id number]     Patient is a 75y old  Female who presents with a chief complaint of Fall, difficulty walking (23 Sep 2020 23:16)      HPI:  "74 y/o F with HTN, HLD, seizures, and DM presents after fall.  Pt reports she rolled out of bed around 1 am last night.  She fell, striking head on the floor.  She states she was unable to stand back up, and crawled around her house.  He family became concerned when they did not hear from her and found her on the floor.  Pt reports pain in upper midline back, lower abdomen, knees, and R wrist after falling.  She reports no recent illness.  No recent fevers, chills, cough, dysuria, n/v/d.  She reports 3 prior similar events of falling out of bed and having trouble standing back up.  She normally ambulates with a cane and states she does not have difficulty getting around her home and neighborhood.      In the ED, pt was given 1L NS, ceftriaxone, labetalol for elevated BP, and amlodipine.   (23 Sep 2020 23:16)"    Patient states she recalls falling out of bed Denies seizures, denies incontinence difficulty recalling episodes, new gait issues, incontinence, Patient has not taken her medications in 3 weeks. On trileptal 300 BID      REVIEW OF SYSTEMS:    see HPI    MEDICATIONS    acetaminophen   Tablet .. 650 milliGRAM(s) Oral every 6 hours PRN  cefTRIAXone   IVPB 1000 milliGRAM(s) IV Intermittent every 24 hours  enoxaparin Injectable 40 milliGRAM(s) SubCutaneous daily  lisinopril 40 milliGRAM(s) Oral daily  OXcarbazepine 300 milliGRAM(s) Oral two times a day      PMH: Obesity    Borderline diabetes    Diverticulosis    Hyperlipidemia    Seizure    Hypertension         PSH: History of appendectomy    No Past Surgical History        Family history: No history of dementia, strokes, or seizures   FAMILY HISTORY:  Family history of cerebrovascular accident (CVA) (Sibling)        SOCIAL HISTORY:  No history of tobacco or alcohol use     Allergies    allergy to Lobster (Swelling)  No Known Drug Allergies  seaford (Unknown)    Intolerances    lactose (Stomach Upset)      Height (cm): 165.1 ( @ 14:42)    Vital Signs Last 24 Hrs  T(C): 36.4 (24 Sep 2020 09:18), Max: 37.2 (24 Sep 2020 00:12)  T(F): 97.5 (24 Sep 2020 09:18), Max: 98.9 (24 Sep 2020 00:12)  HR: 61 (24 Sep 2020 09:18) (54 - 81)  BP: 154/69 (24 Sep 2020 09:18) (154/69 - 231/84)  BP(mean): --  RR: 17 (24 Sep 2020 09:18) (16 - 19)  SpO2: 98% (24 Sep 2020 09:18) (97% - 100%)      On Neurological Examination:    Mental Status - Patient is alert, awake, oriented X3. fluent, names, no dysarthria no aphasia Follows commands well and able to answer questions appropriately. Mood and affect  normal    Cranial Nerves - PERRL, EOMI, VFF, V1-V3 intact, no gross facial asymmetry, tongue/uvula midline    Motor Exam -   Right upper 5/5  Left upper 5/5  Right lower 5/5  Left lower 5/5     nml bulk/tone    Sensory    Intact to light touch and pinprick bilaterally    Coord: FTN intact bilaterally     Gait -  normal      Reflexes:          1+ throughout                                 LABS:  CBC Full  -  ( 23 Sep 2020 15:45 )  WBC Count : 7.30 K/uL  RBC Count : 4.93 M/uL  Hemoglobin : 11.5 g/dL  Hematocrit : 34.0 %  Platelet Count - Automated : 245 K/uL  Mean Cell Volume : 69.0 fL  Mean Cell Hemoglobin : 23.3 pg  Mean Cell Hemoglobin Concentration : 33.8 %  Auto Neutrophil # : 4.95 K/uL  Auto Lymphocyte # : 1.72 K/uL  Auto Monocyte # : 0.58 K/uL  Auto Eosinophil # : 0.02 K/uL  Auto Basophil # : 0.01 K/uL  Auto Neutrophil % : 67.8 %  Auto Lymphocyte % : 23.6 %  Auto Monocyte % : 7.9 %  Auto Eosinophil % : 0.3 %  Auto Basophil % : 0.1 %    Urinalysis Basic - ( 23 Sep 2020 19:12 )    Color: LIGHT YELLOW / Appearance: CLEAR / S.025 / pH: 6.5  Gluc: NEGATIVE / Ketone: SMALL  / Bili: NEGATIVE / Urobili: NORMAL   Blood: NEGATIVE / Protein: 20 / Nitrite: NEGATIVE   Leuk Esterase: SMALL / RBC: 3-5 / WBC 11-25   Sq Epi: OCC / Non Sq Epi: x / Bacteria: NEGATIVE          140  |  99  |  29<H>  ----------------------------<  127<H>  4.0   |  27  |  0.94    Ca    10.0      23 Sep 2020 15:45    TPro  7.7  /  Alb  4.4  /  TBili  0.7  /  DBili  x   /  AST  17  /  ALT  9   /  AlkPhos  112  09-    LIVER FUNCTIONS - ( 23 Sep 2020 15:45 )  Alb: 4.4 g/dL / Pro: 7.7 g/dL / ALK PHOS: 112 u/L / ALT: 9 u/L / AST: 17 u/L / GGT: x           Hemoglobin A1C:       PT/INR - ( 23 Sep 2020 15:45 )   PT: 13.3 SEC;   INR: 1.16          PTT - ( 23 Sep 2020 15:45 )  PTT:36.3 SEC      RADIOLOGY  CTH < from: CT Head No Cont (20 @ 18:01) >  IMPRESSION:    No CT evidence of acute hemorrhage or acute calvarial fracture.    < end of copied text >

## 2020-09-24 NOTE — PHARMACOTHERAPY INTERVENTION NOTE - COMMENTS
Meds verified with insurance fill   patient hasnt filled with [facility] recently   attempted to contact [facility] however unable to get through will attempt again later

## 2020-09-25 ENCOUNTER — TRANSCRIPTION ENCOUNTER (OUTPATIENT)
Age: 75
End: 2020-09-25

## 2020-09-25 LAB
ANION GAP SERPL CALC-SCNC: 14 MMO/L — SIGNIFICANT CHANGE UP (ref 7–14)
BUN SERPL-MCNC: 19 MG/DL — SIGNIFICANT CHANGE UP (ref 7–23)
CALCIUM SERPL-MCNC: 9.8 MG/DL — SIGNIFICANT CHANGE UP (ref 8.4–10.5)
CHLORIDE SERPL-SCNC: 100 MMOL/L — SIGNIFICANT CHANGE UP (ref 98–107)
CO2 SERPL-SCNC: 25 MMOL/L — SIGNIFICANT CHANGE UP (ref 22–31)
CREAT SERPL-MCNC: 0.91 MG/DL — SIGNIFICANT CHANGE UP (ref 0.5–1.3)
GLUCOSE SERPL-MCNC: 140 MG/DL — HIGH (ref 70–99)
HCT VFR BLD CALC: 36 % — SIGNIFICANT CHANGE UP (ref 34.5–45)
HGB BLD-MCNC: 11.7 G/DL — SIGNIFICANT CHANGE UP (ref 11.5–15.5)
MAGNESIUM SERPL-MCNC: 2.5 MG/DL — SIGNIFICANT CHANGE UP (ref 1.6–2.6)
MCHC RBC-ENTMCNC: 23.2 PG — LOW (ref 27–34)
MCHC RBC-ENTMCNC: 32.5 % — SIGNIFICANT CHANGE UP (ref 32–36)
MCV RBC AUTO: 71.4 FL — LOW (ref 80–100)
NRBC # FLD: 0 K/UL — SIGNIFICANT CHANGE UP (ref 0–0)
PHOSPHATE SERPL-MCNC: 3.4 MG/DL — SIGNIFICANT CHANGE UP (ref 2.5–4.5)
PLATELET # BLD AUTO: 245 K/UL — SIGNIFICANT CHANGE UP (ref 150–400)
PMV BLD: 9.6 FL — SIGNIFICANT CHANGE UP (ref 7–13)
POTASSIUM SERPL-MCNC: 3.9 MMOL/L — SIGNIFICANT CHANGE UP (ref 3.5–5.3)
POTASSIUM SERPL-SCNC: 3.9 MMOL/L — SIGNIFICANT CHANGE UP (ref 3.5–5.3)
RBC # BLD: 5.04 M/UL — SIGNIFICANT CHANGE UP (ref 3.8–5.2)
RBC # FLD: 17.5 % — HIGH (ref 10.3–14.5)
SARS-COV-2 IGG SERPL QL IA: POSITIVE
SARS-COV-2 IGM SERPL IA-ACNC: 2.81 INDEX — HIGH
SODIUM SERPL-SCNC: 139 MMOL/L — SIGNIFICANT CHANGE UP (ref 135–145)
WBC # BLD: 7.02 K/UL — SIGNIFICANT CHANGE UP (ref 3.8–10.5)
WBC # FLD AUTO: 7.02 K/UL — SIGNIFICANT CHANGE UP (ref 3.8–10.5)

## 2020-09-25 RX ADMIN — OXCARBAZEPINE 300 MILLIGRAM(S): 300 TABLET, FILM COATED ORAL at 17:00

## 2020-09-25 RX ADMIN — CEFTRIAXONE 100 MILLIGRAM(S): 500 INJECTION, POWDER, FOR SOLUTION INTRAMUSCULAR; INTRAVENOUS at 22:01

## 2020-09-25 RX ADMIN — OXCARBAZEPINE 300 MILLIGRAM(S): 300 TABLET, FILM COATED ORAL at 05:09

## 2020-09-25 RX ADMIN — ENOXAPARIN SODIUM 40 MILLIGRAM(S): 100 INJECTION SUBCUTANEOUS at 11:21

## 2020-09-25 RX ADMIN — Medication 650 MILLIGRAM(S): at 23:08

## 2020-09-25 RX ADMIN — Medication 650 MILLIGRAM(S): at 22:08

## 2020-09-25 RX ADMIN — LISINOPRIL 40 MILLIGRAM(S): 2.5 TABLET ORAL at 05:09

## 2020-09-25 NOTE — CONSULT NOTE ADULT - SUBJECTIVE AND OBJECTIVE BOX
Ulises Luevano MD  Interventional Cardiology / Advance Heart Failure and Cardiac Transplant Specialist  [place] Office : [phone] [place] [address] [place] [address]  Tel:   [place] Office : [address] [facility] [place] [address]  Tel: [phone]  Cell : [phone]      HISTORY OF PRESENTING ILLNESS:  HPI:  76 y/o F with HTN, HLD, seizures, and DM presents after fall.  Pt reports she rolled out of bed around 1 am last night.  She fell, striking head on the floor.  She states she was unable to stand back up, and crawled around her house.  He family became concerned when they did not hear from her and found her on the floor.  Pt reports pain in upper midline back, lower abdomen, knees, and R wrist after falling.  She reports no recent illness.  No recent fevers, chills, cough, dysuria, n/v/d.  She reports 3 prior similar events of falling out of bed and having trouble standing back up.  She normally ambulates with a cane and states she does not have difficulty getting around her home and neighborhood.    no LOC no syncope no h/o cardiac disease     PAST MEDICAL & SURGICAL HISTORY:  Obesity    Borderline diabetes    Diverticulosis    Hyperlipidemia    Seizure    Hypertension    History of appendectomy    No Past Surgical History        SOCIAL HISTORY: Substance Use (street drugs): ( x ) never used  (  ) other:    FAMILY HISTORY:  Family history of cerebrovascular accident (CVA) (Sibling)        REVIEW OF SYSTEMS:  CONSTITUTIONAL: No fever, weight loss, or fatigue  EYES: No eye pain, visual disturbances, or discharge  ENMT:  No difficulty hearing, tinnitus, vertigo; No sinus or throat pain  BREASTS: No pain, masses, or nipple discharge  GASTROINTESTINAL: No abdominal or epigastric pain. No nausea, vomiting, or hematemesis; No diarrhea or constipation. No melena or hematochezia.  GENITOURINARY: No dysuria, frequency, hematuria, or incontinence  NEUROLOGICAL: No headaches, memory loss, loss of strength, numbness, or tremors  ENDOCRINE: No heat or cold intolerance; No hair loss  MUSCULOSKELETAL: No joint pain or swelling; No muscle, back, or extremity pain  PSYCHIATRIC: No depression, anxiety, mood swings, or difficulty sleeping  HEME/LYMPH: No easy bruising, or bleeding gums  All others negative    MEDICATIONS:  enoxaparin Injectable 40 milliGRAM(s) SubCutaneous daily  hydrALAZINE Injectable 5 milliGRAM(s) IV Push once  lisinopril 40 milliGRAM(s) Oral daily  cefTRIAXone   IVPB 1000 milliGRAM(s) IV Intermittent every 24 hours  acetaminophen   Tablet .. 650 milliGRAM(s) Oral every 6 hours PRN  OXcarbazepine 300 milliGRAM(s) Oral two times a day      FAMILY HISTORY:  Family history of cerebrovascular accident (CVA) (Sibling)          Allergies    allergy to Lobster (Swelling)  No Known Drug Allergies  seaford (Unknown)    Intolerances    lactose (Stomach Upset)  	      PHYSICAL EXAM:  T(C): 36.5 (09-25-20 @ 13:27), Max: 36.9 (09-24-20 @ 19:09)  HR: 65 (09-25-20 @ 13:27) (60 - 72)  BP: 111/67 (09-25-20 @ 13:27) (111/67 - 215/82)  RR: 17 (09-25-20 @ 13:27) (17 - 19)  SpO2: 100% (09-25-20 @ 13:27) (99% - 100%)  Wt(kg): --  I&O's Summary      EYES: EOMI, PERRLA, conjunctiva and sclera clear  ENMT: No tonsillar erythema, exudates, or enlargement; Moist mucous membranes, Good dentition, No lesions  Cardiovascular: Normal S1 S2, No JVD, No murmurs, No edema  Respiratory: Lungs clear to auscultation	  Gastrointestinal:  Soft, Non-tender, + BS	  Extremities: no edema      LABS:	 	    CARDIAC MARKERS:                                  11.7   7.02  )-----------( 245      ( 25 Sep 2020 06:24 )             36.0     09-25    139  |  100  |  19  ----------------------------<  140<H>  3.9   |  25  |  0.91    Ca    9.8      25 Sep 2020 06:00  Phos  3.4     09-25  Mg     2.5     09-25    TPro  7.7  /  Alb  4.4  /  TBili  0.7  /  DBili  x   /  AST  17  /  ALT  9   /  AlkPhos  112  09-23    proBNP:   Lipid Profile:   HgA1c:   TSH:     Consultant(s) Notes Reviewed:  [x ] YES  [ ] NO    Care Discussed with Consultants/Other Providers [ x] YES  [ ] NO    Imaging Personally Reviewed independently:  [x] YES  [ ] NO    All labs, radiologic studies, vitals, orders and medications list reviewed. Patient is seen and examined at bedside. Case discussed with medical team.    ASSESSMENT/PLAN:

## 2020-09-25 NOTE — DISCHARGE NOTE NURSING/CASE MANAGEMENT/SOCIAL WORK - PATIENT PORTAL LINK FT
You can access the FollowMyHealth Patient Portal offered by [facility] by registering at the following website: [email or web address]. By joining [employer]’s FollowMyHealth portal, you will also be able to view your health information using other applications (apps) compatible with our system.

## 2020-09-25 NOTE — CONSULT NOTE ADULT - ASSESSMENT
74 y/o F with HTN, HLD, seizures, and DM presents after mechanical fall from bed. Patient has not taken her medications in 3 weeks. Neuro exam grossly non-focal CTH-    -restart home does trileptal 300 BId and observe with patient back on home medicine  -Clinically monitor for now  
EKG - not in chart     a/p     1) Fall - no syncope or LOC, get 12 lead EKG , no need for 2d echo     2) h/o seizures - cont trileptal    3) HTN - cont lisinopril
Yes

## 2020-09-26 ENCOUNTER — TRANSCRIPTION ENCOUNTER (OUTPATIENT)
Age: 75
End: 2020-09-26

## 2020-09-26 VITALS
SYSTOLIC BLOOD PRESSURE: 118 MMHG | TEMPERATURE: 98 F | OXYGEN SATURATION: 97 % | DIASTOLIC BLOOD PRESSURE: 61 MMHG | RESPIRATION RATE: 18 BRPM | HEART RATE: 59 BPM

## 2020-09-26 LAB
ANION GAP SERPL CALC-SCNC: 15 MMO/L — HIGH (ref 7–14)
BUN SERPL-MCNC: 28 MG/DL — HIGH (ref 7–23)
CALCIUM SERPL-MCNC: 9.5 MG/DL — SIGNIFICANT CHANGE UP (ref 8.4–10.5)
CHLORIDE SERPL-SCNC: 101 MMOL/L — SIGNIFICANT CHANGE UP (ref 98–107)
CK SERPL-CCNC: 100 U/L — SIGNIFICANT CHANGE UP (ref 25–170)
CO2 SERPL-SCNC: 25 MMOL/L — SIGNIFICANT CHANGE UP (ref 22–31)
CREAT SERPL-MCNC: 1.1 MG/DL — SIGNIFICANT CHANGE UP (ref 0.5–1.3)
GLUCOSE SERPL-MCNC: 99 MG/DL — SIGNIFICANT CHANGE UP (ref 70–99)
HCT VFR BLD CALC: 32.4 % — LOW (ref 34.5–45)
HGB BLD-MCNC: 11 G/DL — LOW (ref 11.5–15.5)
MAGNESIUM SERPL-MCNC: 2.4 MG/DL — SIGNIFICANT CHANGE UP (ref 1.6–2.6)
MCHC RBC-ENTMCNC: 24 PG — LOW (ref 27–34)
MCHC RBC-ENTMCNC: 34 % — SIGNIFICANT CHANGE UP (ref 32–36)
MCV RBC AUTO: 70.7 FL — LOW (ref 80–100)
NRBC # FLD: 0 K/UL — SIGNIFICANT CHANGE UP (ref 0–0)
PHOSPHATE SERPL-MCNC: 3.3 MG/DL — SIGNIFICANT CHANGE UP (ref 2.5–4.5)
PLATELET # BLD AUTO: 233 K/UL — SIGNIFICANT CHANGE UP (ref 150–400)
PMV BLD: 10 FL — SIGNIFICANT CHANGE UP (ref 7–13)
POTASSIUM SERPL-MCNC: 3.6 MMOL/L — SIGNIFICANT CHANGE UP (ref 3.5–5.3)
POTASSIUM SERPL-SCNC: 3.6 MMOL/L — SIGNIFICANT CHANGE UP (ref 3.5–5.3)
RBC # BLD: 4.58 M/UL — SIGNIFICANT CHANGE UP (ref 3.8–5.2)
RBC # FLD: 17.5 % — HIGH (ref 10.3–14.5)
SODIUM SERPL-SCNC: 141 MMOL/L — SIGNIFICANT CHANGE UP (ref 135–145)
WBC # BLD: 4.71 K/UL — SIGNIFICANT CHANGE UP (ref 3.8–10.5)
WBC # FLD AUTO: 4.71 K/UL — SIGNIFICANT CHANGE UP (ref 3.8–10.5)

## 2020-09-26 RX ORDER — LISINOPRIL 2.5 MG/1
1 TABLET ORAL
Qty: 30 | Refills: 0
Start: 2020-09-26

## 2020-09-26 RX ORDER — OXCARBAZEPINE 300 MG/1
1 TABLET, FILM COATED ORAL
Qty: 60 | Refills: 0
Start: 2020-09-26 | End: 2020-10-25

## 2020-09-26 RX ORDER — AMLODIPINE BESYLATE 2.5 MG/1
1 TABLET ORAL
Qty: 0 | Refills: 0 | DISCHARGE

## 2020-09-26 RX ORDER — LISINOPRIL 2.5 MG/1
1 TABLET ORAL
Qty: 30 | Refills: 0
Start: 2020-09-26 | End: 2020-10-25

## 2020-09-26 RX ADMIN — Medication 650 MILLIGRAM(S): at 10:42

## 2020-09-26 RX ADMIN — Medication 650 MILLIGRAM(S): at 10:12

## 2020-09-26 RX ADMIN — OXCARBAZEPINE 300 MILLIGRAM(S): 300 TABLET, FILM COATED ORAL at 05:15

## 2020-09-26 RX ADMIN — LISINOPRIL 40 MILLIGRAM(S): 2.5 TABLET ORAL at 05:15

## 2020-09-26 RX ADMIN — ENOXAPARIN SODIUM 40 MILLIGRAM(S): 100 INJECTION SUBCUTANEOUS at 11:20

## 2020-09-26 NOTE — DISCHARGE NOTE PROVIDER - NSDCMRMEDTOKEN_GEN_ALL_CORE_FT
lisinopril 40 mg oral tablet: 1 tab(s) orally once a day  OXcarbazepine 300 mg oral tablet: 1 tab(s) orally 2 times a day

## 2020-09-26 NOTE — DISCHARGE NOTE PROVIDER - NSDCCPCAREPLAN_GEN_ALL_CORE_FT
PRINCIPAL DISCHARGE DIAGNOSIS  Diagnosis: Fall  Assessment and Plan of Treatment: Ct Scan of your head was negative while hospitalized.  No fractures not to wrist.      SECONDARY DISCHARGE DIAGNOSES  Diagnosis: Syncope and collapse  Assessment and Plan of Treatment: Cardiac work up negative during hospitalization.  Follow up with your PCP within 2 weeks of discharge for further medical management.

## 2020-09-26 NOTE — DISCHARGE NOTE PROVIDER - HOSPITAL COURSE
76 y/o F with HTN, HLD, seizures, and DM presents after fall from bed with inability to stand up.        Hospital Course:    falling, difficulty standing, ?UTI   - Likely deconditioning  - ceftriaxone x3 days  - 9/23 u.cx   - PT eval --Home with PT  -CT Scan of Head negative    HTN  - lisinopril     no acute fracture or dislocation.  Preserved joint spaces.    IMPRESSION:  No acute fracture or dislocation.      Dispo-  stable for discharge asa per attending Dr. Magallanes on 9/26/20.  All medications reviewed prior to discharge

## 2020-09-26 NOTE — PROGRESS NOTE ADULT - ASSESSMENT
74 y/o F with HTN, HLD, seizures, and DM presents after fall from bed with inability to stand up.
    a/p     1) Fall - no syncope or LOC no need for 2d echo     2) h/o seizures - cont trileptal    3) HTN - cont lisinopril
74 y/o F with HTN, HLD, seizures, and DM presents after mechanical fall from bed. Patient has not taken her medications in 3 weeks. Neuro exam grossly non-focal CTH-    -C/W home does trileptal 300 BID  No further w/u  -Clinically monitor for now  -Can Follow up with her neurologist

## 2020-11-10 NOTE — ED ADULT TRIAGE NOTE - NS ED NOTE AC HIGH RISK COUNTRIES
Price (Do Not Change): 0.00 Detail Level: Simple Instructions: This plan will send the code FBSE to the PM system.  DO NOT or CHANGE the price. No

## 2020-12-18 ENCOUNTER — APPOINTMENT (OUTPATIENT)
Dept: PULMONOLOGY | Facility: CLINIC | Age: 75
End: 2020-12-18

## 2020-12-30 ENCOUNTER — APPOINTMENT (OUTPATIENT)
Dept: PULMONOLOGY | Facility: CLINIC | Age: 75
End: 2020-12-30

## 2021-01-06 ENCOUNTER — APPOINTMENT (OUTPATIENT)
Dept: PULMONOLOGY | Facility: CLINIC | Age: 76
End: 2021-01-06
Payer: MEDICARE

## 2021-01-06 VITALS
TEMPERATURE: 97.1 F | WEIGHT: 235 LBS | HEART RATE: 66 BPM | BODY MASS INDEX: 39.11 KG/M2 | SYSTOLIC BLOOD PRESSURE: 118 MMHG | OXYGEN SATURATION: 97 % | DIASTOLIC BLOOD PRESSURE: 82 MMHG

## 2021-01-06 DIAGNOSIS — R91.8 OTHER NONSPECIFIC ABNORMAL FINDING OF LUNG FIELD: ICD-10-CM

## 2021-01-06 PROCEDURE — 99214 OFFICE O/P EST MOD 30 MIN: CPT

## 2021-01-06 PROCEDURE — 99072 ADDL SUPL MATRL&STAF TM PHE: CPT

## 2021-01-06 NOTE — DISCUSSION/SUMMARY
[FreeTextEntry1] : She is a 75 year old woman with a history of hypertension, diabetes, hyperlipidemia, obesity and back pain who was referred her for a chronic cough. She stated that her cough started in July of 2019. The cough has resolved. CT Chest 3/3/20 (R): Borderline axillary nodes. Small pulmonary nodules, largest 5 mm. \par \par Follow up Chest CT requested.  \par \par Follow up in six months however she may be moving to [place].

## 2021-01-06 NOTE — HISTORY OF PRESENT ILLNESS
[Former] : former [Never] : never [TextBox_4] : She is a 75 year old woman with a history of hypertension, diabetes, hyperlipidemia, obesity and back pain was referred her for a chronic cough. She developed a cough in the summer of 2019 when she was at a wedding in [place]. The cough lasted for several months and resolved. She stopped smoking in 1990. Her occupational history is noncontributory.\par \par She is feeling well. No complaint of cough, wheezing or shortness of breath. No sick contacts. \par  [YearQuit] : 1990

## 2021-01-06 NOTE — REVIEW OF SYSTEMS
[Arthralgias] : arthralgias [Diabetes] : diabetes mellitus [Fever] : no fever [Chills] : no chills [Cough] : no cough [Sputum] : not coughing up ~M sputum [Dyspnea] : no dyspnea [Chest Tightness] : no chest tightness [Wheezing] : no wheezing [Chest Discomfort] : no chest discomfort [Edema] : ~T edema was not present [Nasal Discharge] : no nasal discharge [Heartburn] : no heartburn [Back Pain] : ~T no back pain [DVT] : no DVT [Difficulty Initiating Sleep] : no difficulty falling asleep [Difficulty Maintaining Sleep] : no difficulty maintaining sleep

## 2021-01-08 NOTE — PATIENT PROFILE ADULT. - NS PRO TALK SOMEONE YN
Patient Education    [facility]  30 MONTH VIS[facility]  Here are some suggestions from [employer] experts that may be of value to your family.       FAMILY ROUTINES  Enjoy meals together as a family and always include your child.  Have quiet evening and bedtime routines.  Visit zoos, museums, and other places that help your child learn.  Be active together as a family.  Stay in touch with your friends. Do things outside your family.  Make sure you agree within your family on how to support your child s growing independence, while maintaining consistent limits.    LEARNING TO TALK AND COMMUNICATE  Read books together every day. Reading aloud will help your child get ready for .  Take your child to the library and story times.  Listen to your child carefully and repeat what she says using correct grammar.  Give your child extra time to answer questions.  Be patient. Your child may ask to read the same book again and again.    GETTING ALONG WITH OTHERS  Give your child chances to play with other toddlers. Supervise closely because your child may not be ready to share or play cooperatively.  Offer your child and his friend multiple items that they may like. Children need choices to avoid battles.  Give your child choices between 2 items your child prefers. More than 2 is too much for your child.  Limit TV, tablet, or smartphone use to no more than 1 hour of high-quality programs each day. Be aware of what your child is watching.  Consider making a family media plan. It helps you make rules for media use and balance screen time with other activities, including exercise.    GETTING READY FOR   Think about  or group  for your child. If you need help selecting a program, we can give you information and resources.  Visit a teachers  store or bookstore to look for books about preparing your child for school.  Join a playgroup or make playdates.  Make toilet training  easier.  Dress your child in clothing that can easily be removed.  Place your child on the toilet every 1 to 2 hours.  Praise your child when he is successful.  Try to develop a potty routine.  Create a relaxed environment by reading or singing on the potty.    SAFETY  Make sure the car safety seat is installed correctly in the back seat. Keep the seat rear facing until your child reaches the highest weight or height allowed by the . The harness straps should be snug against your child s chest.  Everyone should wear a lap and shoulder seat belt in the car. Don t start the vehicle until everyone is buckled up.  Never leave your child alone inside or outside your home, especially near cars or machinery.  Have your child wear a helmet that fits properly when riding bikes and trikes or in a seat on adult bikes.  Keep your child within arm s reach when she is near or in water.  Empty buckets, play pools, and tubs when you are finished using them.  When you go out, put a hat on your child, have her wear sun protection clothing, and apply sunscreen with SPF of 15 or higher on her exposed skin. Limit time outside when the sun is strongest (11:00 am-3:00 pm).  Have working smoke and carbon monoxide alarms on every floor. Test them every month and change the batteries every year. Make a family escape plan in case of fire in your home.    WHAT TO EXPECT AT YOUR CHILD S 3 YEAR VISIT  We will talk about  Caring for your child, your family, and yourself  Playing with other children  Encouraging reading and talking  Eating healthy and staying active as a family  Keeping your child safe at home, outside, and in the car          Helpful Resources: Smoking Quit Line: 168.293.2740  Poison Help Line:  635.866.9069  Information About Car Safety Seats: www.safercar.gov/parents  Toll-free Auto Safety Hotline: 226.640.5930  Consistent with Bright Futures: Guidelines for Health Supervision of Infants, Children, and  Adolescents, 4th Edition  For more information, go to https://brightfutures.aap.org.            no

## 2021-03-31 NOTE — ED PROVIDER NOTE - NEUROLOGICAL, MLM
Patient given glycolax to aid with constipation. He continues to deny the need for pain medication. Duran remains in place.  Call light in reach, will monitor Alert and oriented, no focal deficits, no motor or sensory deficits.

## 2021-04-03 ENCOUNTER — INPATIENT (INPATIENT)
Facility: HOSPITAL | Age: 76
LOS: 19 days | Discharge: NURSING, REHAB OR LONG-TERM CARE | End: 2021-04-23
Attending: INTERNAL MEDICINE | Admitting: INTERNAL MEDICINE
Payer: MEDICARE

## 2021-04-03 VITALS
OXYGEN SATURATION: 100 % | DIASTOLIC BLOOD PRESSURE: 87 MMHG | HEIGHT: 65 IN | TEMPERATURE: 99 F | SYSTOLIC BLOOD PRESSURE: 154 MMHG | HEART RATE: 67 BPM | RESPIRATION RATE: 18 BRPM

## 2021-04-03 DIAGNOSIS — Z98.89 OTHER SPECIFIED POSTPROCEDURAL STATES: Chronic | ICD-10-CM

## 2021-04-03 LAB
ALBUMIN SERPL ELPH-MCNC: 3.7 G/DL — SIGNIFICANT CHANGE UP (ref 3.3–5)
ALP SERPL-CCNC: 119 U/L — SIGNIFICANT CHANGE UP (ref 40–120)
ALT FLD-CCNC: 7 U/L — SIGNIFICANT CHANGE UP (ref 4–33)
ANION GAP SERPL CALC-SCNC: 13 MMOL/L — SIGNIFICANT CHANGE UP (ref 7–14)
APTT BLD: 34.7 SEC — SIGNIFICANT CHANGE UP (ref 27–36.3)
AST SERPL-CCNC: 11 U/L — SIGNIFICANT CHANGE UP (ref 4–32)
BASOPHILS # BLD AUTO: 0 K/UL — SIGNIFICANT CHANGE UP (ref 0–0.2)
BASOPHILS NFR BLD AUTO: 0 % — SIGNIFICANT CHANGE UP (ref 0–2)
BILIRUB SERPL-MCNC: 0.5 MG/DL — SIGNIFICANT CHANGE UP (ref 0.2–1.2)
BLOOD GAS VENOUS COMPREHENSIVE RESULT: SIGNIFICANT CHANGE UP
BUN SERPL-MCNC: 17 MG/DL — SIGNIFICANT CHANGE UP (ref 7–23)
CALCIUM SERPL-MCNC: 9.3 MG/DL — SIGNIFICANT CHANGE UP (ref 8.4–10.5)
CHLORIDE SERPL-SCNC: 95 MMOL/L — LOW (ref 98–107)
CK SERPL-CCNC: 120 U/L — SIGNIFICANT CHANGE UP (ref 25–170)
CO2 SERPL-SCNC: 25 MMOL/L — SIGNIFICANT CHANGE UP (ref 22–31)
CREAT SERPL-MCNC: 1.03 MG/DL — SIGNIFICANT CHANGE UP (ref 0.5–1.3)
EOSINOPHIL # BLD AUTO: 0 K/UL — SIGNIFICANT CHANGE UP (ref 0–0.5)
EOSINOPHIL NFR BLD AUTO: 0 % — SIGNIFICANT CHANGE UP (ref 0–6)
GLUCOSE SERPL-MCNC: 142 MG/DL — HIGH (ref 70–99)
HCT VFR BLD CALC: 34.3 % — LOW (ref 34.5–45)
HGB BLD-MCNC: 11.7 G/DL — SIGNIFICANT CHANGE UP (ref 11.5–15.5)
IANC: 4.77 K/UL — SIGNIFICANT CHANGE UP (ref 1.5–8.5)
INR BLD: 1.28 RATIO — HIGH (ref 0.88–1.16)
LYMPHOCYTES # BLD AUTO: 1.43 K/UL — SIGNIFICANT CHANGE UP (ref 1–3.3)
LYMPHOCYTES # BLD AUTO: 22.8 % — SIGNIFICANT CHANGE UP (ref 13–44)
MCHC RBC-ENTMCNC: 23.4 PG — LOW (ref 27–34)
MCHC RBC-ENTMCNC: 34.1 GM/DL — SIGNIFICANT CHANGE UP (ref 32–36)
MCV RBC AUTO: 68.7 FL — LOW (ref 80–100)
MONOCYTES # BLD AUTO: 0.06 K/UL — SIGNIFICANT CHANGE UP (ref 0–0.9)
MONOCYTES NFR BLD AUTO: 0.9 % — LOW (ref 2–14)
NEUTROPHILS # BLD AUTO: 4.56 K/UL — SIGNIFICANT CHANGE UP (ref 1.8–7.4)
NEUTROPHILS NFR BLD AUTO: 72.8 % — SIGNIFICANT CHANGE UP (ref 43–77)
PLATELET # BLD AUTO: 282 K/UL — SIGNIFICANT CHANGE UP (ref 150–400)
POTASSIUM SERPL-MCNC: 3.9 MMOL/L — SIGNIFICANT CHANGE UP (ref 3.5–5.3)
POTASSIUM SERPL-SCNC: 3.9 MMOL/L — SIGNIFICANT CHANGE UP (ref 3.5–5.3)
PROT SERPL-MCNC: 7.7 G/DL — SIGNIFICANT CHANGE UP (ref 6–8.3)
PROTHROM AB SERPL-ACNC: 14.6 SEC — HIGH (ref 10.6–13.6)
RBC # BLD: 4.99 M/UL — SIGNIFICANT CHANGE UP (ref 3.8–5.2)
RBC # FLD: 16.8 % — HIGH (ref 10.3–14.5)
SODIUM SERPL-SCNC: 133 MMOL/L — LOW (ref 135–145)
TROPONIN T, HIGH SENSITIVITY RESULT: 11 NG/L — SIGNIFICANT CHANGE UP
WBC # BLD: 6.27 K/UL — SIGNIFICANT CHANGE UP (ref 3.8–10.5)
WBC # FLD AUTO: 6.27 K/UL — SIGNIFICANT CHANGE UP (ref 3.8–10.5)

## 2021-04-03 PROCEDURE — 72125 CT NECK SPINE W/O DYE: CPT | Mod: 26

## 2021-04-03 PROCEDURE — 72170 X-RAY EXAM OF PELVIS: CPT | Mod: 26

## 2021-04-03 PROCEDURE — 71045 X-RAY EXAM CHEST 1 VIEW: CPT | Mod: 26

## 2021-04-03 PROCEDURE — 71250 CT THORAX DX C-: CPT | Mod: 26

## 2021-04-03 PROCEDURE — 70450 CT HEAD/BRAIN W/O DYE: CPT | Mod: 26

## 2021-04-03 PROCEDURE — 99285 EMERGENCY DEPT VISIT HI MDM: CPT

## 2021-04-03 RX ORDER — SODIUM CHLORIDE 9 MG/ML
1000 INJECTION, SOLUTION INTRAVENOUS ONCE
Refills: 0 | Status: COMPLETED | OUTPATIENT
Start: 2021-04-03 | End: 2021-04-03

## 2021-04-03 RX ORDER — ACETAMINOPHEN 500 MG
975 TABLET ORAL ONCE
Refills: 0 | Status: COMPLETED | OUTPATIENT
Start: 2021-04-03 | End: 2021-04-03

## 2021-04-03 RX ADMIN — SODIUM CHLORIDE 1000 MILLILITER(S): 9 INJECTION, SOLUTION INTRAVENOUS at 22:31

## 2021-04-03 RX ADMIN — Medication 975 MILLIGRAM(S): at 22:31

## 2021-04-03 NOTE — ED ADULT NURSE NOTE - OBJECTIVE STATEMENT
John RN: 75 y/o F received to room 13 c/o fall. Pt a&ox2 oriented to self and situation. Pt states she was sitting on her bed and fell onto the floor. Pt endorsing lower back and midsternal non-radiating cp. Pt denies loc or blood thinners. Pt has pmhx of seizures and dm. Pt respirations even and unlabored. Pt abdomen soft nontender nondistended. Pt skin intact. Pt got 2nd dose of covid vaccine 2 days ago. Pt denies fevers, chills, ha, palpations n/v/d. VS as noted, call bell in reach, comfort measures provided, 20 G IV placed R AC, labs drawn and sent, medicated as per md order, Will give report to primary RN Radha.

## 2021-04-03 NOTE — ED PROVIDER NOTE - PHYSICAL EXAMINATION
Vinnie PAGE MD PGY3:  PHYSICAL EXAM:    GENERAL: Groaning, obese.   HEENT:  Atraumatic, Normocephalic  CHEST/LUNG: Chest rise equal bilaterally. CTAB. 02 sat 100%, tachypneic, mild increased WOB. TTP anterior chest wall without obvious deformity.   HEART: Regular rate and rhythm. No murmurs or rubs.   ABDOMEN: Soft, Nontender, Nondistended  EXTREMITIES:  2+ Peripheral Pulses.  PSYCH: A&Ox0, confused.   SKIN: No obvious rashes or lesions  MSK: No midline spinal TTP. Vinnie PAGE MD PGY3:  PHYSICAL EXAM:    GENERAL: Groaning, obese.   HEENT:  Atraumatic, Normocephalic  CHEST/LUNG: Chest rise equal bilaterally. CTAB. 02 sat 100%, tachypneic, mild increased WOB. TTP anterior chest wall without obvious deformity.   HEART: Regular rate and rhythm. No murmurs or rubs.   ABDOMEN: Soft, Nontender, Nondistended  EXTREMITIES:  2+ Peripheral Pulses.  PSYCH: A&Ox0, confused.   SKIN: No obvious rashes or lesions  MSK: No midline spinal TTP.    Attending/Quentin: Head-atraumatic, obese, PERRL/EOMI, RRR, +PT diffuse upper casimiro/chest pain, no ecchymosis, no crepitus or STS; CTAB, Abd-soft, NT/ND, moving ext FROM; no LE edema, +2 DP/PT; A&Ox1, nonfocal; Skin-warm/dry

## 2021-04-03 NOTE — ED PROVIDER NOTE - SECONDARY DIAGNOSIS.
PHYSICIAN NEXT STEPS:   Call the Patient      CHIEF COMPLAINT:   Chief Complaint/Protocol Used: Vaginal Symptoms   Onset: Friday         ASSESSMENT:   Â» Onset: Friday   Â» Symptom: Swelling inside vaginal   Â» Location: Left side inside labia   Â» Onset: Friday   Â» Pain: 1/10   Â» Itching: Denied   Â» Cause: Unknown   Â» Other Symptoms: Denied   Â» Pregnancy: Unsure; LMP: 3/12/18   -------------------------------------------------------      DISPOSITION:   Disposition Recommendation: See PCP When Office is Open (within 3 days)   Questions that led to disposition:   Â» Tender lump (swelling or \"ball\") at vaginal opening   Patient Directed To: Unspecified   Patient Intended Action: Talk with my doctor         CALL NOTES:   04/09/2018 at 2:46 PM by Mague C[place]AND» Patient declined 3 day disposition for new symptom of left side vaginal swelling as just had been examined on Thursday. Message to Dr. Moore to contact the patient about this new symptom and pending test result.      DISPOSITION OVERRIDE/PROVIDER CONSULT:   Disposition Override: N/A   Override Source: Unspecified   Consulted with PCP: No   Consulted with On-Call Physician: No         CALLER CONTACT INFO:   Name: NETO FIGUEROA (Self)   Phone 1: [phone] (Home)   Phone 2: [phone] - Preferred         ENCOUNTER STARTED:   04/09/18 02:35:06 PM   ENCOUNTER ASSIGNED TO/CLOSED BY:   Mague Thibodeaux @ 04/09/18 02:46:41 PM         -------------------------------------------------------      CARE ADVICE given per Vaginal Symptoms guideline.   GENITAL HYGIENE:    * Keep your genital area clean. Wash daily with water.    * Keep your genital area dry. Wear cotton underwear or underwear with a cotton crotch.    * Do not douche.    * Do not use feminine hygiene products, perfumed soaps.; CALL BACK IF:     * Fever or severe pain    * You become worse.; PAIN MEDICINES:   * For pain relief, take acetaminophen, ibuprofen, or naproxen.   * Use the lowest amount  that makes your pain feel better.   ACETAMINOPHEN (E.G., TYLENOL):    * Take 650 mg (two 325 mg pills) by mouth every 4-6 hours as needed. Each Regular Strength Tylenol pill has 325 mg of acetaminophen. The most you should take each day is 3,250 mg (10 Regular Strength pills a day).   * Another choice is to take 1,000 mg (two 500 mg pills) every 8 hours as needed. Each Extra Strength Tylenol pill has 500 mg of acetaminophen. The most you should take each day is 3,000 mg (6 Extra Strength pills a day).   IBUPROFEN (E.G., MOTRIN, ADVIL):   * Take 400 mg (two 200 mg pills) by mouth every 6 hours as needed.   * Another choice is to take 600 mg (three 200 mg pills) by mouth every 8 hours as needed.   * The most you should take each day is 1,200 mg (six 200 mg pills a day), unless your doctor has told you to take more.   NAPROXEN (E.G., ALEVE):   * Take 220 mg (one 220 mg pill) by mouth every 8 hours as needed. You may take 440 mg (two 220 mg pills) for your first dose.   * The most you should take each day is 660 mg (three 220 mg pills a day), unless your doctor has told you to take more.   EXTRA NOTES:   * Acetaminophen is thought to be safer than ibuprofen or naproxen for people over 65 years old. Acetaminophen is in many OTC and prescription medicines. It might be in more than one medicine that you are taking. You need to be careful and not take an overdose. An acetaminophen overdose can hurt the liver.   * [employer], the company that makes Tylenol, has different dosage instructions for Tylenol in [place] and the [place]. In [place], the maximum recommended dose per day is 4,000 mg or twelve (12) Regular-Strength (325 mg) pills. In the [place], [employer] recommends a maximum dose of ten (10) Regular-Strength (325 mg) pills.   * Before taking any medicine, read all the instructions on the package.         UNDERSTANDS CARE ADVICE: Yes      AGREES WITH CARE ADVICE: Yes      WILL FOLLOW CARE ADVICE: Yes       -------------------------------------------------------   Altered mental status Fever

## 2021-04-03 NOTE — ED PROVIDER NOTE - CLINICAL SUMMARY MEDICAL DECISION MAKING FREE TEXT BOX
Vinnie PAGE MD PGY3: 76 F hx seizure d/o BIBEMS s/p fall when she was found down by daughter whom came to check on her whom is confused and febrile s/p COVID vaccine c/f sepsis . Likely reaction from COVID vaccine but given paitent is altered and was found down and is complaining of chest pain with normal EKG. C/f ACS vs rib fracture vs ICH leading to AMS vs sepsis. Vinnie PAGE MD PGY3: 76 F hx seizure d/o BIBEMS s/p fall when she was found down by daughter whom came to check on her whom is confused and febrile s/p COVID vaccine c/f sepsis . Likely reaction from COVID vaccine but given patient is altered and was found down and is complaining of chest pain with normal EKG. C/f ACS vs rib fracture vs ICH leading to AMS vs sepsis.

## 2021-04-03 NOTE — ED PROVIDER NOTE - OBJECTIVE STATEMENT
Vinnie PAGE MD PGY3: 76 F hx seizure d/o BIBEMS s/p fall when she was found down by daughter whom came to check on her. Patient is normally AAO x 3.     Per daughter Deanna;   patient had second COVID shot yesterday and was having pain in bilateral arms. Was given Tylenol and felt better and was doing better earlier today. Left house at 8am, checked on her at 1pm,only complaining of arm pain. Came home around 6pm was on the ground. Was awake and was crying. Normally ambulates with a cane independantly, drives. Lives by herself, but family checks in on her. Daughter stayed  Medications: Amlodipine, oxcarbamezapine, lisinopril, glimeperide once daily. Vinnie PAGE MD PGY3: 76 F hx seizure d/o BIBEMS s/p fall when she was found down by daughter whom came to check on her. Patient is normally AAO x 3.     Per daughter Deanna;   patient had second COVID shot yesterday and was having pain in bilateral arms. Was given Tylenol and felt better and was doing better earlier today. Left house at 8am, checked on her at 1pm,only complaining of arm pain. Came home around 6pm was on the ground. Was awake and was crying. Normally ambulates with a cane independantly, drives. Lives by herself, but family checks in on her. Daughter stayed  Medications: Amlodipine, oxcarbamezapine, lisinopril, glimeperide once daily.    Attending/Quentin: 75 yo F as described above, h/o HTN, DM at baseline lives by self, ambulates with a cane and is A&Ox3, s/p COVID vaccine yesterday, had spek heather her granddaughter c/o arm pain and upon entry into home found her on the floor. Pt is confused and is a poor historian.

## 2021-04-03 NOTE — ED PROVIDER NOTE - CARE PLAN
Principal Discharge DX:	Syncope and collapse  Secondary Diagnosis:	Altered mental status   Principal Discharge DX:	Syncope and collapse  Secondary Diagnosis:	Altered mental status  Secondary Diagnosis:	Fever

## 2021-04-03 NOTE — ED ADULT TRIAGE NOTE - CHIEF COMPLAINT QUOTE
pt c/o unwitnessed fall onto her L side, while en route started c/o midsternal non-radiating chest pain. Denies LOC, anticoagulant. Pt denies syncopal episode.

## 2021-04-03 NOTE — ED PROVIDER NOTE - FAMILY HISTORY
Sibling  Still living? Unknown  Family history of cerebrovascular accident (CVA), Age at diagnosis: Age Unknown

## 2021-04-03 NOTE — ED PROVIDER NOTE - CROS ED ROS STATEMENT
Detail Level: Simple Duration Of Freeze Thaw-Cycle (Seconds): 8 Render Post-Care Instructions In Note?: no Post-Care Instructions: I reviewed with the patient in detail post-care instructions. Patient is to wear sunprotection, and avoid picking at any of the treated lesions. Pt may apply Vaseline to crusted or scabbing areas. Number Of Freeze-Thaw Cycles: 1 freeze-thaw cycle Consent: The patient's consent was obtained including but not limited to risks of crusting, scabbing, blistering, scarring, darker or lighter pigmentary change, recurrence, incomplete removal and infection. all other ROS negative except as per HPI

## 2021-04-03 NOTE — ED PROVIDER NOTE - PROGRESS NOTE DETAILS
Mental stauts much improved. WIll admit for monitoring to ensure patient is able to ambulate and is a safe discharge as she lives alone. Accepted by Dr Magallanes.

## 2021-04-04 DIAGNOSIS — I10 ESSENTIAL (PRIMARY) HYPERTENSION: ICD-10-CM

## 2021-04-04 DIAGNOSIS — R55 SYNCOPE AND COLLAPSE: ICD-10-CM

## 2021-04-04 DIAGNOSIS — Z29.9 ENCOUNTER FOR PROPHYLACTIC MEASURES, UNSPECIFIED: ICD-10-CM

## 2021-04-04 DIAGNOSIS — R56.9 UNSPECIFIED CONVULSIONS: ICD-10-CM

## 2021-04-04 LAB
A1C WITH ESTIMATED AVERAGE GLUCOSE RESULT: 6.8 % — HIGH (ref 4–5.6)
ALBUMIN SERPL ELPH-MCNC: 3.6 G/DL — SIGNIFICANT CHANGE UP (ref 3.3–5)
ALP SERPL-CCNC: 106 U/L — SIGNIFICANT CHANGE UP (ref 40–120)
ALT FLD-CCNC: 7 U/L — SIGNIFICANT CHANGE UP (ref 4–33)
ANION GAP SERPL CALC-SCNC: 11 MMOL/L — SIGNIFICANT CHANGE UP (ref 7–14)
APPEARANCE UR: CLEAR — SIGNIFICANT CHANGE UP
AST SERPL-CCNC: 10 U/L — SIGNIFICANT CHANGE UP (ref 4–32)
BASOPHILS # BLD AUTO: 0.02 K/UL — SIGNIFICANT CHANGE UP (ref 0–0.2)
BASOPHILS NFR BLD AUTO: 0.5 % — SIGNIFICANT CHANGE UP (ref 0–2)
BILIRUB SERPL-MCNC: 0.6 MG/DL — SIGNIFICANT CHANGE UP (ref 0.2–1.2)
BILIRUB UR-MCNC: NEGATIVE — SIGNIFICANT CHANGE UP
BUN SERPL-MCNC: 20 MG/DL — SIGNIFICANT CHANGE UP (ref 7–23)
CALCIUM SERPL-MCNC: 9.2 MG/DL — SIGNIFICANT CHANGE UP (ref 8.4–10.5)
CHLORIDE SERPL-SCNC: 97 MMOL/L — LOW (ref 98–107)
CO2 SERPL-SCNC: 27 MMOL/L — SIGNIFICANT CHANGE UP (ref 22–31)
COLOR SPEC: YELLOW — SIGNIFICANT CHANGE UP
CREAT SERPL-MCNC: 1.12 MG/DL — SIGNIFICANT CHANGE UP (ref 0.5–1.3)
DIFF PNL FLD: ABNORMAL
EOSINOPHIL # BLD AUTO: 0.01 K/UL — SIGNIFICANT CHANGE UP (ref 0–0.5)
EOSINOPHIL NFR BLD AUTO: 0.2 % — SIGNIFICANT CHANGE UP (ref 0–6)
ESTIMATED AVERAGE GLUCOSE: 148 MG/DL — HIGH (ref 68–114)
GLUCOSE SERPL-MCNC: 109 MG/DL — HIGH (ref 70–99)
GLUCOSE UR QL: NEGATIVE — SIGNIFICANT CHANGE UP
HCT VFR BLD CALC: 32.5 % — LOW (ref 34.5–45)
HGB BLD-MCNC: 10.8 G/DL — LOW (ref 11.5–15.5)
IANC: 2.29 K/UL — SIGNIFICANT CHANGE UP (ref 1.5–8.5)
IMM GRANULOCYTES NFR BLD AUTO: 0.2 % — SIGNIFICANT CHANGE UP (ref 0–1.5)
KETONES UR-MCNC: ABNORMAL
LACTATE SERPL-SCNC: 0.8 MMOL/L — SIGNIFICANT CHANGE UP (ref 0.5–2)
LEUKOCYTE ESTERASE UR-ACNC: NEGATIVE — SIGNIFICANT CHANGE UP
LYMPHOCYTES # BLD AUTO: 1.42 K/UL — SIGNIFICANT CHANGE UP (ref 1–3.3)
LYMPHOCYTES # BLD AUTO: 32 % — SIGNIFICANT CHANGE UP (ref 13–44)
MAGNESIUM SERPL-MCNC: 2.4 MG/DL — SIGNIFICANT CHANGE UP (ref 1.6–2.6)
MCHC RBC-ENTMCNC: 23 PG — LOW (ref 27–34)
MCHC RBC-ENTMCNC: 33.2 GM/DL — SIGNIFICANT CHANGE UP (ref 32–36)
MCV RBC AUTO: 69.3 FL — LOW (ref 80–100)
MONOCYTES # BLD AUTO: 0.69 K/UL — SIGNIFICANT CHANGE UP (ref 0–0.9)
MONOCYTES NFR BLD AUTO: 15.5 % — HIGH (ref 2–14)
NEUTROPHILS # BLD AUTO: 2.29 K/UL — SIGNIFICANT CHANGE UP (ref 1.8–7.4)
NEUTROPHILS NFR BLD AUTO: 51.6 % — SIGNIFICANT CHANGE UP (ref 43–77)
NITRITE UR-MCNC: NEGATIVE — SIGNIFICANT CHANGE UP
NRBC # BLD: 0 /100 WBCS — SIGNIFICANT CHANGE UP
NRBC # FLD: 0 K/UL — SIGNIFICANT CHANGE UP
PH UR: 7.5 — SIGNIFICANT CHANGE UP (ref 5–8)
PHOSPHATE SERPL-MCNC: 3.2 MG/DL — SIGNIFICANT CHANGE UP (ref 2.5–4.5)
PLATELET # BLD AUTO: 253 K/UL — SIGNIFICANT CHANGE UP (ref 150–400)
POTASSIUM SERPL-MCNC: 3.4 MMOL/L — LOW (ref 3.5–5.3)
POTASSIUM SERPL-SCNC: 3.4 MMOL/L — LOW (ref 3.5–5.3)
PROLACTIN SERPL-MCNC: 14.9 NG/ML — SIGNIFICANT CHANGE UP (ref 3.4–24.1)
PROT SERPL-MCNC: 7.1 G/DL — SIGNIFICANT CHANGE UP (ref 6–8.3)
PROT UR-MCNC: ABNORMAL
RBC # BLD: 4.69 M/UL — SIGNIFICANT CHANGE UP (ref 3.8–5.2)
RBC # FLD: 16.7 % — HIGH (ref 10.3–14.5)
SARS-COV-2 RNA SPEC QL NAA+PROBE: SIGNIFICANT CHANGE UP
SODIUM SERPL-SCNC: 135 MMOL/L — SIGNIFICANT CHANGE UP (ref 135–145)
SP GR SPEC: 1.02 — SIGNIFICANT CHANGE UP (ref 1.01–1.02)
TROPONIN T, HIGH SENSITIVITY RESULT: 13 NG/L — SIGNIFICANT CHANGE UP
UROBILINOGEN FLD QL: ABNORMAL
WBC # BLD: 4.44 K/UL — SIGNIFICANT CHANGE UP (ref 3.8–10.5)
WBC # FLD AUTO: 4.44 K/UL — SIGNIFICANT CHANGE UP (ref 3.8–10.5)

## 2021-04-04 RX ORDER — OXCARBAZEPINE 300 MG/1
300 TABLET, FILM COATED ORAL
Refills: 0 | Status: DISCONTINUED | OUTPATIENT
Start: 2021-04-04 | End: 2021-04-12

## 2021-04-04 RX ORDER — SODIUM CHLORIDE 9 MG/ML
1000 INJECTION INTRAMUSCULAR; INTRAVENOUS; SUBCUTANEOUS
Refills: 0 | Status: DISCONTINUED | OUTPATIENT
Start: 2021-04-04 | End: 2021-04-07

## 2021-04-04 RX ORDER — LISINOPRIL 2.5 MG/1
40 TABLET ORAL DAILY
Refills: 0 | Status: DISCONTINUED | OUTPATIENT
Start: 2021-04-04 | End: 2021-04-23

## 2021-04-04 RX ORDER — ENOXAPARIN SODIUM 100 MG/ML
40 INJECTION SUBCUTANEOUS DAILY
Refills: 0 | Status: DISCONTINUED | OUTPATIENT
Start: 2021-04-04 | End: 2021-04-18

## 2021-04-04 RX ORDER — POTASSIUM CHLORIDE 20 MEQ
40 PACKET (EA) ORAL ONCE
Refills: 0 | Status: COMPLETED | OUTPATIENT
Start: 2021-04-04 | End: 2021-04-04

## 2021-04-04 RX ORDER — ACETAMINOPHEN 500 MG
650 TABLET ORAL EVERY 6 HOURS
Refills: 0 | Status: DISCONTINUED | OUTPATIENT
Start: 2021-04-04 | End: 2021-04-15

## 2021-04-04 RX ORDER — HYDRALAZINE HCL 50 MG
25 TABLET ORAL THREE TIMES A DAY
Refills: 0 | Status: DISCONTINUED | OUTPATIENT
Start: 2021-04-04 | End: 2021-04-08

## 2021-04-04 RX ADMIN — Medication 650 MILLIGRAM(S): at 15:27

## 2021-04-04 RX ADMIN — Medication 25 MILLIGRAM(S): at 21:06

## 2021-04-04 RX ADMIN — SODIUM CHLORIDE 50 MILLILITER(S): 9 INJECTION INTRAMUSCULAR; INTRAVENOUS; SUBCUTANEOUS at 17:07

## 2021-04-04 RX ADMIN — Medication 650 MILLIGRAM(S): at 14:30

## 2021-04-04 RX ADMIN — Medication 40 MILLIEQUIVALENT(S): at 08:28

## 2021-04-04 RX ADMIN — OXCARBAZEPINE 300 MILLIGRAM(S): 300 TABLET, FILM COATED ORAL at 17:05

## 2021-04-04 RX ADMIN — LISINOPRIL 40 MILLIGRAM(S): 2.5 TABLET ORAL at 11:44

## 2021-04-04 RX ADMIN — ENOXAPARIN SODIUM 40 MILLIGRAM(S): 100 INJECTION SUBCUTANEOUS at 11:44

## 2021-04-04 RX ADMIN — Medication 25 MILLIGRAM(S): at 17:05

## 2021-04-04 NOTE — ED ADULT NURSE REASSESSMENT NOTE - NS ED NURSE REASSESS COMMENT FT1
Report given to [facility] 2 RN Nicol. Pt sleeping, arousal to verbal stimuli. Respirations even and unlabored. NSR on cardiac monitor. Denies pain at this time.

## 2021-04-04 NOTE — H&P ADULT - NSHPSOCIALHISTORY_GEN_ALL_CORE
smoking; denies  alcohol use; denies   drug use; denies    ambulates with cane/walker  independent with ADLs   lives alone

## 2021-04-04 NOTE — H&P ADULT - PROBLEM SELECTOR PLAN 4
diet: DASH/TLC   dispo: PT eval   dvt ppx: lovenox diet: pending bedside S+S  dispo: PT eval   dvt ppx: lovenox

## 2021-04-04 NOTE — ED ADULT NURSE REASSESSMENT NOTE - NS ED NURSE REASSESS COMMENT FT1
Pt resting in bed comfortably in non acute distress. respirations even and unlabored. VS as noted. Pt was straight cathed with 250 cc clear edwin urine with foul odor noted. Pt denies pain at this time. Pt awaiting CT at this time.

## 2021-04-04 NOTE — H&P ADULT - NSHPREVIEWOFSYSTEMS_GEN_ALL_CORE
General: Denies dizziness, fatigue  Eyes: Denies blurry vision  ENMT: Denies rhinorrhea  Respiratory: Denies cough, SOB  Cardiovascular: Denies palpitations, CP  Gastrointestinal: Denies abd pain, N/V/D/C, hematochezia, melena  : Denies dysuria, increased freq  Musculoskeletal: Denies edema, joint pain  Endocrine: Denies increased thirst, increased frequency  Allergic/Immunologic: Denies rashes or hives  Neuro: Denies weakness, numbness  Psych: Denies anxiety, depression

## 2021-04-04 NOTE — H&P ADULT - HISTORY OF PRESENT ILLNESS
74 y/o F with HTN, HLD, seizures, and DM presents after fall.  Pt reports she fell out of bed yesterday - unsure of exact timing.  Pt is poor historian and unsure of details of fall - including LOC or head trauma. Pt was unable to stand up and was found by her daughter - unsure of how long she was down.     Patient reports prior similar events of falling out of bed and having trouble standing back up which she attributes to her hospital bed being too high from the ground.  Patient lives alone and ambulates with the use of cane and walker. Patient reports no difficulty with ADLs.     ED Course:  74 y/o F with HTN, HLD, seizures, and DM presents after fall.  Pt reports she fell out of bed yesterday - unsure of exact timing.  Pt is poor historian and unsure of details of fall - including LOC or head trauma. Pt was unable to stand up and was found by her daughter - unsure of how long she was down.     Patient reports prior similar events of falling out of bed and having trouble standing back up which she attributes to her hospital bed being too high from the ground.  Patient lives alone and ambulates with the use of cane and walker. Patient reports no difficulty with ADLs.     Per chart review - Per daughter Deanna;   	patient had second COVID shot yesterday and was having pain in bilateral arms. Was given Tylenol and felt better and was doing better earlier today. Left house at 8am, checked on her at 1pm,only complaining of arm pain. Came home around 6pm was on the ground. Was awake and was crying. Normally ambulates with a cane independantly, drives. Lives by herself, but family checks in on her. Daughter stayed  Medications: Amlodipine, oxcarbamezapine, lisinopril, glimeperide once daily.    ED Course:   T: 98, HR: 52, BP: 153/78, RR: 20, saturating 100 % RA  1L LR, 975 tylenol 76 y/o F with HTN, seizures presents after fall.  Pt reports she fell out of bed yesterday - unsure of exact timing.  Pt is poor historian and unsure of details of fall - including LOC or head trauma. Pt was unable to stand up and was found by her daughter - unsure of how long she was down.     Patient reports prior similar events of falling out of bed and having trouble standing back up which she attributes to her hospital bed being too high from the ground.  Patient lives alone and ambulates with the use of cane and walker. Patient reports no difficulty with ADLs.     Per chart review - Per daughter Deanna;   	patient had second COVID shot yesterday and was having pain in bilateral arms. Was given Tylenol and felt better and was doing better earlier today. Left house at 8am, checked on her at 1pm,only complaining of arm pain. Came home around 6pm was on the ground. Was awake and was crying. Normally ambulates with a cane independantly, drives. Lives by herself, but family checks in on her. Daughter stayed  Medications: Amlodipine, oxcarbamezapine, lisinopril, glimeperide once daily.    ED Course:   T: 98, HR: 52, BP: 153/78, RR: 20, saturating 100 % RA  1L LR, 975 tylenol

## 2021-04-04 NOTE — CHART NOTE - NSCHARTNOTEFT_GEN_A_CORE
Dr. Luevano (Cardiology) consulted- will f/u recs.    Alphonse Hewitt NP [facility]   Pager [phone] Dr. Luevano (Cardiology) consulted- will f/u recs.    Addendum:   Patient still orthostatic w/ elevated BP- SBP 190s. Per d/w Dr. Luevano will initiate gentle IVF and start hydralazine 25 mg TID- Will continue to closely monitor.    Alphonse Hewitt NP [facility]   Pager [phone]

## 2021-04-04 NOTE — CONSULT NOTE ADULT - SUBJECTIVE AND OBJECTIVE BOX
Ulises Luevano MD  Interventional Cardiology / Endovascular Specialist  [place] Office : [address] [place] [address] [place]. [address]  Tel:   [place] Office : [address] [facility] [place] [address]  Tel: [phone]  Cell : [phone]    HISTORY OF PRESENTING ILLNESS:  74 y/o F with HTN, seizures presents after fall.  Pt reports she fell out of bed yesterday - unsure of exact timing.  Patient reports prior similar events of falling out of bed and having trouble standing back up which she attributes to her hospital bed being too high from the ground.  Patient lives alone and ambulates with the use of cane and walker. Patient reports no difficulty with ADLs. Found to be orthostatic positive Denies any cardiac history. Endorses chest pain and SOB when climbing stairs for past couple of years.   	  MEDICATIONS:  enoxaparin Injectable 40 milliGRAM(s) SubCutaneous daily  lisinopril 40 milliGRAM(s) Oral daily        OXcarbazepine 300 milliGRAM(s) Oral two times a day            PAST MEDICAL/SURGICAL HISTORY  PAST MEDICAL & SURGICAL HISTORY:  Hypertension    Seizure    Hyperlipidemia    Diverticulosis    Borderline diabetes    Obesity    No Past Surgical History    History of appendectomy        SOCIAL HISTORY: Substance Use (street drugs): ( x ) never used  (  ) other:    FAMILY HISTORY:  Family history of cerebrovascular accident (CVA) (Sibling)        REVIEW OF SYSTEMS:  CONSTITUTIONAL: No fever, weight loss, or fatigue  EYES: No eye pain, visual disturbances, or discharge  ENMT:  No difficulty hearing, tinnitus, vertigo; No sinus or throat pain  BREASTS: No pain, masses, or nipple discharge  GASTROINTESTINAL: No abdominal or epigastric pain. No nausea, vomiting, or hematemesis; No diarrhea or constipation. No melena or hematochezia.  GENITOURINARY: No dysuria, frequency, hematuria, or incontinence  NEUROLOGICAL: No headaches, memory loss, loss of strength, numbness, or tremors  ENDOCRINE: No heat or cold intolerance; No hair loss  MUSCULOSKELETAL: No joint pain or swelling; No muscle, back, or extremity pain  PSYCHIATRIC: No depression, anxiety, mood swings, or difficulty sleeping  HEME/LYMPH: No easy bruising, or bleeding gums  All others negative    PHYSICAL EXAM:  T(C): 36.7 (04-04-21 @ 11:33), Max: 39.2 (04-03-21 @ 21:40)  HR: 60 (04-04-21 @ 11:33) (52 - 83)  BP: 172/75 (04-04-21 @ 11:33) (107/80 - 172/75)  RR: 18 (04-04-21 @ 11:33) (12 - 22)  SpO2: 99% (04-04-21 @ 11:33) (98% - 100%)  Wt(kg): --  I&O's Summary    Height (cm): 165.1 (04-03 @ 19:26)    GENERAL: NAD  EYES: EOMI, PERRLA, conjunctiva and sclera clear  ENMT: No tonsillar erythema, exudates, or enlargement  Cardiovascular: Normal S1 S2, No JVD, No murmurs, No edema  Respiratory: Lungs clear to auscultation	  Gastrointestinal:  Soft, Non-tender, + BS	  Extremities: Normal range of motion, No clubbing, cyanosis or edema  NERVOUS SYSTEM:  Alert & Oriented X3                                    10.8   4.44  )-----------( 253      ( 04 Apr 2021 06:55 )             32.5     04-04    135  |  97<L>  |  20  ----------------------------<  109<H>  3.4<L>   |  27  |  1.12    Ca    9.2      04 Apr 2021 06:55  Phos  3.2     04-04  Mg     2.4     04-04    TPro  7.1  /  Alb  3.6  /  TBili  0.6  /  DBili  x   /  AST  10  /  ALT  7   /  AlkPhos  106  04-04    proBNP:   Lipid Profile:   HgA1c:   TSH:     Consultant(s) Notes Reviewed:  [x ] YES  [ ] NO    Care Discussed with Consultants/Other Providers [ x] YES  [ ] NO    Imaging Personally Reviewed independently:  [x] YES  [ ] NO    All labs, radiologic studies, vitals, orders and medications list reviewed. Patient is seen and examined at bedside. Case discussed with medical team.

## 2021-04-04 NOTE — H&P ADULT - PROBLEM SELECTOR PLAN 1
likely 2/2 deconditioning vs syncope vs seizure   pt with hx of falls - presenting with unwitnessed fall out of bed - unclear circumstances, including LOC/head trauma  -CT head/cervical spine/chest/pelvis - without fracture   -CXR: clear   -PT eval   -monitor on tele   -TTE ordered likely 2/2 deconditioning vs syncope vs seizure   pt with hx of falls - presenting with unwitnessed fall out of bed - unclear circumstances, including LOC/head trauma  -CT head/cervical spine/chest/pelvis - without fracture   -CXR: clear   -f/u prolactin  -f/u orthostatic   -PT eval   -monitor on tele, fall precautions    -TTE ordered

## 2021-04-04 NOTE — H&P ADULT - NSHPLABSRESULTS_GEN_ALL_CORE
11.7   6.27  )-----------( 282      ( 03 Apr 2021 22:28 )             34.3   04-03    133<L>  |  95<L>  |  17  ----------------------------<  142<H>  3.9   |  25  |  1.03    Ca    9.3      03 Apr 2021 22:28    TPro  7.7  /  Alb  3.7  /  TBili  0.5  /  DBili  x   /  AST  11  /  ALT  7   /  AlkPhos  119  04-03    EKG: sinus rhythm - personally reviewed

## 2021-04-05 LAB
BASOPHILS # BLD AUTO: 0.02 K/UL — SIGNIFICANT CHANGE UP (ref 0–0.2)
BASOPHILS NFR BLD AUTO: 0.4 % — SIGNIFICANT CHANGE UP (ref 0–2)
CARBAMAZEPINE SERPL-MCNC: <2 UG/ML — LOW (ref 4–12)
COVID-19 SPIKE DOMAIN AB INTERP: POSITIVE
COVID-19 SPIKE DOMAIN ANTIBODY RESULT: >250 U/ML — HIGH
CULTURE RESULTS: NO GROWTH — SIGNIFICANT CHANGE UP
EOSINOPHIL # BLD AUTO: 0.12 K/UL — SIGNIFICANT CHANGE UP (ref 0–0.5)
EOSINOPHIL NFR BLD AUTO: 2.3 % — SIGNIFICANT CHANGE UP (ref 0–6)
HCT VFR BLD CALC: 31.9 % — LOW (ref 34.5–45)
HGB BLD-MCNC: 10.9 G/DL — LOW (ref 11.5–15.5)
IANC: 2.95 K/UL — SIGNIFICANT CHANGE UP (ref 1.5–8.5)
IMM GRANULOCYTES NFR BLD AUTO: 0.2 % — SIGNIFICANT CHANGE UP (ref 0–1.5)
LYMPHOCYTES # BLD AUTO: 1.41 K/UL — SIGNIFICANT CHANGE UP (ref 1–3.3)
LYMPHOCYTES # BLD AUTO: 27.4 % — SIGNIFICANT CHANGE UP (ref 13–44)
MCHC RBC-ENTMCNC: 23.5 PG — LOW (ref 27–34)
MCHC RBC-ENTMCNC: 34.2 GM/DL — SIGNIFICANT CHANGE UP (ref 32–36)
MCV RBC AUTO: 68.8 FL — LOW (ref 80–100)
MONOCYTES # BLD AUTO: 0.64 K/UL — SIGNIFICANT CHANGE UP (ref 0–0.9)
MONOCYTES NFR BLD AUTO: 12.4 % — SIGNIFICANT CHANGE UP (ref 2–14)
NEUTROPHILS # BLD AUTO: 2.95 K/UL — SIGNIFICANT CHANGE UP (ref 1.8–7.4)
NEUTROPHILS NFR BLD AUTO: 57.3 % — SIGNIFICANT CHANGE UP (ref 43–77)
NRBC # BLD: 0 /100 WBCS — SIGNIFICANT CHANGE UP
NRBC # FLD: 0 K/UL — SIGNIFICANT CHANGE UP
PLATELET # BLD AUTO: 259 K/UL — SIGNIFICANT CHANGE UP (ref 150–400)
RBC # BLD: 4.64 M/UL — SIGNIFICANT CHANGE UP (ref 3.8–5.2)
RBC # FLD: 16.9 % — HIGH (ref 10.3–14.5)
SARS-COV-2 IGG+IGM SERPL QL IA: >250 U/ML — HIGH
SARS-COV-2 IGG+IGM SERPL QL IA: POSITIVE
SPECIMEN SOURCE: SIGNIFICANT CHANGE UP
WBC # BLD: 5.15 K/UL — SIGNIFICANT CHANGE UP (ref 3.8–10.5)
WBC # FLD AUTO: 5.15 K/UL — SIGNIFICANT CHANGE UP (ref 3.8–10.5)

## 2021-04-05 PROCEDURE — 73030 X-RAY EXAM OF SHOULDER: CPT | Mod: 26,RT

## 2021-04-05 RX ADMIN — LISINOPRIL 40 MILLIGRAM(S): 2.5 TABLET ORAL at 05:31

## 2021-04-05 RX ADMIN — Medication 650 MILLIGRAM(S): at 13:03

## 2021-04-05 RX ADMIN — Medication 25 MILLIGRAM(S): at 05:31

## 2021-04-05 RX ADMIN — Medication 650 MILLIGRAM(S): at 12:33

## 2021-04-05 RX ADMIN — ENOXAPARIN SODIUM 40 MILLIGRAM(S): 100 INJECTION SUBCUTANEOUS at 11:17

## 2021-04-05 RX ADMIN — OXCARBAZEPINE 300 MILLIGRAM(S): 300 TABLET, FILM COATED ORAL at 05:31

## 2021-04-05 RX ADMIN — Medication 25 MILLIGRAM(S): at 12:33

## 2021-04-05 RX ADMIN — Medication 25 MILLIGRAM(S): at 21:10

## 2021-04-05 RX ADMIN — OXCARBAZEPINE 300 MILLIGRAM(S): 300 TABLET, FILM COATED ORAL at 18:30

## 2021-04-05 NOTE — CONSULT NOTE ADULT - SUBJECTIVE AND OBJECTIVE BOX
Neurology consult    ADOLPH [id number]     Patient is a 76y old  Female who presents with a chief complaint of Fall (2021 12:18)      HPI:  "76 y/o F with HTN, seizures presents after fall.  Pt reports she fell out of bed yesterday - unsure of exact timing.  Pt is poor historian and unsure of details of fall - including LOC or head trauma. Pt was unable to stand up and was found by her daughter - unsure of how long she was down.     Patient reports prior similar events of falling out of bed and having trouble standing back up which she attributes to her hospital bed being too high from the ground.  Patient lives alone and ambulates with the use of cane and walker. Patient reports no difficulty with ADLs.     Per chart review - Per daughter Deanna;   	patient had second COVID shot yesterday and was having pain in bilateral arms. Was given Tylenol and felt better and was doing better earlier today. Left house at 8am, checked on her at 1pm,only complaining of arm pain. Came home around 6pm was on the ground. Was awake and was crying. Normally ambulates with a cane independantly, drives. Lives by herself, but family checks in on her. Daughter stayed  Medications: Amlodipine, oxcarbamezapine, lisinopril, glimeperide once daily.    ED Course:   T: 98, HR: 52, BP: 153/78, RR: 20, saturating 100 % RA  1L LR, 975 tylenol (2021 05:18)"      no difficulty with language.  No vision loss or double vision.  No dizziness, vertigo or new hearing loss.  . No difficulty with swallowing.  No focal weakness.  No focal sensory changes.    REVIEW OF SYSTEMS:    see HPI    MEDICATIONS    acetaminophen   Tablet .. 650 milliGRAM(s) Oral every 6 hours PRN  enoxaparin Injectable 40 milliGRAM(s) SubCutaneous daily  hydrALAZINE 25 milliGRAM(s) Oral three times a day  lisinopril 40 milliGRAM(s) Oral daily  OXcarbazepine 300 milliGRAM(s) Oral two times a day  sodium chloride 0.9%. 1000 milliLiter(s) IV Continuous <Continuous>      PMH: Hypertension    Seizure    Hyperlipidemia    Diverticulosis    Borderline diabetes    Obesity         PSH: No Past Surgical History    History of appendectomy      FAMILY HISTORY:  Family history of cerebrovascular accident (CVA) (Sibling)        SOCIAL HISTORY:  No history of tobacco or alcohol use     Allergies    allergy to [place] (Swelling)  No Known Drug Allergies  se[place] (Unknown)    Intolerances    lactose (Stomach Upset)        Weight (kg): 106.594 ( @ 18:18)    Vital Signs Last 24 Hrs  T(C): 36.9 (2021 08:00), Max: 36.9 (2021 21:04)  T(F): 98.4 (2021 08:00), Max: 98.5 (2021 21:04)  HR: 61 (2021 08:00) (57 - 61)  BP: 157/70 (2021 08:00) (133/52 - 191/66)  BP(mean): --  RR: 18 (2021 08:00) (17 - 18)  SpO2: 97% (2021 08:00) (97% - 100%)      On Neurological Examination:    Mental Status - Patient is alert, awake, oriented X3. fluent, names, no dysarthria no aphasia Follows commands well and able to answer questions appropriately. Mood and affect  normal    Cranial Nerves - PERRL, EOMI, VFF, V1-V3 intact, no gross facial asymmetry, tongue/uvula midline    Motor Exam -   5/5 throughout   nml bulk/tone    Sensory    Intact to light touch and pinprick bilaterally    Coord: FTN intact bilaterally     Gait -  normal      Reflexes:          2+ throughout                                                     LABS:  CBC Full  -  ( 2021 06:55 )  WBC Count : 5.15 K/uL  RBC Count : 4.64 M/uL  Hemoglobin : 10.9 g/dL  Hematocrit : 31.9 %  Platelet Count - Automated : 259 K/uL  Mean Cell Volume : 68.8 fL  Mean Cell Hemoglobin : 23.5 pg  Mean Cell Hemoglobin Concentration : 34.2 gm/dL  Auto Neutrophil # : 2.95 K/uL  Auto Lymphocyte # : 1.41 K/uL  Auto Monocyte # : 0.64 K/uL  Auto Eosinophil # : 0.12 K/uL  Auto Basophil # : 0.02 K/uL  Auto Neutrophil % : 57.3 %  Auto Lymphocyte % : 27.4 %  Auto Monocyte % : 12.4 %  Auto Eosinophil % : 2.3 %  Auto Basophil % : 0.4 %    Urinalysis Basic - ( 2021 00:40 )    Color: Yellow / Appearance: Clear / S.022 / pH: x  Gluc: x / Ketone: Small  / Bili: Negative / Urobili: 3 mg/dL   Blood: x / Protein: 100 mg/dL / Nitrite: Negative   Leuk Esterase: Negative / RBC: 15 /HPF / WBC 1 /HPF   Sq Epi: x / Non Sq Epi: 1 /HPF / Bacteria: Negative          137  |  99  |  20  ----------------------------<  115<H>  3.8   |  26  |  0.92    Ca    9.0      2021 06:55  Phos  2.7     -  Mg     2.3     -05    TPro  7.1  /  Alb  3.6  /  TBili  0.6  /  DBili  x   /  AST  10  /  ALT  7   /  AlkPhos  106  04-04    LIVER FUNCTIONS - ( 2021 06:55 )  Alb: 3.6 g/dL / Pro: 7.1 g/dL / ALK PHOS: 106 U/L / ALT: 7 U/L / AST: 10 U/L / GGT: x           Hemoglobin A1C:       PT/INR - ( 2021 22:28 )   PT: 14.6 sec;   INR: 1.28 ratio         PTT - ( 2021 22:28 )  PTT:34.7 sec      RADIOLOGY  CTH < from: CT Head No Cont (21 @ 23:03) >  IMPRESSION:    Head CT: No displaced calvarial fracture or acute intracranial hemorrhage.    Cervical spine CT: No acutefracture. Cervical spondylosis.  Heterogeneous thyromegaly. Dedicated thyroid ultrasound can be performed on a nonemergent basis.              ANAMARIA HA MD; Attending Radiologist  This document has been electronically signed. 2021 12:16AM    < end of copied text >

## 2021-04-05 NOTE — PROGRESS NOTE ADULT - SUBJECTIVE AND OBJECTIVE BOX
Ulises Luevano MD  Interventional Cardiology / Advance Heart Failure and Cardiac Transplant Specialist  [place] Office : [address] [place] [address]. [address]  Tel:   [place] Office : [address] [facility] [place] [address]  Tel: [phone]  Cell : [phone]    Subjective/Overnight events: Pt is lying in bed comfortable. no chest pains no SOB no palpitations. continue to experience some dizziness, orthostatic positive, on IVF  	  MEDICATIONS:  enoxaparin Injectable 40 milliGRAM(s) SubCutaneous daily  hydrALAZINE 25 milliGRAM(s) Oral three times a day  lisinopril 40 milliGRAM(s) Oral daily        acetaminophen   Tablet .. 650 milliGRAM(s) Oral every 6 hours PRN  OXcarbazepine 300 milliGRAM(s) Oral two times a day        sodium chloride 0.9%. 1000 milliLiter(s) IV Continuous <Continuous>      PAST MEDICAL/SURGICAL HISTORY  PAST MEDICAL & SURGICAL HISTORY:  Hypertension    Seizure    Hyperlipidemia    Diverticulosis    Borderline diabetes    Obesity    No Past Surgical History    History of appendectomy        SOCIAL HISTORY: Substance Use (street drugs): ( x ) never used  (  ) other:    FAMILY HISTORY:  Family history of cerebrovascular accident (CVA) (Sibling)        REVIEW OF SYSTEMS:  CONSTITUTIONAL: No fever, weight loss, or fatigue  EYES: No eye pain, visual disturbances, or discharge  ENMT:  No difficulty hearing, tinnitus, vertigo; No sinus or throat pain  BREASTS: No pain, masses, or nipple discharge  GASTROINTESTINAL: No abdominal or epigastric pain. No nausea, vomiting, or hematemesis; No diarrhea or constipation. No melena or hematochezia.  GENITOURINARY: No dysuria, frequency, hematuria, or incontinence  NEUROLOGICAL: No headaches, memory loss, loss of strength, numbness, or tremors  ENDOCRINE: No heat or cold intolerance; No hair loss  MUSCULOSKELETAL: No joint pain or swelling; No muscle, back, or extremity pain  PSYCHIATRIC: No depression, anxiety, mood swings, or difficulty sleeping  HEME/LYMPH: No easy bruising, or bleeding gums  All others negative    PHYSICAL EXAM:  T(C): 36.9 (04-05-21 @ 08:00), Max: 36.9 (04-04-21 @ 21:04)  HR: 61 (04-05-21 @ 08:00) (57 - 61)  BP: 157/70 (04-05-21 @ 08:00) (133/52 - 191/66)  RR: 18 (04-05-21 @ 08:00) (17 - 18)  SpO2: 97% (04-05-21 @ 08:00) (97% - 100%)  Wt(kg): --  I&O's Summary    04 Apr 2021 07:01  -  05 Apr 2021 07:00  --------------------------------------------------------  IN: 940 mL / OUT: 800 mL / NET: 140 mL        Weight (kg): 106.594 (04-04 @ 18:18)    GENERAL: NAD  EYES: EOMI, PERRLA, conjunctiva and sclera clear  ENMT: No tonsillar erythema, exudates, or enlargement  Cardiovascular: Normal S1 S2, No JVD, No murmurs, No edema  Respiratory: Lungs clear to auscultation	  Gastrointestinal:  Soft, Non-tender, + BS	  Extremities: Normal range of motion, No clubbing, cyanosis or edema  NERVOUS SYSTEM:  Alert & Oriented X3                                    10.9   5.15  )-----------( 259      ( 05 Apr 2021 06:55 )             31.9     04-05    137  |  99  |  20  ----------------------------<  115<H>  3.8   |  26  |  0.92    Ca    9.0      05 Apr 2021 06:55  Phos  2.7     04-05  Mg     2.3     04-05    TPro  7.1  /  Alb  3.6  /  TBili  0.6  /  DBili  x   /  AST  10  /  ALT  7   /  AlkPhos  106  04-04    proBNP:   Lipid Profile:   HgA1c:   TSH:     Consultant(s) Notes Reviewed:  [x ] YES  [ ] NO    Care Discussed with Consultants/Other Providers [ x] YES  [ ] NO    Imaging Personally Reviewed independently:  [x] YES  [ ] NO    All labs, radiologic studies, vitals, orders and medications list reviewed. Patient is seen and examined at bedside. Case discussed with medical team.

## 2021-04-05 NOTE — PROGRESS NOTE ADULT - ASSESSMENT
Assessment and Plan    74 y/o F with HTN, seizures presents after fall.     EKG: NSR with poor R wave progression    1. s/p fall  -CT head neg  -orthostatic positive on IVF  -continue to monitor orthostatics  -found to have abnormal stress test in 2019--patient did not have any cardiac angiogram performed. echo fro 2019 with mild LV dysfunction. will get repeat echo this admission and will do ischemic eval once more optimized    2. HTN  -improving  -c/w lisinopril and and hydralazine  continue to monitor BP    3. DVT prophylaxis  -lovenox subq

## 2021-04-05 NOTE — PROGRESS NOTE ADULT - SUBJECTIVE AND OBJECTIVE BOX
SUBJECTIVE / OVERNIGHT EVENTS: pt seen and examined      MEDICATIONS  (STANDING):  enoxaparin Injectable 40 milliGRAM(s) SubCutaneous daily  hydrALAZINE 25 milliGRAM(s) Oral three times a day  lisinopril 40 milliGRAM(s) Oral daily  OXcarbazepine 300 milliGRAM(s) Oral two times a day  sodium chloride 0.9%. 1000 milliLiter(s) (50 mL/Hr) IV Continuous <Continuous>    MEDICATIONS  (PRN):  acetaminophen   Tablet .. 650 milliGRAM(s) Oral every 6 hours PRN Temp greater or equal to 38C (100.4F), Mild Pain (1 - 3), Moderate Pain (4 - 6)    Vital Signs Last 24 Hrs  T(C): 36.8 (2021 20:00), Max: 36.9 (2021 05:30)  T(F): 98.3 (2021 20:00), Max: 98.5 (2021 05:30)  HR: 60 (2021 20:00) (53 - 61)  BP: 156/68 (2021 20:00) (156/68 - 173/61)  BP(mean): --  RR: 18 (2021 20:00) (18 - 18)  SpO2: 99% (2021 20:00) (97% - 99%)    CAPILLARY BLOOD GLUCOSE        I&O's Summary    2021 07:01  -  2021 07:00  --------------------------------------------------------  IN: 940 mL / OUT: 800 mL / NET: 140 mL    2021 07:01  -  2021 23:14  --------------------------------------------------------  IN: 1320 mL / OUT: 0 mL / NET: 1320 mL        Constitutional: No fever, fatigue  Skin: No rash.  Eyes: No recent vision problems or eye pain.  ENT: No congestion, ear pain, or sore throat.  Cardiovascular: No chest pain or palpation.  Respiratory: No cough, shortness of breath, congestion, or wheezing.  Gastrointestinal: No abdominal pain, nausea, vomiting, or diarrhea.  Genitourinary: No dysuria.  Musculoskeletal: No joint swelling.  Neurologic: No headache.    PHYSICAL EXAM:  GENERAL: NAD  EYES: EOMI, PERRLA  NECK: Supple, No JVD  CHEST/LUNG: dec breath sounds rt base  HEART:  S1 , S2 +  ABDOMEN: soft , bs+  EXTREMITIES:  no edema  NEUROLOGY:alert awake      LABS:                        10.9   5.15  )-----------( 259      ( 2021 06:55 )             31.9     04-05    137  |  99  |  20  ----------------------------<  115<H>  3.8   |  26  |  0.92    Ca    9.0      2021 06:55  Phos  2.7     04-05  Mg     2.3     04-05    TPro  7.1  /  Alb  3.6  /  TBili  0.6  /  DBili  x   /  AST  10  /  ALT  7   /  AlkPhos  106  04-04          Urinalysis Basic - ( 2021 00:40 )    Color: Yellow / Appearance: Clear / S.022 / pH: x  Gluc: x / Ketone: Small  / Bili: Negative / Urobili: 3 mg/dL   Blood: x / Protein: 100 mg/dL / Nitrite: Negative   Leuk Esterase: Negative / RBC: 15 /HPF / WBC 1 /HPF   Sq Epi: x / Non Sq Epi: 1 /HPF / Bacteria: Negative        RADIOLOGY & ADDITIONAL TESTS:    Imaging Personally Reviewed:    Consultant(s) Notes Reviewed:      Care Discussed with Consultants/Other Providers:

## 2021-04-06 LAB
ANION GAP SERPL CALC-SCNC: 10 MMOL/L — SIGNIFICANT CHANGE UP (ref 7–14)
BASOPHILS # BLD AUTO: 0.02 K/UL — SIGNIFICANT CHANGE UP (ref 0–0.2)
BASOPHILS NFR BLD AUTO: 0.4 % — SIGNIFICANT CHANGE UP (ref 0–2)
BUN SERPL-MCNC: 26 MG/DL — HIGH (ref 7–23)
CALCIUM SERPL-MCNC: 8.7 MG/DL — SIGNIFICANT CHANGE UP (ref 8.4–10.5)
CHLORIDE SERPL-SCNC: 101 MMOL/L — SIGNIFICANT CHANGE UP (ref 98–107)
CO2 SERPL-SCNC: 25 MMOL/L — SIGNIFICANT CHANGE UP (ref 22–31)
CREAT SERPL-MCNC: 1.04 MG/DL — SIGNIFICANT CHANGE UP (ref 0.5–1.3)
EOSINOPHIL # BLD AUTO: 0.17 K/UL — SIGNIFICANT CHANGE UP (ref 0–0.5)
EOSINOPHIL NFR BLD AUTO: 3.7 % — SIGNIFICANT CHANGE UP (ref 0–6)
GLUCOSE SERPL-MCNC: 102 MG/DL — HIGH (ref 70–99)
HCT VFR BLD CALC: 31.6 % — LOW (ref 34.5–45)
HGB BLD-MCNC: 10.5 G/DL — LOW (ref 11.5–15.5)
IANC: 2.28 K/UL — SIGNIFICANT CHANGE UP (ref 1.5–8.5)
IMM GRANULOCYTES NFR BLD AUTO: 0.2 % — SIGNIFICANT CHANGE UP (ref 0–1.5)
LYMPHOCYTES # BLD AUTO: 1.51 K/UL — SIGNIFICANT CHANGE UP (ref 1–3.3)
LYMPHOCYTES # BLD AUTO: 32.8 % — SIGNIFICANT CHANGE UP (ref 13–44)
MAGNESIUM SERPL-MCNC: 2.4 MG/DL — SIGNIFICANT CHANGE UP (ref 1.6–2.6)
MCHC RBC-ENTMCNC: 23.1 PG — LOW (ref 27–34)
MCHC RBC-ENTMCNC: 33.2 GM/DL — SIGNIFICANT CHANGE UP (ref 32–36)
MCV RBC AUTO: 69.5 FL — LOW (ref 80–100)
MONOCYTES # BLD AUTO: 0.62 K/UL — SIGNIFICANT CHANGE UP (ref 0–0.9)
MONOCYTES NFR BLD AUTO: 13.4 % — SIGNIFICANT CHANGE UP (ref 2–14)
NEUTROPHILS # BLD AUTO: 2.28 K/UL — SIGNIFICANT CHANGE UP (ref 1.8–7.4)
NEUTROPHILS NFR BLD AUTO: 49.5 % — SIGNIFICANT CHANGE UP (ref 43–77)
NRBC # BLD: 0 /100 WBCS — SIGNIFICANT CHANGE UP
NRBC # FLD: 0 K/UL — SIGNIFICANT CHANGE UP
PLATELET # BLD AUTO: 252 K/UL — SIGNIFICANT CHANGE UP (ref 150–400)
POTASSIUM SERPL-MCNC: 3.8 MMOL/L — SIGNIFICANT CHANGE UP (ref 3.5–5.3)
POTASSIUM SERPL-SCNC: 3.8 MMOL/L — SIGNIFICANT CHANGE UP (ref 3.5–5.3)
RBC # BLD: 4.55 M/UL — SIGNIFICANT CHANGE UP (ref 3.8–5.2)
RBC # FLD: 16.7 % — HIGH (ref 10.3–14.5)
SODIUM SERPL-SCNC: 136 MMOL/L — SIGNIFICANT CHANGE UP (ref 135–145)
WBC # BLD: 4.61 K/UL — SIGNIFICANT CHANGE UP (ref 3.8–10.5)
WBC # FLD AUTO: 4.61 K/UL — SIGNIFICANT CHANGE UP (ref 3.8–10.5)

## 2021-04-06 RX ADMIN — ENOXAPARIN SODIUM 40 MILLIGRAM(S): 100 INJECTION SUBCUTANEOUS at 13:22

## 2021-04-06 RX ADMIN — Medication 25 MILLIGRAM(S): at 05:16

## 2021-04-06 RX ADMIN — OXCARBAZEPINE 300 MILLIGRAM(S): 300 TABLET, FILM COATED ORAL at 05:16

## 2021-04-06 RX ADMIN — OXCARBAZEPINE 300 MILLIGRAM(S): 300 TABLET, FILM COATED ORAL at 17:27

## 2021-04-06 RX ADMIN — Medication 25 MILLIGRAM(S): at 22:32

## 2021-04-06 RX ADMIN — SODIUM CHLORIDE 50 MILLILITER(S): 9 INJECTION INTRAMUSCULAR; INTRAVENOUS; SUBCUTANEOUS at 09:24

## 2021-04-06 RX ADMIN — Medication 25 MILLIGRAM(S): at 13:21

## 2021-04-06 RX ADMIN — LISINOPRIL 40 MILLIGRAM(S): 2.5 TABLET ORAL at 05:16

## 2021-04-06 NOTE — PROGRESS NOTE ADULT - SUBJECTIVE AND OBJECTIVE BOX
Interval History - Patient seen and examined this am.             Vital Signs Last 24 Hrs  T(C): 36.7 (06 Apr 2021 08:21), Max: 37 (06 Apr 2021 00:00)  T(F): 98 (06 Apr 2021 08:21), Max: 98.6 (06 Apr 2021 00:00)  HR: 59 (06 Apr 2021 08:21) (53 - 63)  BP: 145/80 (06 Apr 2021 08:21) (145/80 - 169/72)  BP(mean): --  RR: 18 (06 Apr 2021 08:21) (18 - 18)  SpO2: 98% (06 Apr 2021 08:21) (97% - 99%)    PHYSICAL EXAM:    Mental Status - Patient is alert, awake, oriented X3. fluent, names, no dysarthria no aphasia Follows commands well and able to answer questions appropriately. Mood and affect  normal    Cranial Nerves - PERRL, EOMI, VFF, V1-V3 intact, no gross facial asymmetry, tongue/uvula midline    Motor Exam -   5/5 throughout   nml bulk/tone    Sensory    Intact to light touch and pinprick bilaterally    Coord: FTN intact bilaterally     Gait -  normal      Reflexes:          2+ throughout            Medications  acetaminophen   Tablet .. 650 milliGRAM(s) Oral every 6 hours PRN  enoxaparin Injectable 40 milliGRAM(s) SubCutaneous daily  hydrALAZINE 25 milliGRAM(s) Oral three times a day  lisinopril 40 milliGRAM(s) Oral daily  OXcarbazepine 300 milliGRAM(s) Oral two times a day  sodium chloride 0.9%. 1000 milliLiter(s) IV Continuous <Continuous>      Lab      Radiology

## 2021-04-06 NOTE — PROGRESS NOTE ADULT - ASSESSMENT
75 y/o F with HTN, seizures presents after fall. Patient does not recall event. PE grossly no-focal CTH-    Continue with Trileptal  Outpatient routine EEG as patient back to baseline  physical therapy  Orthostatic positive Cardiology is following  Can follow up with Neurology, Dr. Jamal Barney at [phone]

## 2021-04-06 NOTE — SWALLOW BEDSIDE ASSESSMENT ADULT - SWALLOW EVAL: RECOMMENDED FEEDING/EATING TECHNIQUES
Called pt [facility] , needs to complete a ACT.     Ashwini Albright MA      alternate food with liquid/maintain upright posture during/after eating for 30 mins/oral hygiene/position upright (90 degrees)/small sips/bites

## 2021-04-06 NOTE — PROGRESS NOTE ADULT - SUBJECTIVE AND OBJECTIVE BOX
SUBJECTIVE / OVERNIGHT EVENTS: pt seen and examined    MEDICATIONS  (STANDING):  enoxaparin Injectable 40 milliGRAM(s) SubCutaneous daily  hydrALAZINE 25 milliGRAM(s) Oral three times a day  lisinopril 40 milliGRAM(s) Oral daily  OXcarbazepine 300 milliGRAM(s) Oral two times a day  sodium chloride 0.9%. 1000 milliLiter(s) (50 mL/Hr) IV Continuous <Continuous>    MEDICATIONS  (PRN):  acetaminophen   Tablet .. 650 milliGRAM(s) Oral every 6 hours PRN Temp greater or equal to 38C (100.4F), Mild Pain (1 - 3), Moderate Pain (4 - 6)    Vital Signs Last 24 Hrs  T(C): 36.7 (2021 20:00), Max: 37 (2021 00:00)  T(F): 98.1 (2021 20:00), Max: 98.6 (2021 00:00)  HR: 60 (2021 20:00) (58 - 63)  BP: 142/70 (2021 20:00) (128/62 - 157/64)  BP(mean): --  RR: 18 (2021 20:00) (18 - 18)  SpO2: 99% (2021 20:00) (98% - 100%)    Constitutional: No fever, fatigue  Skin: No rash.  Eyes: No recent vision problems or eye pain.  ENT: No congestion, ear pain, or sore throat.  Cardiovascular: No chest pain or palpation.  Respiratory: No cough, shortness of breath, congestion, or wheezing.  Gastrointestinal: No abdominal pain, nausea, vomiting, or diarrhea.  Genitourinary: No dysuria.  Musculoskeletal: No joint swelling.  Neurologic: No headache.    PHYSICAL EXAM:  GENERAL: NAD  EYES: EOMI, PERRLA  NECK: Supple, No JVD  CHEST/LUNG: dec breath sounds rt base  HEART:  S1 , S2 +  ABDOMEN: soft , bs+  EXTREMITIES:  no edema  NEUROLOGY:alert awake    LABS:      136  |  101  |  26<H>  ----------------------------<  102<H>  3.8   |  25  |  1.04    Ca    8.7      2021 07:08  Phos  2.7     04-05  Mg     2.4     04-06      Creatinine Trend: 1.04 <--, 0.92 <--, 1.12 <--, 1.03 <--                        10.5   4.61  )-----------( 252      ( 2021 07:08 )             31.6     Urine Studies:  Urinalysis Basic - ( 2021 00:40 )    Color: Yellow / Appearance: Clear / S.022 / pH:   Gluc:  / Ketone: Small  / Bili: Negative / Urobili: 3 mg/dL   Blood:  / Protein: 100 mg/dL / Nitrite: Negative   Leuk Esterase: Negative / RBC: 15 /HPF / WBC 1 /HPF   Sq Epi:  / Non Sq Epi: 1 /HPF / Bacteria: Negative

## 2021-04-06 NOTE — SWALLOW BEDSIDE ASSESSMENT ADULT - SWALLOW EVAL: DIAGNOSIS
Patient presents with functional oropharyngeal swallow. The oral phase was marked by adequate stripping of bolus from utensil, adequate oral containment, adequate mastication for solids, adequate bolus manipulation and functional oral transit time. The pharyngeal phase was marked by laryngeal elevation upon digital palpation with initiation of pharyngeal swallow. No overt s/s of laryngeal penetration/aspiration for puree consistency, solids and thin liquids.

## 2021-04-06 NOTE — PROGRESS NOTE ADULT - ASSESSMENT
Assessment and Plan    74 y/o F with HTN, seizures presents after fall.     EKG: NSR with poor R wave progression    1. s/p fall  -CT head neg  -orthostatic positive on IVF. slightly improving  -continue to monitor orthostatics  -found to have abnormal stress test in 2019--patient did not have any cardiac angiogram performed. echo from 2019 with mild LV dysfunction. will get repeat echo this admission. spoke to patient and daughter Deanna and patient is agreeable to cath, will plan for tomorrow    2. HTN  -improving  -c/w lisinopril and and hydralazine  continue to monitor BP    3. DVT prophylaxis  -lovenox subq

## 2021-04-06 NOTE — PROGRESS NOTE ADULT - SUBJECTIVE AND OBJECTIVE BOX
Ulises Luevano MD  Interventional Cardiology / Advance Heart Failure and Cardiac Transplant Specialist  [place] Office : [address] [place] [address]  Tel:   [place] Office : [address] [facility] [place]. [address]  Tel: [phone]  Cell : [phone]    Subjective/Overnight events: Pt is lying in bed comfortable not in distress, no chest pains no SOB no palpitations. dizziness improving  	  MEDICATIONS:  enoxaparin Injectable 40 milliGRAM(s) SubCutaneous daily  hydrALAZINE 25 milliGRAM(s) Oral three times a day  lisinopril 40 milliGRAM(s) Oral daily        acetaminophen   Tablet .. 650 milliGRAM(s) Oral every 6 hours PRN  OXcarbazepine 300 milliGRAM(s) Oral two times a day        sodium chloride 0.9%. 1000 milliLiter(s) IV Continuous <Continuous>      PAST MEDICAL/SURGICAL HISTORY  PAST MEDICAL & SURGICAL HISTORY:  Hypertension    Seizure    Hyperlipidemia    Diverticulosis    Borderline diabetes    Obesity    No Past Surgical History    History of appendectomy        SOCIAL HISTORY: Substance Use (street drugs): ( x ) never used  (  ) other:    FAMILY HISTORY:  Family history of cerebrovascular accident (CVA) (Sibling)        REVIEW OF SYSTEMS:  CONSTITUTIONAL: No fever, weight loss, or fatigue  EYES: No eye pain, visual disturbances, or discharge  ENMT:  No difficulty hearing, tinnitus, vertigo; No sinus or throat pain  BREASTS: No pain, masses, or nipple discharge  GASTROINTESTINAL: No abdominal or epigastric pain. No nausea, vomiting, or hematemesis; No diarrhea or constipation. No melena or hematochezia.  GENITOURINARY: No dysuria, frequency, hematuria, or incontinence  NEUROLOGICAL: No headaches, memory loss, loss of strength, numbness, or tremors  ENDOCRINE: No heat or cold intolerance; No hair loss  MUSCULOSKELETAL: No joint pain or swelling; No muscle, back, or extremity pain  PSYCHIATRIC: No depression, anxiety, mood swings, or difficulty sleeping  HEME/LYMPH: No easy bruising, or bleeding gums  All others negative    PHYSICAL EXAM:  T(C): 36.7 (04-06-21 @ 08:21), Max: 37 (04-06-21 @ 00:00)  HR: 59 (04-06-21 @ 08:21) (53 - 63)  BP: 145/80 (04-06-21 @ 08:21) (145/80 - 169/72)  RR: 18 (04-06-21 @ 08:21) (18 - 18)  SpO2: 98% (04-06-21 @ 08:21) (97% - 99%)  Wt(kg): --  I&O's Summary    05 Apr 2021 07:01  -  06 Apr 2021 07:00  --------------------------------------------------------  IN: 1920 mL / OUT: 400 mL / NET: 1520 mL    06 Apr 2021 07:01  -  06 Apr 2021 11:00  --------------------------------------------------------  IN: 150 mL / OUT: 250 mL / NET: -100 mL          GENERAL: NAD  EYES: EOMI, PERRLA, conjunctiva and sclera clear  ENMT: No tonsillar erythema, exudates, or enlargement  Cardiovascular: Normal S1 S2, No JVD, No murmurs, No edema  Respiratory: Lungs clear to auscultation	  Gastrointestinal:  Soft, Non-tender, + BS	  Extremities: Normal range of motion, No clubbing, cyanosis or edema  NERVOUS SYSTEM:  Alert & Oriented X3                                  10.5   4.61  )-----------( 252      ( 06 Apr 2021 07:08 )             31.6     04-06    136  |  101  |  26<H>  ----------------------------<  102<H>  3.8   |  25  |  1.04    Ca    8.7      06 Apr 2021 07:08  Phos  2.7     04-05  Mg     2.4     04-06      proBNP:   Lipid Profile:   HgA1c:   TSH:     Consultant(s) Notes Reviewed:  [x ] YES  [ ] NO    Care Discussed with Consultants/Other Providers [ x] YES  [ ] NO    Imaging Personally Reviewed independently:  [x] YES  [ ] NO    All labs, radiologic studies, vitals, orders and medications list reviewed. Patient is seen and examined at bedside. Case discussed with medical team.

## 2021-04-06 NOTE — SWALLOW BEDSIDE ASSESSMENT ADULT - COMMENTS
Progress Note 4/5/21: 74 y/o F with HTN, HLD, seizures, and DM presents after fall, admitted for syncope work-up.    Chest CT 4/3/21: Patent central airways. Bibasilar and the lingual linear atelectasis.  CTH 4/3/21: No displaced calvarial fracture or acute intracranial hemorrhage.    Patient was seen upright at bedside. Patient was alert/awake and verbally responsive to simple questions. Patient able to follow simple directions. Patient denies dysphagia symptoms upon questioning.

## 2021-04-07 LAB
ANION GAP SERPL CALC-SCNC: 11 MMOL/L — SIGNIFICANT CHANGE UP (ref 7–14)
BUN SERPL-MCNC: 28 MG/DL — HIGH (ref 7–23)
CALCIUM SERPL-MCNC: 8.5 MG/DL — SIGNIFICANT CHANGE UP (ref 8.4–10.5)
CHLORIDE SERPL-SCNC: 103 MMOL/L — SIGNIFICANT CHANGE UP (ref 98–107)
CO2 SERPL-SCNC: 24 MMOL/L — SIGNIFICANT CHANGE UP (ref 22–31)
CREAT SERPL-MCNC: 1 MG/DL — SIGNIFICANT CHANGE UP (ref 0.5–1.3)
GLUCOSE SERPL-MCNC: 102 MG/DL — HIGH (ref 70–99)
HCT VFR BLD CALC: 29.9 % — LOW (ref 34.5–45)
HGB BLD-MCNC: 10.3 G/DL — LOW (ref 11.5–15.5)
MAGNESIUM SERPL-MCNC: 2.4 MG/DL — SIGNIFICANT CHANGE UP (ref 1.6–2.6)
MCHC RBC-ENTMCNC: 23.6 PG — LOW (ref 27–34)
MCHC RBC-ENTMCNC: 34.4 GM/DL — SIGNIFICANT CHANGE UP (ref 32–36)
MCV RBC AUTO: 68.6 FL — LOW (ref 80–100)
NRBC # BLD: 0 /100 WBCS — SIGNIFICANT CHANGE UP
NRBC # FLD: 0 K/UL — SIGNIFICANT CHANGE UP
PLATELET # BLD AUTO: 261 K/UL — SIGNIFICANT CHANGE UP (ref 150–400)
POTASSIUM SERPL-MCNC: 3.9 MMOL/L — SIGNIFICANT CHANGE UP (ref 3.5–5.3)
POTASSIUM SERPL-SCNC: 3.9 MMOL/L — SIGNIFICANT CHANGE UP (ref 3.5–5.3)
RBC # BLD: 4.36 M/UL — SIGNIFICANT CHANGE UP (ref 3.8–5.2)
RBC # FLD: 16.6 % — HIGH (ref 10.3–14.5)
SODIUM SERPL-SCNC: 138 MMOL/L — SIGNIFICANT CHANGE UP (ref 135–145)
WBC # BLD: 4.82 K/UL — SIGNIFICANT CHANGE UP (ref 3.8–10.5)
WBC # FLD AUTO: 4.82 K/UL — SIGNIFICANT CHANGE UP (ref 3.8–10.5)

## 2021-04-07 PROCEDURE — 93306 TTE W/DOPPLER COMPLETE: CPT | Mod: 26

## 2021-04-07 RX ORDER — AMLODIPINE BESYLATE 2.5 MG/1
10 TABLET ORAL ONCE
Refills: 0 | Status: COMPLETED | OUTPATIENT
Start: 2021-04-07 | End: 2021-04-07

## 2021-04-07 RX ORDER — HYDROCHLOROTHIAZIDE 25 MG
12.5 TABLET ORAL DAILY
Refills: 0 | Status: DISCONTINUED | OUTPATIENT
Start: 2021-04-07 | End: 2021-04-09

## 2021-04-07 RX ORDER — HYDRALAZINE HCL 50 MG
5 TABLET ORAL ONCE
Refills: 0 | Status: COMPLETED | OUTPATIENT
Start: 2021-04-07 | End: 2021-04-07

## 2021-04-07 RX ADMIN — Medication 650 MILLIGRAM(S): at 20:27

## 2021-04-07 RX ADMIN — Medication 25 MILLIGRAM(S): at 14:43

## 2021-04-07 RX ADMIN — SODIUM CHLORIDE 50 MILLILITER(S): 9 INJECTION INTRAMUSCULAR; INTRAVENOUS; SUBCUTANEOUS at 14:43

## 2021-04-07 RX ADMIN — LISINOPRIL 40 MILLIGRAM(S): 2.5 TABLET ORAL at 04:37

## 2021-04-07 RX ADMIN — Medication 25 MILLIGRAM(S): at 04:37

## 2021-04-07 RX ADMIN — OXCARBAZEPINE 300 MILLIGRAM(S): 300 TABLET, FILM COATED ORAL at 17:00

## 2021-04-07 RX ADMIN — SODIUM CHLORIDE 50 MILLILITER(S): 9 INJECTION INTRAMUSCULAR; INTRAVENOUS; SUBCUTANEOUS at 04:38

## 2021-04-07 RX ADMIN — AMLODIPINE BESYLATE 10 MILLIGRAM(S): 2.5 TABLET ORAL at 09:37

## 2021-04-07 RX ADMIN — ENOXAPARIN SODIUM 40 MILLIGRAM(S): 100 INJECTION SUBCUTANEOUS at 14:43

## 2021-04-07 RX ADMIN — Medication 25 MILLIGRAM(S): at 21:57

## 2021-04-07 RX ADMIN — OXCARBAZEPINE 300 MILLIGRAM(S): 300 TABLET, FILM COATED ORAL at 04:37

## 2021-04-07 RX ADMIN — Medication 5 MILLIGRAM(S): at 09:37

## 2021-04-07 RX ADMIN — Medication 650 MILLIGRAM(S): at 19:57

## 2021-04-07 NOTE — CHART NOTE - NSCHARTNOTEFT_GEN_A_CORE
Patient is s/p cardiac cath via right radial access. Patient without any complaints. Site is stable with no hematoma or active bleed noted. No swelling, dressing is clean/intact/dry. Right Radial pulse palpable.  strength is equal bilaterally. Patient has full ROM in right wrist. Capillary refill < 2 seconds. Will continue to monitor closely.       Vital Signs Last 24 Hrs  T(C): 36.6 (07 Apr 2021 16:38), Max: 37.1 (07 Apr 2021 04:34)  T(F): 97.8 (07 Apr 2021 16:38), Max: 98.7 (07 Apr 2021 04:34)  HR: 63 (07 Apr 2021 16:38) (58 - 64)  BP: 142/67 (07 Apr 2021 16:38) (142/67 - 174/85)  BP(mean): --  RR: 18 (07 Apr 2021 16:38) (17 - 18)  SpO2: 98% (07 Apr 2021 16:38) (98% - 100%)      Marielos Islas PA-C  Pager [phone]

## 2021-04-07 NOTE — PROGRESS NOTE ADULT - SUBJECTIVE AND OBJECTIVE BOX
Ulises Luevano MD  Interventional Cardiology / Advance Heart Failure and Cardiac Transplant Specialist  [place] Office : [address] [place] [address]  Tel:   [place] Office : [address] [facility] [place] [address]  Tel: [phone]  Cell : [phone]    Subjective/Overnight events: Pt is lying in bed comfortable not in distress  	  MEDICATIONS:  enoxaparin Injectable 40 milliGRAM(s) SubCutaneous daily  hydrALAZINE 25 milliGRAM(s) Oral three times a day  lisinopril 40 milliGRAM(s) Oral daily        acetaminophen   Tablet .. 650 milliGRAM(s) Oral every 6 hours PRN  OXcarbazepine 300 milliGRAM(s) Oral two times a day        sodium chloride 0.9%. 1000 milliLiter(s) IV Continuous <Continuous>      PAST MEDICAL/SURGICAL HISTORY  PAST MEDICAL & SURGICAL HISTORY:  Hypertension    Seizure    Hyperlipidemia    Diverticulosis    Borderline diabetes    Obesity    No Past Surgical History    History of appendectomy        SOCIAL HISTORY: Substance Use (street drugs): ( x ) never used  (  ) other:    FAMILY HISTORY:  Family history of cerebrovascular accident (CVA) (Sibling)        REVIEW OF SYSTEMS:  CONSTITUTIONAL: No fever, weight loss, or fatigue  EYES: No eye pain, visual disturbances, or discharge  ENMT:  No difficulty hearing, tinnitus, vertigo; No sinus or throat pain  BREASTS: No pain, masses, or nipple discharge  GASTROINTESTINAL: No abdominal or epigastric pain. No nausea, vomiting, or hematemesis; No diarrhea or constipation. No melena or hematochezia.  GENITOURINARY: No dysuria, frequency, hematuria, or incontinence  NEUROLOGICAL: No headaches, memory loss, loss of strength, numbness, or tremors  ENDOCRINE: No heat or cold intolerance; No hair loss  MUSCULOSKELETAL: No joint pain or swelling; No muscle, back, or extremity pain  PSYCHIATRIC: No depression, anxiety, mood swings, or difficulty sleeping  HEME/LYMPH: No easy bruising, or bleeding gums  All others negative    PHYSICAL EXAM:  T(C): 37.1 (04-07-21 @ 04:34), Max: 37.1 (04-07-21 @ 04:34)  HR: 60 (04-07-21 @ 04:34) (58 - 60)  BP: 163/71 (04-07-21 @ 04:34) (142/70 - 163/71)  RR: 18 (04-07-21 @ 04:34) (18 - 18)  SpO2: 99% (04-07-21 @ 04:34) (99% - 100%)  Wt(kg): --  I&O's Summary    06 Apr 2021 07:01  -  07 Apr 2021 07:00  --------------------------------------------------------  IN: 950 mL / OUT: 750 mL / NET: 200 mL          GENERAL: NAD  EYES: EOMI, PERRLA, conjunctiva and sclera clear  ENMT: No tonsillar erythema, exudates, or enlargement  Cardiovascular: Normal S1 S2, No JVD, No murmurs, No edema  Respiratory: Lungs clear to auscultation	  Gastrointestinal:  Soft, Non-tender, + BS	  Extremities: Normal range of motion, No clubbing, cyanosis or edema  NERVOUS SYSTEM:  Alert & Oriented X3                                  10.3   4.82  )-----------( 261      ( 07 Apr 2021 07:32 )             29.9     04-07    138  |  103  |  28<H>  ----------------------------<  102<H>  3.9   |  24  |  1.00    Ca    8.5      07 Apr 2021 07:32  Mg     2.4     04-07      proBNP:   Lipid Profile:   HgA1c:   TSH:     Consultant(s) Notes Reviewed:  [x ] YES  [ ] NO    Care Discussed with Consultants/Other Providers [ x] YES  [ ] NO    Imaging Personally Reviewed independently:  [x] YES  [ ] NO    All labs, radiologic studies, vitals, orders and medications list reviewed. Patient is seen and examined at bedside. Case discussed with medical team.

## 2021-04-07 NOTE — PROGRESS NOTE ADULT - ASSESSMENT
Assessment and Plan    76 y/o F with HTN, seizures presents after fall.     EKG: NSR with poor R wave progression    1. s/p fall  -CT head neg  -orthostatic positive on IVF. slightly improving  -continue to monitor orthostatics  -found to have abnormal stress test in 2019--patient did not have any cardiac angiogram performed. echo from 2019 with mild LV dysfunction. will get repeat echo this admission. spoke to patient and daughter Deanna and patient is agreeable to cath, -s/p C with non obstructive CAD    2. HTN  -improving  -c/w lisinopril and and hydralazine  continue to monitor BP    3. DVT prophylaxis  -lovenox subq Assessment and Plan    76 y/o F with HTN, seizures presents after fall.     EKG: NSR with poor R wave progression    1. s/p fall  -CT head neg  -orthostatic positive on IVF. slightly improving  -continue to monitor orthostatics  -found to have abnormal stress test in 2019--patient did not have any cardiac angiogram performed. echo from 2019 with mild LV dysfunction. will get repeat echo this admission. spoke to patient and daughter Deanna and patient is agreeable to cath, -s/p C with non obstructive CAD    2. HTN  -improving  -c/w lisinopril and and hydralazine, add HCTz, d/c hydralazine as its is TID  continue to monitor BP    3. DVT prophylaxis  -lovenox subq

## 2021-04-07 NOTE — PROGRESS NOTE ADULT - SUBJECTIVE AND OBJECTIVE BOX
SUBJECTIVE / OVERNIGHT EVENTS: pt seen and examined    MEDICATIONS  (STANDING):  enoxaparin Injectable 40 milliGRAM(s) SubCutaneous daily  hydrALAZINE 25 milliGRAM(s) Oral three times a day  hydrochlorothiazide 12.5 milliGRAM(s) Oral daily  lisinopril 40 milliGRAM(s) Oral daily  OXcarbazepine 300 milliGRAM(s) Oral two times a day    MEDICATIONS  (PRN):  acetaminophen   Tablet .. 650 milliGRAM(s) Oral every 6 hours PRN Temp greater or equal to 38C (100.4F), Mild Pain (1 - 3), Moderate Pain (4 - 6)    Vital Signs Last 24 Hrs  T(C): 36.6 (2021 21:52), Max: 37.1 (2021 04:34)  T(F): 97.9 (2021 21:52), Max: 98.7 (2021 04:34)  HR: 62 (2021 21:52) (60 - 64)  BP: 155/64 (2021 21:52) (142/67 - 174/85)  BP(mean): --  RR: 16 (2021 21:52) (16 - 18)  SpO2: 97% (2021 21:52) (97% - 100%)  Constitutional: No fever, fatigue  Skin: No rash.  Eyes: No recent vision problems or eye pain.  ENT: No congestion, ear pain, or sore throat.  Cardiovascular: No chest pain or palpation.  Respiratory: No cough, shortness of breath, congestion, or wheezing.  Gastrointestinal: No abdominal pain, nausea, vomiting, or diarrhea.  Genitourinary: No dysuria.  Musculoskeletal: No joint swelling.  Neurologic: No headache.    PHYSICAL EXAM:  GENERAL: NAD  EYES: EOMI, PERRLA  NECK: Supple, No JVD  CHEST/LUNG: dec breath sounds rt base  HEART:  S1 , S2 +  ABDOMEN: soft , bs+  EXTREMITIES:  no edema  NEUROLOGY:alert awake    LABS:      138  |  103  |  28<H>  ----------------------------<  102<H>  3.9   |  24  |  1.00    Ca    8.5      2021 07:32  Mg     2.4     04-07      Creatinine Trend: 1.00 <--, 1.04 <--, 0.92 <--, 1.12 <--, 1.03 <--                        10.3   4.82  )-----------( 261      ( 2021 07:32 )             29.9     Urine Studies:  Urinalysis Basic - ( 2021 00:40 )    Color: Yellow / Appearance: Clear / S.022 / pH:   Gluc:  / Ketone: Small  / Bili: Negative / Urobili: 3 mg/dL   Blood:  / Protein: 100 mg/dL / Nitrite: Negative   Leuk Esterase: Negative / RBC: 15 /HPF / WBC 1 /HPF   Sq Epi:  / Non Sq Epi: 1 /HPF / Bacteria: Negative                   mg/dL   Blood:  / Protein: 100 mg/dL / Nitrite: Negative   Leuk Esterase: Negative / RBC: 15 /HPF / WBC 1 /HPF   Sq Epi:  / Non Sq Epi: 1 /HPF / Bacteria: Negative

## 2021-04-08 ENCOUNTER — TRANSCRIPTION ENCOUNTER (OUTPATIENT)
Age: 76
End: 2021-04-08

## 2021-04-08 LAB
ANION GAP SERPL CALC-SCNC: 9 MMOL/L — SIGNIFICANT CHANGE UP (ref 7–14)
BUN SERPL-MCNC: 22 MG/DL — SIGNIFICANT CHANGE UP (ref 7–23)
CALCIUM SERPL-MCNC: 8.8 MG/DL — SIGNIFICANT CHANGE UP (ref 8.4–10.5)
CHLORIDE SERPL-SCNC: 103 MMOL/L — SIGNIFICANT CHANGE UP (ref 98–107)
CO2 SERPL-SCNC: 25 MMOL/L — SIGNIFICANT CHANGE UP (ref 22–31)
CREAT SERPL-MCNC: 0.91 MG/DL — SIGNIFICANT CHANGE UP (ref 0.5–1.3)
GLUCOSE SERPL-MCNC: 108 MG/DL — HIGH (ref 70–99)
HCT VFR BLD CALC: 31 % — LOW (ref 34.5–45)
HGB BLD-MCNC: 10.5 G/DL — LOW (ref 11.5–15.5)
MCHC RBC-ENTMCNC: 23.3 PG — LOW (ref 27–34)
MCHC RBC-ENTMCNC: 33.9 GM/DL — SIGNIFICANT CHANGE UP (ref 32–36)
MCV RBC AUTO: 68.9 FL — LOW (ref 80–100)
NRBC # BLD: 0 /100 WBCS — SIGNIFICANT CHANGE UP
NRBC # FLD: 0 K/UL — SIGNIFICANT CHANGE UP
PLATELET # BLD AUTO: 257 K/UL — SIGNIFICANT CHANGE UP (ref 150–400)
POTASSIUM SERPL-MCNC: 3.7 MMOL/L — SIGNIFICANT CHANGE UP (ref 3.5–5.3)
POTASSIUM SERPL-SCNC: 3.7 MMOL/L — SIGNIFICANT CHANGE UP (ref 3.5–5.3)
RBC # BLD: 4.5 M/UL — SIGNIFICANT CHANGE UP (ref 3.8–5.2)
RBC # FLD: 16.7 % — HIGH (ref 10.3–14.5)
SARS-COV-2 RNA SPEC QL NAA+PROBE: SIGNIFICANT CHANGE UP
SODIUM SERPL-SCNC: 137 MMOL/L — SIGNIFICANT CHANGE UP (ref 135–145)
WBC # BLD: 4.06 K/UL — SIGNIFICANT CHANGE UP (ref 3.8–10.5)
WBC # FLD AUTO: 4.06 K/UL — SIGNIFICANT CHANGE UP (ref 3.8–10.5)

## 2021-04-08 RX ORDER — AMLODIPINE BESYLATE 2.5 MG/1
5 TABLET ORAL DAILY
Refills: 0 | Status: DISCONTINUED | OUTPATIENT
Start: 2021-04-08 | End: 2021-04-10

## 2021-04-08 RX ORDER — TRAMADOL HYDROCHLORIDE 50 MG/1
25 TABLET ORAL ONCE
Refills: 0 | Status: DISCONTINUED | OUTPATIENT
Start: 2021-04-08 | End: 2021-04-15

## 2021-04-08 RX ORDER — AMLODIPINE BESYLATE 2.5 MG/1
1 TABLET ORAL
Qty: 30 | Refills: 0
Start: 2021-04-08 | End: 2021-05-07

## 2021-04-08 RX ADMIN — Medication 650 MILLIGRAM(S): at 00:17

## 2021-04-08 RX ADMIN — OXCARBAZEPINE 300 MILLIGRAM(S): 300 TABLET, FILM COATED ORAL at 17:14

## 2021-04-08 RX ADMIN — Medication 25 MILLIGRAM(S): at 05:19

## 2021-04-08 RX ADMIN — ENOXAPARIN SODIUM 40 MILLIGRAM(S): 100 INJECTION SUBCUTANEOUS at 11:17

## 2021-04-08 RX ADMIN — LISINOPRIL 40 MILLIGRAM(S): 2.5 TABLET ORAL at 05:19

## 2021-04-08 RX ADMIN — OXCARBAZEPINE 300 MILLIGRAM(S): 300 TABLET, FILM COATED ORAL at 05:19

## 2021-04-08 RX ADMIN — Medication 12.5 MILLIGRAM(S): at 05:19

## 2021-04-08 RX ADMIN — Medication 650 MILLIGRAM(S): at 00:47

## 2021-04-08 NOTE — DISCHARGE NOTE PROVIDER - PROVIDER TOKENS
PROVIDER:[TOKEN:[[id number]]] PROVIDER:[TOKEN:[[id number]]],PROVIDER:[TOKEN:[[id number]]] PROVIDER:[TOKEN:[[id number]]],PROVIDER:[TOKEN:[[id number]]],FREE:[LAST:[Your Primary Care Physician],PHONE:[(   )    -],FAX:[(   )    -]]

## 2021-04-08 NOTE — PROGRESS NOTE ADULT - ASSESSMENT
Assessment and Plan    76 y/o F with HTN, seizures presents after fall.     EKG: NSR with poor R wave progression    1. s/p fall  -CT head neg  -orthostatic positive on IVF. slightly improving  -continue to monitor orthostatics  -found to have abnormal stress test in 2019--  -s/p [facility] with non obstructive CAD    2. HTN  -improving  -c/w lisinopril and and hydralazine, add HCTz, d/c hydralazine as its is TID add norvasc 5mg daily  continue to monitor BP    3. DVT prophylaxis  -lovenox subq

## 2021-04-08 NOTE — PROGRESS NOTE ADULT - SUBJECTIVE AND OBJECTIVE BOX
Ulises Luevano MD  Interventional Cardiology / Advance Heart Failure and Cardiac Transplant Specialist  [place] Office : [address] [place] [address] [place]. [address]  Tel:   [place] Office : [address] [facility] [place] [address]  Tel: [phone]  Cell : [phone]    Pt is lying in bed comfortable not in distress, no chest pains no SOB no palpitations  	  MEDICATIONS:  amLODIPine   Tablet 5 milliGRAM(s) Oral daily  enoxaparin Injectable 40 milliGRAM(s) SubCutaneous daily  hydrochlorothiazide 12.5 milliGRAM(s) Oral daily  lisinopril 40 milliGRAM(s) Oral daily        acetaminophen   Tablet .. 650 milliGRAM(s) Oral every 6 hours PRN  OXcarbazepine 300 milliGRAM(s) Oral two times a day  traMADol 25 milliGRAM(s) Oral once            PAST MEDICAL/SURGICAL HISTORY  PAST MEDICAL & SURGICAL HISTORY:  Hypertension    Seizure    Hyperlipidemia    Diverticulosis    Borderline diabetes    Obesity    No Past Surgical History    History of appendectomy        SOCIAL HISTORY: Substance Use (street drugs): ( x ) never used  (  ) other:    FAMILY HISTORY:  Family history of cerebrovascular accident (CVA) (Sibling)        REVIEW OF SYSTEMS:  CONSTITUTIONAL: No fever, weight loss, or fatigue  EYES: No eye pain, visual disturbances, or discharge  ENMT:  No difficulty hearing, tinnitus, vertigo; No sinus or throat pain  BREASTS: No pain, masses, or nipple discharge  GASTROINTESTINAL: No abdominal or epigastric pain. No nausea, vomiting, or hematemesis; No diarrhea or constipation. No melena or hematochezia.  GENITOURINARY: No dysuria, frequency, hematuria, or incontinence  NEUROLOGICAL: No headaches, memory loss, loss of strength, numbness, or tremors  ENDOCRINE: No heat or cold intolerance; No hair loss  MUSCULOSKELETAL: No joint pain or swelling; No muscle, back, or extremity pain  PSYCHIATRIC: No depression, anxiety, mood swings, or difficulty sleeping  HEME/LYMPH: No easy bruising, or bleeding gums  All others negative    PHYSICAL EXAM:  T(C): 36.8 (04-08-21 @ 11:15), Max: 37.1 (04-08-21 @ 00:00)  HR: 64 (04-08-21 @ 11:15) (54 - 64)  BP: 152/95 (04-08-21 @ 11:15) (142/67 - 174/85)  RR: 17 (04-08-21 @ 11:15) (16 - 18)  SpO2: 97% (04-08-21 @ 11:15) (97% - 100%)  Wt(kg): --  I&O's Summary    07 Apr 2021 07:01  -  08 Apr 2021 07:00  --------------------------------------------------------  IN: 960 mL / OUT: 675 mL / NET: 285 mL    08 Apr 2021 07:01  -  08 Apr 2021 14:31  --------------------------------------------------------  IN: 480 mL / OUT: 325 mL / NET: 155 mL          GENERAL: NAD  EYES: EOMI, PERRLA, conjunctiva and sclera clear  ENMT: No tonsillar erythema, exudates, or enlargement; Moist mucous membranes, Good dentition, No lesions  Cardiovascular: Normal S1 S2, No JVD, No murmurs, No edema  Respiratory: Lungs clear to auscultation	  Gastrointestinal:  Soft, Non-tender, + BS	  Extremities: right radial site ok                                    10.5   4.06  )-----------( 257      ( 08 Apr 2021 06:35 )             31.0     04-08    137  |  103  |  22  ----------------------------<  108<H>  3.7   |  25  |  0.91    Ca    8.8      08 Apr 2021 06:35  Mg     2.4     04-07      proBNP:   Lipid Profile:   HgA1c:   TSH:     Consultant(s) Notes Reviewed:  [x ] YES  [ ] NO    Care Discussed with Consultants/Other Providers [ x] YES  [ ] NO    Imaging Personally Reviewed independently:  [x] YES  [ ] NO    All labs, radiologic studies, vitals, orders and medications list reviewed. Patient is seen and examined at bedside. Case discussed with medical team.

## 2021-04-08 NOTE — DISCHARGE NOTE PROVIDER - NSDCMRMEDTOKEN_GEN_ALL_CORE_FT
hydroCHLOROthiazide 12.5 mg oral capsule: 1 cap(s) orally once a day  lisinopril 40 mg oral tablet: 1 tab(s) orally once a day  Norvasc 5 mg oral tablet: 1 tab(s) orally once a day   OXcarbazepine 300 mg oral tablet: 1 tab(s) orally 2 times a day   lisinopril 40 mg oral tablet: 1 tab(s) orally once a day  Norvasc 5 mg oral tablet: 1 tab(s) orally once a day   OXcarbazepine 300 mg oral tablet: 1 tab(s) orally 2 times a day   amLODIPine 10 mg oral tablet: 1 tab(s) orally once a day  isosorbide mononitrate 60 mg oral tablet, extended release: 1 tab(s) orally once a day  lidocaine 5% topical film: Apply topically to affected area once a day  lisinopril 40 mg oral tablet: 1 tab(s) orally once a day  meclizine 25 mg oral tablet: 1 tab(s) orally 3 times a day, As needed, Dizziness  OXcarbazepine 150 mg oral tablet: 3 tab(s) orally 2 times a day

## 2021-04-08 NOTE — DISCHARGE NOTE PROVIDER - HOSPITAL COURSE
76 YO F with HTN, HLD, seizures, and DM presents after fall, admitted for syncope work-up.    Syncope and collapse:  Likely secondary to deconditioning vs syncope vs seizure   Patient with history of falls, presenting with unwitnessed fall out of bed - unclear circumstances, including LOC/head trauma  CT head/cervical spine/chest/pelvis - without fracture   CXR: clear   4/7 LHC: Luminal irregularities.     Hypertension:   Low sodium and fat diet, continue anti-hypertensive medications, and follow up with primary care physician.  Hydralazine discontinued   started on Norvasc and Hydrochlorthiazide  Follow up with Cardiology within 1-2 weeks     TTE:  1. Mitral annular calcification, otherwise normal mitral  valve. Minimal mitral regurgitation.  2. Aortic valve not well visualized; appears calcified.  Mild-moderate aortic regurgitation.Pressure halftime about  800 msec, but VC 0.36 cm.  3. Endocardium not well visualized; grossly normal left  ventricular systolic function.  4. Mild diastolic dysfunction (Stage I).  5. The right ventricle is not well visualized; grossly  normal right ventricular systolic function.  *** Compared with echocardiogram of 2/9/2019, no  significant changes noted.    4/8: Case discussed with Dr. Magallanes. Patient is stable and optimized for discharge. Medications reviewed and sent to agreed upon pharmacy. 76 YO F with HTN, HLD, seizures, and DM presents after fall, admitted for syncope work-up.    Syncope and collapse:  Likely secondary to deconditioning vs syncope vs seizure   Patient with history of falls, presenting with unwitnessed fall out of bed - unclear circumstances, including LOC/head trauma  CT head/cervical spine/chest/pelvis - without fracture   CXR: clear   4/7: LHC: Luminal irregularities.     Hypertension:   Low sodium and fat diet, continue anti-hypertensive medications, and follow up with primary care physician.  Hydralazine discontinued   started on Norvasc and Hydrochlorthiazide  Follow up with Cardiology within 1-2 weeks     TTE:  1. Mitral annular calcification, otherwise normal mitral  valve. Minimal mitral regurgitation.  2. Aortic valve not well visualized; appears calcified.  Mild-moderate aortic regurgitation.Pressure halftime about  800 msec, but VC 0.36 cm.  3. Endocardium not well visualized; grossly normal left  ventricular systolic function.  4. Mild diastolic dysfunction (Stage I).  5. The right ventricle is not well visualized; grossly  normal right ventricular systolic function.  *** Compared with echocardiogram of 2/9/2019, no  significant changes noted.    4/8: Case discussed with Dr. Magallanes. Patient is stable and optimized for discharge. Medications reviewed and sent to agreed upon pharmacy. 74 YO F with HTN, HLD, seizures, and DM presents after fall, admitted for syncope work-up.    Syncope and collapse  - likely 2/2 deconditioning vs syncope vs seizure   - pt with hx of falls - presenting with unwitnessed fall out of bed - unclear circumstances, including LOC/head trauma  - CT head/cervical spine/chest/pelvis - without fracture   - CXR: clear  - telemetry   - PT eval  - rehab   - X-ray R shoulder s/p fall - AC and glenohumeral joint osteoarthrosis, no fx  - 4/7 LHC: Luminal irregularities  - Echo-->grossly normal left ventricular systolic function. Mild diastolic dysfunction (Stage I).  - s/p RRT on 4/11 for unresponsiveness, + emesis, suspected seizure, recovered w/o treatment,  Neurology consulted, EEG, no seizure activity noted, moderate slowing. No further inpt w/u. C/W Trileptal  300 am 450 qhs    This case was reviewed with .  The patient is medically stable and optimized for discharge. All medications were reviewed and prescriptions were sent to .         74 YO F with HTN, HLD, seizures, and DM presents after fall, admitted for syncope work-up.    Syncope and collapse  - likely 2/2 deconditioning vs syncope vs seizure   - pt with hx of falls - presenting with unwitnessed fall out of bed - unclear circumstances, including LOC/head trauma  - CT head/cervical spine/chest/pelvis - without fracture   - X-ray R shoulder s/p fall - AC and glenohumeral joint osteoarthrosis, no fx  - CXR: clear  -EP evaluated  for bradycardia. Patient initially was supposed to go for PPM placement, but case cancelled 2/2 AMS. ILR was placed on 4/20  -Patient noted to have orthostatic hypotension which improved with IVF and TEDs. HCTZ d/c'd.  -Cardiology following   - Echo-->grossly normal left ventricular systolic function. Mild diastolic dysfunction (Stage I).  - 4/7 s/p LHC: Luminal irregularities  - s/p multiple RRTs for unresponsiveness, + emesis, suspected seizure, recovered w/o treatment,  Neurology consulted. EEG without seizure activity noted, moderate slowing. CT head with no acute pathologies. No further inpt w/u. Trileptal increased to 450mg bid  - PT recommended rehab    Case discussed with Dr. Magallanes on 4/23/21 and patient is medically stable for discharge to rehab.

## 2021-04-08 NOTE — PROGRESS NOTE ADULT - SUBJECTIVE AND OBJECTIVE BOX
SUBJECTIVE / OVERNIGHT EVENTS: pt seen and examined    MEDICATIONS  (STANDING):  amLODIPine   Tablet 5 milliGRAM(s) Oral daily  enoxaparin Injectable 40 milliGRAM(s) SubCutaneous daily  hydrochlorothiazide 12.5 milliGRAM(s) Oral daily  lisinopril 40 milliGRAM(s) Oral daily  OXcarbazepine 300 milliGRAM(s) Oral two times a day  traMADol 25 milliGRAM(s) Oral once    MEDICATIONS  (PRN):  acetaminophen   Tablet .. 650 milliGRAM(s) Oral every 6 hours PRN Temp greater or equal to 38C (100.4F), Mild Pain (1 - 3), Moderate Pain (4 - 6)    Vital Signs Last 24 Hrs  T(C): 36.7 (2021 20:48), Max: 37.1 (2021 00:00)  T(F): 98.1 (2021 20:48), Max: 98.7 (2021 00:00)  HR: 65 (2021 20:48) (54 - 65)  BP: 166/87 (2021 20:48) (151/62 - 166/87)  BP(mean): --  RR: 18 (2021 20:48) (16 - 18)  SpO2: 99% (2021 20:48) (97% - 100%)    Constitutional: No fever, fatigue  Skin: No rash.  Eyes: No recent vision problems or eye pain.  ENT: No congestion, ear pain, or sore throat.  Cardiovascular: No chest pain or palpation.  Respiratory: No cough, shortness of breath, congestion, or wheezing.  Gastrointestinal: No abdominal pain, nausea, vomiting, or diarrhea.  Genitourinary: No dysuria.  Musculoskeletal: No joint swelling.  Neurologic: No headache.    PHYSICAL EXAM:  GENERAL: NAD  EYES: EOMI, PERRLA  NECK: Supple, No JVD  CHEST/LUNG: dec breath sounds rt base  HEART:  S1 , S2 +  ABDOMEN: soft , bs+  EXTREMITIES:  no edema  NEUROLOGY:alert awake    LABS:      137  |  103  |  22  ----------------------------<  108<H>  3.7   |  25  |  0.91    Ca    8.8      2021 06:35  Mg     2.4     04-07      Creatinine Trend: 0.91 <--, 1.00 <--, 1.04 <--, 0.92 <--, 1.12 <--, 1.03 <--                        10.5   4.06  )-----------( 257      ( 2021 06:35 )             31.0     Urine Studies:  Urinalysis Basic - ( 2021 00:40 )    Color: Yellow / Appearance: Clear / S.022 / pH:   Gluc:  / Ketone: Small  / Bili: Negative / Urobili: 3 mg/dL   Blood:  / Protein: 100 mg/dL / Nitrite: Negative   Leuk Esterase: Negative / RBC: 15 /HPF / WBC 1 /HPF   Sq Epi:  / Non Sq Epi: 1 /HPF / Bacteria: Negative

## 2021-04-08 NOTE — DISCHARGE NOTE PROVIDER - NSDCCPCAREPLAN_GEN_ALL_CORE_FT
PRINCIPAL DISCHARGE DIAGNOSIS  Diagnosis: Syncope and collapse  Assessment and Plan of Treatment: Symptoms improved. You had a cath while inpatient. Follow up with Primary Care Provider within 1-2 weeks.      SECONDARY DISCHARGE DIAGNOSES  Diagnosis: Hypertension  Assessment and Plan of Treatment: Low sodium and fat diet, continue anti-hypertensive medications, and follow up with primary care physician. Hydralazine discontinued and started on Norvasc and Hydrochlorthiazide. Follow up with Cardiology within 1-2 weeks     PRINCIPAL DISCHARGE DIAGNOSIS  Diagnosis: Syncope and collapse  Assessment and Plan of Treatment: Likely due to deconditioning. You had a cardiac and neurologic work up which did not show any negative findings. Continue physical therapy at rehab. Follow up with cardiology Dr Luevano, neurology Dr Corbin and your primary care phsician on discharge, call to make appointments.      SECONDARY DISCHARGE DIAGNOSES  Diagnosis: Seizure  Assessment and Plan of Treatment: Continue your trileptal as prescribed.    Diagnosis: Hypertension  Assessment and Plan of Treatment: Continue blood pressure medication regimen as directed. Monitor for any visual changes, headaches or dizziness.  Monitor blood pressure regularly.  Follow up with your primary care provider/cardiologist in 1-2 weeks for further management for high blood pressure.     PRINCIPAL DISCHARGE DIAGNOSIS  Diagnosis: Syncope and collapse  Assessment and Plan of Treatment: Likely due to deconditioning. You had a cardiac and neurologic work up which did not show any negative findings. You underwent a cardiac catheterization (angiogram) which did not show any blockages, just nonobstructive coronary artery disease. Your blood pressure dropped with positional changes, but improved with IV fluids and SANDRA stockings. Stay well hydrated. Rise from sitting to standing position slowly. You may take Meclizine as needed for dizziness. Continue physical therapy at rehab. Follow up with cardiology Dr. Luevano and your Primary Care Physician in 1-2 weeks.      SECONDARY DISCHARGE DIAGNOSES  Diagnosis: Hypertension  Assessment and Plan of Treatment: Continue Amlodipine, Lisinopril, and Imdur as directed. HCTZ was discontinued. Monitor for any visual changes, headaches or dizziness.  Monitor blood pressure regularly.  Follow up with your primary care provider/cardiologist in 1-2 weeks for further management for high blood pressure.    Diagnosis: Seizure  Assessment and Plan of Treatment: Trileptal was increased to 450mg twice a day. Follow up with Neurologist Dr. Barney on discharge.    Diagnosis: Bradycardia  Assessment and Plan of Treatment: You were noted to have a slightly slow heart rate and were considered for a pacemaker, but case was cancelled and an Internal Loop Recorder was placed to further monitor your heart. Follow up in the Device Clinic on 5/5/21 at 2:00pm located at [facility] on the 4th floor of the Oncology Building. Call [phone] with questions.

## 2021-04-08 NOTE — DISCHARGE NOTE PROVIDER - CARE PROVIDER_API CALL
Ulises Luevano  CARDIOVASCULAR DISEASE  [address] [place] [address]  Phone: [phone]  Fax: [phone]  Follow Up Time:    Ulises Luevano  CARDIOVASCULAR DISEASE  [address] [place] [address]  Phone: [phone]  Fax: [phone]  Follow Up Time:     Jamal Barney)  Neurology  [address] [facility], [address]  [place] [address]  Phone: [phone]  Fax: [phone]  Follow Up Time:    Ulises Luevano  CARDIOVASCULAR DISEASE  [address] [place] [address]  Phone: [phone]  Fax: [phone]  Follow Up Time:     Jamal Barney)  Neurology  [address] [facility], [address]  [place] [address]  Phone: [phone]  Fax: [phone]  Follow Up Time:     Your Primary Care Physician,   Phone: (   )    -  Fax: (   )    -  Follow Up Time:

## 2021-04-09 LAB
ANION GAP SERPL CALC-SCNC: 10 MMOL/L — SIGNIFICANT CHANGE UP (ref 7–14)
BUN SERPL-MCNC: 24 MG/DL — HIGH (ref 7–23)
CALCIUM SERPL-MCNC: 9 MG/DL — SIGNIFICANT CHANGE UP (ref 8.4–10.5)
CHLORIDE SERPL-SCNC: 100 MMOL/L — SIGNIFICANT CHANGE UP (ref 98–107)
CO2 SERPL-SCNC: 26 MMOL/L — SIGNIFICANT CHANGE UP (ref 22–31)
CREAT SERPL-MCNC: 0.97 MG/DL — SIGNIFICANT CHANGE UP (ref 0.5–1.3)
CULTURE RESULTS: SIGNIFICANT CHANGE UP
CULTURE RESULTS: SIGNIFICANT CHANGE UP
GLUCOSE SERPL-MCNC: 103 MG/DL — HIGH (ref 70–99)
HCT VFR BLD CALC: 32.9 % — LOW (ref 34.5–45)
HGB BLD-MCNC: 11.1 G/DL — LOW (ref 11.5–15.5)
MCHC RBC-ENTMCNC: 23.1 PG — LOW (ref 27–34)
MCHC RBC-ENTMCNC: 33.7 GM/DL — SIGNIFICANT CHANGE UP (ref 32–36)
MCV RBC AUTO: 68.4 FL — LOW (ref 80–100)
NRBC # BLD: 0 /100 WBCS — SIGNIFICANT CHANGE UP
NRBC # FLD: 0 K/UL — SIGNIFICANT CHANGE UP
PLATELET # BLD AUTO: 280 K/UL — SIGNIFICANT CHANGE UP (ref 150–400)
POTASSIUM SERPL-MCNC: 4 MMOL/L — SIGNIFICANT CHANGE UP (ref 3.5–5.3)
POTASSIUM SERPL-SCNC: 4 MMOL/L — SIGNIFICANT CHANGE UP (ref 3.5–5.3)
RBC # BLD: 4.81 M/UL — SIGNIFICANT CHANGE UP (ref 3.8–5.2)
RBC # FLD: 16.5 % — HIGH (ref 10.3–14.5)
SODIUM SERPL-SCNC: 136 MMOL/L — SIGNIFICANT CHANGE UP (ref 135–145)
SPECIMEN SOURCE: SIGNIFICANT CHANGE UP
SPECIMEN SOURCE: SIGNIFICANT CHANGE UP
WBC # BLD: 5.06 K/UL — SIGNIFICANT CHANGE UP (ref 3.8–10.5)
WBC # FLD AUTO: 5.06 K/UL — SIGNIFICANT CHANGE UP (ref 3.8–10.5)

## 2021-04-09 RX ORDER — HYDROCHLOROTHIAZIDE 25 MG
25 TABLET ORAL DAILY
Refills: 0 | Status: DISCONTINUED | OUTPATIENT
Start: 2021-04-09 | End: 2021-04-22

## 2021-04-09 RX ADMIN — OXCARBAZEPINE 300 MILLIGRAM(S): 300 TABLET, FILM COATED ORAL at 18:49

## 2021-04-09 RX ADMIN — Medication 12.5 MILLIGRAM(S): at 05:52

## 2021-04-09 RX ADMIN — LISINOPRIL 40 MILLIGRAM(S): 2.5 TABLET ORAL at 05:52

## 2021-04-09 RX ADMIN — AMLODIPINE BESYLATE 5 MILLIGRAM(S): 2.5 TABLET ORAL at 05:52

## 2021-04-09 RX ADMIN — ENOXAPARIN SODIUM 40 MILLIGRAM(S): 100 INJECTION SUBCUTANEOUS at 12:38

## 2021-04-09 RX ADMIN — OXCARBAZEPINE 300 MILLIGRAM(S): 300 TABLET, FILM COATED ORAL at 05:52

## 2021-04-09 NOTE — PROGRESS NOTE ADULT - ASSESSMENT
Assessment and Plan    76 y/o F with HTN, seizures presents after fall.     EKG: NSR with poor R wave progression    1. s/p fall  -CT head neg  -orthostatic positive on IVF. slightly improving  -continue to monitor orthostatics  -found to have abnormal stress test in 2019--  -s/p [facility] with non obstructive CAD    2. HTN  -improving  -c/w lisinopril  increase  HCTz to 25mg qd, d/c hydralazine as its is TID added norvasc 5mg daily  continue to monitor BP    3. DVT prophylaxis  -lovenox subq

## 2021-04-09 NOTE — PROGRESS NOTE ADULT - SUBJECTIVE AND OBJECTIVE BOX
Interval History - Patient seen and examined this am.       Vital Signs Last 24 Hrs  T(C): 36.7 (09 Apr 2021 08:14), Max: 36.7 (08 Apr 2021 20:48)  T(F): 98.1 (09 Apr 2021 08:14), Max: 98.1 (08 Apr 2021 20:48)  HR: 58 (09 Apr 2021 08:14) (58 - 65)  BP: 174/86 (09 Apr 2021 08:14) (160/90 - 174/86)  BP(mean): --  RR: 18 (09 Apr 2021 08:14) (16 - 18)  SpO2: 100% (09 Apr 2021 08:14) (97% - 100%)    PHYSICAL EXAM:    Mental Status - Patient is alert, awake, oriented X3. fluent, names, no dysarthria no aphasia Follows commands well and able to answer questions appropriately. Mood and affect  normal    Cranial Nerves - PERRL, EOMI, VFF, V1-V3 intact, no gross facial asymmetry, tongue/uvula midline    Motor Exam -   5/5 throughout   nml bulk/tone    Sensory    Intact to light touch and pinprick bilaterally    Coord: FTN intact bilaterally     Gait -  normal      Reflexes:          2+ throughout            MEDICATIONS  (STANDING):  amLODIPine   Tablet 5 milliGRAM(s) Oral daily  enoxaparin Injectable 40 milliGRAM(s) SubCutaneous daily  hydrochlorothiazide 25 milliGRAM(s) Oral daily  lisinopril 40 milliGRAM(s) Oral daily  OXcarbazepine 300 milliGRAM(s) Oral two times a day  traMADol 25 milliGRAM(s) Oral once    MEDICATIONS  (PRN):  acetaminophen   Tablet .. 650 milliGRAM(s) Oral every 6 hours PRN Temp greater or equal to 38C (100.4F), Mild Pain (1 - 3), Moderate Pain (4 - 6)  Lab      Radiology

## 2021-04-09 NOTE — PROGRESS NOTE ADULT - ASSESSMENT
77 y/o F with HTN, seizures presents after fall. Patient does not recall event. PE grossly no-focal CTH-    Continue with Trileptal  Outpatient routine EEG as patient back to baseline    Can follow up with Neurology, Dr. Jamal Barney at [phone]

## 2021-04-09 NOTE — PROGRESS NOTE ADULT - SUBJECTIVE AND OBJECTIVE BOX
Ulises Luevano MD  Interventional Cardiology / Advance Heart Failure and Cardiac Transplant Specialist  [place] Office : [address] [place] [address]. [address]  Tel:   [place] Office : [address] [facility] [place] [address]  Tel: [phone]  Cell : [phone]    Pt is lying in bed comfortable not in distress, no chest pains no SOB no palpitations  	  MEDICATIONS:  amLODIPine   Tablet 5 milliGRAM(s) Oral daily  enoxaparin Injectable 40 milliGRAM(s) SubCutaneous daily  hydrochlorothiazide 25 milliGRAM(s) Oral daily  lisinopril 40 milliGRAM(s) Oral daily        acetaminophen   Tablet .. 650 milliGRAM(s) Oral every 6 hours PRN  OXcarbazepine 300 milliGRAM(s) Oral two times a day  traMADol 25 milliGRAM(s) Oral once            PAST MEDICAL/SURGICAL HISTORY  PAST MEDICAL & SURGICAL HISTORY:  Hypertension    Seizure    Hyperlipidemia    Diverticulosis    Borderline diabetes    Obesity    No Past Surgical History    History of appendectomy        SOCIAL HISTORY: Substance Use (street drugs): ( x ) never used  (  ) other:    FAMILY HISTORY:  Family history of cerebrovascular accident (CVA) (Sibling)        REVIEW OF SYSTEMS:  CONSTITUTIONAL: No fever, weight loss, or fatigue  EYES: No eye pain, visual disturbances, or discharge  ENMT:  No difficulty hearing, tinnitus, vertigo; No sinus or throat pain  BREASTS: No pain, masses, or nipple discharge  GASTROINTESTINAL: No abdominal or epigastric pain. No nausea, vomiting, or hematemesis; No diarrhea or constipation. No melena or hematochezia.  GENITOURINARY: No dysuria, frequency, hematuria, or incontinence  NEUROLOGICAL: No headaches, memory loss, loss of strength, numbness, or tremors  ENDOCRINE: No heat or cold intolerance; No hair loss  MUSCULOSKELETAL: No joint pain or swelling; No muscle, back, or extremity pain  PSYCHIATRIC: No depression, anxiety, mood swings, or difficulty sleeping  HEME/LYMPH: No easy bruising, or bleeding gums  All others negative    PHYSICAL EXAM:  T(C): 36.7 (04-09-21 @ 08:14), Max: 36.7 (04-08-21 @ 20:48)  HR: 58 (04-09-21 @ 08:14) (58 - 65)  BP: 174/86 (04-09-21 @ 08:14) (160/90 - 174/86)  RR: 18 (04-09-21 @ 08:14) (16 - 18)  SpO2: 100% (04-09-21 @ 08:14) (97% - 100%)  Wt(kg): --  I&O's Summary    08 Apr 2021 07:01  -  09 Apr 2021 07:00  --------------------------------------------------------  IN: 720 mL / OUT: 1200 mL / NET: -480 mL          GENERAL: NAD  EYES: EOMI, PERRLA, conjunctiva and sclera clear  ENMT: No tonsillar erythema, exudates, or enlargement; Moist mucous membranes, Good dentition, No lesions  Cardiovascular: Normal S1 S2, No JVD, No murmurs, No edema  Respiratory: Lungs clear to auscultation	  Gastrointestinal:  Soft, Non-tender, + BS	  Extremities: Normal range of motion, No clubbing, cyanosis or edema  LYMPH: No lymphadenopathy noted  NERVOUS SYSTEM:  Alert & Oriented X3, Good concentration; Motor Strength 5/5 B/L upper and lower extremities; DTRs 2+ intact and symmetric                                    11.1   5.06  )-----------( 280      ( 09 Apr 2021 07:07 )             32.9     04-09    136  |  100  |  24<H>  ----------------------------<  103<H>  4.0   |  26  |  0.97    Ca    9.0      09 Apr 2021 07:07      proBNP:   Lipid Profile:   HgA1c:   TSH:     Consultant(s) Notes Reviewed:  [x ] YES  [ ] NO    Care Discussed with Consultants/Other Providers [ x] YES  [ ] NO    Imaging Personally Reviewed independently:  [x] YES  [ ] NO    All labs, radiologic studies, vitals, orders and medications list reviewed. Patient is seen and examined at bedside. Case discussed with medical team.

## 2021-04-09 NOTE — PROGRESS NOTE ADULT - SUBJECTIVE AND OBJECTIVE BOX
SUBJECTIVE / OVERNIGHT EVENTS: pt seen and examined    MEDICATIONS  (STANDING):  amLODIPine   Tablet 5 milliGRAM(s) Oral daily  enoxaparin Injectable 40 milliGRAM(s) SubCutaneous daily  hydrochlorothiazide 25 milliGRAM(s) Oral daily  lisinopril 40 milliGRAM(s) Oral daily  OXcarbazepine 300 milliGRAM(s) Oral two times a day  traMADol 25 milliGRAM(s) Oral once    MEDICATIONS  (PRN):  acetaminophen   Tablet .. 650 milliGRAM(s) Oral every 6 hours PRN Temp greater or equal to 38C (100.4F), Mild Pain (1 - 3), Moderate Pain (4 - 6)    Vital Signs Last 24 Hrs  T(C): 36.7 (2021 20:30), Max: 36.7 (2021 08:14)  T(F): 98 (2021 20:30), Max: 98.1 (2021 08:14)  HR: 58 (2021 20:30) (58 - 60)  BP: 155/59 (2021 20:30) (155/59 - 179/87)  BP(mean): --  RR: 18 (2021 20:30) (16 - 18)  SpO2: 100% (2021 20:30) (97% - 100%)  SpO2: 99% (2021 20:48) (97% - 100%)    Constitutional: No fever, fatigue  Skin: No rash.  Eyes: No recent vision problems or eye pain.  ENT: No congestion, ear pain, or sore throat.  Cardiovascular: No chest pain or palpation.  Respiratory: No cough, shortness of breath, congestion, or wheezing.  Gastrointestinal: No abdominal pain, nausea, vomiting, or diarrhea.  Genitourinary: No dysuria.  Musculoskeletal: No joint swelling.  Neurologic: No headache.    PHYSICAL EXAM:  GENERAL: NAD  EYES: EOMI, PERRLA  NECK: Supple, No JVD  CHEST/LUNG: dec breath sounds rt base  HEART:  S1 , S2 +  ABDOMEN: soft , bs+  EXTREMITIES:  no edema  NEUROLOGY:alert awake    LABS:      136  |  100  |  24<H>  ----------------------------<  103<H>  4.0   |  26  |  0.97    Ca    9.0      2021 07:07      Creatinine Trend: 0.97 <--, 0.91 <--, 1.00 <--, 1.04 <--, 0.92 <--, 1.12 <--, 1.03 <--                        11.1   5.06  )-----------( 280      ( 2021 07:07 )             32.9     Urine Studies:  Urinalysis Basic - ( 2021 00:40 )    Color: Yellow / Appearance: Clear / S.022 / pH:   Gluc:  / Ketone: Small  / Bili: Negative / Urobili: 3 mg/dL   Blood:  / Protein: 100 mg/dL / Nitrite: Negative   Leuk Esterase: Negative / RBC: 15 /HPF / WBC 1 /HPF   Sq Epi:  / Non Sq Epi: 1 /HPF / Bacteria: Negative

## 2021-04-10 RX ORDER — AMLODIPINE BESYLATE 2.5 MG/1
10 TABLET ORAL DAILY
Refills: 0 | Status: DISCONTINUED | OUTPATIENT
Start: 2021-04-10 | End: 2021-04-23

## 2021-04-10 RX ADMIN — AMLODIPINE BESYLATE 10 MILLIGRAM(S): 2.5 TABLET ORAL at 12:15

## 2021-04-10 RX ADMIN — ENOXAPARIN SODIUM 40 MILLIGRAM(S): 100 INJECTION SUBCUTANEOUS at 12:15

## 2021-04-10 RX ADMIN — Medication 650 MILLIGRAM(S): at 22:40

## 2021-04-10 RX ADMIN — OXCARBAZEPINE 300 MILLIGRAM(S): 300 TABLET, FILM COATED ORAL at 18:18

## 2021-04-10 RX ADMIN — AMLODIPINE BESYLATE 5 MILLIGRAM(S): 2.5 TABLET ORAL at 05:39

## 2021-04-10 RX ADMIN — Medication 25 MILLIGRAM(S): at 05:39

## 2021-04-10 RX ADMIN — OXCARBAZEPINE 300 MILLIGRAM(S): 300 TABLET, FILM COATED ORAL at 05:39

## 2021-04-10 RX ADMIN — Medication 650 MILLIGRAM(S): at 22:00

## 2021-04-10 RX ADMIN — LISINOPRIL 40 MILLIGRAM(S): 2.5 TABLET ORAL at 05:39

## 2021-04-10 NOTE — PROGRESS NOTE ADULT - SUBJECTIVE AND OBJECTIVE BOX
SUBJECTIVE / OVERNIGHT EVENTS: pt seen and examined    MEDICATIONS  (STANDING):  amLODIPine   Tablet 10 milliGRAM(s) Oral daily  enoxaparin Injectable 40 milliGRAM(s) SubCutaneous daily  hydrochlorothiazide 25 milliGRAM(s) Oral daily  lisinopril 40 milliGRAM(s) Oral daily  OXcarbazepine 300 milliGRAM(s) Oral two times a day  traMADol 25 milliGRAM(s) Oral once    MEDICATIONS  (PRN):  acetaminophen   Tablet .. 650 milliGRAM(s) Oral every 6 hours PRN Temp greater or equal to 38C (100.4F), Mild Pain (1 - 3), Moderate Pain (4 - 6)    Vital Signs Last 24 Hrs  T(C): 36.7 (10 Apr 2021 19:38), Max: 37 (10 Apr 2021 16:26)  T(F): 98 (10 Apr 2021 19:38), Max: 98.6 (10 Apr 2021 16:26)  HR: 58 (10 Apr 2021 19:38) (52 - 60)  BP: 152/50 (10 Apr 2021 19:38) (134/68 - 186/74)  BP(mean): --  RR: 18 (10 Apr 2021 19:38) (18 - 18)  SpO2: 99% (10 Apr 2021 19:38) (96% - 100%)    Constitutional: No fever, fatigue  Skin: No rash.  Eyes: No recent vision problems or eye pain.  ENT: No congestion, ear pain, or sore throat.  Cardiovascular: No chest pain or palpation.  Respiratory: No cough, shortness of breath, congestion, or wheezing.  Gastrointestinal: No abdominal pain, nausea, vomiting, or diarrhea.  Genitourinary: No dysuria.  Musculoskeletal: No joint swelling.  Neurologic: No headache.    PHYSICAL EXAM:  GENERAL: NAD  EYES: EOMI, PERRLA  NECK: Supple, No JVD  CHEST/LUNG: dec breath sounds rt base  HEART:  S1 , S2 +  ABDOMEN: soft , bs+  EXTREMITIES:  no edema  NEUROLOGY:alert awake    LABS:      136  |  100  |  24<H>  ----------------------------<  103<H>  4.0   |  26  |  0.97    Ca    9.0      2021 07:07      Creatinine Trend: 0.97 <--, 0.91 <--, 1.00 <--, 1.04 <--, 0.92 <--, 1.12 <--, 1.03 <--                        11.1   5.06  )-----------( 280      ( 2021 07:07 )             32.9     Urine Studies:  Urinalysis Basic - ( 2021 00:40 )    Color: Yellow / Appearance: Clear / S.022 / pH:   Gluc:  / Ketone: Small  / Bili: Negative / Urobili: 3 mg/dL   Blood:  / Protein: 100 mg/dL / Nitrite: Negative   Leuk Esterase: Negative / RBC: 15 /HPF / WBC 1 /HPF   Sq Epi:  / Non Sq Epi: 1 /HPF / Bacteria: Negative

## 2021-04-10 NOTE — PROGRESS NOTE ADULT - ASSESSMENT
Assessment and Plan    74 y/o F with HTN, seizures presents after fall.     EKG: NSR with poor R wave progression    1. s/p fall  -CT head neg  -orthostatic positive on IVF. slightly improving  -continue to monitor orthostatics  -found to have abnormal stress test in 2019--  -s/p [facility] with non obstructive CAD    2. HTN  -improving  -c/w lisinopril  increase  HCTz to 25mg qd, added norvasc 10mg daily  continue to monitor BP    3. DVT prophylaxis  -lovenox subq

## 2021-04-10 NOTE — PROGRESS NOTE ADULT - SUBJECTIVE AND OBJECTIVE BOX
Ulises Luevano MD  Interventional Cardiology / Advance Heart Failure and Cardiac Transplant Specialist  [place] Office : [address] [place] [phone]  Tel:   [place] Office : [address] [facility] [place] [address]  Tel: [phone]  Cell : [phone]    Pt is lying in bed comfortable not in distress, no chest pains no SOB no palpitations  	  MEDICATIONS:  amLODIPine   Tablet 10 milliGRAM(s) Oral daily  enoxaparin Injectable 40 milliGRAM(s) SubCutaneous daily  hydrochlorothiazide 25 milliGRAM(s) Oral daily  lisinopril 40 milliGRAM(s) Oral daily        acetaminophen   Tablet .. 650 milliGRAM(s) Oral every 6 hours PRN  OXcarbazepine 300 milliGRAM(s) Oral two times a day  traMADol 25 milliGRAM(s) Oral once            PAST MEDICAL/SURGICAL HISTORY  PAST MEDICAL & SURGICAL HISTORY:  Hypertension    Seizure    Hyperlipidemia    Diverticulosis    Borderline diabetes    Obesity    No Past Surgical History    History of appendectomy        SOCIAL HISTORY: Substance Use (street drugs): ( x ) never used  (  ) other:    FAMILY HISTORY:  Family history of cerebrovascular accident (CVA) (Sibling)        PHYSICAL EXAM:  T(C): 36.6 (04-10-21 @ 05:36), Max: 36.7 (04-09-21 @ 15:59)  HR: 60 (04-10-21 @ 09:22) (52 - 60)  BP: 134/68 (04-10-21 @ 09:22) (134/68 - 186/74)  RR: 18 (04-10-21 @ 09:22) (18 - 18)  SpO2: 96% (04-10-21 @ 09:22) (96% - 100%)  Wt(kg): --  I&O's Summary    09 Apr 2021 07:01  -  10 Apr 2021 07:00  --------------------------------------------------------  IN: 0 mL / OUT: 300 mL / NET: -300 mL           EYES: EOMI, PERRLA, conjunctiva and sclera clear  ENMT: No tonsillar erythema, exudates, or enlargement; Moist mucous membranes, Good dentition, No lesions  Cardiovascular: Normal S1 S2, No JVD, No murmurs, No edema  Respiratory: Lungs clear to auscultation	  Gastrointestinal:  Soft, Non-tender, + BS	  Extremities: no edema                                    11.1   5.06  )-----------( 280      ( 09 Apr 2021 07:07 )             32.9     04-09    136  |  100  |  24<H>  ----------------------------<  103<H>  4.0   |  26  |  0.97    Ca    9.0      09 Apr 2021 07:07      proBNP:   Lipid Profile:   HgA1c:   TSH:     Consultant(s) Notes Reviewed:  [x ] YES  [ ] NO    Care Discussed with Consultants/Other Providers [ x] YES  [ ] NO    Imaging Personally Reviewed independently:  [x] YES  [ ] NO    All labs, radiologic studies, vitals, orders and medications list reviewed. Patient is seen and examined at bedside. Case discussed with medical team.

## 2021-04-11 LAB
ANION GAP SERPL CALC-SCNC: 10 MMOL/L — SIGNIFICANT CHANGE UP (ref 7–14)
ANION GAP SERPL CALC-SCNC: 10 MMOL/L — SIGNIFICANT CHANGE UP (ref 7–14)
BUN SERPL-MCNC: 25 MG/DL — HIGH (ref 7–23)
BUN SERPL-MCNC: 27 MG/DL — HIGH (ref 7–23)
CALCIUM SERPL-MCNC: 9 MG/DL — SIGNIFICANT CHANGE UP (ref 8.4–10.5)
CALCIUM SERPL-MCNC: 9 MG/DL — SIGNIFICANT CHANGE UP (ref 8.4–10.5)
CHLORIDE SERPL-SCNC: 97 MMOL/L — LOW (ref 98–107)
CHLORIDE SERPL-SCNC: 97 MMOL/L — LOW (ref 98–107)
CK MB CFR SERPL CALC: <1 NG/ML — SIGNIFICANT CHANGE UP
CO2 SERPL-SCNC: 28 MMOL/L — SIGNIFICANT CHANGE UP (ref 22–31)
CO2 SERPL-SCNC: 28 MMOL/L — SIGNIFICANT CHANGE UP (ref 22–31)
CREAT SERPL-MCNC: 0.97 MG/DL — SIGNIFICANT CHANGE UP (ref 0.5–1.3)
CREAT SERPL-MCNC: 1.13 MG/DL — SIGNIFICANT CHANGE UP (ref 0.5–1.3)
GLUCOSE BLDC GLUCOMTR-MCNC: 151 MG/DL — HIGH (ref 70–99)
GLUCOSE SERPL-MCNC: 113 MG/DL — HIGH (ref 70–99)
GLUCOSE SERPL-MCNC: 124 MG/DL — HIGH (ref 70–99)
HCT VFR BLD CALC: 31.9 % — LOW (ref 34.5–45)
HCT VFR BLD CALC: 32.9 % — LOW (ref 34.5–45)
HGB BLD-MCNC: 10.8 G/DL — LOW (ref 11.5–15.5)
HGB BLD-MCNC: 11.3 G/DL — LOW (ref 11.5–15.5)
MAGNESIUM SERPL-MCNC: 2.5 MG/DL — SIGNIFICANT CHANGE UP (ref 1.6–2.6)
MCHC RBC-ENTMCNC: 23.4 PG — LOW (ref 27–34)
MCHC RBC-ENTMCNC: 23.6 PG — LOW (ref 27–34)
MCHC RBC-ENTMCNC: 33.9 GM/DL — SIGNIFICANT CHANGE UP (ref 32–36)
MCHC RBC-ENTMCNC: 34.3 GM/DL — SIGNIFICANT CHANGE UP (ref 32–36)
MCV RBC AUTO: 68.7 FL — LOW (ref 80–100)
MCV RBC AUTO: 69.2 FL — LOW (ref 80–100)
NRBC # BLD: 0 /100 WBCS — SIGNIFICANT CHANGE UP
NRBC # BLD: 0 /100 WBCS — SIGNIFICANT CHANGE UP
NRBC # FLD: 0 K/UL — SIGNIFICANT CHANGE UP
NRBC # FLD: 0 K/UL — SIGNIFICANT CHANGE UP
PHOSPHATE SERPL-MCNC: 3.1 MG/DL — SIGNIFICANT CHANGE UP (ref 2.5–4.5)
PLATELET # BLD AUTO: 298 K/UL — SIGNIFICANT CHANGE UP (ref 150–400)
PLATELET # BLD AUTO: 310 K/UL — SIGNIFICANT CHANGE UP (ref 150–400)
POTASSIUM SERPL-MCNC: 3.8 MMOL/L — SIGNIFICANT CHANGE UP (ref 3.5–5.3)
POTASSIUM SERPL-MCNC: 4.1 MMOL/L — SIGNIFICANT CHANGE UP (ref 3.5–5.3)
POTASSIUM SERPL-SCNC: 3.8 MMOL/L — SIGNIFICANT CHANGE UP (ref 3.5–5.3)
POTASSIUM SERPL-SCNC: 4.1 MMOL/L — SIGNIFICANT CHANGE UP (ref 3.5–5.3)
RBC # BLD: 4.61 M/UL — SIGNIFICANT CHANGE UP (ref 3.8–5.2)
RBC # BLD: 4.79 M/UL — SIGNIFICANT CHANGE UP (ref 3.8–5.2)
RBC # FLD: 16.6 % — HIGH (ref 10.3–14.5)
RBC # FLD: 16.7 % — HIGH (ref 10.3–14.5)
SODIUM SERPL-SCNC: 135 MMOL/L — SIGNIFICANT CHANGE UP (ref 135–145)
SODIUM SERPL-SCNC: 135 MMOL/L — SIGNIFICANT CHANGE UP (ref 135–145)
TROPONIN T, HIGH SENSITIVITY RESULT: 16 NG/L — SIGNIFICANT CHANGE UP
WBC # BLD: 4.29 K/UL — SIGNIFICANT CHANGE UP (ref 3.8–10.5)
WBC # BLD: 5.28 K/UL — SIGNIFICANT CHANGE UP (ref 3.8–10.5)
WBC # FLD AUTO: 4.29 K/UL — SIGNIFICANT CHANGE UP (ref 3.8–10.5)
WBC # FLD AUTO: 5.28 K/UL — SIGNIFICANT CHANGE UP (ref 3.8–10.5)

## 2021-04-11 PROCEDURE — 71045 X-RAY EXAM CHEST 1 VIEW: CPT | Mod: 26

## 2021-04-11 PROCEDURE — 70450 CT HEAD/BRAIN W/O DYE: CPT | Mod: 26

## 2021-04-11 RX ORDER — MECLIZINE HCL 12.5 MG
25 TABLET ORAL THREE TIMES A DAY
Refills: 0 | Status: DISCONTINUED | OUTPATIENT
Start: 2021-04-11 | End: 2021-04-23

## 2021-04-11 RX ORDER — ISOSORBIDE MONONITRATE 60 MG/1
30 TABLET, EXTENDED RELEASE ORAL DAILY
Refills: 0 | Status: DISCONTINUED | OUTPATIENT
Start: 2021-04-11 | End: 2021-04-22

## 2021-04-11 RX ADMIN — ISOSORBIDE MONONITRATE 30 MILLIGRAM(S): 60 TABLET, EXTENDED RELEASE ORAL at 12:20

## 2021-04-11 RX ADMIN — LISINOPRIL 40 MILLIGRAM(S): 2.5 TABLET ORAL at 05:30

## 2021-04-11 RX ADMIN — AMLODIPINE BESYLATE 10 MILLIGRAM(S): 2.5 TABLET ORAL at 05:30

## 2021-04-11 RX ADMIN — OXCARBAZEPINE 300 MILLIGRAM(S): 300 TABLET, FILM COATED ORAL at 05:30

## 2021-04-11 RX ADMIN — Medication 25 MILLIGRAM(S): at 05:30

## 2021-04-11 RX ADMIN — OXCARBAZEPINE 300 MILLIGRAM(S): 300 TABLET, FILM COATED ORAL at 17:07

## 2021-04-11 RX ADMIN — ENOXAPARIN SODIUM 40 MILLIGRAM(S): 100 INJECTION SUBCUTANEOUS at 11:33

## 2021-04-11 NOTE — PROGRESS NOTE ADULT - SUBJECTIVE AND OBJECTIVE BOX
SUBJECTIVE / OVERNIGHT EVENTS: pt seen and examined    MEDICATIONS  (STANDING):  amLODIPine   Tablet 10 milliGRAM(s) Oral daily  enoxaparin Injectable 40 milliGRAM(s) SubCutaneous daily  hydrochlorothiazide 25 milliGRAM(s) Oral daily  isosorbide   mononitrate ER Tablet (IMDUR) 30 milliGRAM(s) Oral daily  lisinopril 40 milliGRAM(s) Oral daily  OXcarbazepine 300 milliGRAM(s) Oral two times a day  traMADol 25 milliGRAM(s) Oral once    MEDICATIONS  (PRN):  acetaminophen   Tablet .. 650 milliGRAM(s) Oral every 6 hours PRN Temp greater or equal to 38C (100.4F), Mild Pain (1 - 3), Moderate Pain (4 - 6)    Vital Signs Last 24 Hrs  T(C): 36.8 (11 Apr 2021 11:32), Max: 37 (10 Apr 2021 16:26)  T(F): 98.2 (11 Apr 2021 11:32), Max: 98.6 (10 Apr 2021 16:26)  HR: 53 (11 Apr 2021 11:32) (50 - 58)  BP: 150/64 (11 Apr 2021 11:32) (148/64 - 168/70)  BP(mean): --  RR: 17 (11 Apr 2021 11:32) (17 - 18)  SpO2: 100% (11 Apr 2021 11:32) (96% - 100%)    Constitutional: No fever, fatigue  Skin: No rash.  Eyes: No recent vision problems or eye pain.  ENT: No congestion, ear pain, or sore throat.  Cardiovascular: No chest pain or palpation.  Respiratory: No cough, shortness of breath, congestion, or wheezing.  Gastrointestinal: No abdominal pain, nausea, vomiting, or diarrhea.  Genitourinary: No dysuria.  Musculoskeletal: No joint swelling.  Neurologic: No headache.    PHYSICAL EXAM:  GENERAL: NAD  EYES: EOMI, PERRLA  NECK: Supple, No JVD  CHEST/LUNG: dec breath sounds rt base  HEART:  S1 , S2 +  ABDOMEN: soft , bs+  EXTREMITIES:  no edema  NEUROLOGY:alert awake    LABS:  04-11    135  |  97<L>  |  25<H>  ----------------------------<  113<H>  3.8   |  28  |  0.97    Ca    9.0      11 Apr 2021 06:56      Creatinine Trend: 0.97 <--, 0.97 <--, 0.91 <--, 1.00 <--, 1.04 <--, 0.92 <--                        11.3   4.29  )-----------( 298      ( 11 Apr 2021 06:56 )             32.9     Urine Studies:

## 2021-04-11 NOTE — PROGRESS NOTE ADULT - ASSESSMENT
INCOMPLETE NOTE. Assessment: 76 yo female with a PMHx of HTN, HLD, and seizures who presented to [facility] on 04/04 after being found down. RRT called today for episode of emesis followed by unresponsiveness for a few minutes.    Recommendations:  [] c/w Trileptal.  [] Send Trileptal level.  [] vEEG as inpatient.  [] If another episode of unresponsiveness occurs, then please send CPK and Prolactin levels as soon as possible.  [] Rest of care per primary team.    Case discussed with neurology attending Dr. Barney.

## 2021-04-11 NOTE — DIETITIAN INITIAL EVALUATION ADULT. - ADD RECOMMEND
- Monitor weights, labs, BM's, skin integrity, p.o. intake. - Encourage po intake, honor food preferences as able

## 2021-04-11 NOTE — PROGRESS NOTE ADULT - SUBJECTIVE AND OBJECTIVE BOX
[id number]    Subjective: INCOMPLETE NOTE.      PAST MEDICAL & SURGICAL HISTORY:  Hypertension    Seizure    Hyperlipidemia    Diverticulosis    Borderline diabetes    Obesity    No Past Surgical History    History of appendectomy      FAMILY HISTORY:  Family history of cerebrovascular accident (CVA) (Sibling)      Social Hx:  Nonsmoker, no drug or alcohol use    Home Medications:    MEDICATIONS  (STANDING):  amLODIPine   Tablet 10 milliGRAM(s) Oral daily  enoxaparin Injectable 40 milliGRAM(s) SubCutaneous daily  hydrochlorothiazide 25 milliGRAM(s) Oral daily  isosorbide   mononitrate ER Tablet (IMDUR) 30 milliGRAM(s) Oral daily  lisinopril 40 milliGRAM(s) Oral daily  OXcarbazepine 300 milliGRAM(s) Oral two times a day  traMADol 25 milliGRAM(s) Oral once    MEDICATIONS  (PRN):  acetaminophen   Tablet .. 650 milliGRAM(s) Oral every 6 hours PRN Temp greater or equal to 38C (100.4F), Mild Pain (1 - 3), Moderate Pain (4 - 6)  meclizine 25 milliGRAM(s) Oral three times a day PRN Dizziness    Allergies  allergy to Lobster (Swelling)  No Known Drug Allergies  seaford (Unknown)    Intolerances  lactose (Stomach Upset)      REVIEW OF SYSTEMS  General:	  Skin/Breast:	  Ophthalmologic:  ENMT:	  Respiratory and Thorax:	  Cardiovascular:	  Gastrointestinal:	  Genitourinary:	  Musculoskeletal:	  Neurological:	  Psychiatric:	  Hematology/Lymphatics:	  Endocrine:	  Allergic/Immunologic:	    ROS: Pertinent positives above, all other ROS were reviewed and are negative.      Vital Signs Last 24 Hrs  T(C): 36.5 (11 Apr 2021 16:12), Max: 36.8 (11 Apr 2021 01:10)  T(F): 97.7 (11 Apr 2021 16:12), Max: 98.3 (11 Apr 2021 01:10)  HR: 52 (11 Apr 2021 16:12) (50 - 58)  BP: 141/82 (11 Apr 2021 17:10) (141/82 - 172/84)  BP(mean): --  RR: 17 (11 Apr 2021 16:12) (17 - 18)  SpO2: 100% (11 Apr 2021 16:12) (96% - 100%)    GENERAL EXAM:  Constitutional: awake and alert. NAD  HEENT: PERRLA, EOMI  Neck: Supple  Respiratory: Breath sounds are clear bilaterally  Cardiovascular: S1 and S2, regular / irregular rhythm  Gastrointestinal: soft, nontender  Extremities: no edema, no cyanosis  Vascular: no carotid bruits  Musculoskeletal: no joint swelling/tenderness, no abnormal movements  Skin: no rashes    NEUROLOGICAL EXAM:  MS: AAOX3, fluent, attends b/l; recent and remote memory intact; normal attention, language and fund of knowledge.   CN: VFF, EOMI, PERRL, no GIO, no APD,  V1-3 intact, no facial asymmetry, t/p midline, SCM/trap intact.  Eyes-Fundi: no papilledema.  Motor: Strength: 5/5 4x. Tone: normal. Bulk: normal. DTR 2+ symm.  Plantar flex b/l. Sensation: intact to LT/PP/Vibration/Position/Temperature 4x.   Coordination: intact 4x.   Gait:  Romberg negative, pull test negative; walks with narrow base, pivots in 2 steps.    NIHSS  mRS    Labs:   cbc                      10.8   5.28  )-----------( 310      ( 11 Apr 2021 17:51 )             31.9     [id number]    135  |  97<L>  |  27<H>  ----------------------------<  124<H>  4.1   |  28  |  1.13    Ca    9.0      11 Apr 2021 17:51  Phos  3.1     04-11  Mg     2.5     04-11      Coags  Lipids  A1C  Cardiac MarkersCARDIAC MARKERS ( 11 Apr 2021 17:51 )  x     / x     / x     / x     / <1.0 ng/mL        UA  CSF  Immunological Labs    Radiology: [id number]    Subjective: 75 yo female seen and examined at bedside. Neurology follow up requested by primary team follow RRT earlier today. Patient was eating when she had a sudden episode of NBNB emesis and then became unresponsive. No tongue bite or urinary incontinence. Patient was drowsy during the rapid but was responding to questions and A&Ox3. Patient does not remember the event. Denies HA, dizziness, numbness/tingling.    PAST MEDICAL & SURGICAL HISTORY:  Hypertension    Seizure    Hyperlipidemia    Diverticulosis    Borderline diabetes    Obesity    No Past Surgical History    History of appendectomy    FAMILY HISTORY:  Family history of cerebrovascular accident (CVA) (Sibling)    Social Hx:  Nonsmoker, no drug or alcohol use    MEDICATIONS  (STANDING):  amLODIPine   Tablet 10 milliGRAM(s) Oral daily  enoxaparin Injectable 40 milliGRAM(s) SubCutaneous daily  hydrochlorothiazide 25 milliGRAM(s) Oral daily  isosorbide   mononitrate ER Tablet (IMDUR) 30 milliGRAM(s) Oral daily  lisinopril 40 milliGRAM(s) Oral daily  OXcarbazepine 300 milliGRAM(s) Oral two times a day  traMADol 25 milliGRAM(s) Oral once    MEDICATIONS  (PRN):  acetaminophen   Tablet .. 650 milliGRAM(s) Oral every 6 hours PRN Temp greater or equal to 38C (100.4F), Mild Pain (1 - 3), Moderate Pain (4 - 6)  meclizine 25 milliGRAM(s) Oral three times a day PRN Dizziness    Allergies  allergy to Lobster (Swelling)  No Known Drug Allergies  seaford (Unknown)    Intolerances  lactose (Stomach Upset)    ROS: Pertinent positives above, all other ROS were reviewed and are negative.      Vital Signs Last 24 Hrs  T(C): 36.5 (11 Apr 2021 16:12), Max: 36.8 (11 Apr 2021 01:10)  T(F): 97.7 (11 Apr 2021 16:12), Max: 98.3 (11 Apr 2021 01:10)  HR: 52 (11 Apr 2021 16:12) (50 - 58)  BP: 141/82 (11 Apr 2021 17:10) (141/82 - 172/84)  RR: 17 (11 Apr 2021 16:12) (17 - 18)  SpO2: 100% (11 Apr 2021 16:12) (96% - 100%)    GENERAL EXAM:  Constitutional: awake and alert. NAD.    NEUROLOGICAL EXAM:  MS: Alert, eyes open spontaneously. Oriented to self, age, location, month, year. Speech is clear, fluent. Follows commands.  CN: PERRL. VFF. EOMI. V1-V3 intact. Face symmetric. Tongue midline.   Motor: All extremities antigravity.   Sensory: Intact to LT throughout.  Coordination: No dysmetria on FNF or on HTS bilaterally.  Gait: Deferred.    Labs:   cbc                      10.8   5.28  )-----------( 310      ( 11 Apr 2021 17:51 )             31.9     [id number]    135  |  97<L>  |  27<H>  ----------------------------<  124<H>  4.1   |  28  |  1.13    Ca    9.0      11 Apr 2021 17:51  Phos  3.1     04-11  Mg     2.5     04-11    Cardiac Markers: CARDIAC MARKERS ( 11 Apr 2021 17:51 )  x     / x     / x     / x     / <1.0 ng/mL    Radiology:  -04/03 CTH: No displaced calvarial fracture or acute intracranial hemorrhage.  -04/03 Cervical spine CT: No acute fracture. Cervical spondylosis. Heterogeneous thyromegaly.  -04/11 CTH: No hydrocephalus, acute intracranial hemorrhage, mass effect, or brain edema. Moderate to severe white matter microvascular ischemic disease.

## 2021-04-11 NOTE — DIETITIAN INITIAL EVALUATION ADULT. - PROBLEM SELECTOR PLAN 1
likely 2/2 deconditioning vs syncope vs seizure   pt with hx of falls - presenting with unwitnessed fall out of bed - unclear circumstances, including LOC/head trauma  -CT head/cervical spine/chest/pelvis - without fracture   -CXR: clear   -f/u prolactin  -f/u orthostatic   -PT eval   -monitor on tele, fall precautions    -TTE ordered

## 2021-04-11 NOTE — PROGRESS NOTE ADULT - ASSESSMENT
Assessment and Plan    76 y/o F with HTN, seizures presents after fall.     EKG: NSR with poor R wave progression    1. s/p fall  -CT head neg  -orthostatic positive on IVF. slightly improving  -continue to monitor orthostatics  -found to have abnormal stress test in 2019--  -s/p [facility] with non obstructive CAD    2. HTN  -improving  -c/w lisinopril     HCTz to 25mg qd,  norvasc 10mg daily add imdur 30mg daily   continue to monitor BP    3. AMS  -?seizure consider CT head, EEG , neuro consult

## 2021-04-11 NOTE — DIETITIAN INITIAL EVALUATION ADULT. - PERTINENT LABORATORY DATA
04-11 Na135 mmol/L Glu 113 mg/dL<H> K+ 3.8 mmol/L Cr  0.97 mg/dL BUN 25 mg/dL<H> 04-05 Phos 2.7 mg/dL    HbA1c 6.8% (4/4/21)

## 2021-04-11 NOTE — RAPID RESPONSE TEAM SUMMARY - NSADDTLFINDINGSRRT_GEN_ALL_CORE
- Patient drowsy, but able to answer questions during rapid AOx3  - Telemetry was not connected to patient. Appeared in sinus rhythm on zole  -

## 2021-04-11 NOTE — RAPID RESPONSE TEAM SUMMARY - NSSITUATIONBACKGROUNDRRT_GEN_ALL_CORE
Briefly, 74 y/o F with HTN, HLD, seizures, and DM presents after fall, admitted for syncope work-up. RRT called for patient being found unresponsive.

## 2021-04-11 NOTE — DIETITIAN INITIAL EVALUATION ADULT. - CHIEF COMPLAINT
77 y/o F with HTN, HLD, seizures, and borderline DM, s/p 2nd COVID vaccine on 4/2 c/o b/l arm pain and presents after unwitnessed fall, daughter found her down on floor, admitted for syncope work-up. Also admits to h/o CP, trop 11 ->13 EKG- NSR @ 65 incomplete RBB/ LAFB, TWI v1-2, Flat T waves v3-6, III, F

## 2021-04-11 NOTE — DIETITIAN INITIAL EVALUATION ADULT. - ORAL INTAKE PTA/DIET HISTORY
Met patient at bedside. Patient reports that she is not a big eater at baseline. Patient denies any decrease in po intake/appetite po intake.

## 2021-04-11 NOTE — DIETITIAN INITIAL EVALUATION ADULT. - OTHER INFO
Patient reports continued good po intake throughout hospital stay. RD observed patient consume 100% of breakfast tray this morning. Denies n/v/d/c and chewing/swallowing difficulties at this time.     UBW: 240 lbs - per pt report  Admit weight: 235 lbs  Weight stable     RD provided patient with nutrition education for consistent carbohydrate + DASH/TLC (cholesterol and sodium restricted) diet. Patient receptive to education and accepted all written materials. Patient reports fair po intake throughout hospital stay. RD observed patient consume ~75% of breakfast tray this morning. Patient endorses nausea, denies constipation/diarrhea/chewing/swallowing difficulties at this time.     UBW: 240 lbs - per pt report  Admit weight: 235 lbs  Weight stable     RD provided patient with nutrition education for consistent carbohydrate + DASH/TLC (cholesterol and sodium restricted) diet. Patient receptive to education and accepted all written materials.

## 2021-04-11 NOTE — PROGRESS NOTE ADULT - SUBJECTIVE AND OBJECTIVE BOX
Ulises Luevano MD  Interventional Cardiology / Advance Heart Failure and Cardiac Transplant Specialist  [place] Office : [address] [place] [address] [facility] [address]  Tel:   [place] Office : [address] [facility] [place] [address]  Tel: [phone]  Cell : [phone]    Pt is lying in bed confused had a RRT today sec to unresponsiveness ?seizure   	  MEDICATIONS:  amLODIPine   Tablet 10 milliGRAM(s) Oral daily  enoxaparin Injectable 40 milliGRAM(s) SubCutaneous daily  hydrochlorothiazide 25 milliGRAM(s) Oral daily  isosorbide   mononitrate ER Tablet (IMDUR) 30 milliGRAM(s) Oral daily  lisinopril 40 milliGRAM(s) Oral daily        acetaminophen   Tablet .. 650 milliGRAM(s) Oral every 6 hours PRN  meclizine 25 milliGRAM(s) Oral three times a day PRN  OXcarbazepine 300 milliGRAM(s) Oral two times a day  traMADol 25 milliGRAM(s) Oral once            PAST MEDICAL/SURGICAL HISTORY  PAST MEDICAL & SURGICAL HISTORY:  Hypertension    Seizure    Hyperlipidemia    Diverticulosis    Borderline diabetes    Obesity    No Past Surgical History    History of appendectomy        SOCIAL HISTORY: Substance Use (street drugs): ( x ) never used  (  ) other:    FAMILY HISTORY:  Family history of cerebrovascular accident (CVA) (Sibling)           PHYSICAL EXAM:  T(C): 36.5 (04-11-21 @ 16:12), Max: 36.8 (04-11-21 @ 01:10)  HR: 52 (04-11-21 @ 16:12) (50 - 58)  BP: 172/84 (04-11-21 @ 16:12) (148/64 - 172/84)  RR: 17 (04-11-21 @ 16:12) (17 - 18)  SpO2: 100% (04-11-21 @ 16:12) (96% - 100%)  Wt(kg): --  I&O's Summary    10 Apr 2021 07:01  -  11 Apr 2021 07:00  --------------------------------------------------------  IN: 420 mL / OUT: 300 mL / NET: 120 mL    11 Apr 2021 07:01  -  11 Apr 2021 17:19  --------------------------------------------------------  IN: 480 mL / OUT: 525 mL / NET: -45 mL          GENERAL: confused  EYES: EOMI, PERRLA, conjunctiva and sclera clear  ENMT: No tonsillar erythema, exudates, or enlargement; Moist mucous membranes, Good dentition, No lesions  Cardiovascular: Normal S1 S2, No JVD, No murmurs, No edema  Respiratory: Lungs clear to auscultation	  Gastrointestinal:  Soft, Non-tender, + BS	  Extremities: no edema                                  11.3   4.29  )-----------( 298      ( 11 Apr 2021 06:56 )             32.9     04-11    135  |  97<L>  |  25<H>  ----------------------------<  113<H>  3.8   |  28  |  0.97    Ca    9.0      11 Apr 2021 06:56      proBNP:   Lipid Profile:   HgA1c:   TSH:     Consultant(s) Notes Reviewed:  [x ] YES  [ ] NO    Care Discussed with Consultants/Other Providers [ x] YES  [ ] NO    Imaging Personally Reviewed independently:  [x] YES  [ ] NO    All labs, radiologic studies, vitals, orders and medications list reviewed. Patient is seen and examined at bedside. Case discussed with medical team.

## 2021-04-11 NOTE — RAPID RESPONSE TEAM SUMMARY - NSOTHERINTERVENTIONSRRT_GEN_ALL_CORE
- Suspect that patient had a seizure, as she was quickly returning back to baseline.  - Primary team would reach out to cardiology for additionally workup pending  - Recommend telemetry monitoring.

## 2021-04-11 NOTE — CHART NOTE - NSCHARTNOTEFT_GEN_A_CORE
RRT called by RN for one episode of nbnb emesis and unresponsiveness for few seconds while eating. (see RRT note for details). Patient noted to be AxO x 3 during rapid response evaluation. VSS. Patient was seating in chair, in no acute distress. She does not recall anything during the time she was unresponsive, last time she can remember was having lunch. No shaking or tongue biting noted, or urinary incontinence. She denies any cp/sob/dizziness. s/p Cardiac cath with no findings. Awaiting Rehab. Will obtain CXR. Will place patient on Telemetry monitoring for now. Covering Attg, Dr. Magallanes aware. RRT called by RN for one episode of nbnb emesis and unresponsiveness for few seconds while eating. (see RRT note for details). Patient noted to be AxO x 3 during rapid response evaluation. VSS. Patient was seating in chair, in no acute distress. She does not recall anything during the time she was unresponsive, last time she can remember was having lunch. No shaking or tongue biting noted, or urinary incontinence. She denies any cp/sob/dizziness. s/p Cardiac cath with no findings. Awaiting Rehab. CXR done after the RRT showing Stable cardiomegaly. Clear lungs. Will place patient on Telemetry monitoring for now. Covering Attg, Dr. Elpidio moreira. RRT called by RN for one episode of nbnb emesis and unresponsiveness for few seconds while eating. (see RRT note for details). Patient noted to be AxO x 3 during rapid response evaluation. VSS. Patient was seating in chair, in no acute distress. She does not recall anything during the time she was unresponsive, last time she can remember was having lunch. No shaking or tongue biting noted, or urinary incontinence. She denies any cp/sob/dizziness. s/p Cardiac cath with no findings. Awaiting Rehab. CXR done after the RRT showing Stable cardiomegaly. Clear lungs. Will place patient on Telemetry monitoring for now. Card seen patient at bedside, no new intervention, Neurology reconsulted, recs pending. Labs (Trop/CKMB/CBC/BMP) pending. CT Head ordered. Covering Attg, Dr. Magallanes aware.

## 2021-04-12 LAB
ANION GAP SERPL CALC-SCNC: 10 MMOL/L — SIGNIFICANT CHANGE UP (ref 7–14)
BUN SERPL-MCNC: 28 MG/DL — HIGH (ref 7–23)
CALCIUM SERPL-MCNC: 9.1 MG/DL — SIGNIFICANT CHANGE UP (ref 8.4–10.5)
CHLORIDE SERPL-SCNC: 98 MMOL/L — SIGNIFICANT CHANGE UP (ref 98–107)
CO2 SERPL-SCNC: 26 MMOL/L — SIGNIFICANT CHANGE UP (ref 22–31)
CREAT SERPL-MCNC: 1.06 MG/DL — SIGNIFICANT CHANGE UP (ref 0.5–1.3)
GLUCOSE SERPL-MCNC: 106 MG/DL — HIGH (ref 70–99)
HCT VFR BLD CALC: 32 % — LOW (ref 34.5–45)
HGB BLD-MCNC: 10.9 G/DL — LOW (ref 11.5–15.5)
MAGNESIUM SERPL-MCNC: 2.5 MG/DL — SIGNIFICANT CHANGE UP (ref 1.6–2.6)
MCHC RBC-ENTMCNC: 23.8 PG — LOW (ref 27–34)
MCHC RBC-ENTMCNC: 34.1 GM/DL — SIGNIFICANT CHANGE UP (ref 32–36)
MCV RBC AUTO: 69.9 FL — LOW (ref 80–100)
NRBC # BLD: 0 /100 WBCS — SIGNIFICANT CHANGE UP
NRBC # FLD: 0 K/UL — SIGNIFICANT CHANGE UP
PHOSPHATE SERPL-MCNC: 3.1 MG/DL — SIGNIFICANT CHANGE UP (ref 2.5–4.5)
PLATELET # BLD AUTO: 285 K/UL — SIGNIFICANT CHANGE UP (ref 150–400)
POTASSIUM SERPL-MCNC: 4 MMOL/L — SIGNIFICANT CHANGE UP (ref 3.5–5.3)
POTASSIUM SERPL-SCNC: 4 MMOL/L — SIGNIFICANT CHANGE UP (ref 3.5–5.3)
RBC # BLD: 4.58 M/UL — SIGNIFICANT CHANGE UP (ref 3.8–5.2)
RBC # FLD: 16.7 % — HIGH (ref 10.3–14.5)
SODIUM SERPL-SCNC: 134 MMOL/L — LOW (ref 135–145)
WBC # BLD: 5.31 K/UL — SIGNIFICANT CHANGE UP (ref 3.8–10.5)
WBC # FLD AUTO: 5.31 K/UL — SIGNIFICANT CHANGE UP (ref 3.8–10.5)

## 2021-04-12 RX ORDER — OXCARBAZEPINE 300 MG/1
150 TABLET, FILM COATED ORAL
Refills: 0 | Status: DISCONTINUED | OUTPATIENT
Start: 2021-04-12 | End: 2021-04-19

## 2021-04-12 RX ORDER — OXCARBAZEPINE 300 MG/1
300 TABLET, FILM COATED ORAL
Refills: 0 | Status: DISCONTINUED | OUTPATIENT
Start: 2021-04-12 | End: 2021-04-19

## 2021-04-12 RX ORDER — LIDOCAINE 4 G/100G
1 CREAM TOPICAL EVERY 24 HOURS
Refills: 0 | Status: DISCONTINUED | OUTPATIENT
Start: 2021-04-12 | End: 2021-04-23

## 2021-04-12 RX ADMIN — OXCARBAZEPINE 150 MILLIGRAM(S): 300 TABLET, FILM COATED ORAL at 21:26

## 2021-04-12 RX ADMIN — AMLODIPINE BESYLATE 10 MILLIGRAM(S): 2.5 TABLET ORAL at 04:56

## 2021-04-12 RX ADMIN — OXCARBAZEPINE 300 MILLIGRAM(S): 300 TABLET, FILM COATED ORAL at 04:56

## 2021-04-12 RX ADMIN — ENOXAPARIN SODIUM 40 MILLIGRAM(S): 100 INJECTION SUBCUTANEOUS at 11:18

## 2021-04-12 RX ADMIN — ISOSORBIDE MONONITRATE 30 MILLIGRAM(S): 60 TABLET, EXTENDED RELEASE ORAL at 11:18

## 2021-04-12 RX ADMIN — LIDOCAINE 1 PATCH: 4 CREAM TOPICAL at 21:27

## 2021-04-12 RX ADMIN — LISINOPRIL 40 MILLIGRAM(S): 2.5 TABLET ORAL at 04:56

## 2021-04-12 RX ADMIN — OXCARBAZEPINE 300 MILLIGRAM(S): 300 TABLET, FILM COATED ORAL at 17:04

## 2021-04-12 RX ADMIN — Medication 25 MILLIGRAM(S): at 04:56

## 2021-04-12 NOTE — PROGRESS NOTE ADULT - SUBJECTIVE AND OBJECTIVE BOX
SUBJECTIVE / OVERNIGHT EVENTS: pt seen and examined  pt had episode of change in mental status yesterday  pt is at her baseline mental status at present     MEDICATIONS  (STANDING):  amLODIPine   Tablet 10 milliGRAM(s) Oral daily  enoxaparin Injectable 40 milliGRAM(s) SubCutaneous daily  hydrochlorothiazide 25 milliGRAM(s) Oral daily  isosorbide   mononitrate ER Tablet (IMDUR) 30 milliGRAM(s) Oral daily  lidocaine   Patch 1 Patch Transdermal every 24 hours  lisinopril 40 milliGRAM(s) Oral daily  OXcarbazepine 300 milliGRAM(s) Oral two times a day  OXcarbazepine 150 milliGRAM(s) Oral <User Schedule>  traMADol 25 milliGRAM(s) Oral once    MEDICATIONS  (PRN):  acetaminophen   Tablet .. 650 milliGRAM(s) Oral every 6 hours PRN Temp greater or equal to 38C (100.4F), Mild Pain (1 - 3), Moderate Pain (4 - 6)  meclizine 25 milliGRAM(s) Oral three times a day PRN Dizziness  Vital Signs Last 24 Hrs  T(C): 36.8 (12 Apr 2021 20:00), Max: 37 (12 Apr 2021 05:21)  T(F): 98.3 (12 Apr 2021 20:00), Max: 98.6 (12 Apr 2021 05:21)  HR: 58 (12 Apr 2021 20:00) (54 - 60)  BP: 153/78 (12 Apr 2021 20:00) (117/73 - 166/98)  BP(mean): 114 (12 Apr 2021 16:00) (114 - 114)  RR: 18 (12 Apr 2021 20:00) (17 - 20)  SpO2: 99% (12 Apr 2021 20:00) (99% - 100%)  Constitutional: No fever, fatigue  Skin: No rash.  Eyes: No recent vision problems or eye pain.  ENT: No congestion, ear pain, or sore throat.  Cardiovascular: No chest pain or palpation.  Respiratory: No cough, shortness of breath, congestion, or wheezing.  Gastrointestinal: No abdominal pain, nausea, vomiting, or diarrhea.  Genitourinary: No dysuria.  Musculoskeletal: No joint swelling.  Neurologic: No headache.    PHYSICAL EXAM:  GENERAL: NAD  EYES: EOMI, PERRLA  NECK: Supple, No JVD  CHEST/LUNG: dec breath sounds rt base  HEART:  S1 , S2 +  ABDOMEN: soft , bs+  EXTREMITIES:  no edema  NEUROLOGY:alert awake    LABS:  04-12    134<L>  |  98  |  28<H>  ----------------------------<  106<H>  4.0   |  26  |  1.06    Ca    9.1      12 Apr 2021 06:59  Phos  3.1     04-12  Mg     2.5     04-12      Creatinine Trend: 1.06 <--, 1.13 <--, 0.97 <--, 0.97 <--, 0.91 <--, 1.00 <--, 1.04 <--                        10.9   5.31  )-----------( 285      ( 12 Apr 2021 06:59 )             32.0     Urine Studies:      CARDIAC MARKERS ( 11 Apr 2021 17:51 )  x     / x     / x     / x     / <1.0 ng/mL

## 2021-04-12 NOTE — PROGRESS NOTE ADULT - SUBJECTIVE AND OBJECTIVE BOX
[id number]    Subjective: 77 yo female seen and examined at bedside. episode of LOC yesterday    PAST MEDICAL & SURGICAL HISTORY:  Hypertension    Seizure    Hyperlipidemia    Diverticulosis    Borderline diabetes    Obesity    No Past Surgical History    History of appendectomy    FAMILY HISTORY:  Family history of cerebrovascular accident (CVA) (Sibling)    Social Hx:  Nonsmoker, no drug or alcohol use    MEDICATIONS  (STANDING):  amLODIPine   Tablet 10 milliGRAM(s) Oral daily  enoxaparin Injectable 40 milliGRAM(s) SubCutaneous daily  hydrochlorothiazide 25 milliGRAM(s) Oral daily  isosorbide   mononitrate ER Tablet (IMDUR) 30 milliGRAM(s) Oral daily  lisinopril 40 milliGRAM(s) Oral daily  OXcarbazepine 300 milliGRAM(s) Oral two times a day  traMADol 25 milliGRAM(s) Oral once    MEDICATIONS  (PRN):  acetaminophen   Tablet .. 650 milliGRAM(s) Oral every 6 hours PRN Temp greater or equal to 38C (100.4F), Mild Pain (1 - 3), Moderate Pain (4 - 6)  meclizine 25 milliGRAM(s) Oral three times a day PRN Dizziness      Allergies  allergy to Lobster (Swelling)  No Known Drug Allergies  seaford (Unknown)    Intolerances  lactose (Stomach Upset)    ROS: Pertinent positives above, all other ROS were reviewed and are negative.      Vital Signs Last 24 Hrs  T(C): 36.8 (12 Apr 2021 11:17), Max: 37 (12 Apr 2021 00:21)  T(F): 98.2 (12 Apr 2021 11:17), Max: 98.6 (12 Apr 2021 00:21)  HR: 54 (12 Apr 2021 11:17) (52 - 58)  BP: 117/73 (12 Apr 2021 11:17) (117/73 - 172/84)  BP(mean): --  RR: 17 (12 Apr 2021 11:17) (17 - 18)  SpO2: 100% (12 Apr 2021 11:17) (98% - 100%)    GENERAL EXAM:  Constitutional: awake and alert. NAD.    NEUROLOGICAL EXAM:  MS: Alert, eyes open spontaneously. Oriented to self, age, location, month, year. Speech is clear, fluent. Follows commands.  CN: PERRL. VFF. EOMI. V1-V3 intact. Face symmetric. Tongue midline.   Motor: All extremities antigravity.   Sensory: Intact to LT throughout.  Coordination: No dysmetria on FNF or on HTS bilaterally.  Gait: Deferred.    Labs:   cbc                      10.8   5.28  )-----------( 310      ( 11 Apr 2021 17:51 )             31.9     [id number]    135  |  97<L>  |  27<H>  ----------------------------<  124<H>  4.1   |  28  |  1.13    Ca    9.0      11 Apr 2021 17:51  Phos  3.1     04-11  Mg     2.5     04-11    Cardiac Markers: CARDIAC MARKERS ( 11 Apr 2021 17:51 )  x     / x     / x     / x     / <1.0 ng/mL    Radiology:  -04/03 CTH: No displaced calvarial fracture or acute intracranial hemorrhage.  -04/03 Cervical spine CT: No acute fracture. Cervical spondylosis. Heterogeneous thyromegaly.  -04/11 CTH: No hydrocephalus, acute intracranial hemorrhage, mass effect, or brain edema. Moderate to severe white matter microvascular ischemic disease.

## 2021-04-12 NOTE — PROGRESS NOTE ADULT - SUBJECTIVE AND OBJECTIVE BOX
Ulises Luevano MD  Interventional Cardiology / Endovascular Specialist  [place] Office : [address] [place] [address] [place] [address]  Tel:   [place] Office : [address] [facility] [place] [address]  Tel: [phone]  Cell : [phone]    Subjective/Overnight events: Patient sitting up in recliner comfortably. No acute distress. Denies chest pain, SOB or palpitations. s/p RRT yesterday for vomiting and AMS. mental status improved today  	  MEDICATIONS:  amLODIPine   Tablet 10 milliGRAM(s) Oral daily  enoxaparin Injectable 40 milliGRAM(s) SubCutaneous daily  hydrochlorothiazide 25 milliGRAM(s) Oral daily  isosorbide   mononitrate ER Tablet (IMDUR) 30 milliGRAM(s) Oral daily  lisinopril 40 milliGRAM(s) Oral daily        acetaminophen   Tablet .. 650 milliGRAM(s) Oral every 6 hours PRN  meclizine 25 milliGRAM(s) Oral three times a day PRN  OXcarbazepine 300 milliGRAM(s) Oral two times a day  traMADol 25 milliGRAM(s) Oral once            PAST MEDICAL/SURGICAL HISTORY  PAST MEDICAL & SURGICAL HISTORY:  Hypertension    Seizure    Hyperlipidemia    Diverticulosis    Borderline diabetes    Obesity    No Past Surgical History    History of appendectomy        SOCIAL HISTORY: Substance Use (street drugs): ( x ) never used  (  ) other:    FAMILY HISTORY:  Family history of cerebrovascular accident (CVA) (Sibling)        REVIEW OF SYSTEMS:  CONSTITUTIONAL: No fever, weight loss, or fatigue  EYES: No eye pain, visual disturbances, or discharge  ENMT:  No difficulty hearing, tinnitus, vertigo; No sinus or throat pain  BREASTS: No pain, masses, or nipple discharge  GASTROINTESTINAL: No abdominal or epigastric pain. No nausea, vomiting, or hematemesis; No diarrhea or constipation. No melena or hematochezia.  GENITOURINARY: No dysuria, frequency, hematuria, or incontinence  NEUROLOGICAL: No headaches, memory loss, loss of strength, numbness, or tremors  ENDOCRINE: No heat or cold intolerance; No hair loss  MUSCULOSKELETAL: No joint pain or swelling; No muscle, back, or extremity pain  PSYCHIATRIC: No depression, anxiety, mood swings, or difficulty sleeping  HEME/LYMPH: No easy bruising, or bleeding gums  All others negative    PHYSICAL EXAM:  T(C): 36.8 (04-12-21 @ 11:17), Max: 37 (04-12-21 @ 00:21)  HR: 54 (04-12-21 @ 11:17) (52 - 58)  BP: 117/73 (04-12-21 @ 11:17) (117/73 - 172/84)  RR: 17 (04-12-21 @ 11:17) (17 - 18)  SpO2: 100% (04-12-21 @ 11:17) (98% - 100%)  Wt(kg): --  I&O's Summary    11 Apr 2021 07:01  -  12 Apr 2021 07:00  --------------------------------------------------------  IN: 720 mL / OUT: 1095 mL / NET: -375 mL    12 Apr 2021 07:01  -  12 Apr 2021 11:32  --------------------------------------------------------  IN: 480 mL / OUT: 350 mL / NET: 130 mL          GENERAL: NAD  EYES: EOMI, PERRLA, conjunctiva and sclera clear  ENMT: No tonsillar erythema, exudates, or enlargement; Moist mucous membranes, Good dentition, No lesions  Cardiovascular: Normal S1 S2, No JVD, No murmurs, No edema  Respiratory: Lungs clear to auscultation	  Gastrointestinal:  Soft, Non-tender, + BS	  Extremities: no edema                                10.9   5.31  )-----------( 285      ( 12 Apr 2021 06:59 )             32.0     04-12    134<L>  |  98  |  28<H>  ----------------------------<  106<H>  4.0   |  26  |  1.06    Ca    9.1      12 Apr 2021 06:59  Phos  3.1     04-12  Mg     2.5     04-12      proBNP:   Lipid Profile:   HgA1c:   TSH:     Consultant(s) Notes Reviewed:  [x ] YES  [ ] NO    Care Discussed with Consultants/Other Providers [ x] YES  [ ] NO    Imaging Personally Reviewed independently:  [x] YES  [ ] NO    All labs, radiologic studies, vitals, orders and medications list reviewed. Patient is seen and examined at bedside. Case discussed with medical team.

## 2021-04-12 NOTE — PROGRESS NOTE ADULT - ASSESSMENT
Assessment: 74 yo female with a PMHx of HTN, HLD, and seizures who presented to [facility] on 04/[facility] after being found down. RRT called today for episode of emesis followed by unresponsiveness for a few minutes.      -Increase Trileptal to 300 am 450 qhs  -EEG

## 2021-04-12 NOTE — CHART NOTE - NSCHARTNOTEFT_GEN_A_CORE
EEG preliminary read (not final) on the initial recording hour(s) = x 3  No seizures recorded.  Moderate slowing / GRDA noted, nonspecific.  Final report to follow tomorrow morning after completion of study.    [facility] EEG Reading Room Ph#: [phone]  Epilepsy Answering Service after 5PM and before 8:30AM: Ph#: [phone]

## 2021-04-12 NOTE — PROGRESS NOTE ADULT - ASSESSMENT
Assessment and Plan    76 y/o F with HTN, seizures presents after fall.     EKG: NSR with poor R wave progression    1. s/p fall  -CT head neg  -orthostatic positive s/p IVF. slightly improving  -continue to monitor orthostatics  -found to have abnormal stress test in 2019--  -s/p [facility] this admission with non obstructive CAD    2. HTN  -improving  -c/w lisinopril, HCTz  25mg qd,  norvasc 10mg daily & imdur 30mg daily   -continue to monitor BP    3. AMS  -improved today  -?seizure   -CT head with no acute pathologies  - EEG pending  -f/u neuro    Assessment and Plan    74 y/o F with HTN, seizures presents after fall.     EKG: NSR with poor R wave progression    1. s/p fall  -CT head neg  -orthostatic positive s/p IVF. slightly improving  -continue to monitor orthostatics  -found to have abnormal stress test in 2019--  -s/p [facility] this admission with non obstructive CAD    2. HTN  -improving  -c/w lisinopril, HCTz  25mg qd,  norvasc 10mg daily & imdur 30mg daily   -continue to monitor BP    3. AMS  -improved today  -?seizure   -CT head with no acute pathologies  - EEG pending  -f/u neuro   -daughter updated

## 2021-04-13 LAB — SARS-COV-2 RNA SPEC QL NAA+PROBE: SIGNIFICANT CHANGE UP

## 2021-04-13 PROCEDURE — 95720 EEG PHY/QHP EA INCR W/VEEG: CPT

## 2021-04-13 RX ADMIN — Medication 650 MILLIGRAM(S): at 13:38

## 2021-04-13 RX ADMIN — ISOSORBIDE MONONITRATE 30 MILLIGRAM(S): 60 TABLET, EXTENDED RELEASE ORAL at 13:14

## 2021-04-13 RX ADMIN — ENOXAPARIN SODIUM 40 MILLIGRAM(S): 100 INJECTION SUBCUTANEOUS at 13:15

## 2021-04-13 RX ADMIN — LIDOCAINE 1 PATCH: 4 CREAM TOPICAL at 06:26

## 2021-04-13 RX ADMIN — LIDOCAINE 1 PATCH: 4 CREAM TOPICAL at 21:43

## 2021-04-13 RX ADMIN — Medication 25 MILLIGRAM(S): at 05:22

## 2021-04-13 RX ADMIN — OXCARBAZEPINE 300 MILLIGRAM(S): 300 TABLET, FILM COATED ORAL at 17:34

## 2021-04-13 RX ADMIN — OXCARBAZEPINE 300 MILLIGRAM(S): 300 TABLET, FILM COATED ORAL at 05:23

## 2021-04-13 RX ADMIN — Medication 650 MILLIGRAM(S): at 14:08

## 2021-04-13 RX ADMIN — LIDOCAINE 1 PATCH: 4 CREAM TOPICAL at 09:56

## 2021-04-13 RX ADMIN — OXCARBAZEPINE 150 MILLIGRAM(S): 300 TABLET, FILM COATED ORAL at 21:44

## 2021-04-13 RX ADMIN — LISINOPRIL 40 MILLIGRAM(S): 2.5 TABLET ORAL at 05:23

## 2021-04-13 RX ADMIN — AMLODIPINE BESYLATE 10 MILLIGRAM(S): 2.5 TABLET ORAL at 05:23

## 2021-04-13 NOTE — EEG REPORT - NS EEG TEXT BOX
[facility]   COMPREHENSIVE EPILEPSY CENTER   REPORT OF LONG-TERM VIDEO EEG     [facility]: [address], [place] [address], Ph#: [phone]    Patient Name: ADOLPH LANGE  Age and : 76y ([birth year])  MRN #: [id number]  Location: [address]  Referring Physician: Patrick Magallanes    Start Time/Date: 16:25 on 21  End Time/Date: 08:00 on 21  Duration: 15 H 14 mins     _____________________________________________________________  STUDY INFORMATION    EEG Recording Technique:  The patient underwent continuous Video-EEG monitoring, using Telemetry System hardware on the XLTek Digital System. EEG and video data were stored on a computer hard drive with important events saved in digital archive files. The material was reviewed by a physician (electroencephalographer / epileptologist) on a daily basis. Eric and seizure detection algorithms were utilized and reviewed. An EEG Technician attended to the patient, and was available throughout daytime work hours.  The epilepsy center neurologist was available in person or on call 24-hours per day.    EEG Placement and Labeling of Electrodes:  The EEG was performed utilizing 20 channel referential EEG connections (coronal over temporal over parasagittal montage) using all standard 10-20 electrode placements with EKG, with additional electrodes placed in the inferior temporal region using the modified 10-10 montage electrode placements for elective admissions, or if deemed necessary. Recording was at a sampling rate of 256 samples per second per channel. Time synchronized digital video recording was done simultaneously with EEG recording. A low light infrared camera was used for low light recording.     _____________________________________________________________  HISTORY    Patient is a 76y old  Female who presents with a chief complaint of Fall and seizure like activity. Patient had an episode of unresponsiveness.        PERTINENT MEDICATION:  OXcarbazepine 300 milliGRAM(s) Oral two times a day  OXcarbazepine 150 milliGRAM(s) Oral <User Schedule>    _____________________________________________________________  STUDY INTERPRETATION    Findings: The background was continuous, spontaneously variable and reactive. During wakefulness, the posterior dominant rhythm consisted of symmetric, well-modulated 6 Hz activity, with amplitude to 30 uV, that attenuated to eye opening.  Low amplitude frontal beta was noted in wakefulness.    Background Slowing:  Diffuse theta and delta slowing at a times 1.5-2 Hz ocassional brief generalized rhythmic delta activity.     Focal Slowing:   None were present.    Sleep Background:  Drowsiness was characterized by fragmentation, attenuation, and slowing of the background activity.    Sleep was characterized by the presence of vertex waves, symmetric sleep spindles and K-complexes.    Other Non-Epileptiform Findings:  None were present.      Interictal Epileptiform Activity:   None were present.    Events:  Clinical events: None recorded.  Seizures: None recorded.    Activation Procedures:   Hyperventilation was not performed.    Photic stimulation was not performed.     Artifacts:  Intermittent myogenic and movement artifacts were noted.    ECG:  The heart rate on single channel ECG was predominantly between 60-70 BPM.    _____________________________________________________________  EEG SUMMARY/CLASSIFICATION    Abnormal EEG in the awake, drowsy and asleep states.  - Moderate generalized slowing with GRDA   _____________________________________________________________  EEG IMPRESSION/CLINICAL CORRELATE    Abnormal EEG study.  1. Moderate nonspecific diffuse or multifocal cerebral dysfunction.   2. No epileptiform pattern or seizure seen.      _____________________________________________________________    Prelim read   Serjio Galeano   Epilepsy Fellow  [facility] Epilepsy [place]    Epilepsy Reading Room Ph# [phone]  Epilepsy Answering Service after 5 pm and before 8:30 am: # [phone] [facility]   COMPREHENSIVE EPILEPSY CENTER   REPORT OF LONG-TERM VIDEO EEG     [facility]: [address], [place] [address], Ph#: [phone]    Patient Name: ADOLPH LANGE  Age and : 76y (41)  MRN #: [id number]  Location: [address]  Referring Physician: Patrick Magallanes    Start Time/Date: 16:25 on 21  End Time/Date: 08:00 on 21  Duration: 15 H 14 mins     _____________________________________________________________  STUDY INFORMATION    EEG Recording Technique:  The patient underwent continuous Video-EEG monitoring, using Telemetry System hardware on the XLTek Digital System. EEG and video data were stored on a computer hard drive with important events saved in digital archive files. The material was reviewed by a physician (electroencephalographer / epileptologist) on a daily basis. Eric and seizure detection algorithms were utilized and reviewed. An EEG Technician attended to the patient, and was available throughout daytime work hours.  The epilepsy center neurologist was available in person or on call 24-hours per day.    EEG Placement and Labeling of Electrodes:  The EEG was performed utilizing 20 channel referential EEG connections (coronal over temporal over parasagittal montage) using all standard 10-20 electrode placements with EKG, with additional electrodes placed in the inferior temporal region using the modified 10-10 montage electrode placements for elective admissions, or if deemed necessary. Recording was at a sampling rate of 256 samples per second per channel. Time synchronized digital video recording was done simultaneously with EEG recording. A low light infrared camera was used for low light recording.     _____________________________________________________________  HISTORY    Patient is a 76y old  Female who presents with a chief complaint of Fall and seizure like activity. Patient had an episode of unresponsiveness.        PERTINENT MEDICATION:  OXcarbazepine 300 milliGRAM(s) Oral two times a day  OXcarbazepine 150 milliGRAM(s) Oral <User Schedule>    _____________________________________________________________  STUDY INTERPRETATION    Findings: The background was continuous, spontaneously variable and reactive. During wakefulness, the posterior dominant rhythm consisted of symmetric, well-modulated 6 Hz activity, with amplitude to 30 uV, that attenuated to eye opening.  Low amplitude frontal beta was noted in wakefulness.    Background Slowing:  Diffuse theta and delta slowing at a times 1.5-2 Hz ocassional brief generalized rhythmic delta activity.     Focal Slowing:   None were present.    Sleep Background:  Drowsiness was characterized by fragmentation, attenuation, and slowing of the background activity.    Sleep was characterized by the presence of vertex waves, symmetric sleep spindles and K-complexes.    Other Non-Epileptiform Findings:  None were present.      Interictal Epileptiform Activity:   None were present.    Events:  Clinical events: None recorded.  Seizures: None recorded.    Activation Procedures:   Hyperventilation was not performed.    Photic stimulation was not performed.     Artifacts:  Intermittent myogenic and movement artifacts were noted.    ECG:  The heart rate on single channel ECG was predominantly between 60-70 BPM.    _____________________________________________________________  EEG SUMMARY/CLASSIFICATION    Abnormal EEG in the awake, drowsy and asleep states.  - Moderate generalized slowing with GRDA   _____________________________________________________________  EEG IMPRESSION/CLINICAL CORRELATE    Abnormal EEG study.  1. Moderate nonspecific diffuse or multifocal cerebral dysfunction.   2. No epileptiform pattern or seizure seen.      _____________________________________________________________    Serjio Galeano   Epilepsy Fellow  [facility] Epilepsy [place]    Epilepsy Reading Room Ph# [phone]  Epilepsy Answering Service after 5 pm and before 8:30 am: # [phone] [facility]   COMPREHENSIVE EPILEPSY CENTER   REPORT OF LONG-TERM VIDEO EEG     [facility]: [address], [place] [address], Ph#: [phone]    Patient Name: ADOLPH LANGE  Age and : 76y (41)  MRN #: [id number]  Location: [address]  Referring Physician: Patrick Magallanes    Start Time/Date: 16:25 on 21  End Time/Date: 11:30 on 21  Duration: 18 H 45 mins     _____________________________________________________________  STUDY INFORMATION    EEG Recording Technique:  The patient underwent continuous Video-EEG monitoring, using Telemetry System hardware on the XLTek Digital System. EEG and video data were stored on a computer hard drive with important events saved in digital archive files. The material was reviewed by a physician (electroencephalographer / epileptologist) on a daily basis. Eric and seizure detection algorithms were utilized and reviewed. An EEG Technician attended to the patient, and was available throughout daytime work hours.  The epilepsy center neurologist was available in person or on call 24-hours per day.    EEG Placement and Labeling of Electrodes:  The EEG was performed utilizing 20 channel referential EEG connections (coronal over temporal over parasagittal montage) using all standard 10-20 electrode placements with EKG, with additional electrodes placed in the inferior temporal region using the modified 10-10 montage electrode placements for elective admissions, or if deemed necessary. Recording was at a sampling rate of 256 samples per second per channel. Time synchronized digital video recording was done simultaneously with EEG recording. A low light infrared camera was used for low light recording.     _____________________________________________________________  HISTORY    Patient is a 76y old  Female who presents with a chief complaint of Fall and seizure like activity. Patient had an episode of unresponsiveness.        PERTINENT MEDICATION:  OXcarbazepine 300 milliGRAM(s) Oral two times a day  OXcarbazepine 150 milliGRAM(s) Oral <User Schedule>    _____________________________________________________________  STUDY INTERPRETATION    Findings: The background was continuous, spontaneously variable and reactive. During wakefulness, the posterior dominant rhythm consisted of symmetric, well-modulated 6 Hz activity, with amplitude to 30 uV, that attenuated to eye opening.  Low amplitude frontal beta was noted in wakefulness.    Background Slowing:  Diffuse theta and delta slowing at a times 1.5-2 Hz ocassional brief generalized rhythmic delta activity.     Focal Slowing:   None were present.    Sleep Background:  Drowsiness was characterized by fragmentation, attenuation, and slowing of the background activity.    Sleep was characterized by the presence of vertex waves, symmetric sleep spindles and K-complexes.    Other Non-Epileptiform Findings:  None were present.      Interictal Epileptiform Activity:   None were present.    Events:  Clinical events: None recorded.  Seizures: None recorded.    Activation Procedures:   Hyperventilation was not performed.    Photic stimulation was not performed.     Artifacts:  Intermittent myogenic and movement artifacts were noted.    ECG:  The heart rate on single channel ECG was predominantly between 60-70 BPM.    _____________________________________________________________  EEG SUMMARY/CLASSIFICATION    Abnormal EEG in the awake, drowsy and asleep states.  - Moderate generalized slowing with GRDA   _____________________________________________________________  EEG IMPRESSION/CLINICAL CORRELATE    Abnormal EEG study.  1. Moderate nonspecific diffuse or multifocal cerebral dysfunction.   2. No epileptiform pattern or seizure seen.      _____________________________________________________________    Serjio Galeano   Epilepsy Fellow  [facility] Epilepsy [place]    Epilepsy Reading Room Ph# [phone]  Epilepsy Answering Service after 5 pm and before 8:30 am: # [phone]

## 2021-04-13 NOTE — PROGRESS NOTE ADULT - SUBJECTIVE AND OBJECTIVE BOX
Ulises Luevano MD  Interventional Cardiology / Endovascular Specialist  [place] Office : [address] [place], [place] [address]  Tel:   [place] Office : [address] [facility] [place] [address]  Tel: [phone]  Cell : [phone]    Subjective/Overnight events: Patient lying in bed comfortably on EEG	  MEDICATIONS:  amLODIPine   Tablet 10 milliGRAM(s) Oral daily  enoxaparin Injectable 40 milliGRAM(s) SubCutaneous daily  hydrochlorothiazide 25 milliGRAM(s) Oral daily  isosorbide   mononitrate ER Tablet (IMDUR) 30 milliGRAM(s) Oral daily  lisinopril 40 milliGRAM(s) Oral daily        acetaminophen   Tablet .. 650 milliGRAM(s) Oral every 6 hours PRN  meclizine 25 milliGRAM(s) Oral three times a day PRN  OXcarbazepine 300 milliGRAM(s) Oral two times a day  OXcarbazepine 150 milliGRAM(s) Oral <User Schedule>  traMADol 25 milliGRAM(s) Oral once        lidocaine   Patch 1 Patch Transdermal every 24 hours      PAST MEDICAL/SURGICAL HISTORY  PAST MEDICAL & SURGICAL HISTORY:  Hypertension    Seizure    Hyperlipidemia    Diverticulosis    Borderline diabetes    Obesity    No Past Surgical History    History of appendectomy        SOCIAL HISTORY: Substance Use (street drugs): ( x ) never used  (  ) other:    FAMILY HISTORY:  Family history of cerebrovascular accident (CVA) (Sibling)        REVIEW OF SYSTEMS:  CONSTITUTIONAL: No fever, weight loss, or fatigue  EYES: No eye pain, visual disturbances, or discharge  ENMT:  No difficulty hearing, tinnitus, vertigo; No sinus or throat pain  BREASTS: No pain, masses, or nipple discharge  GASTROINTESTINAL: No abdominal or epigastric pain. No nausea, vomiting, or hematemesis; No diarrhea or constipation. No melena or hematochezia.  GENITOURINARY: No dysuria, frequency, hematuria, or incontinence  NEUROLOGICAL: No headaches, memory loss, loss of strength, numbness, or tremors  ENDOCRINE: No heat or cold intolerance; No hair loss  MUSCULOSKELETAL: No joint pain or swelling; No muscle, back, or extremity pain  PSYCHIATRIC: No depression, anxiety, mood swings, or difficulty sleeping  HEME/LYMPH: No easy bruising, or bleeding gums  All others negative    PHYSICAL EXAM:  T(C): 36.6 (04-13-21 @ 11:02), Max: 36.8 (04-12-21 @ 20:00)  HR: 56 (04-13-21 @ 11:02) (56 - 60)  BP: 140/54 (04-13-21 @ 11:02) (140/54 - 166/98)  RR: 18 (04-13-21 @ 11:02) (18 - 20)  SpO2: 97% (04-13-21 @ 11:02) (97% - 100%)  Wt(kg): --  I&O's Summary    12 Apr 2021 07:01  -  13 Apr 2021 07:00  --------------------------------------------------------  IN: 720 mL / OUT: 1235 mL / NET: -515 mL        GENERAL: NAD  EYES: EOMI, PERRLA, conjunctiva and sclera clear  ENMT: No tonsillar erythema, exudates, or enlargement;  Cardiovascular: Normal S1 S2, No JVD, No murmurs, No edema  Respiratory: Lungs clear to auscultation	  Gastrointestinal:  Soft, Non-tender, + BS	  Extremities: no edema                                  10.9   5.31  )-----------( 285      ( 12 Apr 2021 06:59 )             32.0     04-12    134<L>  |  98  |  28<H>  ----------------------------<  106<H>  4.0   |  26  |  1.06    Ca    9.1      12 Apr 2021 06:59  Phos  3.1     04-12  Mg     2.5     04-12      proBNP:   Lipid Profile:   HgA1c:   TSH:     Consultant(s) Notes Reviewed:  [x ] YES  [ ] NO    Care Discussed with Consultants/Other Providers [ x] YES  [ ] NO    Imaging Personally Reviewed independently:  [x] YES  [ ] NO    All labs, radiologic studies, vitals, orders and medications list reviewed. Patient is seen and examined at bedside. Case discussed with medical team.

## 2021-04-13 NOTE — PROGRESS NOTE ADULT - ASSESSMENT
Assessment and Plan    74 y/o F with HTN, seizures presents after fall.     EKG: NSR with poor R wave progression    1. s/p fall  -CT head neg  -orthostatic positive s/p IVF. slightly improving  -continue to monitor orthostatics  -found to have abnormal stress test in 2019--  -s/p [facility] this admission with non obstructive CAD    2. HTN  -improving  -c/w lisinopril, HCTz  25mg qd,  norvasc 10mg daily & imdur 30mg daily   -continue to monitor BP    3. AMS  -improved today  -?seizure   -CT head with no acute pathologies  -on EEG  -f/u neuro

## 2021-04-13 NOTE — PROGRESS NOTE ADULT - ASSESSMENT
Assessment: 74 yo female with a PMHx of HTN, HLD, and seizures who presented to [facility] on 04/04 after being found down. RRT called today for episode of emesis followed by unresponsiveness for a few minutes. EEG with moderate slowing      -C/W Trileptal  300 am 450 qhs  -Can take off EEG

## 2021-04-13 NOTE — PROGRESS NOTE ADULT - SUBJECTIVE AND OBJECTIVE BOX
MRN-[id number]    Subjective: 77 yo female seen and examined at bedside. episode of LOC on EEG    PAST MEDICAL & SURGICAL HISTORY:  Hypertension    Seizure    Hyperlipidemia    Diverticulosis    Borderline diabetes    Obesity    No Past Surgical History    History of appendectomy    FAMILY HISTORY:  Family history of cerebrovascular accident (CVA) (Sibling)    Social Hx:  Nonsmoker, no drug or alcohol use    MEDICATIONS  (STANDING):  amLODIPine   Tablet 10 milliGRAM(s) Oral daily  enoxaparin Injectable 40 milliGRAM(s) SubCutaneous daily  hydrochlorothiazide 25 milliGRAM(s) Oral daily  isosorbide   mononitrate ER Tablet (IMDUR) 30 milliGRAM(s) Oral daily  lidocaine   Patch 1 Patch Transdermal every 24 hours  lisinopril 40 milliGRAM(s) Oral daily  OXcarbazepine 300 milliGRAM(s) Oral two times a day  OXcarbazepine 150 milliGRAM(s) Oral <User Schedule>  traMADol 25 milliGRAM(s) Oral once    MEDICATIONS  (PRN):  acetaminophen   Tablet .. 650 milliGRAM(s) Oral every 6 hours PRN Temp greater or equal to 38C (100.4F), Mild Pain (1 - 3), Moderate Pain (4 - 6)  meclizine 25 milliGRAM(s) Oral three times a day PRN Dizziness        Allergies  allergy to Lobster (Swelling)  No Known Drug Allergies  seaford (Unknown)    Intolerances  lactose (Stomach Upset)    ROS: Pertinent positives above, all other ROS were reviewed and are negative.      Vital Signs Last 24 Hrs  T(C): 36.6 (13 Apr 2021 11:02), Max: 36.8 (12 Apr 2021 20:00)  T(F): 97.9 (13 Apr 2021 11:02), Max: 98.3 (12 Apr 2021 20:00)  HR: 56 (13 Apr 2021 11:02) (56 - 60)  BP: 140/54 (13 Apr 2021 11:02) (140/54 - 166/98)  BP(mean): 114 (12 Apr 2021 16:00) (114 - 114)  RR: 18 (13 Apr 2021 11:02) (18 - 20)  SpO2: 97% (13 Apr 2021 11:02) (97% - 100%)    GENERAL EXAM:  Constitutional: awake and alert. NAD.    NEUROLOGICAL EXAM:  MS: Alert, eyes open spontaneously. Oriented to self, age, location, month, year. Speech is clear, fluent. Follows commands.  CN: PERRL. VFF. EOMI. V1-V3 intact. Face symmetric. Tongue midline.   Motor: All extremities antigravity.   Sensory: Intact to LT throughout.  Coordination: No dysmetria on FNF or on HTS bilaterally.  Gait: Deferred.    Labs:   cbc                      10.8   5.28  )-----------( 310      ( 11 Apr 2021 17:51 )             31.9     [id number]    135  |  97<L>  |  27<H>  ----------------------------<  124<H>  4.1   |  28  |  1.13    Ca    9.0      11 Apr 2021 17:51  Phos  3.1     04-11  Mg     2.5     04-11    Cardiac Markers: CARDIAC MARKERS ( 11 Apr 2021 17:51 )  x     / x     / x     / x     / <1.0 ng/mL    Radiology:  -04/03 CTH: No displaced calvarial fracture or acute intracranial hemorrhage.  -04/03 Cervical spine CT: No acute fracture. Cervical spondylosis. Heterogeneous thyromegaly.  -04/11 CTH: No hydrocephalus, acute intracranial hemorrhage, mass effect, or brain edema. Moderate to severe white matter microvascular ischemic disease.      EEG SUMMARY/CLASSIFICATION    Abnormal EEG in the awake, drowsy and asleep states.  - Moderate generalized slowing with GRDA   _____________________________________________________________  EEG IMPRESSION/CLINICAL CORRELATE    Abnormal EEG study.  1. Moderate nonspecific diffuse or multifocal cerebral dysfunction.   2. No epileptiform pattern or seizure seen.      _____________________________________________________________    Serjio Galeano   Epilepsy Fellow  [facility]    Epilepsy Reading Room Ph# [phone]  Epilepsy Answering Service after 5 pm and before 8:30 am: # [phone]

## 2021-04-14 RX ADMIN — Medication 25 MILLIGRAM(S): at 05:35

## 2021-04-14 RX ADMIN — ISOSORBIDE MONONITRATE 30 MILLIGRAM(S): 60 TABLET, EXTENDED RELEASE ORAL at 11:00

## 2021-04-14 RX ADMIN — OXCARBAZEPINE 300 MILLIGRAM(S): 300 TABLET, FILM COATED ORAL at 17:10

## 2021-04-14 RX ADMIN — OXCARBAZEPINE 300 MILLIGRAM(S): 300 TABLET, FILM COATED ORAL at 05:35

## 2021-04-14 RX ADMIN — LISINOPRIL 40 MILLIGRAM(S): 2.5 TABLET ORAL at 05:35

## 2021-04-14 RX ADMIN — AMLODIPINE BESYLATE 10 MILLIGRAM(S): 2.5 TABLET ORAL at 05:35

## 2021-04-14 RX ADMIN — LIDOCAINE 1 PATCH: 4 CREAM TOPICAL at 09:08

## 2021-04-14 RX ADMIN — OXCARBAZEPINE 150 MILLIGRAM(S): 300 TABLET, FILM COATED ORAL at 20:09

## 2021-04-14 RX ADMIN — Medication 25 MILLIGRAM(S): at 05:36

## 2021-04-14 RX ADMIN — ENOXAPARIN SODIUM 40 MILLIGRAM(S): 100 INJECTION SUBCUTANEOUS at 11:00

## 2021-04-14 RX ADMIN — LIDOCAINE 1 PATCH: 4 CREAM TOPICAL at 06:23

## 2021-04-14 NOTE — PROGRESS NOTE ADULT - ASSESSMENT
Assessment: 76 yo female with a PMHx of HTN, HLD, and seizures who presented to [facility] on 04/04 after being found down. RRT called today for episode of emesis followed by unresponsiveness for a few minutes. EEG with moderate slowing      -C/W Trileptal  300 am 450 qhs  -No further inpt w/u

## 2021-04-14 NOTE — PROGRESS NOTE ADULT - SUBJECTIVE AND OBJECTIVE BOX
Ulises Luevano MD  Interventional Cardiology / Endovascular Specialist  [place] Office : [phone] [place], [place] [address]. [address]  Tel:   [place] Office : [phone] [facility] [place] [address]  Tel: [phone]  Cell : [phone]    Subjective/Overnight events: Patient lying in bed comfortably. No acute distress.  	  MEDICATIONS:  amLODIPine   Tablet 10 milliGRAM(s) Oral daily  enoxaparin Injectable 40 milliGRAM(s) SubCutaneous daily  hydrochlorothiazide 25 milliGRAM(s) Oral daily  isosorbide   mononitrate ER Tablet (IMDUR) 30 milliGRAM(s) Oral daily  lisinopril 40 milliGRAM(s) Oral daily        acetaminophen   Tablet .. 650 milliGRAM(s) Oral every 6 hours PRN  meclizine 25 milliGRAM(s) Oral three times a day PRN  OXcarbazepine 300 milliGRAM(s) Oral two times a day  OXcarbazepine 150 milliGRAM(s) Oral <User Schedule>  traMADol 25 milliGRAM(s) Oral once        lidocaine   Patch 1 Patch Transdermal every 24 hours      PAST MEDICAL/SURGICAL HISTORY  PAST MEDICAL & SURGICAL HISTORY:  Hypertension    Seizure    Hyperlipidemia    Diverticulosis    Borderline diabetes    Obesity    No Past Surgical History    History of appendectomy        SOCIAL HISTORY: Substance Use (street drugs): ( x ) never used  (  ) other:    FAMILY HISTORY:  Family history of cerebrovascular accident (CVA) (Sibling)        REVIEW OF SYSTEMS:  CONSTITUTIONAL: No fever, weight loss, or fatigue  EYES: No eye pain, visual disturbances, or discharge  ENMT:  No difficulty hearing, tinnitus, vertigo; No sinus or throat pain  BREASTS: No pain, masses, or nipple discharge  GASTROINTESTINAL: No abdominal or epigastric pain. No nausea, vomiting, or hematemesis; No diarrhea or constipation. No melena or hematochezia.  GENITOURINARY: No dysuria, frequency, hematuria, or incontinence  NEUROLOGICAL: No headaches, memory loss, loss of strength, numbness, or tremors  ENDOCRINE: No heat or cold intolerance; No hair loss  MUSCULOSKELETAL: No joint pain or swelling; No muscle, back, or extremity pain  PSYCHIATRIC: No depression, anxiety, mood swings, or difficulty sleeping  HEME/LYMPH: No easy bruising, or bleeding gums  All others negative    PHYSICAL EXAM:  T(C): 36.8 (04-14-21 @ 10:58), Max: 36.8 (04-13-21 @ 21:28)  HR: 58 (04-14-21 @ 10:58) (55 - 58)  BP: 137/65 (04-14-21 @ 10:58) (108/51 - 162/74)  RR: 16 (04-14-21 @ 10:58) (16 - 18)  SpO2: 100% (04-14-21 @ 10:58) (95% - 100%)  Wt(kg): --  I&O's Summary    13 Apr 2021 07:01  -  14 Apr 2021 07:00  --------------------------------------------------------  IN: 0 mL / OUT: 300 mL / NET: -300 mL    14 Apr 2021 07:01  -  14 Apr 2021 11:55  --------------------------------------------------------  IN: 480 mL / OUT: 0 mL / NET: 480 mL          GENERAL: NAD  EYES: EOMI, PERRLA, conjunctiva and sclera clear  ENMT: No tonsillar erythema, exudates, or enlargement;  Cardiovascular: Normal S1 S2, No JVD, No murmurs, No edema  Respiratory: Lungs clear to auscultation	  Gastrointestinal:  Soft, Non-tender, + BS	  Extremities: no edema                    proBNP:   Lipid Profile:   HgA1c:   TSH:     Consultant(s) Notes Reviewed:  [x ] YES  [ ] NO    Care Discussed with Consultants/Other Providers [ x] YES  [ ] NO    Imaging Personally Reviewed independently:  [x] YES  [ ] NO    All labs, radiologic studies, vitals, orders and medications list reviewed. Patient is seen and examined at bedside. Case discussed with medical team.

## 2021-04-14 NOTE — PROGRESS NOTE ADULT - SUBJECTIVE AND OBJECTIVE BOX
late note entry   SUBJECTIVE / OVERNIGHT EVENTS: pt seen and examined    MEDICATIONS  (STANDING):  amLODIPine   Tablet 10 milliGRAM(s) Oral daily  enoxaparin Injectable 40 milliGRAM(s) SubCutaneous daily  hydrochlorothiazide 25 milliGRAM(s) Oral daily  isosorbide   mononitrate ER Tablet (IMDUR) 30 milliGRAM(s) Oral daily  lidocaine   Patch 1 Patch Transdermal every 24 hours  lisinopril 40 milliGRAM(s) Oral daily  OXcarbazepine 300 milliGRAM(s) Oral two times a day  OXcarbazepine 150 milliGRAM(s) Oral <User Schedule>  traMADol 25 milliGRAM(s) Oral once    MEDICATIONS  (PRN):  acetaminophen   Tablet .. 650 milliGRAM(s) Oral every 6 hours PRN Temp greater or equal to 38C (100.4F), Mild Pain (1 - 3), Moderate Pain (4 - 6)  meclizine 25 milliGRAM(s) Oral three times a day PRN Dizziness    Vital Signs Last 24 Hrs  T(C): 36.8 (13 Apr 2021 21:28), Max: 36.8 (13 Apr 2021 05:20)  T(F): 98.2 (13 Apr 2021 21:28), Max: 98.2 (13 Apr 2021 05:20)  HR: 55 (13 Apr 2021 21:28) (55 - 58)  BP: 162/74 (13 Apr 2021 21:28) (108/51 - 166/72)  BP(mean): --  RR: 18 (13 Apr 2021 21:28) (18 - 18)  SpO2: 95% (13 Apr 2021 21:28) (95% - 99%)    Constitutional: No fever, fatigue  Skin: No rash.  Eyes: No recent vision problems or eye pain.  ENT: No congestion, ear pain, or sore throat.  Cardiovascular: No chest pain or palpation.  Respiratory: No cough, shortness of breath, congestion, or wheezing.  Gastrointestinal: No abdominal pain, nausea, vomiting, or diarrhea.  Genitourinary: No dysuria.  Musculoskeletal: No joint swelling.  Neurologic: No headache.    PHYSICAL EXAM:  GENERAL: NAD  EYES: EOMI, PERRLA  NECK: Supple, No JVD  CHEST/LUNG: dec breath sounds rt base  HEART:  S1 , S2 +  ABDOMEN: soft , bs+  EXTREMITIES:  no edema  NEUROLOGY:alert awake    LABS:  04-12    134<L>  |  98  |  28<H>  ----------------------------<  106<H>  4.0   |  26  |  1.06    Ca    9.1      12 Apr 2021 06:59  Phos  3.1     04-12  Mg     2.5     04-12      Creatinine Trend: 1.06 <--, 1.13 <--, 0.97 <--, 0.97 <--, 0.91 <--, 1.00 <--                        10.9   5.31  )-----------( 285      ( 12 Apr 2021 06:59 )             32.0     Urine Studies:

## 2021-04-14 NOTE — PROGRESS NOTE ADULT - SUBJECTIVE AND OBJECTIVE BOX
[id number]    Subjective: 77 yo female seen and examined at bedside. episode of LOC    PAST MEDICAL & SURGICAL HISTORY:  Hypertension    Seizure    Hyperlipidemia    Diverticulosis    Borderline diabetes    Obesity    No Past Surgical History    History of appendectomy    FAMILY HISTORY:  Family history of cerebrovascular accident (CVA) (Sibling)    Social Hx:  Nonsmoker, no drug or alcohol use    MEDICATIONS  (STANDING):  amLODIPine   Tablet 10 milliGRAM(s) Oral daily  enoxaparin Injectable 40 milliGRAM(s) SubCutaneous daily  hydrochlorothiazide 25 milliGRAM(s) Oral daily  isosorbide   mononitrate ER Tablet (IMDUR) 30 milliGRAM(s) Oral daily  lidocaine   Patch 1 Patch Transdermal every 24 hours  lisinopril 40 milliGRAM(s) Oral daily  OXcarbazepine 300 milliGRAM(s) Oral two times a day  OXcarbazepine 150 milliGRAM(s) Oral <User Schedule>  traMADol 25 milliGRAM(s) Oral once    MEDICATIONS  (PRN):  acetaminophen   Tablet .. 650 milliGRAM(s) Oral every 6 hours PRN Temp greater or equal to 38C (100.4F), Mild Pain (1 - 3), Moderate Pain (4 - 6)  meclizine 25 milliGRAM(s) Oral three times a day PRN Dizziness          Allergies  allergy to [place] (Swelling)  No Known Drug Allergies  se[place] (Unknown)    Intolerances  lactose (Stomach Upset)    ROS: Pertinent positives above, all other ROS were reviewed and are negative.      Vital Signs Last 24 Hrs  T(C): 36.8 (14 Apr 2021 10:58), Max: 36.8 (13 Apr 2021 21:28)  T(F): 98.2 (14 Apr 2021 10:58), Max: 98.2 (13 Apr 2021 21:28)  HR: 58 (14 Apr 2021 10:58) (55 - 58)  BP: 137/65 (14 Apr 2021 10:58) (108/51 - 162/74)  BP(mean): --  RR: 16 (14 Apr 2021 10:58) (16 - 18)  SpO2: 100% (14 Apr 2021 10:58) (95% - 100%)  GENERAL EXAM:  Constitutional: awake and alert. NAD.    NEUROLOGICAL EXAM:  MS: Alert, eyes open spontaneously. Oriented to self, age, location, month, year. Speech is clear, fluent. Follows commands.  CN: PERRL. VFF. EOMI. V1-V3 intact. Face symmetric. Tongue midline.   Motor: All extremities antigravity.   Sensory: Intact to LT throughout.  Coordination: No dysmetria on FNF or on HTS bilaterally.  Gait: Deferred.    Labs:   cbc                      10.8   5.28  )-----------( 310      ( 11 Apr 2021 17:51 )             31.9     [id number]    135  |  97<L>  |  27<H>  ----------------------------<  124<H>  4.1   |  28  |  1.13    Ca    9.0      11 Apr 2021 17:51  Phos  3.1     04-11  Mg     2.5     04-11    Cardiac Markers: CARDIAC MARKERS ( 11 Apr 2021 17:51 )  x     / x     / x     / x     / <1.0 ng/mL    Radiology:  -04/03 CTH: No displaced calvarial fracture or acute intracranial hemorrhage.  -04/03 Cervical spine CT: No acute fracture. Cervical spondylosis. Heterogeneous thyromegaly.  -04/11 CTH: No hydrocephalus, acute intracranial hemorrhage, mass effect, or brain edema. Moderate to severe white matter microvascular ischemic disease.      EEG SUMMARY/CLASSIFICATION    Abnormal EEG in the awake, drowsy and asleep states.  - Moderate generalized slowing with GRDA   _____________________________________________________________  EEG IMPRESSION/CLINICAL CORRELATE    Abnormal EEG study.  1. Moderate nonspecific diffuse or multifocal cerebral dysfunction.   2. No epileptiform pattern or seizure seen.      _____________________________________________________________    Serjio Galeano   Epilepsy Fellow  [facility]    Epilepsy Reading Room Ph# [phone]  Epilepsy Answering Service after 5 pm and before 8:30 am: Ph# [phone]

## 2021-04-14 NOTE — PROGRESS NOTE ADULT - ASSESSMENT
Assessment and Plan    74 y/o F with HTN, seizures presents after fall.     EKG: NSR with poor R wave progression    1. s/p fall  -CT head neg  -orthostatic positive s/p IVF. slightly improving  -continue to monitor orthostatics  -found to have abnormal stress test in 2019--  -s/p [facility] this admission with non obstructive CAD    2. HTN  -improving  -c/w lisinopril, HCTz  25mg qd,  norvasc 10mg daily & imdur 30mg daily   -continue to monitor BP    3. AMS  -improved today  -?seizure   -CT head with no acute pathologies  -on EEG  -f/u neuro

## 2021-04-15 LAB
ANION GAP SERPL CALC-SCNC: 9 MMOL/L — SIGNIFICANT CHANGE UP (ref 7–14)
BUN SERPL-MCNC: 28 MG/DL — HIGH (ref 7–23)
CALCIUM SERPL-MCNC: 8.8 MG/DL — SIGNIFICANT CHANGE UP (ref 8.4–10.5)
CHLORIDE SERPL-SCNC: 97 MMOL/L — LOW (ref 98–107)
CO2 SERPL-SCNC: 27 MMOL/L — SIGNIFICANT CHANGE UP (ref 22–31)
CREAT SERPL-MCNC: 1.15 MG/DL — SIGNIFICANT CHANGE UP (ref 0.5–1.3)
GLUCOSE SERPL-MCNC: 105 MG/DL — HIGH (ref 70–99)
HCT VFR BLD CALC: 32.1 % — LOW (ref 34.5–45)
HGB BLD-MCNC: 10.8 G/DL — LOW (ref 11.5–15.5)
MCHC RBC-ENTMCNC: 23.2 PG — LOW (ref 27–34)
MCHC RBC-ENTMCNC: 33.6 GM/DL — SIGNIFICANT CHANGE UP (ref 32–36)
MCV RBC AUTO: 69 FL — LOW (ref 80–100)
NRBC # BLD: 0 /100 WBCS — SIGNIFICANT CHANGE UP
NRBC # FLD: 0 K/UL — SIGNIFICANT CHANGE UP
PLATELET # BLD AUTO: 286 K/UL — SIGNIFICANT CHANGE UP (ref 150–400)
POTASSIUM SERPL-MCNC: 3.8 MMOL/L — SIGNIFICANT CHANGE UP (ref 3.5–5.3)
POTASSIUM SERPL-SCNC: 3.8 MMOL/L — SIGNIFICANT CHANGE UP (ref 3.5–5.3)
RBC # BLD: 4.65 M/UL — SIGNIFICANT CHANGE UP (ref 3.8–5.2)
RBC # FLD: 16.3 % — HIGH (ref 10.3–14.5)
SODIUM SERPL-SCNC: 133 MMOL/L — LOW (ref 135–145)
WBC # BLD: 4.43 K/UL — SIGNIFICANT CHANGE UP (ref 3.8–10.5)
WBC # FLD AUTO: 4.43 K/UL — SIGNIFICANT CHANGE UP (ref 3.8–10.5)

## 2021-04-15 PROCEDURE — 99233 SBSQ HOSP IP/OBS HIGH 50: CPT

## 2021-04-15 RX ORDER — ACETAMINOPHEN 500 MG
1000 TABLET ORAL EVERY 6 HOURS
Refills: 0 | Status: DISCONTINUED | OUTPATIENT
Start: 2021-04-15 | End: 2021-04-23

## 2021-04-15 RX ORDER — ONDANSETRON 8 MG/1
4 TABLET, FILM COATED ORAL EVERY 6 HOURS
Refills: 0 | Status: DISCONTINUED | OUTPATIENT
Start: 2021-04-15 | End: 2021-04-23

## 2021-04-15 RX ADMIN — ISOSORBIDE MONONITRATE 30 MILLIGRAM(S): 60 TABLET, EXTENDED RELEASE ORAL at 11:00

## 2021-04-15 RX ADMIN — Medication 1000 MILLIGRAM(S): at 11:00

## 2021-04-15 RX ADMIN — OXCARBAZEPINE 150 MILLIGRAM(S): 300 TABLET, FILM COATED ORAL at 20:46

## 2021-04-15 RX ADMIN — OXCARBAZEPINE 300 MILLIGRAM(S): 300 TABLET, FILM COATED ORAL at 17:14

## 2021-04-15 RX ADMIN — Medication 25 MILLIGRAM(S): at 05:47

## 2021-04-15 RX ADMIN — OXCARBAZEPINE 300 MILLIGRAM(S): 300 TABLET, FILM COATED ORAL at 05:47

## 2021-04-15 RX ADMIN — Medication 1000 MILLIGRAM(S): at 11:55

## 2021-04-15 RX ADMIN — LISINOPRIL 40 MILLIGRAM(S): 2.5 TABLET ORAL at 05:47

## 2021-04-15 RX ADMIN — ENOXAPARIN SODIUM 40 MILLIGRAM(S): 100 INJECTION SUBCUTANEOUS at 11:00

## 2021-04-15 RX ADMIN — AMLODIPINE BESYLATE 10 MILLIGRAM(S): 2.5 TABLET ORAL at 05:47

## 2021-04-15 NOTE — CONSULT NOTE ADULT - ATTENDING COMMENTS
agree with above   Orthostatics +  will give more fluid   Abnormal stress in the past will plan for cath once orthostatics -paul
76 yo female with a PMHx of HTN, HLD, and seizures who presented to [facility] on 04/04 after being found on the floor. RRT called on 4/11/21 for episode of emesis followed by unresponsiveness for a few minutes. EEG with moderate slowing. EKG and telemetry shows sinus rhythm with HR 40s-60s. Has + orthostatics. Patient had episode of vomiting with bradycardia today. Bradycardia likely vagally mediated as there is p-p prolongation. Patient was not on telemetry during prior episode of unresponsiveness. Reviewing of telemetry shows no pacing indications. Discussed with patient and grand daughter regarding ILR implantation for long term monitoring. Risks/benefits and alternatives of the procedure discussed with patient and family. All questions answered. Will follow.

## 2021-04-15 NOTE — PROGRESS NOTE ADULT - ASSESSMENT
Assessment and Plan    74 y/o F with HTN, seizures presents after fall.     EKG: NSR with poor R wave progression    1. s/p fall  -CT head neg  -orthostatic positive s/p IVF. slightly improving  -found to have abnormal stress test in 2019--  -s/p [facility] this admission with non obstructive CAD    2. HTN  -improving  -c/w lisinopril, HCTz  25mg qd,  norvasc 10mg daily & imdur 30mg daily   -continue to monitor BP    3. AMS  -improved today  -CT head with no acute pathologies  -EEG with moderate slowing, appreciate neuro recs

## 2021-04-15 NOTE — CONSULT NOTE ADULT - SUBJECTIVE AND OBJECTIVE BOX
CHIEF COMPLAINT:  Called to evaluate patient with fall, possible syncope    HISTORY OF PRESENT ILLNESS:  74 y/o F with history of HTN, HLD, seizures, and DM presented after fall.  Patient reports that she rolled out of bed around 1 am.  She fell, striking head on the floor.  She states she was unable to stand back up, and crawled around her house.  He family became concerned when they did not hear from her and found her on the floor.  Pt reports pain in upper midline back, lower abdomen, knees, and R wrist after falling.  She reports no recent illness.  No recent fevers, chills, cough, dysuria, n/v/d.  She reports 3 prior similar events of falling out of bed and having trouble standing back up.  She normally ambulates with a cane and states she does not have difficulty getting around her home and neighborhood. CT head/cervical spine/chest/pelvis revealed no fracture and patient was noted to have + orthostatics and is on IVF. LHC on  revealed luminal irregularities. Echocardiogram shows normal LV function and mild diastolic dysfunction. Patient had RRT on  for emesis followed by unresponsiveness, suspected seizure, recovered w/o treatment,  Neurology was consulted. EEG showed no seizure activity and moderate slowing. No further neurology work up planned. EP was called to evaluate for possible syncope. EKG on admission revealed sinus rhythm with HR 60 bpm, narrow qrs,       PAST MEDICAL & SURGICAL HISTORY:  Hypertension  Seizure  Hyperlipidemia  Diverticulosis  Borderline diabetes  Obesity    Past Surgical History  History of appendectomy    PREVIOUS DIAGNOSTIC TESTING:    Echocardiogram:  from: Transthoracic Echocardiogram (21 @ 18:49)   DIMENSIONS:  Dimensions:     Normal Values:  LA:     4.5 cm    2.0 - 4.0 cm  Ao:     3.2 cm    2.0 - 3.8 cm  SEPTUM: 1.2 cm    0.6 - 1.2 cm  PWT:    1.2 cm    0.6 - 1.1 cm  LVIDd:  5.6 cm    3.0 - 5.6 cm  LVIDs:  3.8 cm    1.8 - 4.0 cm  Derived Variables:  LVMI: 132 g/m2  RWT: 0.42  Fractional short: 32 %  Ejection Fraction (Visual Estimate): 50-55 %  Ejection Fraction (Teicholtz): 60 %  ------------------------------------------------------------------------  OBSERVATIONS:  Mitral Valve: Mitral annular calcification, otherwise  normal mitral valve. Minimal mitral regurgitation.  Aortic Root: Normal aortic root.  Aortic Valve: Aortic valve not well visualized; appears  calcified. Mild-moderate aortic regurgitation.Pressure  halftime about 800 msec, but VC 0.36 cm.  Left Atrium: Mildly dilated left atrium.  LA volume index =  39 cc/m2.  Left Ventricle: Endocardium not well visualized; grossly  normal left ventricular systolic function. No obvious  regional wall motion abnormalities. Normal left ventricular  internal dimensions and wall thicknesses. Mild diastolic  dysfunction (Stage I).  Right Heart: Normal right atrium. The right ventricle is  not well visualized; grossly normal right ventricular  systolic function. Normal tricuspid valve. Mild tricuspid  regurgitation. Pulmonic valve not well visualized.  Pericardium/PleuraNormal pericardium with trace pericardial  effusion.  Hemodynamic: Estimated right ventricular systolic pressure  equals 37 mm Hg, assuming right atrial pressure equals 10  mm Hg, consistent with borderline pulmonary hypertension.  ------------------------------------------------------------------------  CONCLUSIONS:  1. Mitral annular calcification, otherwise normal mitral  valve. Minimal mitral regurgitation.  2. Aortic valve not well visualized; appears calcified.  Mild-moderate aortic regurgitation.Pressure halftime about  800 msec, but VC 0.36 cm.  3. Endocardium not well visualized; grossly normal left  ventricular systolic function.  4. Mild diastolic dysfunction (Stage I).  5. The right ventricle is not well visualized; grossly  normal right ventricular systolic function.  *** Comparedwith echocardiogram of 2019, no  significant changes noted.   Catheterization:   from: Cardiac Cath Lab - Adult (21 @ 10:02)   VENTRICLES: No left ventriculogram was performed.  CORONARY VESSELS: The coronary circulation is right dominant.  LM:   --  LM: Normal.  LAD:   --  LAD: Angiography showed mild atherosclerosis with no flow  limiting lesions.  CX:   --  Circumflex: Angiography showed mild atherosclerosis with no flow  limiting lesions.  RCA:   --  RCA: Angiography showed mild atherosclerosis with no flow  limiting lesions.  COMPLICATIONS: There were no complications.  DIAGNOSTIC RECOMMENDATIONS: non obstructive CAD  Prepared and signed by  Ulises Luevano M.D.  Signed 2021 12:55:20  HEMODYNAMIC TABLES  Pressures:  Baseline  Pressures:  - HR: 52  Pressures:  - Rhythm:  Pressures:  -- Aortic Pressure (S/D/M): 101/58/77  Outputs:  Baseline  Outputs:  -- OUTPUTS: O2 consumption: 264.70  Outputs:  -- OUTPUTS: Vo2 Indexed: 125.00  Outputs:  NO PHASE  Outputs:  -- CALCULATIONS[age]  Outputs:  -- CALCULATIONS: Body Surface Area: 2.12  Outputs:  -- CALCULATIONS: Height in cm: 165.00  Outputs:  -- CALCULATIONS: Sex: Female  Outputs:  -- CALCULATIONS: Weight in k.60  Outputs:  -- OUTPUTS: O2 consumption: 264.70  Outputs:  -- OUTPUTS: Vo2 Indexed: 125.00    Stress Test:     from: Nuclear Stress Test-Pharmacologic (19 @ 13:32)   NUCLEAR FINDINGS:  The left ventricle was markdely dilated at baseline. There  are small, mild to moderate defects in anteroseptal,  apical walls that arepartially reversible consistent with  mild anteroseptal ischemia with focal apical scarring  (infarct). The LV time activity curve suggested diastolic  dysfunction.  ------------------------------------------------------------------------  GATED ANALYSIS:  Post-stress gated wall motion analysis was performed (LVEF  = 47 %;LVEDV = 123 ml.), revealing mild overall  hypokinesis.There was focal apical akinesis. The remaining  segments contract well.  ------------------------------------------------------------------------  IMPRESSIONS:Abnormal Study  * Myocardial Perfusion SPECT results are abnormal.  * The left ventricle was markdely dilated at baseline.  There are small, mild to moderate defects in anteroseptal,  apical walls that are partiallyreversible consistent with  mild anteroseptal ischemia with focal apical scarring  (infarct). The LV time activity curve suggested diastolic  dysfunction.  * Post-stress gated wall motion analysis was performed  (LVEF = 47 %;LVEDV = 123 ml.), revealing mild overall  hypokinesis.There was focal apical akinesis. The remaining  segments contract well.      MEDICATIONS:  amLODIPine   Tablet 10 milliGRAM(s) Oral daily  enoxaparin Injectable 40 milliGRAM(s) SubCutaneous daily  hydrochlorothiazide 25 milliGRAM(s) Oral daily  isosorbide   mononitrate ER Tablet (IMDUR) 30 milliGRAM(s) Oral daily  lisinopril 40 milliGRAM(s) Oral daily  acetaminophen   Tablet .. 1000 milliGRAM(s) Oral every 6 hours PRN  meclizine 25 milliGRAM(s) Oral three times a day PRN  ondansetron Injectable 4 milliGRAM(s) IV Push every 6 hours PRN  OXcarbazepine 300 milliGRAM(s) Oral two times a day  OXcarbazepine 150 milliGRAM(s) Oral <User Schedule>  lidocaine   Patch 1 Patch Transdermal every 24 hours      FAMILY HISTORY:  Family history of cerebrovascular accident (CVA) (Sibling)    SOCIAL HISTORY:      [-] Smoker  [-] Alcohol  [-] Drugs    Allergies    allergy to Lobster (Swelling)  No Known Drug Allergies  seaford (Unknown)    Intolerances    lactose (Stomach Upset)  	    REVIEW OF SYSTEMS:  CONSTITUTIONAL: No fever, weight loss, or fatigue  EYES: No eye pain, visual disturbances, or discharge  ENMT:  No difficulty hearing, tinnitus, vertigo; No sinus or throat pain  NECK: No pain or stiffness  RESPIRATORY: No cough, wheezing, chills or hemoptysis; No Shortness of Breath  CARDIOVASCULAR: No chest pain, palpitations, passing out, dizziness, or leg swelling  GASTROINTESTINAL: No abdominal or epigastric pain. No nausea, vomiting, or hematemesis; No diarrhea or constipation. No melena or hematochezia.  GENITOURINARY: No dysuria, frequency, hematuria, or incontinence  NEUROLOGICAL: No headaches, memory loss, loss of strength, numbness, or tremors  SKIN: No itching, burning, rashes, or lesions   LYMPH Nodes: No enlarged glands  ENDOCRINE: No heat or cold intolerance; No hair loss  MUSCULOSKELETAL: No joint pain or swelling; No muscle, back, or extremity pain  PSYCHIATRIC: No depression, anxiety, mood swings, or difficulty sleeping  HEME/LYMPH: No easy bruising, or bleeding gums  ALLERY AND IMMUNOLOGIC: No hives or eczema	    [X] All others negative	  [ ] Unable to obtain    PHYSICAL EXAM:  T(C): 36.7 (04-15-21 @ 16:30), Max: 37.2 (21 @ 21:07)  HR: 57 (04-15-21 @ 16:30) (52 - 60)  BP: 122/64 (04-15-21 @ 16:30) (121/60 - 170/63)  RR: 17 (04-15-21 @ 16:30) (17 - 18)  SpO2: 100% (04-15-21 @ 16:30) (97% - 100%)  Wt(kg): --  I&O's Summary    2021 07:  -  15 Apr 2021 07:00  --------------------------------------------------------  IN: 720 mL / OUT: 1375 mL / NET: -655 mL    15 Apr 2021 07:  -  15 Apr 2021 17:41  --------------------------------------------------------  IN: 480 mL / OUT: 375 mL / NET: 105 mL        Appearance: Normal	  HEENT:   Normal oral mucosa, PERRL, EOMI	  Lymphatic: No lymphadenopathy  Cardiovascular: Normal S1 S2, No JVD, No murmurs, No edema  Respiratory: Lungs clear to auscultation	  Psychiatry: A & O x 3, Mood & affect appropriate  Gastrointestinal:  Soft, Non-tender, + BS	  Skin: No rashes, No ecchymoses, No cyanosis	  Neurologic: Non-focal  Extremities: Normal range of motion, No clubbing, cyanosis or edema  Vascular: Peripheral pulses palpable 2+ bilaterally    TELEMETRY:     ECG:  Sinus rhythm with HR 63 bpm; HERMINIA 170 ms; Qrs 98 ms; QT/QTc 446 ms/456ms  RADIOLOGY:  OTHER: 	  	  LABS:	 	    CARDIAC MARKERS:                          10.8   4.43  )-----------( 286      ( 15 Apr 2021 06:35 )             32.1     04-15    133<L>  |  97<L>  |  28<H>  ----------------------------<  105<H>  3.8   |  27  |  1.15    Ca    8.8      15 Apr 2021 06:35      TSH: pending         CHIEF COMPLAINT:  Called to evaluate patient with fall, possible syncope    HISTORY OF PRESENT ILLNESS:  76 y/o F with history of HTN, HLD, seizures, and DM presented after fall.  Patient reports that she rolled out of bed and she fell, striking head on the floor. She states she was unable to stand back up, and crawled around her house.  Her family became concerned when they did not hear from her and found her on the floor. Patient reports pain in the upper midline back, lower abdomen, knees, and R wrist after falling. She reports no recent illness, no recent fevers, chills, cough, dysuria, n/v/d. She normally ambulates with a cane and states she does not have difficulty getting around her home and neighborhood. CT head/cervical spine/chest/pelvis revealed no fracture and patient was noted to have + orthostatics and is on IVF. LHC on  revealed luminal irregularities. Echocardiogram shows normal LV function and mild diastolic dysfunction. Patient had RRT on  for emesis followed by unresponsiveness, suspected seizure, recovered w/o treatment.  Neurology was consulted. EEG showed no seizure activity and moderate slowing. No further neurology work up planned. EP was called to evaluate for possible syncope. EKG on admission revealed sinus rhythm with HR 63 bpm, narrow qrs. Telemetry revealed sinus rhythm with HR 50s-60s and 40s during sleeping hours. She was not on telemetry during the episode of unresponsiveness. Patient had one episode of vomiting today with HR as low as 40 bpm. She denies any prior history of syncope or near syncope. Family reports that patient most likely was dehydrated on the day of admission. Patient reports that her BP medications were increased since admission. Currently patient denies any chest pain, SOB, palpitations or dizziness. Granddaughter is also at the bedside.      PAST MEDICAL & SURGICAL HISTORY:  Hypertension  Seizure  Hyperlipidemia  Diverticulosis  Borderline diabetes  Obesity    Past Surgical History  History of appendectomy    PREVIOUS DIAGNOSTIC TESTING:    Echocardiogram:  from: Transthoracic Echocardiogram (21 @ 18:49)   DIMENSIONS:  Dimensions:     Normal Values:  LA:     4.5 cm    2.0 - 4.0 cm  Ao:     3.2 cm    2.0 - 3.8 cm  SEPTUM: 1.2 cm    0.6 - 1.2 cm  PWT:    1.2 cm    0.6 - 1.1 cm  LVIDd:  5.6 cm    3.0 - 5.6 cm  LVIDs:  3.8 cm    1.8 - 4.0 cm  Derived Variables:  LVMI: 132 g/m2  RWT: 0.42  Fractional short: 32 %  Ejection Fraction (Visual Estimate): 50-55 %  Ejection Fraction (Teicholtz): 60 %  ------------------------------------------------------------------------  OBSERVATIONS:  Mitral Valve: Mitral annular calcification, otherwise  normal mitral valve. Minimal mitral regurgitation.  Aortic Root: Normal aortic root.  Aortic Valve: Aortic valve not well visualized; appears  calcified. Mild-moderate aortic regurgitation.Pressure  halftime about 800 msec, but VC 0.36 cm.  Left Atrium: Mildly dilated left atrium.  LA volume index =  39 cc/m2.  Left Ventricle: Endocardium not well visualized; grossly  normal left ventricular systolic function. No obvious  regional wall motion abnormalities. Normal left ventricular  internal dimensions and wall thicknesses. Mild diastolic  dysfunction (Stage I).  Right Heart: Normal right atrium. The right ventricle is  not well visualized; grossly normal right ventricular  systolic function. Normal tricuspid valve. Mild tricuspid  regurgitation. Pulmonic valve not well visualized.  Pericardium/PleuraNormal pericardium with trace pericardial  effusion.  Hemodynamic: Estimated right ventricular systolic pressure  equals 37 mm Hg, assuming right atrial pressure equals 10  mm Hg, consistent with borderline pulmonary hypertension.  ------------------------------------------------------------------------  CONCLUSIONS:  1. Mitral annular calcification, otherwise normal mitral  valve. Minimal mitral regurgitation.  2. Aortic valve not well visualized; appears calcified.  Mild-moderate aortic regurgitation.Pressure halftime about  800 msec, but VC 0.36 cm.  3. Endocardium not well visualized; grossly normal left  ventricular systolic function.  4. Mild diastolic dysfunction (Stage I).  5. The right ventricle is not well visualized; grossly  normal right ventricular systolic function.  *** Comparedwith echocardiogram of 2019, no  significant changes noted.   Catheterization:   from: Cardiac Cath Lab - Adult (21 @ 10:02)   VENTRICLES: No left ventriculogram was performed.  CORONARY VESSELS: The coronary circulation is right dominant.  LM:   --  LM: Normal.  LAD:   --  LAD: Angiography showed mild atherosclerosis with no flow  limiting lesions.  CX:   --  Circumflex: Angiography showed mild atherosclerosis with no flow  limiting lesions.  RCA:   --  RCA: Angiography showed mild atherosclerosis with no flow  limiting lesions.  COMPLICATIONS: There were no complications.  DIAGNOSTIC RECOMMENDATIONS: non obstructive CAD  Prepared and signed by  Ulises Luevano M.D.  Signed 2021 12:55:20  HEMODYNAMIC TABLES  Pressures:  Baseline  Pressures:  - HR: 52  Pressures:  - Rhythm:  Pressures:  -- Aortic Pressure (S/D/M): 101/58/77  Outputs:  Baseline  Outputs:  -- OUTPUTS: O2 consumption: 264.70  Outputs:  -- OUTPUTS: Vo2 Indexed: 125.00  Outputs:  NO PHASE  Outputs:  -- CALCULATIONS[age]  Outputs:  -- CALCULATIONS: Body Surface Area: 2.12  Outputs:  -- CALCULATIONS: Height in cm: 165.00  Outputs:  -- CALCULATIONS: Sex: Female  Outputs:  -- CALCULATIONS: Weight in k.60  Outputs:  -- OUTPUTS: O2 consumption: 264.70  Outputs:  -- OUTPUTS: Vo2 Indexed: 125.00    Stress Test:     from: Nuclear Stress Test-Pharmacologic (19 @ 13:32)   NUCLEAR FINDINGS:  The left ventricle was markdely dilated at baseline. There  are small, mild to moderate defects in anteroseptal,  apical walls that arepartially reversible consistent with  mild anteroseptal ischemia with focal apical scarring  (infarct). The LV time activity curve suggested diastolic  dysfunction.  ------------------------------------------------------------------------  GATED ANALYSIS:  Post-stress gated wall motion analysis was performed (LVEF  = 47 %;LVEDV = 123 ml.), revealing mild overall  hypokinesis.There was focal apical akinesis. The remaining  segments contract well.  ------------------------------------------------------------------------  IMPRESSIONS:Abnormal Study  * Myocardial Perfusion SPECT results are abnormal.  * The left ventricle was markdely dilated at baseline.  There are small, mild to moderate defects in anteroseptal,  apical walls that are partiallyreversible consistent with  mild anteroseptal ischemia with focal apical scarring  (infarct). The LV time activity curve suggested diastolic  dysfunction.  * Post-stress gated wall motion analysis was performed  (LVEF = 47 %;LVEDV = 123 ml.), revealing mild overall  hypokinesis.There was focal apical akinesis. The remaining  segments contract well.      MEDICATIONS:  amLODIPine   Tablet 10 milliGRAM(s) Oral daily  enoxaparin Injectable 40 milliGRAM(s) SubCutaneous daily  hydrochlorothiazide 25 milliGRAM(s) Oral daily  isosorbide   mononitrate ER Tablet (IMDUR) 30 milliGRAM(s) Oral daily  lisinopril 40 milliGRAM(s) Oral daily  acetaminophen   Tablet .. 1000 milliGRAM(s) Oral every 6 hours PRN  meclizine 25 milliGRAM(s) Oral three times a day PRN  ondansetron Injectable 4 milliGRAM(s) IV Push every 6 hours PRN  OXcarbazepine 300 milliGRAM(s) Oral two times a day  OXcarbazepine 150 milliGRAM(s) Oral <User Schedule>  lidocaine   Patch 1 Patch Transdermal every 24 hours      FAMILY HISTORY:  Family history of cerebrovascular accident (CVA) (Sibling)    SOCIAL HISTORY:      [-] Smoker  [-] Alcohol  [-] Drugs    Allergies    allergy to Lobster (Swelling)  No Known Drug Allergies  seaford (Unknown)    Intolerances    lactose (Stomach Upset)  	    REVIEW OF SYSTEMS:  CONSTITUTIONAL: No fever, weight loss, or fatigue  EYES: No eye pain, visual disturbances, or discharge  ENMT:  No difficulty hearing, tinnitus, vertigo; No sinus or throat pain  NECK: No pain or stiffness  RESPIRATORY: No cough, wheezing, chills or hemoptysis; No Shortness of Breath  CARDIOVASCULAR: No chest pain, palpitations, dizziness, or leg swelling, + syncope  GASTROINTESTINAL: No abdominal or epigastric pain. No nausea, or hematemesis; No diarrhea or constipation. No melena or hematochezia. + vomiting  GENITOURINARY: No dysuria, frequency, hematuria, or incontinence  NEUROLOGICAL: No headaches, memory loss, loss of strength, numbness, or tremors  SKIN: No itching, burning, rashes, or lesions   LYMPH Nodes: No enlarged glands  ENDOCRINE: No heat or cold intolerance; No hair loss  MUSCULOSKELETAL: No joint pain or swelling; No muscle, back, or extremity pain  PSYCHIATRIC: No depression, anxiety, mood swings, or difficulty sleeping  HEME/LYMPH: No easy bruising, or bleeding gums  ALLERY AND IMMUNOLOGIC: No hives or eczema	    [X] All others negative	  [ ] Unable to obtain    PHYSICAL EXAM:  T(C): 36.7 (04-15-21 @ 16:30), Max: 37.2 (21 @ 21:07)  HR: 57 (04-15-21 @ 16:30) (52 - 60)  BP: 122/64 (04-15-21 @ 16:30) (121/60 - 170/63)  RR: 17 (04-15-21 @ 16:30) (17 - 18)  SpO2: 100% (04-15-21 @ 16:30) (97% - 100%)  Wt(kg): --  I&O's Summary    2021 07:  -  15 Apr 2021 07:00  --------------------------------------------------------  IN: 720 mL / OUT: 1375 mL / NET: -655 mL    15 Apr 2021 07:  -  15 Apr 2021 17:41  --------------------------------------------------------  IN: 480 mL / OUT: 375 mL / NET: 105 mL        Appearance: Normal	  HEENT:   Normal oral mucosa, PERRL, EOMI	  Lymphatic: No lymphadenopathy  Cardiovascular: Normal S1 S2, No JVD, No murmurs, No edema  Respiratory: Lungs clear to auscultation	  Psychiatry: A & O x 3, Mood & affect appropriate  Gastrointestinal:  Soft, Non-tender, + BS	  Skin: No rashes, No ecchymoses, No cyanosis	  Neurologic: Non-focal  Extremities: Normal range of motion, No clubbing, cyanosis or edema  Vascular: Peripheral pulses palpable 2+ bilaterally    TELEMETRY:   Sinus rhythm with HR 40s-60s;   ECG:  Sinus rhythm with HR 63 bpm; HERMINIA 170 ms; Qrs 98 ms; QT/QTc 446 ms/456ms  RADIOLOGY:  OTHER: 	  	  LABS:	 	    CARDIAC MARKERS:                          10.8   4.43  )-----------( 286      ( 15 Apr 2021 06:35 )             32.1     04-15    133<L>  |  97<L>  |  28<H>  ----------------------------<  105<H>  3.8   |  27  |  1.15    Ca    8.8      15 Apr 2021 06:35      TSH: pending

## 2021-04-15 NOTE — PROGRESS NOTE ADULT - ASSESSMENT
Assessment: 74 yo female with a PMHx of HTN, HLD, and seizures who presented to [facility] on 04/04 after being found down. RRT called today for episode of emesis followed by unresponsiveness for a few minutes. EEG with moderate slowing      -C/W Trileptal  300 am 450 qhs  -No further inpt w/u

## 2021-04-15 NOTE — PROGRESS NOTE ADULT - SUBJECTIVE AND OBJECTIVE BOX
SUBJECTIVE / OVERNIGHT EVENTS: pt seen and examined  pt had episode of bradycardia after pt felt nauseous  pt at baseline mental status    MEDICATIONS  (STANDING):  amLODIPine   Tablet 10 milliGRAM(s) Oral daily  enoxaparin Injectable 40 milliGRAM(s) SubCutaneous daily  hydrochlorothiazide 25 milliGRAM(s) Oral daily  isosorbide   mononitrate ER Tablet (IMDUR) 30 milliGRAM(s) Oral daily  lidocaine   Patch 1 Patch Transdermal every 24 hours  lisinopril 40 milliGRAM(s) Oral daily  OXcarbazepine 300 milliGRAM(s) Oral two times a day  OXcarbazepine 150 milliGRAM(s) Oral <User Schedule>    MEDICATIONS  (PRN):  acetaminophen   Tablet .. 1000 milliGRAM(s) Oral every 6 hours PRN Mild Pain (1 - 3), Moderate Pain (4 - 6), Severe Pain (7 - 10)  meclizine 25 milliGRAM(s) Oral three times a day PRN Dizziness  ondansetron Injectable 4 milliGRAM(s) IV Push every 6 hours PRN Nausea and/or Vomiting    Vital Signs Last 24 Hrs  T(C): 36.6 (04-15-21 @ 20:00), Max: 36.9 (04-15-21 @ 00:38)  T(F): 97.8 (04-15-21 @ 20:00), Max: 98.5 (04-15-21 @ 00:38)  HR: 58 (04-15-21 @ 20:00) (52 - 60)  BP: 122/60 (04-15-21 @ 20:00) (121/60 - 170/63)  RR: 17 (04-15-21 @ 20:00) (17 - 18)  SpO2: 100% (04-15-21 @ 20:00) (100% - 100%)  Wt(kg): --    Constitutional: No fever, fatigue  Skin: No rash.  Eyes: No recent vision problems or eye pain.  ENT: No congestion, ear pain, or sore throat.  Cardiovascular: No chest pain or palpation.  Respiratory: No cough, shortness of breath, congestion, or wheezing.  Gastrointestinal: No abdominal pain, nausea, vomiting, or diarrhea.  Genitourinary: No dysuria.  Musculoskeletal: No joint swelling.  Neurologic: No headache.    PHYSICAL EXAM:  GENERAL: NAD  EYES: EOMI, PERRLA  NECK: Supple, No JVD  CHEST/LUNG: dec breath sounds rt base  HEART:  S1 , S2 +  ABDOMEN: soft , bs+  EXTREMITIES:  no edema  NEUROLOGY:alert awake    LABS:  04-15    133<L>  |  97<L>  |  28<H>  ----------------------------<  105<H>  3.8   |  27  |  1.15    Ca    8.8      15 Apr 2021 06:35      Creatinine Trend: 1.15 <--, 1.06 <--, 1.13 <--, 0.97 <--, 0.97 <--                        10.8   4.43  )-----------( 286      ( 15 Apr 2021 06:35 )             32.1     Urine Studies:                    Urine Studies:

## 2021-04-15 NOTE — CONSULT NOTE ADULT - ASSESSMENT
Assessment and Plan    76 y/o F with HTN, seizures presents after fall.     EKG: NSR with poor R wave progression    1. s/p fall  -CT head neg  -orthostatic positive s/p IVF  -continue to monitor orthostatics  -found to have abnormal stress test in 2019--patient did not have any cardiac angiogram performed. echo fro 2019 with mild LV dysfunction. will get repeat echo this admission and will do ischemic eval once more optimized    2. HTN  -currently elevated  -resume home BP meds  continue to monitor BP    3. DVT prophylaxis  -lovenox subq
77 y/o F with HTN, seizures presents after fall. Patient does not recall event. PE grossly no-focal CTH-    Continue with Trileptal  Outpatient routine EEG as patient back to baseline  physical therapy  Orthostatic positive Cardiology is following  Can follow up with Neurology, Dr. Jamal Barney at [phone] 
74 yo female with a PMHx of HTN, HLD, and seizures who presented to [facility] on 04/04 after being found on the floor. RRT called on 4/11/21 for episode of emesis followed by unresponsiveness for a few minutes. EEG with moderate slowing. EKG and telemetry shows sinus rhythm with HR 40s-60s. Has + orthostatics. Patient had episode of vomiting with bradycardia today. Bradycardia likely vagally mediated as there is p-p prolongation. Patient was not on telemetry during prior episode of unresponsiveness. Reviewing of telemetry shows no pacing indications. Discussed with patient and grand daughter regarding ILR implantation for long term monitoring. Risks/benefits and alternatives of the procedure discussed with patient and family. All questions answered.  - Continue to monitor on telemetry  - Check TFTs  - Monitor and maintain electrolytes  - Plan for possible ILR implantation if patient/family agreeable  - Discussed with Dr. Mendenhall  - Will follow up

## 2021-04-15 NOTE — CHART NOTE - NSCHARTNOTEFT_GEN_A_CORE
Notified by RN, Pt with 1 episode of emesis after eating with associated bradycardia to 38 on tele monitor during episode. Pt seen and examined. Pt sitting in the chair, not in acute distress. Pt states she became nauseous and vomited immediately after eating chicken. Denies any dizziness, chest or epigastric pain, and/or abdominal pain. Last BM yesterday. Pt still with feeling of nausea, no further vomiting. Tele monitor reviewed, NSR, HR 55.    GEN: Axox4, not in acute distress  Resp: B/L Clear  Cardiovascular- Normal, S1, S2, no murmur.  Abdomen: Round soft Non-distended, +BS  Ext: HYLTON x 4, No edema    A/P: 76 y/o F with HTN, HLD, DM, seizures presents after fall, found to have Orthostasis, s/p IVF, slightly improved. Pt with bradycardia, associated N/V likely 2/2 Vasovagal response.   -Discussed w/ Dr. Magallanes, obtain CT A/P w/o cont.  -will obtain EP eval (Dr. Magallanes will call).   -Anti-emetics prn nausea  -continue to monitor

## 2021-04-15 NOTE — PROGRESS NOTE ADULT - SUBJECTIVE AND OBJECTIVE BOX
[id number]    Subjective: 77 yo female seen and examined at bedside. episode of LOC    PAST MEDICAL & SURGICAL HISTORY:  Hypertension    Seizure    Hyperlipidemia    Diverticulosis    Borderline diabetes    Obesity    No Past Surgical History    History of appendectomy    FAMILY HISTORY:  Family history of cerebrovascular accident (CVA) (Sibling)    Social Hx:  Nonsmoker, no drug or alcohol use    MEDICATIONS  (STANDING):  amLODIPine   Tablet 10 milliGRAM(s) Oral daily  enoxaparin Injectable 40 milliGRAM(s) SubCutaneous daily  hydrochlorothiazide 25 milliGRAM(s) Oral daily  isosorbide   mononitrate ER Tablet (IMDUR) 30 milliGRAM(s) Oral daily  lidocaine   Patch 1 Patch Transdermal every 24 hours  lisinopril 40 milliGRAM(s) Oral daily  OXcarbazepine 300 milliGRAM(s) Oral two times a day  OXcarbazepine 150 milliGRAM(s) Oral <User Schedule>    MEDICATIONS  (PRN):  acetaminophen   Tablet .. 1000 milliGRAM(s) Oral every 6 hours PRN Mild Pain (1 - 3), Moderate Pain (4 - 6), Severe Pain (7 - 10)  meclizine 25 milliGRAM(s) Oral three times a day PRN Dizziness            Allergies  allergy to Lobster (Swelling)  No Known Drug Allergies  seaford (Unknown)    Intolerances  lactose (Stomach Upset)    ROS: Pertinent positives above, all other ROS were reviewed and are negative.      Vital Signs Last 24 Hrs  T(C): 36.8 (14 Apr 2021 10:58), Max: 36.8 (13 Apr 2021 21:28)  T(F): 98.2 (14 Apr 2021 10:58), Max: 98.2 (13 Apr 2021 21:28)  HR: 58 (14 Apr 2021 10:58) (55 - 58)  BP: 137/65 (14 Apr 2021 10:58) (108/51 - 162/74)  BP(mean): --  RR: 16 (14 Apr 2021 10:58) (16 - 18)  SpO2: 100% (14 Apr 2021 10:58) (95% - 100%)  GENERAL EXAM:  Constitutional: awake and alert. NAD.    NEUROLOGICAL EXAM:  MS: Alert, eyes open spontaneously. Oriented to self, age, location, month, year. Speech is clear, fluent. Follows commands.  CN: PERRL. VFF. EOMI. V1-V3 intact. Face symmetric. Tongue midline.   Motor: All extremities antigravity.   Sensory: Intact to LT throughout.  Coordination: No dysmetria on FNF or on HTS bilaterally.  Gait: Deferred.      Radiology:  -04/03 CTH: No displaced calvarial fracture or acute intracranial hemorrhage.  -04/03 Cervical spine CT: No acute fracture. Cervical spondylosis. Heterogeneous thyromegaly.  -04/11 CTH: No hydrocephalus, acute intracranial hemorrhage, mass effect, or brain edema. Moderate to severe white matter microvascular ischemic disease.      EEG SUMMARY/CLASSIFICATION    Abnormal EEG in the awake, drowsy and asleep states.  - Moderate generalized slowing with GRDA   _____________________________________________________________  EEG IMPRESSION/CLINICAL CORRELATE    Abnormal EEG study.  1. Moderate nonspecific diffuse or multifocal cerebral dysfunction.   2. No epileptiform pattern or seizure seen.      _____________________________________________________________    Serjio Galeano   Epilepsy Fellow  [facility]    Epilepsy Reading Room Ph# [phone]  Epilepsy Answering Service after 5 pm and before 8:30 am: Ph# [phone]

## 2021-04-15 NOTE — PROGRESS NOTE ADULT - SUBJECTIVE AND OBJECTIVE BOX
Ulises Luevano MD  Interventional Cardiology / Endovascular Specialist  [place] Office : [phone] [place], [place] [address]  Tel:   [place] Office : [address] [facility] [place] [address]  Tel: [phone]  Cell : [phone]    Subjective/Overnight events: Patient lying in bed comfortably. No acute distress. Denies chest pain, SOB or palpitations  	  MEDICATIONS:  amLODIPine   Tablet 10 milliGRAM(s) Oral daily  enoxaparin Injectable 40 milliGRAM(s) SubCutaneous daily  hydrochlorothiazide 25 milliGRAM(s) Oral daily  isosorbide   mononitrate ER Tablet (IMDUR) 30 milliGRAM(s) Oral daily  lisinopril 40 milliGRAM(s) Oral daily        acetaminophen   Tablet .. 1000 milliGRAM(s) Oral every 6 hours PRN  meclizine 25 milliGRAM(s) Oral three times a day PRN  OXcarbazepine 300 milliGRAM(s) Oral two times a day  OXcarbazepine 150 milliGRAM(s) Oral <User Schedule>        lidocaine   Patch 1 Patch Transdermal every 24 hours      PAST MEDICAL/SURGICAL HISTORY  PAST MEDICAL & SURGICAL HISTORY:  Hypertension    Seizure    Hyperlipidemia    Diverticulosis    Borderline diabetes    Obesity    No Past Surgical History    History of appendectomy        SOCIAL HISTORY: Substance Use (street drugs): ( x ) never used  (  ) other:    FAMILY HISTORY:  Family history of cerebrovascular accident (CVA) (Sibling)        REVIEW OF SYSTEMS:  CONSTITUTIONAL: No fever, weight loss, or fatigue  EYES: No eye pain, visual disturbances, or discharge  ENMT:  No difficulty hearing, tinnitus, vertigo; No sinus or throat pain  BREASTS: No pain, masses, or nipple discharge  GASTROINTESTINAL: No abdominal or epigastric pain. No nausea, vomiting, or hematemesis; No diarrhea or constipation. No melena or hematochezia.  GENITOURINARY: No dysuria, frequency, hematuria, or incontinence  NEUROLOGICAL: No headaches, memory loss, loss of strength, numbness, or tremors  ENDOCRINE: No heat or cold intolerance; No hair loss  MUSCULOSKELETAL: No joint pain or swelling; No muscle, back, or extremity pain  PSYCHIATRIC: No depression, anxiety, mood swings, or difficulty sleeping  HEME/LYMPH: No easy bruising, or bleeding gums  All others negative    PHYSICAL EXAM:  T(C): 36.6 (04-15-21 @ 10:58), Max: 37.2 (04-14-21 @ 21:07)  HR: 57 (04-15-21 @ 10:58) (52 - 60)  BP: 129/68 (04-15-21 @ 10:58) (121/60 - 170/63)  RR: 17 (04-15-21 @ 10:58) (17 - 18)  SpO2: 100% (04-15-21 @ 10:58) (97% - 100%)  Wt(kg): --  I&O's Summary    14 Apr 2021 07:01  -  15 Apr 2021 07:00  --------------------------------------------------------  IN: 720 mL / OUT: 1375 mL / NET: -655 mL        GENERAL: NAD  EYES: EOMI, PERRLA, conjunctiva and sclera clear  ENMT: No tonsillar erythema, exudates, or enlargement;  Cardiovascular: Normal S1 S2, No JVD, No murmurs, No edema  Respiratory: Lungs clear to auscultation	  Gastrointestinal:  Soft, Non-tender, + BS	  Extremities: no edema                                          10.8   4.43  )-----------( 286      ( 15 Apr 2021 06:35 )             32.1     04-15    133<L>  |  97<L>  |  28<H>  ----------------------------<  105<H>  3.8   |  27  |  1.15    Ca    8.8      15 Apr 2021 06:35      proBNP:   Lipid Profile:   HgA1c:   TSH:     Consultant(s) Notes Reviewed:  [x ] YES  [ ] NO    Care Discussed with Consultants/Other Providers [ x] YES  [ ] NO    Imaging Personally Reviewed independently:  [x] YES  [ ] NO    All labs, radiologic studies, vitals, orders and medications list reviewed. Patient is seen and examined at bedside. Case discussed with medical team.

## 2021-04-16 LAB
ANION GAP SERPL CALC-SCNC: 11 MMOL/L — SIGNIFICANT CHANGE UP (ref 7–14)
BUN SERPL-MCNC: 39 MG/DL — HIGH (ref 7–23)
CALCIUM SERPL-MCNC: 8.9 MG/DL — SIGNIFICANT CHANGE UP (ref 8.4–10.5)
CHLORIDE SERPL-SCNC: 94 MMOL/L — LOW (ref 98–107)
CO2 SERPL-SCNC: 28 MMOL/L — SIGNIFICANT CHANGE UP (ref 22–31)
CREAT SERPL-MCNC: 1.21 MG/DL — SIGNIFICANT CHANGE UP (ref 0.5–1.3)
GLUCOSE BLDC GLUCOMTR-MCNC: 155 MG/DL — HIGH (ref 70–99)
GLUCOSE SERPL-MCNC: 106 MG/DL — HIGH (ref 70–99)
HCT VFR BLD CALC: 31.2 % — LOW (ref 34.5–45)
HGB BLD-MCNC: 10.6 G/DL — LOW (ref 11.5–15.5)
MCHC RBC-ENTMCNC: 23.2 PG — LOW (ref 27–34)
MCHC RBC-ENTMCNC: 34 GM/DL — SIGNIFICANT CHANGE UP (ref 32–36)
MCV RBC AUTO: 68.3 FL — LOW (ref 80–100)
NRBC # BLD: 0 /100 WBCS — SIGNIFICANT CHANGE UP
NRBC # FLD: 0 K/UL — SIGNIFICANT CHANGE UP
OB PNL STL: NEGATIVE — SIGNIFICANT CHANGE UP
PLATELET # BLD AUTO: 299 K/UL — SIGNIFICANT CHANGE UP (ref 150–400)
POTASSIUM SERPL-MCNC: 3.6 MMOL/L — SIGNIFICANT CHANGE UP (ref 3.5–5.3)
POTASSIUM SERPL-SCNC: 3.6 MMOL/L — SIGNIFICANT CHANGE UP (ref 3.5–5.3)
PROCALCITONIN SERPL-MCNC: 0.14 NG/ML — HIGH (ref 0.02–0.1)
RBC # BLD: 4.57 M/UL — SIGNIFICANT CHANGE UP (ref 3.8–5.2)
RBC # FLD: 16.3 % — HIGH (ref 10.3–14.5)
SODIUM SERPL-SCNC: 133 MMOL/L — LOW (ref 135–145)
WBC # BLD: 4.5 K/UL — SIGNIFICANT CHANGE UP (ref 3.8–10.5)
WBC # FLD AUTO: 4.5 K/UL — SIGNIFICANT CHANGE UP (ref 3.8–10.5)

## 2021-04-16 PROCEDURE — 99232 SBSQ HOSP IP/OBS MODERATE 35: CPT

## 2021-04-16 RX ADMIN — AMLODIPINE BESYLATE 10 MILLIGRAM(S): 2.5 TABLET ORAL at 04:15

## 2021-04-16 RX ADMIN — OXCARBAZEPINE 300 MILLIGRAM(S): 300 TABLET, FILM COATED ORAL at 04:15

## 2021-04-16 RX ADMIN — Medication 25 MILLIGRAM(S): at 04:15

## 2021-04-16 RX ADMIN — Medication 1000 MILLIGRAM(S): at 13:00

## 2021-04-16 RX ADMIN — Medication 1000 MILLIGRAM(S): at 13:30

## 2021-04-16 RX ADMIN — OXCARBAZEPINE 150 MILLIGRAM(S): 300 TABLET, FILM COATED ORAL at 22:23

## 2021-04-16 RX ADMIN — OXCARBAZEPINE 300 MILLIGRAM(S): 300 TABLET, FILM COATED ORAL at 22:22

## 2021-04-16 RX ADMIN — ENOXAPARIN SODIUM 40 MILLIGRAM(S): 100 INJECTION SUBCUTANEOUS at 13:19

## 2021-04-16 RX ADMIN — LISINOPRIL 40 MILLIGRAM(S): 2.5 TABLET ORAL at 04:15

## 2021-04-16 RX ADMIN — ISOSORBIDE MONONITRATE 30 MILLIGRAM(S): 60 TABLET, EXTENDED RELEASE ORAL at 13:19

## 2021-04-16 RX ADMIN — ONDANSETRON 4 MILLIGRAM(S): 8 TABLET, FILM COATED ORAL at 13:19

## 2021-04-16 RX ADMIN — LIDOCAINE 1 PATCH: 4 CREAM TOPICAL at 22:23

## 2021-04-16 NOTE — RAPID RESPONSE TEAM SUMMARY - NSSITUATIONBACKGROUNDRRT_GEN_ALL_CORE
76 y/o F with HTN, HLD, seizures, and DM presents after fall, admitted for syncope work-up. RRT was called as patient had pre-syncopal episode. Per nursing staff, patient walked to toilet off the tele and had BM, started to feel lightheaded, dizzy, diaphoretic. Unclear if patient syncopy (pt can't recall, per nursing staff no syncope or fall/head trauma). Patient transferred to bed. Found to have narrow complex bradycardia at HR of 40s, SBP in 110, SpO2 mid 90s. HR spontaneously improved to 50s and patient's mental status improved, opened up her eye, answering questions appropriately, yet feeling diaphoretic and scared. Per EP note, patient's tele showed baseline sinus bradycardia in 50-60s with going down to 40s while asleep. Most likely vasovagal stimulation during defecation leading to symptomatic bradycardia. Nurse will page primary team, gave MAR spectra number so can update primary team with plan once contacted. Pt will benefit from re-eval of EP given episodes of likely symptomatic bradycardia.   76 y/o F with HTN, HLD, seizures, and DM presents after fall, admitted for syncope work-up. RRT was called as patient had pre-syncopal episode. Per nursing staff, patient walked to toilet off the tele and had BM, started to feel lightheaded, dizzy, diaphoretic. Unclear if patient syncopy (pt can't recall, per nursing staff no syncope or fall/head trauma). Patient transferred to bed. Found to have narrow complex bradycardia at HR of 40s, SBP in 110, SpO2 mid 90s. HR spontaneously improved to 50s and patient's mental status improved, opened up her eye, answering questions appropriately, yet feeling diaphoretic and scared. Per EP note, patient's tele showed baseline sinus bradycardia in 50-60s with going down to 40s while asleep. Most likely vasovagal stimulation during defecation leading to symptomatic bradycardia. Nurse will page primary team, gave MAR spectra number so can update primary team with plan once contacted. Pt will benefit from re-eval of EP given episodes of likely symptomatic bradycardia. Asked nurse to bring bedside commode and having tele lead on at all time.

## 2021-04-16 NOTE — CHART NOTE - NSCHARTNOTEFT_GEN_A_CORE
Called to see patient presented with abd pain 5/10. with nausea and vomited x1.  Patient had margarita bowel movement this morning.  She ws nauseous this morning and did not have her breakfast.  Patient denies fever, sob, cough, dizziness.    Vital Signs Last 24 Hrs  T(C): 36.6 (16 Apr 2021 13:33), Max: 36.7 (15 Apr 2021 16:30)  T(F): 97.8 (16 Apr 2021 13:33), Max: 98.1 (15 Apr 2021 16:30)  HR: 52 (16 Apr 2021 13:33) (52 - 61)  BP: 102/77 (16 Apr 2021 13:33) (102/77 - 166/66)  BP(mean): --  RR: 22 (16 Apr 2021 13:33) (17 - 22)  SpO2: 99% (16 Apr 2021 13:33) (98% - 100%)  CM:  bradycardia intermittently    Lung- clear to auscaltatin, Cor- S1, S2 regular, Abd- Called to see patient presented with abd pain 5/10. with nausea and vomited x1.  Patient had margarita bowel movement this morning.  She ws nauseous this morning and did not have her breakfast.  Patient denies fever, sob, cough, dizziness.    Vital Signs Last 24 Hrs  T(C): 36.6 (16 Apr 2021 13:33), Max: 36.7 (15 Apr 2021 16:30)  T(F): 97.8 (16 Apr 2021 13:33), Max: 98.1 (15 Apr 2021 16:30)  HR: 52 (16 Apr 2021 13:33) (52 - 61)  BP: 102/77 (16 Apr 2021 13:33) (102/77 - 166/66)  BP(mean): --  RR: 22 (16 Apr 2021 13:33) (17 - 22)  SpO2: 99% (16 Apr 2021 13:33) (98% - 100%)  CM:  bradycardia intermittently    Lung- clear to auscaltation, Cor- S1, S2 regular, Abd- BS +, soft Mild tenderness Low abd area,  minimal stool palpated, Quaiac done, sent to lab  Ext- Pulses equal both arms and lower extremeties.  MS- A ox3                        10.6   4.50  )-----------( 299      ( 16 Apr 2021 08:03 )             31.2     16 Apr 2021 08:03    133    |  94     |  39     ----------------------------<  106    3.6     |  28     |  1.21     Ca    8.9        16 Apr 2021 08:03          CAPILLARY BLOOD GLUCOSE      POCT Blood Glucose.: 155 mg/dL (16 Apr 2021 12:59)    A/P-  Abd pain- r/o constipation, r/o Mesenteric ischemia CT abd with contrast with angio ordered, Procalcitonin & lactate.   intermittent Bradycardia- Loop recorder today by HAYLEE GOETZ Called to see patient presented with abd pain 5/10. with nausea and vomited x1.  Patient had margarita bowel movement this morning.  She ws nauseous this morning and did not have her breakfast.  Patient denies fever, sob, cough, dizziness.    Vital Signs Last 24 Hrs  T(C): 36.6 (16 Apr 2021 13:33), Max: 36.7 (15 Apr 2021 16:30)  T(F): 97.8 (16 Apr 2021 13:33), Max: 98.1 (15 Apr 2021 16:30)  HR: 52 (16 Apr 2021 13:33) (52 - 61)  BP: 102/77 (16 Apr 2021 13:33) (102/77 - 166/66)  BP(mean): --  RR: 22 (16 Apr 2021 13:33) (17 - 22)  SpO2: 99% (16 Apr 2021 13:33) (98% - 100%)  CM:  bradycardia intermittently    Lung- clear to auscaltation, Cor- S1, S2 regular, Abd- BS +, soft Mild tenderness Low abd area,  minimal stool palpated, Quaiac done, sent to lab  Ext- Pulses equal both arms and lower extremeties.  MS- A ox3                        10.6   4.50  )-----------( 299      ( 16 Apr 2021 08:03 )             31.2     16 Apr 2021 08:03    133    |  94     |  39     ----------------------------<  106    3.6     |  28     |  1.21     Ca    8.9        16 Apr 2021 08:03    CAPILLARY BLOOD GLUCOSE  POCT Blood Glucose.: 155 mg/dL (16 Apr 2021 12:59)  EKG- Rate- 58, first degreee AV block     A/P-  Abd pain- r/o constipation, r/o Mesenteric ischemia CT abd with contrast with angio ordered, Procalcitonin & lactate.   intermittent Bradycardia- Loop recorder today by HAYLEE GOETZ

## 2021-04-16 NOTE — PROGRESS NOTE ADULT - SUBJECTIVE AND OBJECTIVE BOX
SUBJECTIVE / OVERNIGHT EVENTS: pt seen and examined  tele with Pauses+    MEDICATIONS  (STANDING):  amLODIPine   Tablet 10 milliGRAM(s) Oral daily  enoxaparin Injectable 40 milliGRAM(s) SubCutaneous daily  hydrochlorothiazide 25 milliGRAM(s) Oral daily  isosorbide   mononitrate ER Tablet (IMDUR) 30 milliGRAM(s) Oral daily  lidocaine   Patch 1 Patch Transdermal every 24 hours  lisinopril 40 milliGRAM(s) Oral daily  OXcarbazepine 300 milliGRAM(s) Oral two times a day  OXcarbazepine 150 milliGRAM(s) Oral <User Schedule>    MEDICATIONS  (PRN):  acetaminophen   Tablet .. 1000 milliGRAM(s) Oral every 6 hours PRN Mild Pain (1 - 3), Moderate Pain (4 - 6), Severe Pain (7 - 10)  meclizine 25 milliGRAM(s) Oral three times a day PRN Dizziness  ondansetron Injectable 4 milliGRAM(s) IV Push every 6 hours PRN Nausea and/or Vomiting    Vital Signs Last 24 Hrs  T(C): 36.8 (16 Apr 2021 19:34), Max: 36.8 (16 Apr 2021 19:34)  T(F): 98.3 (16 Apr 2021 19:34), Max: 98.3 (16 Apr 2021 19:34)  HR: 66 (16 Apr 2021 19:34) (52 - 66)  BP: 160/65 (16 Apr 2021 19:34) (102/77 - 194/89)  BP(mean): --  RR: 18 (16 Apr 2021 19:34) (17 - 22)  SpO2: 100% (16 Apr 2021 19:34) (98% - 100%)    Constitutional: No fever, fatigue  Skin: No rash.  Eyes: No recent vision problems or eye pain.  ENT: No congestion, ear pain, or sore throat.  Cardiovascular: No chest pain or palpation.  Respiratory: No cough, shortness of breath, congestion, or wheezing.  Gastrointestinal: No abdominal pain, nausea, vomiting, or diarrhea.  Genitourinary: No dysuria.  Musculoskeletal: No joint swelling.  Neurologic: No headache.    PHYSICAL EXAM:  GENERAL: NAD  EYES: EOMI, PERRLA  NECK: Supple, No JVD  CHEST/LUNG: dec breath sounds rt base  HEART:  S1 , S2 +  ABDOMEN: soft , bs+  EXTREMITIES:  no edema  NEUROLOGY:alert awake    LABS:  04-16    133<L>  |  94<L>  |  39<H>  ----------------------------<  106<H>  3.6   |  28  |  1.21    Ca    8.9      16 Apr 2021 08:03      Creatinine Trend: 1.21 <--, 1.15 <--, 1.06 <--, 1.13 <--, 0.97 <--                        10.6   4.50  )-----------( 299      ( 16 Apr 2021 08:03 )             31.2     Urine Studies:                    Urine Studies:

## 2021-04-16 NOTE — PROGRESS NOTE ADULT - SUBJECTIVE AND OBJECTIVE BOX
[id number]    Subjective: 75 yo female seen and examined at bedside.     PAST MEDICAL & SURGICAL HISTORY:  Hypertension    Seizure    Hyperlipidemia    Diverticulosis    Borderline diabetes    Obesity    No Past Surgical History    History of appendectomy    FAMILY HISTORY:  Family history of cerebrovascular accident (CVA) (Sibling)    Social Hx:  Nonsmoker, no drug or alcohol use    MEDICATIONS  (STANDING):  amLODIPine   Tablet 10 milliGRAM(s) Oral daily  enoxaparin Injectable 40 milliGRAM(s) SubCutaneous daily  hydrochlorothiazide 25 milliGRAM(s) Oral daily  isosorbide   mononitrate ER Tablet (IMDUR) 30 milliGRAM(s) Oral daily  lidocaine   Patch 1 Patch Transdermal every 24 hours  lisinopril 40 milliGRAM(s) Oral daily  OXcarbazepine 300 milliGRAM(s) Oral two times a day  OXcarbazepine 150 milliGRAM(s) Oral <User Schedule>    MEDICATIONS  (PRN):  acetaminophen   Tablet .. 1000 milliGRAM(s) Oral every 6 hours PRN Mild Pain (1 - 3), Moderate Pain (4 - 6), Severe Pain (7 - 10)  meclizine 25 milliGRAM(s) Oral three times a day PRN Dizziness  ondansetron Injectable 4 milliGRAM(s) IV Push every 6 hours PRN Nausea and/or Vomiting          Allergies  allergy to Lobster (Swelling)  No Known Drug Allergies  seaford (Unknown)    Intolerances  lactose (Stomach Upset)    ROS: Pertinent positives above, all other ROS were reviewed and are negative.      Vital Signs Last 24 Hrs  T(C): 36.4 (16 Apr 2021 08:29), Max: 36.7 (15 Apr 2021 16:30)  T(F): 97.5 (16 Apr 2021 08:29), Max: 98.1 (15 Apr 2021 16:30)  HR: 61 (16 Apr 2021 08:29) (53 - 61)  BP: 156/72 (16 Apr 2021 08:29) (122/60 - 166/66)  BP(mean): --  RR: 18 (16 Apr 2021 08:29) (17 - 18)  SpO2: 98% (16 Apr 2021 08:29) (98% - 100%)  GENERAL EXAM:  Constitutional: awake and alert. NAD.    NEUROLOGICAL EXAM:  MS: Alert, eyes open spontaneously. Oriented to self, age, location, month, year. Speech is clear, fluent. Follows commands.  CN: PERRL. VFF. EOMI. V1-V3 intact. Face symmetric. Tongue midline.   Motor: All extremities antigravity.   Sensory: Intact to LT throughout.  Coordination: No dysmetria on FNF or on HTS bilaterally.  Gait: Deferred.      Radiology:  -04/03 CTH: No displaced calvarial fracture or acute intracranial hemorrhage.  -04/03 Cervical spine CT: No acute fracture. Cervical spondylosis. Heterogeneous thyromegaly.  -04/11 CTH: No hydrocephalus, acute intracranial hemorrhage, mass effect, or brain edema. Moderate to severe white matter microvascular ischemic disease.      EEG SUMMARY/CLASSIFICATION    Abnormal EEG in the awake, drowsy and asleep states.  - Moderate generalized slowing with GRDA   _____________________________________________________________  EEG IMPRESSION/CLINICAL CORRELATE    Abnormal EEG study.  1. Moderate nonspecific diffuse or multifocal cerebral dysfunction.   2. No epileptiform pattern or seizure seen.      _____________________________________________________________    Serjio Galeano   Epilepsy Fellow  [facility]    Epilepsy Reading Room Ph# [phone]  Epilepsy Answering Service after 5 pm and before 8:30 am: Ph# [phone]

## 2021-04-16 NOTE — PROGRESS NOTE ADULT - SUBJECTIVE AND OBJECTIVE BOX
Ulises Luevano MD  Interventional Cardiology / Endovascular Specialist  [place] Office : [address] [place] [address] [place] [address]  Tel:   [place] Office : [address] [facility] [place] [address]  Tel: [phone]  Cell : [phone]    Subjective/Overnight events: Patient lying in bed comfortably. No acute distress. Denies chest pain, SOB or palpitations  	  MEDICATIONS:  amLODIPine   Tablet 10 milliGRAM(s) Oral daily  enoxaparin Injectable 40 milliGRAM(s) SubCutaneous daily  hydrochlorothiazide 25 milliGRAM(s) Oral daily  isosorbide   mononitrate ER Tablet (IMDUR) 30 milliGRAM(s) Oral daily  lisinopril 40 milliGRAM(s) Oral daily        acetaminophen   Tablet .. 1000 milliGRAM(s) Oral every 6 hours PRN  meclizine 25 milliGRAM(s) Oral three times a day PRN  ondansetron Injectable 4 milliGRAM(s) IV Push every 6 hours PRN  OXcarbazepine 300 milliGRAM(s) Oral two times a day  OXcarbazepine 150 milliGRAM(s) Oral <User Schedule>        lidocaine   Patch 1 Patch Transdermal every 24 hours      PAST MEDICAL/SURGICAL HISTORY  PAST MEDICAL & SURGICAL HISTORY:  Hypertension    Seizure    Hyperlipidemia    Diverticulosis    Borderline diabetes    Obesity    No Past Surgical History    History of appendectomy        SOCIAL HISTORY: Substance Use (street drugs): ( x ) never used  (  ) other:    FAMILY HISTORY:  Family history of cerebrovascular accident (CVA) (Sibling)        REVIEW OF SYSTEMS:  CONSTITUTIONAL: No fever, weight loss, or fatigue  EYES: No eye pain, visual disturbances, or discharge  ENMT:  No difficulty hearing, tinnitus, vertigo; No sinus or throat pain  BREASTS: No pain, masses, or nipple discharge  GASTROINTESTINAL: No abdominal or epigastric pain. No nausea, vomiting, or hematemesis; No diarrhea or constipation. No melena or hematochezia.  GENITOURINARY: No dysuria, frequency, hematuria, or incontinence  NEUROLOGICAL: No headaches, memory loss, loss of strength, numbness, or tremors  ENDOCRINE: No heat or cold intolerance; No hair loss  MUSCULOSKELETAL: No joint pain or swelling; No muscle, back, or extremity pain  PSYCHIATRIC: No depression, anxiety, mood swings, or difficulty sleeping  HEME/LYMPH: No easy bruising, or bleeding gums  All others negative    PHYSICAL EXAM:  T(C): 36.5 (04-16-21 @ 16:46), Max: 36.6 (04-15-21 @ 20:00)  HR: 60 (04-16-21 @ 16:46) (52 - 61)  BP: 194/89 (04-16-21 @ 16:46) (102/77 - 194/89)  RR: 18 (04-16-21 @ 16:46) (17 - 22)  SpO2: 98% (04-16-21 @ 16:46) (98% - 100%)  Wt(kg): --  I&O's Summary    15 Apr 2021 07:01  -  16 Apr 2021 07:00  --------------------------------------------------------  IN: 840 mL / OUT: 675 mL / NET: 165 mL            GENERAL: NAD  EYES: EOMI, PERRLA, conjunctiva and sclera clear  ENMT: No tonsillar erythema, exudates, or enlargement;  Cardiovascular: Normal S1 S2, No JVD, No murmurs, No edema  Respiratory: Lungs clear to auscultation	  Gastrointestinal:  Soft, suprapubic tenderness , + BS	  Extremities: no edema                                  10.6   4.50  )-----------( 299      ( 16 Apr 2021 08:03 )             31.2     04-16    133<L>  |  94<L>  |  39<H>  ----------------------------<  106<H>  3.6   |  28  |  1.21    Ca    8.9      16 Apr 2021 08:03      proBNP:   Lipid Profile:   HgA1c:   TSH:     Consultant(s) Notes Reviewed:  [x ] YES  [ ] NO    Care Discussed with Consultants/Other Providers [ x] YES  [ ] NO    Imaging Personally Reviewed independently:  [x] YES  [ ] NO    All labs, radiologic studies, vitals, orders and medications list reviewed. Patient is seen and examined at bedside. Case discussed with medical team.

## 2021-04-16 NOTE — PROGRESS NOTE ADULT - ASSESSMENT
74 y/o F with HTN, seizures presents after fall.     EKG: NSR with poor R wave progression    1. s/p fall  -CT head neg  -orthostatic positive s/p IVF. slightly improving  -found to have abnormal stress test in 2019--  -s/p [facility] this admission with non obstructive CAD    2. HTN  -improving  -c/w lisinopril, HCTz  25mg qd,  norvasc 10mg daily & imdur 30mg daily   -continue to monitor BP    3. AMS  - had another episode today   -CT head with no acute pathologies  -EEG with moderate slowing, appreciate neuro recs  - was not on tele , EP onboard planned for possible PPM     4) Abd pain   - CT a/p pending

## 2021-04-16 NOTE — PROGRESS NOTE ADULT - ASSESSMENT
Assessment: 76 yo female with a PMHx of HTN, HLD, and seizures who presented to [facility] on 04/04 after being found down. RRT called today for episode of emesis followed by unresponsiveness for a few minutes. EEG with moderate slowing      -C/W Trileptal  300 am 450 qhs  -No further inpt w/u

## 2021-04-16 NOTE — PROGRESS NOTE ADULT - SUBJECTIVE AND OBJECTIVE BOX
Physical Exam:  General: Alert, NAD, lying in bed  Lung: CTABL no wheezing appreciated  CVS: S1 S2 RRR no rubs, murmur appreciated  Abdomen: +BS, soft NTND    Pulses: +2DP/PT    Plan:   Awaiting ILR implantation. Risks/benefits and alternatives of the procedure explained to the patient and all questions answered. Patient consented for the procedure and consent is placed in the chart       Physical Exam:  General: Alert, NAD, lying in bed  Lung: CTABL no wheezing appreciated  CVS: S1 S2 RRR no rubs, murmur appreciated  Abdomen: +BS, soft NTND    Pulses: +2DP/PT    Plan:   Awaiting ILR implantation. Risks/benefits and alternatives of the procedure explained to the patient and all questions answered. Patient consented for the procedure and consent is placed in the chart    Addendum:  Patient was scheduled for ILR implantation today. Patient had an episode of change in mental status while having BM. She was not on telemetry at the time. Patient's HR noted to be 30s when she was back on tele. Patient most likely had another episode of hypervagotonia causing severe bradycardia. Discussed with Dr. Mendenhall and it was decided that patient would benefit from pacemaker with rate drop response to prevent extreme bradycardia during vagal episodes. Discussed with patient's daughter and she was agreeable. However patient was found to be with acute sharp lower abdominal pain and is awaiting CT abdomen. Discussed with primary team  No PPM implantation today   NPO p [place] on Sunday night for possible PPM implantation on Monday

## 2021-04-17 LAB
ALBUMIN SERPL ELPH-MCNC: 3.4 G/DL — SIGNIFICANT CHANGE UP (ref 3.3–5)
ALP SERPL-CCNC: 128 U/L — HIGH (ref 40–120)
ALT FLD-CCNC: 10 U/L — SIGNIFICANT CHANGE UP (ref 4–33)
ANION GAP SERPL CALC-SCNC: 12 MMOL/L — SIGNIFICANT CHANGE UP (ref 7–14)
AST SERPL-CCNC: 16 U/L — SIGNIFICANT CHANGE UP (ref 4–32)
BILIRUB SERPL-MCNC: 0.3 MG/DL — SIGNIFICANT CHANGE UP (ref 0.2–1.2)
BUN SERPL-MCNC: 37 MG/DL — HIGH (ref 7–23)
CALCIUM SERPL-MCNC: 9.1 MG/DL — SIGNIFICANT CHANGE UP (ref 8.4–10.5)
CHLORIDE SERPL-SCNC: 93 MMOL/L — LOW (ref 98–107)
CO2 SERPL-SCNC: 23 MMOL/L — SIGNIFICANT CHANGE UP (ref 22–31)
CREAT SERPL-MCNC: 1.37 MG/DL — HIGH (ref 0.5–1.3)
GLUCOSE SERPL-MCNC: 107 MG/DL — HIGH (ref 70–99)
HCT VFR BLD CALC: 33.6 % — LOW (ref 34.5–45)
HGB BLD-MCNC: 11.3 G/DL — LOW (ref 11.5–15.5)
MAGNESIUM SERPL-MCNC: 2.8 MG/DL — HIGH (ref 1.6–2.6)
MCHC RBC-ENTMCNC: 23.2 PG — LOW (ref 27–34)
MCHC RBC-ENTMCNC: 33.6 GM/DL — SIGNIFICANT CHANGE UP (ref 32–36)
MCV RBC AUTO: 68.9 FL — LOW (ref 80–100)
NRBC # BLD: 0 /100 WBCS — SIGNIFICANT CHANGE UP
NRBC # FLD: 0 K/UL — SIGNIFICANT CHANGE UP
OSMOLALITY SERPL: 292 MOSM/KG — SIGNIFICANT CHANGE UP (ref 275–295)
PHOSPHATE SERPL-MCNC: 4 MG/DL — SIGNIFICANT CHANGE UP (ref 2.5–4.5)
PLATELET # BLD AUTO: 312 K/UL — SIGNIFICANT CHANGE UP (ref 150–400)
POTASSIUM SERPL-MCNC: 3.8 MMOL/L — SIGNIFICANT CHANGE UP (ref 3.5–5.3)
POTASSIUM SERPL-SCNC: 3.8 MMOL/L — SIGNIFICANT CHANGE UP (ref 3.5–5.3)
PROT SERPL-MCNC: 7.3 G/DL — SIGNIFICANT CHANGE UP (ref 6–8.3)
RBC # BLD: 4.88 M/UL — SIGNIFICANT CHANGE UP (ref 3.8–5.2)
RBC # FLD: 16.4 % — HIGH (ref 10.3–14.5)
SODIUM SERPL-SCNC: 128 MMOL/L — LOW (ref 135–145)
WBC # BLD: 5.81 K/UL — SIGNIFICANT CHANGE UP (ref 3.8–10.5)
WBC # FLD AUTO: 5.81 K/UL — SIGNIFICANT CHANGE UP (ref 3.8–10.5)

## 2021-04-17 PROCEDURE — 74177 CT ABD & PELVIS W/CONTRAST: CPT | Mod: 26

## 2021-04-17 PROCEDURE — 76937 US GUIDE VASCULAR ACCESS: CPT | Mod: 26,59

## 2021-04-17 PROCEDURE — 36569 INSJ PICC 5 YR+ W/O IMAGING: CPT

## 2021-04-17 RX ORDER — SODIUM CHLORIDE 9 MG/ML
500 INJECTION INTRAMUSCULAR; INTRAVENOUS; SUBCUTANEOUS
Refills: 0 | Status: DISCONTINUED | OUTPATIENT
Start: 2021-04-17 | End: 2021-04-17

## 2021-04-17 RX ORDER — SODIUM CHLORIDE 9 MG/ML
500 INJECTION INTRAMUSCULAR; INTRAVENOUS; SUBCUTANEOUS
Refills: 0 | Status: DISCONTINUED | OUTPATIENT
Start: 2021-04-17 | End: 2021-04-22

## 2021-04-17 RX ORDER — SODIUM CHLORIDE 9 MG/ML
1000 INJECTION INTRAMUSCULAR; INTRAVENOUS; SUBCUTANEOUS ONCE
Refills: 0 | Status: COMPLETED | OUTPATIENT
Start: 2021-04-17 | End: 2021-04-17

## 2021-04-17 RX ADMIN — Medication 25 MILLIGRAM(S): at 06:16

## 2021-04-17 RX ADMIN — ISOSORBIDE MONONITRATE 30 MILLIGRAM(S): 60 TABLET, EXTENDED RELEASE ORAL at 11:29

## 2021-04-17 RX ADMIN — LIDOCAINE 1 PATCH: 4 CREAM TOPICAL at 21:11

## 2021-04-17 RX ADMIN — SODIUM CHLORIDE 500 MILLILITER(S): 9 INJECTION INTRAMUSCULAR; INTRAVENOUS; SUBCUTANEOUS at 13:55

## 2021-04-17 RX ADMIN — LISINOPRIL 40 MILLIGRAM(S): 2.5 TABLET ORAL at 06:17

## 2021-04-17 RX ADMIN — LIDOCAINE 1 PATCH: 4 CREAM TOPICAL at 09:54

## 2021-04-17 RX ADMIN — OXCARBAZEPINE 300 MILLIGRAM(S): 300 TABLET, FILM COATED ORAL at 21:11

## 2021-04-17 RX ADMIN — OXCARBAZEPINE 300 MILLIGRAM(S): 300 TABLET, FILM COATED ORAL at 08:26

## 2021-04-17 RX ADMIN — OXCARBAZEPINE 150 MILLIGRAM(S): 300 TABLET, FILM COATED ORAL at 21:11

## 2021-04-17 RX ADMIN — SODIUM CHLORIDE 100 MILLILITER(S): 9 INJECTION INTRAMUSCULAR; INTRAVENOUS; SUBCUTANEOUS at 23:00

## 2021-04-17 RX ADMIN — AMLODIPINE BESYLATE 10 MILLIGRAM(S): 2.5 TABLET ORAL at 06:17

## 2021-04-17 RX ADMIN — ENOXAPARIN SODIUM 40 MILLIGRAM(S): 100 INJECTION SUBCUTANEOUS at 11:29

## 2021-04-17 RX ADMIN — LIDOCAINE 1 PATCH: 4 CREAM TOPICAL at 08:26

## 2021-04-17 NOTE — PROVIDER CONTACT NOTE (OTHER) - RECOMMENDATIONS
Encourage PO intake of fluids until IV access is obtained.
provider notified
RRT called
am scheduled BP meds to be given and rpt vitals in 1hr.
provider notified

## 2021-04-17 NOTE — PROGRESS NOTE ADULT - SUBJECTIVE AND OBJECTIVE BOX
Ulises Luevano MD  Interventional Cardiology / Endovascular Specialist  [place] Office : [address] [place] [address] [place] [address]  Tel:   [place] Office : [address] [facility] [place] [address]  Tel: [phone]  Cell : [phone]    Subjective/Overnight events: Patient lying in bed comfortably. No acute distress. Denies chest pain, SOB or palpitations  	  	  MEDICATIONS:  amLODIPine   Tablet 10 milliGRAM(s) Oral daily  enoxaparin Injectable 40 milliGRAM(s) SubCutaneous daily  hydrochlorothiazide 25 milliGRAM(s) Oral daily  isosorbide   mononitrate ER Tablet (IMDUR) 30 milliGRAM(s) Oral daily  lisinopril 40 milliGRAM(s) Oral daily        acetaminophen   Tablet .. 1000 milliGRAM(s) Oral every 6 hours PRN  meclizine 25 milliGRAM(s) Oral three times a day PRN  ondansetron Injectable 4 milliGRAM(s) IV Push every 6 hours PRN  OXcarbazepine 300 milliGRAM(s) Oral two times a day  OXcarbazepine 150 milliGRAM(s) Oral <User Schedule>        lidocaine   Patch 1 Patch Transdermal every 24 hours      PAST MEDICAL/SURGICAL HISTORY  PAST MEDICAL & SURGICAL HISTORY:  Hypertension    Seizure    Hyperlipidemia    Diverticulosis    Borderline diabetes    Obesity    No Past Surgical History    History of appendectomy        SOCIAL HISTORY: Substance Use (street drugs): ( x ) never used  (  ) other:    FAMILY HISTORY:  Family history of cerebrovascular accident (CVA) (Sibling)        REVIEW OF SYSTEMS:  CONSTITUTIONAL: No fever, weight loss, or fatigue  EYES: No eye pain, visual disturbances, or discharge  ENMT:  No difficulty hearing, tinnitus, vertigo; No sinus or throat pain  BREASTS: No pain, masses, or nipple discharge  GASTROINTESTINAL: No abdominal or epigastric pain. No nausea, vomiting, or hematemesis; No diarrhea or constipation. No melena or hematochezia.  GENITOURINARY: No dysuria, frequency, hematuria, or incontinence  NEUROLOGICAL: No headaches, memory loss, loss of strength, numbness, or tremors  ENDOCRINE: No heat or cold intolerance; No hair loss  MUSCULOSKELETAL: No joint pain or swelling; No muscle, back, or extremity pain  PSYCHIATRIC: No depression, anxiety, mood swings, or difficulty sleeping  HEME/LYMPH: No easy bruising, or bleeding gums  All others negative    PHYSICAL EXAM:  T(C): 36.3 (04-17-21 @ 16:47), Max: 36.8 (04-16-21 @ 19:34)  HR: 65 (04-17-21 @ 17:27) (49 - 66)  BP: 164/69 (04-17-21 @ 17:27) (100/47 - 164/69)  RR: 18 (04-17-21 @ 16:47) (17 - 18)  SpO2: 100% (04-17-21 @ 16:47) (96% - 100%)  Wt(kg): --  I&O's Summary    16 Apr 2021 07:01  -  17 Apr 2021 07:00  --------------------------------------------------------  IN: 240 mL / OUT: 300 mL / NET: -60 mL      GENERAL: NAD  EYES: EOMI, PERRLA, conjunctiva and sclera clear  ENMT: No tonsillar erythema, exudates, or enlargement;  Cardiovascular: Normal S1 S2, No JVD, No murmurs, No edema  Respiratory: Lungs clear to auscultation	  Gastrointestinal:  Soft, suprapubic tenderness , + BS	  Extremities: no edema                              11.3   5.81  )-----------( 312      ( 17 Apr 2021 06:21 )             33.6     04-17    128<L>  |  93<L>  |  37<H>  ----------------------------<  107<H>  3.8   |  23  |  1.37<H>    Ca    9.1      17 Apr 2021 06:21  Phos  4.0     04-17  Mg     2.8     04-17    TPro  7.3  /  Alb  3.4  /  TBili  0.3  /  DBili  x   /  AST  16  /  ALT  10  /  AlkPhos  128<H>  04-17    proBNP:   Lipid Profile:   HgA1c:   TSH:     Consultant(s) Notes Reviewed:  [x ] YES  [ ] NO    Care Discussed with Consultants/Other Providers [ x] YES  [ ] NO    Imaging Personally Reviewed independently:  [x] YES  [ ] NO    All labs, radiologic studies, vitals, orders and medications list reviewed. Patient is seen and examined at bedside. Case discussed with medical team.

## 2021-04-17 NOTE — PROGRESS NOTE ADULT - ASSESSMENT
74 y/o F with HTN, seizures presents after fall.     EKG: NSR with poor R wave progression    1. s/p fall  -CT head neg  -orthostatic positive s/p IVF. slightly improving  -found to have abnormal stress test in 2019--  -s/p [facility] this admission with non obstructive CAD    2. HTN  -improving  -c/w lisinopril, HCTz  25mg qd,  norvasc 10mg daily & imdur 30mg daily   -continue to monitor BP    3. AMS  - had another episode today   -CT head with no acute pathologies  -EEG with moderate slowing, appreciate neuro recs  - was not on tele , EP onboard planned for possible PPM

## 2021-04-17 NOTE — PROGRESS NOTE ADULT - SUBJECTIVE AND OBJECTIVE BOX
SUBJECTIVE / OVERNIGHT EVENTS: pt seen and examined  tele with Pauses+    MEDICATIONS  (STANDING):  amLODIPine   Tablet 10 milliGRAM(s) Oral daily  enoxaparin Injectable 40 milliGRAM(s) SubCutaneous daily  hydrochlorothiazide 25 milliGRAM(s) Oral daily  isosorbide   mononitrate ER Tablet (IMDUR) 30 milliGRAM(s) Oral daily  lidocaine   Patch 1 Patch Transdermal every 24 hours  lisinopril 40 milliGRAM(s) Oral daily  OXcarbazepine 300 milliGRAM(s) Oral two times a day  OXcarbazepine 150 milliGRAM(s) Oral <User Schedule>    MEDICATIONS  (PRN):  acetaminophen   Tablet .. 1000 milliGRAM(s) Oral every 6 hours PRN Mild Pain (1 - 3), Moderate Pain (4 - 6), Severe Pain (7 - 10)  meclizine 25 milliGRAM(s) Oral three times a day PRN Dizziness  ondansetron Injectable 4 milliGRAM(s) IV Push every 6 hours PRN Nausea and/or Vomiting    Vital Signs Last 24 Hrs  T(C): 36.3 (17 Apr 2021 16:47), Max: 36.7 (16 Apr 2021 23:48)  T(F): 97.4 (17 Apr 2021 16:47), Max: 98 (16 Apr 2021 23:48)  HR: 65 (17 Apr 2021 17:27) (49 - 65)  BP: 164/69 (17 Apr 2021 17:27) (100/47 - 164/69)  BP(mean): --  RR: 18 (17 Apr 2021 16:47) (17 - 18)  SpO2: 100% (17 Apr 2021 16:47) (96% - 100%))    Constitutional: No fever, fatigue  Skin: No rash.  Eyes: No recent vision problems or eye pain.  ENT: No congestion, ear pain, or sore throat.  Cardiovascular: No chest pain or palpation.  Respiratory: No cough, shortness of breath, congestion, or wheezing.  Gastrointestinal: No abdominal pain, nausea, vomiting, or diarrhea.  Genitourinary: No dysuria.  Musculoskeletal: No joint swelling.  Neurologic: No headache.    PHYSICAL EXAM:  GENERAL: NAD  EYES: EOMI, PERRLA  NECK: Supple, No JVD  CHEST/LUNG: dec breath sounds rt base  HEART:  S1 , S2 +  ABDOMEN: soft , bs+  EXTREMITIES:  no edema  NEUROLOGY:alert awake    LABS:  04-17    128<L>  |  93<L>  |  37<H>  ----------------------------<  107<H>  3.8   |  23  |  1.37<H>    Ca    9.1      17 Apr 2021 06:21  Phos  4.0     04-17  Mg     2.8     04-17    TPro  7.3  /  Alb  3.4  /  TBili  0.3  /  DBili      /  AST  16  /  ALT  10  /  AlkPhos  128<H>  04-17    Creatinine Trend: 1.37 <--, 1.21 <--, 1.15 <--, 1.06 <--, 1.13 <--, 0.97 <--                        11.3   5.81  )-----------( 312      ( 17 Apr 2021 06:21 )             33.6     Urine Studies:            LIVER FUNCTIONS - ( 17 Apr 2021 06:21 )  Alb: 3.4 g/dL / Pro: 7.3 g/dL / ALK PHOS: 128 U/L / ALT: 10 U/L / AST: 16 U/L / GGT: x                       Urine Studies:

## 2021-04-18 LAB
ANION GAP SERPL CALC-SCNC: 10 MMOL/L — SIGNIFICANT CHANGE UP (ref 7–14)
BUN SERPL-MCNC: 36 MG/DL — HIGH (ref 7–23)
CALCIUM SERPL-MCNC: 8.6 MG/DL — SIGNIFICANT CHANGE UP (ref 8.4–10.5)
CHLORIDE SERPL-SCNC: 96 MMOL/L — LOW (ref 98–107)
CO2 SERPL-SCNC: 26 MMOL/L — SIGNIFICANT CHANGE UP (ref 22–31)
CREAT SERPL-MCNC: 1.28 MG/DL — SIGNIFICANT CHANGE UP (ref 0.5–1.3)
GLUCOSE SERPL-MCNC: 100 MG/DL — HIGH (ref 70–99)
HCT VFR BLD CALC: 32 % — LOW (ref 34.5–45)
HGB BLD-MCNC: 10.8 G/DL — LOW (ref 11.5–15.5)
MAGNESIUM SERPL-MCNC: 2.6 MG/DL — SIGNIFICANT CHANGE UP (ref 1.6–2.6)
MCHC RBC-ENTMCNC: 23.3 PG — LOW (ref 27–34)
MCHC RBC-ENTMCNC: 33.8 GM/DL — SIGNIFICANT CHANGE UP (ref 32–36)
MCV RBC AUTO: 69 FL — LOW (ref 80–100)
NRBC # BLD: 0 /100 WBCS — SIGNIFICANT CHANGE UP
NRBC # FLD: 0 K/UL — SIGNIFICANT CHANGE UP
PHOSPHATE SERPL-MCNC: 3.3 MG/DL — SIGNIFICANT CHANGE UP (ref 2.5–4.5)
PLATELET # BLD AUTO: 301 K/UL — SIGNIFICANT CHANGE UP (ref 150–400)
POTASSIUM SERPL-MCNC: 4.1 MMOL/L — SIGNIFICANT CHANGE UP (ref 3.5–5.3)
POTASSIUM SERPL-SCNC: 4.1 MMOL/L — SIGNIFICANT CHANGE UP (ref 3.5–5.3)
RBC # BLD: 4.64 M/UL — SIGNIFICANT CHANGE UP (ref 3.8–5.2)
RBC # FLD: 16.5 % — HIGH (ref 10.3–14.5)
SARS-COV-2 RNA SPEC QL NAA+PROBE: SIGNIFICANT CHANGE UP
SODIUM SERPL-SCNC: 132 MMOL/L — LOW (ref 135–145)
WBC # BLD: 4.39 K/UL — SIGNIFICANT CHANGE UP (ref 3.8–10.5)
WBC # FLD AUTO: 4.39 K/UL — SIGNIFICANT CHANGE UP (ref 3.8–10.5)

## 2021-04-18 PROCEDURE — 99232 SBSQ HOSP IP/OBS MODERATE 35: CPT | Mod: GC

## 2021-04-18 RX ADMIN — Medication 25 MILLIGRAM(S): at 06:31

## 2021-04-18 RX ADMIN — OXCARBAZEPINE 150 MILLIGRAM(S): 300 TABLET, FILM COATED ORAL at 22:39

## 2021-04-18 RX ADMIN — LIDOCAINE 1 PATCH: 4 CREAM TOPICAL at 08:34

## 2021-04-18 RX ADMIN — LIDOCAINE 1 PATCH: 4 CREAM TOPICAL at 07:34

## 2021-04-18 RX ADMIN — ENOXAPARIN SODIUM 40 MILLIGRAM(S): 100 INJECTION SUBCUTANEOUS at 13:20

## 2021-04-18 RX ADMIN — OXCARBAZEPINE 300 MILLIGRAM(S): 300 TABLET, FILM COATED ORAL at 08:33

## 2021-04-18 RX ADMIN — ISOSORBIDE MONONITRATE 30 MILLIGRAM(S): 60 TABLET, EXTENDED RELEASE ORAL at 13:19

## 2021-04-18 RX ADMIN — LISINOPRIL 40 MILLIGRAM(S): 2.5 TABLET ORAL at 06:31

## 2021-04-18 RX ADMIN — LIDOCAINE 1 PATCH: 4 CREAM TOPICAL at 22:39

## 2021-04-18 RX ADMIN — OXCARBAZEPINE 300 MILLIGRAM(S): 300 TABLET, FILM COATED ORAL at 22:39

## 2021-04-18 RX ADMIN — Medication 1000 MILLIGRAM(S): at 22:40

## 2021-04-18 RX ADMIN — AMLODIPINE BESYLATE 10 MILLIGRAM(S): 2.5 TABLET ORAL at 06:31

## 2021-04-18 NOTE — PROGRESS NOTE ADULT - ASSESSMENT
74 y/o F with HTN, seizures presents after fall.     EKG: NSR with poor R wave progression    1. s/p fall  -CT head neg  -orthostatic positive s/p IVF. slightly improving  -found to have abnormal stress test in 2019--  -s/p [facility] this admission with non obstructive CAD    2. HTN  - improving  - c/w lisinopril, HCTz  25mg qd,  norvasc 10mg daily & imdur 30mg daily   - continue to monitor BP    3. AMS  - had another episode today   - CT head with no acute pathologies  - EEG with moderate slowing, appreciate neuro recs  - was not on tele , EP onboard planned for possible PPM

## 2021-04-18 NOTE — PROGRESS NOTE ADULT - SUBJECTIVE AND OBJECTIVE BOX
SUBJECTIVE / OVERNIGHT EVENTS: pt seen and examined    MEDICATIONS  (STANDING):  amLODIPine   Tablet 10 milliGRAM(s) Oral daily  hydrochlorothiazide 25 milliGRAM(s) Oral daily  isosorbide   mononitrate ER Tablet (IMDUR) 30 milliGRAM(s) Oral daily  lidocaine   Patch 1 Patch Transdermal every 24 hours  lisinopril 40 milliGRAM(s) Oral daily  OXcarbazepine 300 milliGRAM(s) Oral two times a day  OXcarbazepine 150 milliGRAM(s) Oral <User Schedule>  sodium chloride 0.9%. 500 milliLiter(s) (100 mL/Hr) IV Continuous <Continuous>    MEDICATIONS  (PRN):  acetaminophen   Tablet .. 1000 milliGRAM(s) Oral every 6 hours PRN Mild Pain (1 - 3), Moderate Pain (4 - 6), Severe Pain (7 - 10)  meclizine 25 milliGRAM(s) Oral three times a day PRN Dizziness  ondansetron Injectable 4 milliGRAM(s) IV Push every 6 hours PRN Nausea and/or Vomiting    Vital Signs Last 24 Hrs  T(C): 36.6 (18 Apr 2021 22:36), Max: 36.7 (18 Apr 2021 16:58)  T(F): 97.9 (18 Apr 2021 22:36), Max: 98.1 (18 Apr 2021 16:58)  HR: 58 (18 Apr 2021 22:36) (56 - 82)  BP: 165/66 (18 Apr 2021 22:36) (103/55 - 165/66)  BP(mean): --  RR: 18 (18 Apr 2021 22:36) (18 - 18)  SpO2: 98% (18 Apr 2021 22:36) (98% - 100%)    Constitutional: No fever, fatigue  Skin: No rash.  Eyes: No recent vision problems or eye pain.  ENT: No congestion, ear pain, or sore throat.  Cardiovascular: No chest pain or palpation.  Respiratory: No cough, shortness of breath, congestion, or wheezing.  Gastrointestinal: No abdominal pain, nausea, vomiting, or diarrhea.  Genitourinary: No dysuria.  Musculoskeletal: No joint swelling.  Neurologic: No headache.    PHYSICAL EXAM:  GENERAL: NAD  EYES: EOMI, PERRLA  NECK: Supple, No JVD  CHEST/LUNG: dec breath sounds rt base  HEART:  S1 , S2 +  ABDOMEN: soft , bs+  EXTREMITIES:  no edema  NEUROLOGY:alert awake    LABS:  04-18    132<L>  |  96<L>  |  36<H>  ----------------------------<  100<H>  4.1   |  26  |  1.28    Ca    8.6      18 Apr 2021 07:31  Phos  3.3     04-18  Mg     2.6     04-18    TPro  7.3  /  Alb  3.4  /  TBili  0.3  /  DBili      /  AST  16  /  ALT  10  /  AlkPhos  128<H>  04-17    Creatinine Trend: 1.28 <--, 1.37 <--, 1.21 <--, 1.15 <--, 1.06 <--                        10.8   4.39  )-----------( 301      ( 18 Apr 2021 07:31 )             32.0     Urine Studies:            LIVER FUNCTIONS - ( 17 Apr 2021 06:21 )  Alb: 3.4 g/dL / Pro: 7.3 g/dL / ALK PHOS: 128 U/L / ALT: 10 U/L / AST: 16 U/L / GGT: x                   LIVER FUNCTIONS - ( 17 Apr 2021 06:21 )  Alb: 3.4 g/dL / Pro: 7.3 g/dL / ALK PHOS: 128 U/L / ALT: 10 U/L / AST: 16 U/L / GGT: x                       Urine Studies:

## 2021-04-18 NOTE — PROGRESS NOTE ADULT - SUBJECTIVE AND OBJECTIVE BOX
Patient seen and examined at bedside.    Overnight Events:     Review Of Systems: No chest pain, shortness of breath, or palpitations            Current Meds:  acetaminophen   Tablet .. 1000 milliGRAM(s) Oral every 6 hours PRN  amLODIPine   Tablet 10 milliGRAM(s) Oral daily  enoxaparin Injectable 40 milliGRAM(s) SubCutaneous daily  hydrochlorothiazide 25 milliGRAM(s) Oral daily  isosorbide   mononitrate ER Tablet (IMDUR) 30 milliGRAM(s) Oral daily  lidocaine   Patch 1 Patch Transdermal every 24 hours  lisinopril 40 milliGRAM(s) Oral daily  meclizine 25 milliGRAM(s) Oral three times a day PRN  ondansetron Injectable 4 milliGRAM(s) IV Push every 6 hours PRN  OXcarbazepine 300 milliGRAM(s) Oral two times a day  OXcarbazepine 150 milliGRAM(s) Oral <User Schedule>  sodium chloride 0.9%. 500 milliLiter(s) IV Continuous <Continuous>      Vitals:  T(F): 97.7 (04-18), Max: 97.9 (04-17)  HR: 59 (04-18) (53 - 82)  BP: 139/51 (04-18) (100/47 - 164/69)  RR: 18 (04-18)  SpO2: 98% (04-18)  I&O's Summary    17 Apr 2021 07:01  -  18 Apr 2021 07:00  --------------------------------------------------------  IN: 860 mL / OUT: 400 mL / NET: 460 mL        Physical Exam:  Appearance: No acute distress; well appearing  HEENT:  EOMI, sclera anicteric, Normal oral mucosa  Cardiovascular: RRR, S1, S2, no murmurs, rubs, or gallops; no edema; no JVD  Respiratory: Clear to auscultation bilaterally, no wheezes, rales, rhonchi  Gastrointestinal: soft, non-tender, non-distended with normal bowel sounds  Musculoskeletal: No clubbing; no joint deformity   Neurologic: Non-focal  Lymphatic: No lymphadenopathy  Psychiatry: AAOx3, mood & affect appropriate  Skin: No rashes, ecchymoses, or cyanosis                          10.8   4.39  )-----------( 301      ( 18 Apr 2021 07:31 )             32.0     04-18    132<L>  |  96<L>  |  36<H>  ----------------------------<  100<H>  4.1   |  26  |  1.28    Ca    8.6      18 Apr 2021 07:31  Phos  3.3     04-18  Mg     2.6     04-18    TPro  7.3  /  Alb  3.4  /  TBili  0.3  /  DBili  x   /  AST  16  /  ALT  10  /  AlkPhos  128<H>  04-17      CARDIAC MARKERS ( 11 Apr 2021 17:51 )  16 ng/L / x     / x     / x     / x     / <1.0 ng/mL    Interpretation of Telemetry:   Patient seen and examined at bedside.    Overnight Events: NAEO, feels well.    Review Of Systems: No chest pain, shortness of breath, or palpitations            Current Meds:  acetaminophen   Tablet .. 1000 milliGRAM(s) Oral every 6 hours PRN  amLODIPine   Tablet 10 milliGRAM(s) Oral daily  enoxaparin Injectable 40 milliGRAM(s) SubCutaneous daily  hydrochlorothiazide 25 milliGRAM(s) Oral daily  isosorbide   mononitrate ER Tablet (IMDUR) 30 milliGRAM(s) Oral daily  lidocaine   Patch 1 Patch Transdermal every 24 hours  lisinopril 40 milliGRAM(s) Oral daily  meclizine 25 milliGRAM(s) Oral three times a day PRN  ondansetron Injectable 4 milliGRAM(s) IV Push every 6 hours PRN  OXcarbazepine 300 milliGRAM(s) Oral two times a day  OXcarbazepine 150 milliGRAM(s) Oral <User Schedule>  sodium chloride 0.9%. 500 milliLiter(s) IV Continuous <Continuous>      Vitals:  T(F): 97.7 (04-18), Max: 97.9 (04-17)  HR: 59 (04-18) (53 - 82)  BP: 139/51 (04-18) (100/47 - 164/69)  RR: 18 (04-18)  SpO2: 98% (04-18)  I&O's Summary    17 Apr 2021 07:01  -  18 Apr 2021 07:00  --------------------------------------------------------  IN: 860 mL / OUT: 400 mL / NET: 460 mL        Physical Exam:  Appearance: No acute distress; well appearing  HEENT:  EOMI, sclera anicteric, Normal oral mucosa  Cardiovascular: RRR, S1, S2, no murmurs, rubs, or gallops; no edema; no JVD  Respiratory: Clear to auscultation bilaterally, no wheezes, rales, rhonchi  Gastrointestinal: soft, non-tender, non-distended with normal bowel sounds  Musculoskeletal: No clubbing; no joint deformity   Neurologic: Non-focal  Lymphatic: No lymphadenopathy  Psychiatry: AAOx3, mood & affect appropriate  Skin: No rashes, ecchymoses, or cyanosis                          10.8   4.39  )-----------( 301      ( 18 Apr 2021 07:31 )             32.0     04-18    132<L>  |  96<L>  |  36<H>  ----------------------------<  100<H>  4.1   |  26  |  1.28    Ca    8.6      18 Apr 2021 07:31  Phos  3.3     04-18  Mg     2.6     04-18    TPro  7.3  /  Alb  3.4  /  TBili  0.3  /  DBili  x   /  AST  16  /  ALT  10  /  AlkPhos  128<H>  04-17      CARDIAC MARKERS ( 11 Apr 2021 17:51 )  16 ng/L / x     / x     / x     / x     / <1.0 ng/mL    Interpretation of Telemetry: sinus 40s-60s

## 2021-04-18 NOTE — PROGRESS NOTE ADULT - SUBJECTIVE AND OBJECTIVE BOX
Ulises Luevano MD  Interventional Cardiology / Endovascular Specialist  [place] Office : [address] [place] [address] [place] [address]  Tel:   [place] Office : [address] [facility] [place] [address]  Tel: [phone]  Cell : [phone]    Subjective/Overnight events: Patient lying in bed comfortably. No acute distress. Denies chest pain, SOB or palpitations  	  MEDICATIONS:  amLODIPine   Tablet 10 milliGRAM(s) Oral daily  enoxaparin Injectable 40 milliGRAM(s) SubCutaneous daily  hydrochlorothiazide 25 milliGRAM(s) Oral daily  isosorbide   mononitrate ER Tablet (IMDUR) 30 milliGRAM(s) Oral daily  lisinopril 40 milliGRAM(s) Oral daily  acetaminophen   Tablet .. 1000 milliGRAM(s) Oral every 6 hours PRN  meclizine 25 milliGRAM(s) Oral three times a day PRN  ondansetron Injectable 4 milliGRAM(s) IV Push every 6 hours PRN  OXcarbazepine 300 milliGRAM(s) Oral two times a day  OXcarbazepine 150 milliGRAM(s) Oral <User Schedule>  lidocaine   Patch 1 Patch Transdermal every 24 hours  sodium chloride 0.9%. 500 milliLiter(s) IV Continuous <Continuous>      PAST MEDICAL/SURGICAL HISTORY  PAST MEDICAL & SURGICAL HISTORY:  Hypertension    Seizure    Hyperlipidemia    Diverticulosis    Borderline diabetes    Obesity    No Past Surgical History    History of appendectomy        SOCIAL HISTORY: Substance Use (street drugs): ( x ) never used  (  ) other:    FAMILY HISTORY:  Family history of cerebrovascular accident (CVA) (Sibling)        REVIEW OF SYSTEMS:  CONSTITUTIONAL: No fever, weight loss, or fatigue  EYES: No eye pain, visual disturbances, or discharge  ENMT:  No difficulty hearing, tinnitus, vertigo; No sinus or throat pain  BREASTS: No pain, masses, or nipple discharge  GASTROINTESTINAL: No abdominal or epigastric pain. No nausea, vomiting, or hematemesis; No diarrhea or constipation. No melena or hematochezia.  GENITOURINARY: No dysuria, frequency, hematuria, or incontinence  NEUROLOGICAL: No headaches, memory loss, loss of strength, numbness, or tremors  ENDOCRINE: No heat or cold intolerance; No hair loss  MUSCULOSKELETAL: No joint pain or swelling; No muscle, back, or extremity pain  PSYCHIATRIC: No depression, anxiety, mood swings, or difficulty sleeping  HEME/LYMPH: No easy bruising, or bleeding gums  All others negative    PHYSICAL EXAM:  T(C): 36.5 (04-18-21 @ 06:36), Max: 36.6 (04-17-21 @ 11:26)  HR: 59 (04-18-21 @ 06:36) (53 - 82)  BP: 139/51 (04-18-21 @ 06:36) (100/47 - 164/69)  RR: 18 (04-18-21 @ 06:36) (18 - 18)  SpO2: 98% (04-18-21 @ 06:36) (97% - 100%)  Wt(kg): --  I&O's Summary    17 Apr 2021 07:01  -  18 Apr 2021 07:00  --------------------------------------------------------  IN: 860 mL / OUT: 400 mL / NET: 460 mL    GENERAL: NAD  EYES: EOMI, PERRLA, conjunctiva and sclera clear  ENMT: No tonsillar erythema, exudates, or enlargement;  Cardiovascular: Normal S1 S2, No JVD, No murmurs, No edema  Respiratory: Lungs clear to auscultation	  Gastrointestinal:  Soft, suprapubic tenderness , + BS	  Extremities: no edema                                  10.8   4.39  )-----------( 301      ( 18 Apr 2021 07:31 )             32.0     04-18    132<L>  |  96<L>  |  36<H>  ----------------------------<  100<H>  4.1   |  26  |  1.28    Ca    8.6      18 Apr 2021 07:31  Phos  3.3     04-18  Mg     2.6     04-18    TPro  7.3  /  Alb  3.4  /  TBili  0.3  /  DBili  x   /  AST  16  /  ALT  10  /  AlkPhos  128<H>  04-17    proBNP:   Lipid Profile:   HgA1c:   TSH:     Consultant(s) Notes Reviewed:  [x ] YES  [ ] NO    Care Discussed with Consultants/Other Providers [ x] YES  [ ] NO    Imaging Personally Reviewed independently:  [x] YES  [ ] NO    All labs, radiologic studies, vitals, orders and medications list reviewed. Patient is seen and examined at bedside. Case discussed with medical team.

## 2021-04-19 LAB
ANION GAP SERPL CALC-SCNC: 11 MMOL/L — SIGNIFICANT CHANGE UP (ref 7–14)
APTT BLD: 35.9 SEC — SIGNIFICANT CHANGE UP (ref 27–36.3)
BLD GP AB SCN SERPL QL: NEGATIVE — SIGNIFICANT CHANGE UP
BUN SERPL-MCNC: 35 MG/DL — HIGH (ref 7–23)
CALCIUM SERPL-MCNC: 8.9 MG/DL — SIGNIFICANT CHANGE UP (ref 8.4–10.5)
CHLORIDE SERPL-SCNC: 94 MMOL/L — LOW (ref 98–107)
CO2 SERPL-SCNC: 27 MMOL/L — SIGNIFICANT CHANGE UP (ref 22–31)
CREAT SERPL-MCNC: 1.21 MG/DL — SIGNIFICANT CHANGE UP (ref 0.5–1.3)
GLUCOSE BLDC GLUCOMTR-MCNC: 184 MG/DL — HIGH (ref 70–99)
GLUCOSE SERPL-MCNC: 98 MG/DL — SIGNIFICANT CHANGE UP (ref 70–99)
HCT VFR BLD CALC: 32.7 % — LOW (ref 34.5–45)
HGB BLD-MCNC: 11.2 G/DL — LOW (ref 11.5–15.5)
INR BLD: 1.11 RATIO — SIGNIFICANT CHANGE UP (ref 0.88–1.16)
MAGNESIUM SERPL-MCNC: 2.7 MG/DL — HIGH (ref 1.6–2.6)
MCHC RBC-ENTMCNC: 23.2 PG — LOW (ref 27–34)
MCHC RBC-ENTMCNC: 34.3 GM/DL — SIGNIFICANT CHANGE UP (ref 32–36)
MCV RBC AUTO: 67.8 FL — LOW (ref 80–100)
NRBC # BLD: 0 /100 WBCS — SIGNIFICANT CHANGE UP
NRBC # FLD: 0 K/UL — SIGNIFICANT CHANGE UP
PHOSPHATE SERPL-MCNC: 3.3 MG/DL — SIGNIFICANT CHANGE UP (ref 2.5–4.5)
PLATELET # BLD AUTO: 298 K/UL — SIGNIFICANT CHANGE UP (ref 150–400)
POTASSIUM SERPL-MCNC: 4 MMOL/L — SIGNIFICANT CHANGE UP (ref 3.5–5.3)
POTASSIUM SERPL-SCNC: 4 MMOL/L — SIGNIFICANT CHANGE UP (ref 3.5–5.3)
PROTHROM AB SERPL-ACNC: 12.7 SEC — SIGNIFICANT CHANGE UP (ref 10.6–13.6)
RBC # BLD: 4.82 M/UL — SIGNIFICANT CHANGE UP (ref 3.8–5.2)
RBC # FLD: 16.3 % — HIGH (ref 10.3–14.5)
RH IG SCN BLD-IMP: POSITIVE — SIGNIFICANT CHANGE UP
SODIUM SERPL-SCNC: 132 MMOL/L — LOW (ref 135–145)
WBC # BLD: 3.05 K/UL — LOW (ref 3.8–10.5)
WBC # FLD AUTO: 3.05 K/UL — LOW (ref 3.8–10.5)

## 2021-04-19 RX ORDER — OXCARBAZEPINE 300 MG/1
450 TABLET, FILM COATED ORAL
Refills: 0 | Status: DISCONTINUED | OUTPATIENT
Start: 2021-04-19 | End: 2021-04-19

## 2021-04-19 RX ORDER — OXCARBAZEPINE 300 MG/1
450 TABLET, FILM COATED ORAL
Refills: 0 | Status: DISCONTINUED | OUTPATIENT
Start: 2021-04-19 | End: 2021-04-23

## 2021-04-19 RX ADMIN — OXCARBAZEPINE 300 MILLIGRAM(S): 300 TABLET, FILM COATED ORAL at 07:46

## 2021-04-19 RX ADMIN — OXCARBAZEPINE 450 MILLIGRAM(S): 300 TABLET, FILM COATED ORAL at 19:51

## 2021-04-19 RX ADMIN — LIDOCAINE 1 PATCH: 4 CREAM TOPICAL at 07:45

## 2021-04-19 RX ADMIN — LISINOPRIL 40 MILLIGRAM(S): 2.5 TABLET ORAL at 05:05

## 2021-04-19 RX ADMIN — Medication 1000 MILLIGRAM(S): at 19:50

## 2021-04-19 RX ADMIN — AMLODIPINE BESYLATE 10 MILLIGRAM(S): 2.5 TABLET ORAL at 05:05

## 2021-04-19 RX ADMIN — LIDOCAINE 1 PATCH: 4 CREAM TOPICAL at 21:57

## 2021-04-19 RX ADMIN — ISOSORBIDE MONONITRATE 30 MILLIGRAM(S): 60 TABLET, EXTENDED RELEASE ORAL at 11:05

## 2021-04-19 RX ADMIN — Medication 25 MILLIGRAM(S): at 05:05

## 2021-04-19 NOTE — CHART NOTE - NSCHARTNOTEFT_GEN_A_CORE
Patient is seen and examined. Denies nay chest pain, SOB, palpitations. Has c/o occ. dizziness. Telemetry shows sinus rhythm with HR 40s-70s and as low as 30s at times. patient is NPO p Mn for possible PPM implantation today. Consent in the chart. Patient/family understands risks/benefits and alternatives of the procedure. All questions answered. Assessment: please see EP preprocedure assessment record.

## 2021-04-19 NOTE — PROGRESS NOTE ADULT - ASSESSMENT
76 y/o F with HTN, seizures presents after fall.     EKG: NSR with poor R wave progression  Tele: SB 30-50s    1. s/p fall  -CT head neg  -orthostatic positive s/p IVF. slightly improving  -found to have abnormal stress test in 2019--  -s/p C this admission with non obstructive CAD    2. HTN  - elevated this morning, improved now  - c/w lisinopril, HCTz  25mg qd,  norvasc 10mg daily & imdur 30mg daily   - continue to monitor BP    3. AMS  - had another episode, found to be jayla to 30s  - CT head with no acute pathologies  - EEG with moderate slowing, appreciate neuro recs  - EP onboard planned for PPM

## 2021-04-19 NOTE — PROGRESS NOTE ADULT - SUBJECTIVE AND OBJECTIVE BOX
SUBJECTIVE / OVERNIGHT EVENTS: pt seen and examined    MEDICATIONS  (STANDING):  amLODIPine   Tablet 10 milliGRAM(s) Oral daily  hydrochlorothiazide 25 milliGRAM(s) Oral daily  isosorbide   mononitrate ER Tablet (IMDUR) 30 milliGRAM(s) Oral daily  lidocaine   Patch 1 Patch Transdermal every 24 hours  lisinopril 40 milliGRAM(s) Oral daily  OXcarbazepine 450 milliGRAM(s) Oral two times a day  sodium chloride 0.9%. 500 milliLiter(s) (100 mL/Hr) IV Continuous <Continuous>    MEDICATIONS  (PRN):  acetaminophen   Tablet .. 1000 milliGRAM(s) Oral every 6 hours PRN Mild Pain (1 - 3), Moderate Pain (4 - 6), Severe Pain (7 - 10)  meclizine 25 milliGRAM(s) Oral three times a day PRN Dizziness  ondansetron Injectable 4 milliGRAM(s) IV Push every 6 hours PRN Nausea and/or Vomiting    Vital Signs Last 24 Hrs  T(C): 36.7 (19 Apr 2021 21:55), Max: 36.9 (19 Apr 2021 05:01)  T(F): 98.1 (19 Apr 2021 21:55), Max: 98.5 (19 Apr 2021 05:01)  HR: 57 (19 Apr 2021 21:55) (56 - 62)  BP: 138/52 (19 Apr 2021 21:55) (119/67 - 195/82)  BP(mean): --  RR: 18 (19 Apr 2021 21:55) (18 - 18)  SpO2: 100% (19 Apr 2021 21:55) (98% - 100%)    Constitutional: No fever, fatigue  Skin: No rash.  Eyes: No recent vision problems or eye pain.  ENT: No congestion, ear pain, or sore throat.  Cardiovascular: No chest pain or palpation.  Respiratory: No cough, shortness of breath, congestion, or wheezing.  Gastrointestinal: No abdominal pain, nausea, vomiting, or diarrhea.  Genitourinary: No dysuria.  Musculoskeletal: No joint swelling.  Neurologic: No headache.    PHYSICAL EXAM:  GENERAL: NAD  EYES: EOMI, PERRLA  NECK: Supple, No JVD  CHEST/LUNG: dec breath sounds rt base  HEART:  S1 , S2 +  ABDOMEN: soft , bs+  EXTREMITIES:  no edema  NEUROLOGY:alert awake    LABS:  04-19    132<L>  |  94<L>  |  35<H>  ----------------------------<  98  4.0   |  27  |  1.21    Ca    8.9      19 Apr 2021 07:26  Phos  3.3     04-19  Mg     2.7     04-19      Creatinine Trend: 1.21 <--, 1.28 <--, 1.37 <--, 1.21 <--, 1.15 <--                        11.2   3.05  )-----------( 298      ( 19 Apr 2021 07:26 )             32.7     Urine Studies:              PT/INR - ( 19 Apr 2021 07:26 )   PT: 12.7 sec;   INR: 1.11 ratio         PTT - ( 19 Apr 2021 07:26 )  PTT:35.9 sec  128 U/L / ALT: 10 U/L / AST: 16 U/L / GGT: x                   LIVER FUNCTIONS - ( 17 Apr 2021 06:21 )  Alb: 3.4 g/dL / Pro: 7.3 g/dL / ALK PHOS: 128 U/L / ALT: 10 U/L / AST: 16 U/L / GGT: x                       Urine Studies:

## 2021-04-19 NOTE — CHART NOTE - NSCHARTNOTEFT_GEN_A_CORE
RRT called for patient presented with near syncope x 1 Min.  Patient regained her consciousness after one minute.  .  Patient was going for PPM today.  will change to AM due to emergency in EP lab.  Will give IVF NS 500cc bolus  Vital Signs Last 24 Hrs  T(C): 36.4 (19 Apr 2021 13:59), Max: 36.9 (19 Apr 2021 05:01)  T(F): 97.5 (19 Apr 2021 13:59), Max: 98.5 (19 Apr 2021 05:01)  HR: 57 (19 Apr 2021 13:59) (56 - 62)  BP: 146/66 (19 Apr 2021 13:59) (138/76 - 195/82)  BP(mean): --  RR: 18 (19 Apr 2021 13:59) (18 - 18)  SpO2: 99% (19 Apr 2021 13:59) (98% - 100%)  r/o Vasovagal- + orthostatic, IVFluid 500cc bolus given  Bradycardia- EKG done  PPM in am.  Continue to monitor    Jessica GOETZ RRT called for patient presented with near syncope x 1 Min.  Patient regained her consciousness after one minute.  .  Patient was going for PPM today.  will change to AM due to emergency in EP lab.  Will give IVF NS 500cc bolus  Vital Signs Last 24 Hrs  T(C): 36.4 (19 Apr 2021 13:59), Max: 36.9 (19 Apr 2021 05:01)  T(F): 97.5 (19 Apr 2021 13:59), Max: 98.5 (19 Apr 2021 05:01)  HR: 57 (19 Apr 2021 13:59) (56 - 62)  BP: 146/66 (19 Apr 2021 13:59) (138/76 - 195/82)  BP(mean): --  RR: 18 (19 Apr 2021 13:59) (18 - 18)  SpO2: 99% (19 Apr 2021 13:59) (98% - 100%)  r/o Vasovagal- + orthostatic, IVFluid 500cc bolus given  Bradycardia- EKG done  PPM in am.  Continue to monitor  r/o seizure- Dr Harmon is n the case. notiflied    Jessica GOETZ

## 2021-04-19 NOTE — CHART NOTE - NSCHARTNOTEFT_GEN_A_CORE
PPM implantation was cancelled today. Patient had an episode of change in mental status after eating dinner. Telemetry revealed sinus rhythm with HR 50s-60s. No significant bradycardia noted. Discussed with Dr. Mendenhall. Patient's episodes are likely seizure related as she has remote history of seizures. Plan for ILR implantation tomorrow to monitor for arrhythmia.

## 2021-04-19 NOTE — RAPID RESPONSE TEAM SUMMARY - NSSITUATIONBACKGROUNDRRT_GEN_ALL_CORE
74 y/o F with HTN, HLD, seizures, and DM presents after fall, admitted for syncope work-up. RRT was called for syncopal episode. Patient's daughter at bedside reports patient was sitting up in bed and leaned forward and developed abnormal breathing. Shortly afterwards patient LOC and went back on the bed. No head trauma. SBP 130s. SpO2 98% HR 50s to 60s Glucose 184. As per nursing staff patient quickly returned to baseline, event lasted about a minute. No tele events. Reportedly patient was NPO earlier in the day awaiting pacemaker which is scheduled for tomorrow however recently had food and drink. EKG ordered. Given 500cc bolus of NS x 1. Overall patient mental status improved. RRT concluded.

## 2021-04-19 NOTE — RAPID RESPONSE TEAM SUMMARY - NSOTHERINTERVENTIONSRRT_GEN_ALL_CORE
- Can consider giving additional IVF as patient appears dry  - Would recommend a snack before bedtime as patient will be NPO after MN for pacemaker tomorrow   - Fall risk, bed alarms in place minimize ambulation without assistance

## 2021-04-19 NOTE — RAPID RESPONSE TEAM SUMMARY - NSREASONFORCALLINGRRT_GEN_ALL_CORE
Acute mental status changes
Abnormal heart rate/Acute mental status changes
Acute mental status changes

## 2021-04-19 NOTE — PROGRESS NOTE ADULT - SUBJECTIVE AND OBJECTIVE BOX
Ulises Luevano MD  Interventional Cardiology / Advance Heart Failure and Cardiac Transplant Specialist  [place] Office : [address] [place] [address] [place] [address]  Tel:   [place] Office : [address] [facility] [place] [address]  Tel: [phone]  Cell : [phone]    Subjective/Overnight events: Pt is lying in bed comfortable not in distress, no chest pains no SOB no palpitations  	  MEDICATIONS:  amLODIPine   Tablet 10 milliGRAM(s) Oral daily  hydrochlorothiazide 25 milliGRAM(s) Oral daily  isosorbide   mononitrate ER Tablet (IMDUR) 30 milliGRAM(s) Oral daily  lisinopril 40 milliGRAM(s) Oral daily        acetaminophen   Tablet .. 1000 milliGRAM(s) Oral every 6 hours PRN  meclizine 25 milliGRAM(s) Oral three times a day PRN  ondansetron Injectable 4 milliGRAM(s) IV Push every 6 hours PRN  OXcarbazepine 300 milliGRAM(s) Oral two times a day  OXcarbazepine 150 milliGRAM(s) Oral <User Schedule>        lidocaine   Patch 1 Patch Transdermal every 24 hours  sodium chloride 0.9%. 500 milliLiter(s) IV Continuous <Continuous>      PAST MEDICAL/SURGICAL HISTORY  PAST MEDICAL & SURGICAL HISTORY:  Hypertension    Seizure    Hyperlipidemia    Diverticulosis    Borderline diabetes    Obesity    No Past Surgical History    History of appendectomy        SOCIAL HISTORY: Substance Use (street drugs): ( x ) never used  (  ) other:    FAMILY HISTORY:  Family history of cerebrovascular accident (CVA) (Sibling)        REVIEW OF SYSTEMS:  CONSTITUTIONAL: No fever, weight loss, or fatigue  EYES: No eye pain, visual disturbances, or discharge  ENMT:  No difficulty hearing, tinnitus, vertigo; No sinus or throat pain  BREASTS: No pain, masses, or nipple discharge  GASTROINTESTINAL: No abdominal or epigastric pain. No nausea, vomiting, or hematemesis; No diarrhea or constipation. No melena or hematochezia.  GENITOURINARY: No dysuria, frequency, hematuria, or incontinence  NEUROLOGICAL: No headaches, memory loss, loss of strength, numbness, or tremors  ENDOCRINE: No heat or cold intolerance; No hair loss  MUSCULOSKELETAL: No joint pain or swelling; No muscle, back, or extremity pain  PSYCHIATRIC: No depression, anxiety, mood swings, or difficulty sleeping  HEME/LYMPH: No easy bruising, or bleeding gums  All others negative    PHYSICAL EXAM:  T(C): 36.4 (04-19-21 @ 10:25), Max: 36.9 (04-19-21 @ 05:01)  HR: 56 (04-19-21 @ 10:25) (56 - 62)  BP: 152/75 (04-19-21 @ 10:25) (103/55 - 195/82)  RR: 18 (04-19-21 @ 10:25) (18 - 18)  SpO2: 98% (04-19-21 @ 10:25) (98% - 100%)  Wt(kg): --  I&O's Summary    19 Apr 2021 07:01  -  19 Apr 2021 11:17  --------------------------------------------------------  IN: 0 mL / OUT: 850 mL / NET: -850 mL          GENERAL: NAD  EYES: EOMI, PERRLA, conjunctiva and sclera clear  ENMT: No tonsillar erythema, exudates, or enlargement;  Cardiovascular: Normal S1 S2, No JVD, No murmurs, No edema  Respiratory: Lungs clear to auscultation	  Gastrointestinal:  Soft, suprapubic tenderness , + BS	  Extremities: no edema                                        11.2   3.05  )-----------( 298      ( 19 Apr 2021 07:26 )             32.7     04-19    132<L>  |  94<L>  |  35<H>  ----------------------------<  98  4.0   |  27  |  1.21    Ca    8.9      19 Apr 2021 07:26  Phos  3.3     04-19  Mg     2.7     04-19      proBNP:   Lipid Profile:   HgA1c:   TSH:     Consultant(s) Notes Reviewed:  [x ] YES  [ ] NO    Care Discussed with Consultants/Other Providers [ x] YES  [ ] NO    Imaging Personally Reviewed independently:  [x] YES  [ ] NO    All labs, radiologic studies, vitals, orders and medications list reviewed. Patient is seen and examined at bedside. Case discussed with medical team.

## 2021-04-20 LAB
ALBUMIN SERPL ELPH-MCNC: 3.5 G/DL — SIGNIFICANT CHANGE UP (ref 3.3–5)
ALP SERPL-CCNC: 122 U/L — HIGH (ref 40–120)
ALT FLD-CCNC: 9 U/L — SIGNIFICANT CHANGE UP (ref 4–33)
ANION GAP SERPL CALC-SCNC: 10 MMOL/L — SIGNIFICANT CHANGE UP (ref 7–14)
AST SERPL-CCNC: 17 U/L — SIGNIFICANT CHANGE UP (ref 4–32)
BILIRUB DIRECT SERPL-MCNC: <0.2 MG/DL — SIGNIFICANT CHANGE UP (ref 0–0.2)
BILIRUB INDIRECT FLD-MCNC: >0 MG/DL — SIGNIFICANT CHANGE UP (ref 0–1)
BILIRUB SERPL-MCNC: 0.2 MG/DL — SIGNIFICANT CHANGE UP (ref 0.2–1.2)
BUN SERPL-MCNC: 27 MG/DL — HIGH (ref 7–23)
CALCIUM SERPL-MCNC: 9.1 MG/DL — SIGNIFICANT CHANGE UP (ref 8.4–10.5)
CHLORIDE SERPL-SCNC: 96 MMOL/L — LOW (ref 98–107)
CO2 SERPL-SCNC: 27 MMOL/L — SIGNIFICANT CHANGE UP (ref 22–31)
CREAT SERPL-MCNC: 1.07 MG/DL — SIGNIFICANT CHANGE UP (ref 0.5–1.3)
GLUCOSE SERPL-MCNC: 97 MG/DL — SIGNIFICANT CHANGE UP (ref 70–99)
HCT VFR BLD CALC: 31.4 % — LOW (ref 34.5–45)
HGB BLD-MCNC: 10.7 G/DL — LOW (ref 11.5–15.5)
MAGNESIUM SERPL-MCNC: 2.6 MG/DL — SIGNIFICANT CHANGE UP (ref 1.6–2.6)
MCHC RBC-ENTMCNC: 23.2 PG — LOW (ref 27–34)
MCHC RBC-ENTMCNC: 34.1 GM/DL — SIGNIFICANT CHANGE UP (ref 32–36)
MCV RBC AUTO: 68.1 FL — LOW (ref 80–100)
NRBC # BLD: 0 /100 WBCS — SIGNIFICANT CHANGE UP
NRBC # FLD: 0 K/UL — SIGNIFICANT CHANGE UP
PHOSPHATE SERPL-MCNC: 3.2 MG/DL — SIGNIFICANT CHANGE UP (ref 2.5–4.5)
PLATELET # BLD AUTO: 300 K/UL — SIGNIFICANT CHANGE UP (ref 150–400)
POTASSIUM SERPL-MCNC: 3.8 MMOL/L — SIGNIFICANT CHANGE UP (ref 3.5–5.3)
POTASSIUM SERPL-SCNC: 3.8 MMOL/L — SIGNIFICANT CHANGE UP (ref 3.5–5.3)
PROT SERPL-MCNC: 7.2 G/DL — SIGNIFICANT CHANGE UP (ref 6–8.3)
RBC # BLD: 4.61 M/UL — SIGNIFICANT CHANGE UP (ref 3.8–5.2)
RBC # FLD: 16.1 % — HIGH (ref 10.3–14.5)
SODIUM SERPL-SCNC: 133 MMOL/L — LOW (ref 135–145)
WBC # BLD: 3.36 K/UL — LOW (ref 3.8–10.5)
WBC # FLD AUTO: 3.36 K/UL — LOW (ref 3.8–10.5)

## 2021-04-20 PROCEDURE — 33285 INSJ SUBQ CAR RHYTHM MNTR: CPT

## 2021-04-20 RX ORDER — HYDRALAZINE HCL 50 MG
10 TABLET ORAL ONCE
Refills: 0 | Status: COMPLETED | OUTPATIENT
Start: 2021-04-20 | End: 2021-04-20

## 2021-04-20 RX ORDER — ENOXAPARIN SODIUM 100 MG/ML
40 INJECTION SUBCUTANEOUS DAILY
Refills: 0 | Status: DISCONTINUED | OUTPATIENT
Start: 2021-04-21 | End: 2021-04-23

## 2021-04-20 RX ADMIN — ISOSORBIDE MONONITRATE 30 MILLIGRAM(S): 60 TABLET, EXTENDED RELEASE ORAL at 17:19

## 2021-04-20 RX ADMIN — LIDOCAINE 1 PATCH: 4 CREAM TOPICAL at 21:45

## 2021-04-20 RX ADMIN — Medication 1000 MILLIGRAM(S): at 21:40

## 2021-04-20 RX ADMIN — LISINOPRIL 40 MILLIGRAM(S): 2.5 TABLET ORAL at 06:09

## 2021-04-20 RX ADMIN — OXCARBAZEPINE 450 MILLIGRAM(S): 300 TABLET, FILM COATED ORAL at 06:32

## 2021-04-20 RX ADMIN — AMLODIPINE BESYLATE 10 MILLIGRAM(S): 2.5 TABLET ORAL at 13:36

## 2021-04-20 RX ADMIN — OXCARBAZEPINE 450 MILLIGRAM(S): 300 TABLET, FILM COATED ORAL at 17:19

## 2021-04-20 RX ADMIN — Medication 25 MILLIGRAM(S): at 06:09

## 2021-04-20 RX ADMIN — Medication 1000 MILLIGRAM(S): at 19:33

## 2021-04-20 RX ADMIN — LIDOCAINE 1 PATCH: 4 CREAM TOPICAL at 07:01

## 2021-04-20 RX ADMIN — LIDOCAINE 1 PATCH: 4 CREAM TOPICAL at 10:00

## 2021-04-20 RX ADMIN — Medication 10 MILLIGRAM(S): at 13:36

## 2021-04-20 NOTE — PROGRESS NOTE ADULT - ASSESSMENT
76 y/o F with HTN, seizures presents after fall.     EKG: NSR with poor R wave progression  Tele: SB 40-50s    1. s/p fall  -CT head neg  -orthostatic positive s/p IVF. slightly improving  -found to have abnormal stress test in 2019--  -s/p C this admission with non obstructive CAD    2. HTN  - elevated this morning, improved now  - c/w lisinopril, HCTz  25mg qd,  norvasc 10mg daily & imdur 30mg daily   - continue to monitor BP    3. AMS  - CT head with no acute pathologies  - EEG with moderate slowing, appreciate neuro recs  - EP onboard planned for PPM cancelled 2/2 RRT yesterday. plan for loop recorder now

## 2021-04-20 NOTE — PROGRESS NOTE ADULT - SUBJECTIVE AND OBJECTIVE BOX
SUBJECTIVE / OVERNIGHT EVENTS: pt seen and examined    MEDICATIONS  (STANDING):  amLODIPine   Tablet 10 milliGRAM(s) Oral daily  hydrochlorothiazide 25 milliGRAM(s) Oral daily  isosorbide   mononitrate ER Tablet (IMDUR) 30 milliGRAM(s) Oral daily  lidocaine   Patch 1 Patch Transdermal every 24 hours  lisinopril 40 milliGRAM(s) Oral daily  OXcarbazepine 450 milliGRAM(s) Oral two times a day  sodium chloride 0.9%. 500 milliLiter(s) (100 mL/Hr) IV Continuous <Continuous>    MEDICATIONS  (PRN):  acetaminophen   Tablet .. 1000 milliGRAM(s) Oral every 6 hours PRN Mild Pain (1 - 3), Moderate Pain (4 - 6), Severe Pain (7 - 10)  meclizine 25 milliGRAM(s) Oral three times a day PRN Dizziness  ondansetron Injectable 4 milliGRAM(s) IV Push every 6 hours PRN Nausea and/or Vomiting    Vital Signs Last 24 Hrs  T(C): 36.7 (20 Apr 2021 17:16), Max: 36.7 (19 Apr 2021 21:55)  T(F): 98 (20 Apr 2021 17:16), Max: 98.1 (19 Apr 2021 21:55)  HR: 73 (20 Apr 2021 17:16) (53 - 73)  BP: 151/73 (20 Apr 2021 17:16) (138/52 - 156/62)  BP(mean): --  RR: 17 (20 Apr 2021 17:16) (16 - 18)  SpO2: 100% (20 Apr 2021 17:16) (97% - 100%)    Constitutional: No fever, fatigue  Skin: No rash.  Eyes: No recent vision problems or eye pain.  ENT: No congestion, ear pain, or sore throat.  Cardiovascular: No chest pain or palpation.  Respiratory: No cough, shortness of breath, congestion, or wheezing.  Gastrointestinal: No abdominal pain, nausea, vomiting, or diarrhea.  Genitourinary: No dysuria.  Musculoskeletal: No joint swelling.  Neurologic: No headache.    PHYSICAL EXAM:  GENERAL: NAD  EYES: EOMI, PERRLA  NECK: Supple, No JVD  CHEST/LUNG: dec breath sounds rt base  HEART:  S1 , S2 +  ABDOMEN: soft , bs+  EXTREMITIES:  no edema  NEUROLOGY:alert awake    LABS:  04-20    133<L>  |  96<L>  |  27<H>  ----------------------------<  97  3.8   |  27  |  1.07    Ca    9.1      20 Apr 2021 06:47  Phos  3.2     04-20  Mg     2.6     04-20    TPro  7.2  /  Alb  3.5  /  TBili  0.2  /  DBili  <0.2  /  AST  17  /  ALT  9   /  AlkPhos  122<H>  04-20    Creatinine Trend: 1.07 <--, 1.21 <--, 1.28 <--, 1.37 <--, 1.21 <--, 1.15 <--                        10.7   3.36  )-----------( 300      ( 20 Apr 2021 06:47 )             31.4     Urine Studies:            LIVER FUNCTIONS - ( 20 Apr 2021 06:51 )  Alb: 3.5 g/dL / Pro: 7.2 g/dL / ALK PHOS: 122 U/L / ALT: 9 U/L / AST: 17 U/L / GGT: x           PT/INR - ( 19 Apr 2021 07:26 )   PT: 12.7 sec;   INR: 1.11 ratio         PTT - ( 19 Apr 2021 07:26 )  PTT:35.9 sec  PTT - ( 19 Apr 2021 07:26 )  PTT:35.9 sec  128 U/L / ALT: 10 U/L / AST: 16 U/L / GGT: x                   LIVER FUNCTIONS - ( 17 Apr 2021 06:21 )  Alb: 3.4 g/dL / Pro: 7.3 g/dL / ALK PHOS: 128 U/L / ALT: 10 U/L / AST: 16 U/L / GGT: x                       Urine Studies:

## 2021-04-20 NOTE — PROGRESS NOTE ADULT - ASSESSMENT
Assessment: 74 yo female with a PMHx of HTN, HLD, and seizures who presented to [facility] on 04/04 after being found down. PE non-focal. EEG with moderate slowing. episode of LC yeserday    Impression: event from yesterday is more consistent with syncope than seizure ? post-prandial       -C/W Trileptal  450 BID  -Orthostatics  -No further inpt w/u

## 2021-04-20 NOTE — CHART NOTE - NSCHARTNOTEFT_GEN_A_CORE
ACP team PA Note    ILR site check s/p procedure. Pt denies any complaints at the site.     Vital Signs Last 24 Hrs  T(C): 36.9 (20 Apr 2021 20:58), Max: 36.9 (20 Apr 2021 20:58)  T(F): 98.4 (20 Apr 2021 20:58), Max: 98.4 (20 Apr 2021 20:58)  HR: 75 (20 Apr 2021 20:58) (53 - 75)  BP: 145/54 (20 Apr 2021 20:58) (145/54 - 156/62)  BP(mean): --  RR: 17 (20 Apr 2021 20:58) (16 - 17)  SpO2: 100% (20 Apr 2021 20:58) (97% - 100%)    -- site is soft, C/D/I, no bleeding, no hematoma, no tenderness to palpation, no swelling   -- continue monitoring

## 2021-04-20 NOTE — CHART NOTE - NSCHARTNOTEFT_GEN_A_CORE
Patient denies lightheadedness or dizziness.  Telemetry SB 50-60 with episodes of SB 44 seen overnight associated with sleep.  + orthostatic hypotension.  For implantable loop recorder today.  Patient may resume diet.

## 2021-04-20 NOTE — PROVIDER CONTACT NOTE (OTHER) - SITUATION
patient jayla down to 38bmp on tele monitoring and then had an episode of emesis from lunch
pt refusing Tele monitoring and  monitoring.
Pt. No IV access and unable to draw VBG lactate. Day shift unable to obtain labs and IV after multiple attempts from different nurses. Night and clinical impact RN unable to obtain either after x3.
HR 53
pt with elevated BP. 186/74, HR 54
patient is refusing telemetry monitoring
pt /47 and states she feels very tired. pt is lethargic but arousable.
RN went into room and noticed patient was unresponsive, vitals stable. sternal rub done, patient still unresponsive. patient had emesis of lunch, after about 30 seconds patient started talking again

## 2021-04-20 NOTE — PROGRESS NOTE ADULT - SUBJECTIVE AND OBJECTIVE BOX
MRN-[id number]    Subjective: 77 yo female seen and examined at bedside. brief episode LOC yesterday , patient did not eat untill evening. felt weak and then LOC returned to BL shortly after. was told on the phone her HR increased to 110 when she sat up    PAST MEDICAL & SURGICAL HISTORY:  Hypertension    Seizure    Hyperlipidemia    Diverticulosis    Borderline diabetes    Obesity    No Past Surgical History    History of appendectomy    FAMILY HISTORY:  Family history of cerebrovascular accident (CVA) (Sibling)  MEDICATIONS  (STANDING):  amLODIPine   Tablet 10 milliGRAM(s) Oral daily  hydrochlorothiazide 25 milliGRAM(s) Oral daily  isosorbide   mononitrate ER Tablet (IMDUR) 30 milliGRAM(s) Oral daily  lidocaine   Patch 1 Patch Transdermal every 24 hours  lisinopril 40 milliGRAM(s) Oral daily  OXcarbazepine 450 milliGRAM(s) Oral two times a day  sodium chloride 0.9%. 500 milliLiter(s) (100 mL/Hr) IV Continuous <Continuous>    MEDICATIONS  (PRN):  acetaminophen   Tablet .. 1000 milliGRAM(s) Oral every 6 hours PRN Mild Pain (1 - 3), Moderate Pain (4 - 6), Severe Pain (7 - 10)  meclizine 25 milliGRAM(s) Oral three times a day PRN Dizziness  ondansetron Injectable 4 milliGRAM(s) IV Push every 6 hours PRN Nausea and/or Vomiting    Social Hx:  Nonsmoker, no drug or alcohol use              Allergies  allergy to Lobster (Swelling)  No Known Drug Allergies  seaford (Unknown)    Intolerances  lactose (Stomach Upset)    ROS: Pertinent positives above, all other ROS were reviewed and are negative.      Vital Signs Last 24 Hrs  T(C): 36.7 (20 Apr 2021 06:01), Max: 36.7 (19 Apr 2021 21:55)  T(F): 98 (20 Apr 2021 06:01), Max: 98.1 (19 Apr 2021 21:55)  HR: 54 (20 Apr 2021 06:01) (54 - 58)  BP: 150/56 (20 Apr 2021 06:01) (119/67 - 150/56)  BP(mean): --  RR: 16 (20 Apr 2021 06:01) (16 - 18)  SpO2: 97% (20 Apr 2021 06:01) (97% - 100%)  GENERAL EXAM:  Constitutional: awake and alert. NAD.    NEUROLOGICAL EXAM:  MS: Alert, eyes open spontaneously. Oriented to self, age, location, month, year. Speech is clear, fluent. Follows commands.  CN: PERRL. VFF. EOMI. V1-V3 intact. Face symmetric. Tongue midline.   Motor: All extremities antigravity.   Sensory: Intact to LT throughout.  Coordination: No dysmetria on FNF or on HTS bilaterally.  Gait: Deferred.      Radiology:  -04/03 CTH: No displaced calvarial fracture or acute intracranial hemorrhage.  -04/03 Cervical spine CT: No acute fracture. Cervical spondylosis. Heterogeneous thyromegaly.  -04/11 CTH: No hydrocephalus, acute intracranial hemorrhage, mass effect, or brain edema. Moderate to severe white matter microvascular ischemic disease.      EEG SUMMARY/CLASSIFICATION    Abnormal EEG in the awake, drowsy and asleep states.  - Moderate generalized slowing with GRDA   _____________________________________________________________  EEG IMPRESSION/CLINICAL CORRELATE    Abnormal EEG study.  1. Moderate nonspecific diffuse or multifocal cerebral dysfunction.   2. No epileptiform pattern or seizure seen.      _____________________________________________________________    Serjio Galeano   Epilepsy Fellow  [facility]    Epilepsy Reading Room Ph# [phone]  Epilepsy Answering Service after 5 pm and before 8:30 am: Ph# [phone]

## 2021-04-20 NOTE — PROVIDER CONTACT NOTE (OTHER) - ACTION/TREATMENT ORDERED:
ACP notified   will cont. to monitor
RRT done, continue to monitor patient
give am BP meds now and rpt vitals in 1hr. cont to monitor.
will come to bedside to see patient
Ok to have no IV. Will try again prior to Monday's procedure
continue to monitor patient
cont to monitor. vitals done Q4hrs.
Inserting ultrasound guided IV now. Will order fluid bolus once IV started. Encourage PO intake of fluids in mean time.

## 2021-04-20 NOTE — PROVIDER CONTACT NOTE (OTHER) - BACKGROUND
patient admitted for unwitnessed fall at home, s/p cath with no findings. pending rehab
BP normally runs in the 140's-150's systolic. Pt due for a PPM placement on Monday. s/p RRT on 4/16 when pt vasovagaled.
patient s/p fall at home, work up done. cath negative. pending rehab. patient was an RRT 4/11 for possible aspiration at lunch and unresponsiveness
Pt. came in w/ dizziness and unwitnessed fall
admitted for syncope and collapse
patient was admitted for a fall at home unwitnessed, s/p cath with no findings. awaiting rehab
pt admitted for syncope and collapse.
pt admitted for syncope and collapse.

## 2021-04-20 NOTE — PROGRESS NOTE ADULT - SUBJECTIVE AND OBJECTIVE BOX
Ulises Luevano MD  Interventional Cardiology / Advance Heart Failure and Cardiac Transplant Specialist  [place] Office : [address] [place] [address] [place]. [address]  Tel:   [place] Office : [address] [facility] [place] [address]  Tel: [phone]  Cell : [phone]    Subjective/Overnight events: Pt is lying in bed comfortable not in distress, no chest pains no SOB no palpitations. had another RRT yesterday for AMS  	  MEDICATIONS:  amLODIPine   Tablet 10 milliGRAM(s) Oral daily  hydrochlorothiazide 25 milliGRAM(s) Oral daily  isosorbide   mononitrate ER Tablet (IMDUR) 30 milliGRAM(s) Oral daily  lisinopril 40 milliGRAM(s) Oral daily        acetaminophen   Tablet .. 1000 milliGRAM(s) Oral every 6 hours PRN  meclizine 25 milliGRAM(s) Oral three times a day PRN  ondansetron Injectable 4 milliGRAM(s) IV Push every 6 hours PRN  OXcarbazepine 450 milliGRAM(s) Oral two times a day        lidocaine   Patch 1 Patch Transdermal every 24 hours  sodium chloride 0.9%. 500 milliLiter(s) IV Continuous <Continuous>      PAST MEDICAL/SURGICAL HISTORY  PAST MEDICAL & SURGICAL HISTORY:  Hypertension    Seizure    Hyperlipidemia    Diverticulosis    Borderline diabetes    Obesity    No Past Surgical History    History of appendectomy        SOCIAL HISTORY: Substance Use (street drugs): ( x ) never used  (  ) other:    FAMILY HISTORY:  Family history of cerebrovascular accident (CVA) (Sibling)        REVIEW OF SYSTEMS:  CONSTITUTIONAL: No fever, weight loss, or fatigue  EYES: No eye pain, visual disturbances, or discharge  ENMT:  No difficulty hearing, tinnitus, vertigo; No sinus or throat pain  BREASTS: No pain, masses, or nipple discharge  GASTROINTESTINAL: No abdominal or epigastric pain. No nausea, vomiting, or hematemesis; No diarrhea or constipation. No melena or hematochezia.  GENITOURINARY: No dysuria, frequency, hematuria, or incontinence  NEUROLOGICAL: No headaches, memory loss, loss of strength, numbness, or tremors  ENDOCRINE: No heat or cold intolerance; No hair loss  MUSCULOSKELETAL: No joint pain or swelling; No muscle, back, or extremity pain  PSYCHIATRIC: No depression, anxiety, mood swings, or difficulty sleeping  HEME/LYMPH: No easy bruising, or bleeding gums  All others negative    PHYSICAL EXAM:  T(C): 36.7 (04-20-21 @ 06:01), Max: 36.7 (04-19-21 @ 21:55)  HR: 54 (04-20-21 @ 06:01) (54 - 58)  BP: 150/56 (04-20-21 @ 06:01) (119/67 - 150/56)  RR: 16 (04-20-21 @ 06:01) (16 - 18)  SpO2: 97% (04-20-21 @ 06:01) (97% - 100%)  Wt(kg): --  I&O's Summary    19 Apr 2021 07:01  -  20 Apr 2021 07:00  --------------------------------------------------------  IN: 0 mL / OUT: 1950 mL / NET: -1950 mL    20 Apr 2021 07:01  -  20 Apr 2021 11:38  --------------------------------------------------------  IN: 0 mL / OUT: 300 mL / NET: -300 mL            GENERAL: NAD  EYES: EOMI, PERRLA, conjunctiva and sclera clear  ENMT: No tonsillar erythema, exudates, or enlargement;  Cardiovascular: Normal S1 S2, No JVD, No murmurs, No edema  Respiratory: Lungs clear to auscultation	  Gastrointestinal:  Soft, suprapubic tenderness , + BS	  Extremities: no edema                                  10.7   3.36  )-----------( 300      ( 20 Apr 2021 06:47 )             31.4     04-20    133<L>  |  96<L>  |  27<H>  ----------------------------<  97  3.8   |  27  |  1.07    Ca    9.1      20 Apr 2021 06:47  Phos  3.2     04-20  Mg     2.6     04-20      proBNP:   Lipid Profile:   HgA1c:   TSH:     Consultant(s) Notes Reviewed:  [x ] YES  [ ] NO    Care Discussed with Consultants/Other Providers [ x] YES  [ ] NO    Imaging Personally Reviewed independently:  [x] YES  [ ] NO    All labs, radiologic studies, vitals, orders and medications list reviewed. Patient is seen and examined at bedside. Case discussed with medical team.

## 2021-04-20 NOTE — CHART NOTE - NSCHARTNOTEFT_GEN_A_CORE
Type of Procedure: ILR implant  Licensed independent practitioner: Miah Mendenhall MD  Assistant: None  Description of procedure:   Written informed consent was obtained from the patient after a full explanation of the risks and benefits of  the procedure. The patient was brought to the lab in the fasting state. Continuous electrocardiographic and  hemodynamic monitoring was initiated. The patient was prepped and draped in the usual sterile fashion.   Local anesthetic was delivered and a 1 cm diagonal incision was made just lateral to the sternum using the  incision tool supplied by the . Using the insertion tool, a subcutaneous tunnel was created  approximately 8 mm under the skin. The insertion tool was then rotated 180 degrees to create a pocket for the  device, and the preloaded device was then inserted into the pocket. The device was held in place while the  insertion tool was removed. Hemostasis was achieved with manual pressure. Dermabond was then placed  over the incision. The wound was covered with a dry sterile dressing. The procedure was well  tolerated and the patient left the laboratory alert and in good condition. Patient education regarding device  triggering was performed prior to discharge.  During the procedure, a MDT rep was present to manage a complex .  Programming was as follows:  - Tachycardia: 171 bpm), 16 beats duration  - Bradycardia: >2,000ms (  - Pause: >3 seconds  - AF detection: ON    Estimated blood loss: 0cc  Specimen removed: N/A  Preoperative Dx: syncope  Postoperative Dx: syncope  Complications: None  Anesthesia type: Local

## 2021-04-20 NOTE — PROVIDER CONTACT NOTE (OTHER) - REASON
Pt. No IV access and unable to draw VBG lactate
refusing tele
HR
pt /47 and states she feels very tired
Elevated BP
emesis
pt refusing Tele and  monitoring.
patient unresponsive

## 2021-04-20 NOTE — PROVIDER CONTACT NOTE (OTHER) - ASSESSMENT
pt is a & o x 4. rest of vitals stable, NAD noted. pt states she always has high blood pressure.
/47 and pt states she feels tired and is lethargic but arousable. Denies dizziness and lightheadedness.
patients vitals stable. never loss consciousness. sitting up in chair
Pt. VS WNL. Pt. denies dizziness or pain at this time
patient sitting up in chair, A&ox4 self care
pt is a & o x 4. vitals stable, NAD noted. pt was pending d/c to Rehab today but paperwork didn't go through until late in the evening.
patient now sitting up in chair, SB on tele. vitals stable.
HR 53, /62, RR 17, O2 99% and temp 97.8  pt asymptomatic resting in bed

## 2021-04-20 NOTE — PROVIDER CONTACT NOTE (OTHER) - NAME OF MD/NP/PA/DO NOTIFIED:
Braydon Goldberg [id number]
JAE _ Jacob Keating. [id number]
EdgarJohn saldana , [id number]
JAE _ Jacob Keating. [id number]
ACP Rosemary Mcatrhur
Anaya GOETZ
Braydon Church, [address]
Rory Fabiola JAE PA

## 2021-04-20 NOTE — PROVIDER CONTACT NOTE (OTHER) - DATE AND TIME:
17-Apr-2021 00:06
20-Apr-2021 12:00
10-Apr-2021 06:11
11-Apr-2021 13:42
09-Apr-2021 20:30
17-Apr-2021 13:05
11-Apr-2021 08:42
15-Apr-2021 14:38

## 2021-04-21 LAB
ALBUMIN SERPL ELPH-MCNC: 3.5 G/DL — SIGNIFICANT CHANGE UP (ref 3.3–5)
ALP SERPL-CCNC: 118 U/L — SIGNIFICANT CHANGE UP (ref 40–120)
ALT FLD-CCNC: 10 U/L — SIGNIFICANT CHANGE UP (ref 4–33)
ANION GAP SERPL CALC-SCNC: 8 MMOL/L — SIGNIFICANT CHANGE UP (ref 7–14)
AST SERPL-CCNC: 16 U/L — SIGNIFICANT CHANGE UP (ref 4–32)
BILIRUB SERPL-MCNC: 0.3 MG/DL — SIGNIFICANT CHANGE UP (ref 0.2–1.2)
BUN SERPL-MCNC: 25 MG/DL — HIGH (ref 7–23)
CALCIUM SERPL-MCNC: 9 MG/DL — SIGNIFICANT CHANGE UP (ref 8.4–10.5)
CHLORIDE SERPL-SCNC: 96 MMOL/L — LOW (ref 98–107)
CO2 SERPL-SCNC: 29 MMOL/L — SIGNIFICANT CHANGE UP (ref 22–31)
CREAT SERPL-MCNC: 1.34 MG/DL — HIGH (ref 0.5–1.3)
GLUCOSE SERPL-MCNC: 104 MG/DL — HIGH (ref 70–99)
HCT VFR BLD CALC: 30.4 % — LOW (ref 34.5–45)
HGB BLD-MCNC: 10.5 G/DL — LOW (ref 11.5–15.5)
MAGNESIUM SERPL-MCNC: 2.6 MG/DL — SIGNIFICANT CHANGE UP (ref 1.6–2.6)
MCHC RBC-ENTMCNC: 23.7 PG — LOW (ref 27–34)
MCHC RBC-ENTMCNC: 34.5 GM/DL — SIGNIFICANT CHANGE UP (ref 32–36)
MCV RBC AUTO: 68.6 FL — LOW (ref 80–100)
NRBC # BLD: 0 /100 WBCS — SIGNIFICANT CHANGE UP
NRBC # FLD: 0 K/UL — SIGNIFICANT CHANGE UP
PHOSPHATE SERPL-MCNC: 3.6 MG/DL — SIGNIFICANT CHANGE UP (ref 2.5–4.5)
PLATELET # BLD AUTO: 287 K/UL — SIGNIFICANT CHANGE UP (ref 150–400)
POTASSIUM SERPL-MCNC: 3.8 MMOL/L — SIGNIFICANT CHANGE UP (ref 3.5–5.3)
POTASSIUM SERPL-SCNC: 3.8 MMOL/L — SIGNIFICANT CHANGE UP (ref 3.5–5.3)
PROT SERPL-MCNC: 6.9 G/DL — SIGNIFICANT CHANGE UP (ref 6–8.3)
RBC # BLD: 4.43 M/UL — SIGNIFICANT CHANGE UP (ref 3.8–5.2)
RBC # FLD: 16.2 % — HIGH (ref 10.3–14.5)
SODIUM SERPL-SCNC: 133 MMOL/L — LOW (ref 135–145)
WBC # BLD: 3.72 K/UL — LOW (ref 3.8–10.5)
WBC # FLD AUTO: 3.72 K/UL — LOW (ref 3.8–10.5)

## 2021-04-21 RX ORDER — SODIUM CHLORIDE 9 MG/ML
1000 INJECTION INTRAMUSCULAR; INTRAVENOUS; SUBCUTANEOUS
Refills: 0 | Status: DISCONTINUED | OUTPATIENT
Start: 2021-04-21 | End: 2021-04-22

## 2021-04-21 RX ADMIN — LIDOCAINE 1 PATCH: 4 CREAM TOPICAL at 05:22

## 2021-04-21 RX ADMIN — OXCARBAZEPINE 450 MILLIGRAM(S): 300 TABLET, FILM COATED ORAL at 05:24

## 2021-04-21 RX ADMIN — OXCARBAZEPINE 450 MILLIGRAM(S): 300 TABLET, FILM COATED ORAL at 17:38

## 2021-04-21 RX ADMIN — LIDOCAINE 1 PATCH: 4 CREAM TOPICAL at 08:51

## 2021-04-21 RX ADMIN — LISINOPRIL 40 MILLIGRAM(S): 2.5 TABLET ORAL at 05:24

## 2021-04-21 RX ADMIN — ENOXAPARIN SODIUM 40 MILLIGRAM(S): 100 INJECTION SUBCUTANEOUS at 12:09

## 2021-04-21 RX ADMIN — AMLODIPINE BESYLATE 10 MILLIGRAM(S): 2.5 TABLET ORAL at 05:24

## 2021-04-21 RX ADMIN — Medication 25 MILLIGRAM(S): at 05:23

## 2021-04-21 RX ADMIN — LIDOCAINE 1 PATCH: 4 CREAM TOPICAL at 23:13

## 2021-04-21 RX ADMIN — ISOSORBIDE MONONITRATE 30 MILLIGRAM(S): 60 TABLET, EXTENDED RELEASE ORAL at 12:09

## 2021-04-21 NOTE — CHART NOTE - NSCHARTNOTESELECT_GEN_ALL_CORE
ACP NP/Event Note
EEG prelim
Event Note
Rapid Response
Event Note
RRT/Event Note
Site Check/Event Note

## 2021-04-21 NOTE — PROGRESS NOTE ADULT - ASSESSMENT
74 y/o F with HTN, seizures presents after fall.     EKG: NSR with poor R wave progression  Tele: SB 50s    1. s/p fall  -CT head neg  -orthostatic positive s/p IVF. f/u orthostatics today  -found to have abnormal stress test in 2019--  -s/p [facility] this admission with non obstructive CAD    2. HTN  -improving  - c/w lisinopril, HCTz  25mg qd,  norvasc 10mg daily & imdur 30mg daily   - continue to monitor BP    3. AMS  - CT head with no acute pathologies  - EEG with moderate slowing, appreciate neuro recs  - EP onboard planned for PPM cancelled 2/2 RRT . s/p ILR     4. JOSE LUIS  -f/u bladder scan   -f/u orthos. ?2/2 dehydration

## 2021-04-21 NOTE — PROGRESS NOTE ADULT - SUBJECTIVE AND OBJECTIVE BOX
SUBJECTIVE / OVERNIGHT EVENTS: pt seen and examined    MEDICATIONS  (STANDING):  amLODIPine   Tablet 10 milliGRAM(s) Oral daily  enoxaparin Injectable 40 milliGRAM(s) SubCutaneous daily  hydrochlorothiazide 25 milliGRAM(s) Oral daily  isosorbide   mononitrate ER Tablet (IMDUR) 30 milliGRAM(s) Oral daily  lidocaine   Patch 1 Patch Transdermal every 24 hours  lisinopril 40 milliGRAM(s) Oral daily  OXcarbazepine 450 milliGRAM(s) Oral two times a day  sodium chloride 0.9%. 500 milliLiter(s) (100 mL/Hr) IV Continuous <Continuous>  sodium chloride 0.9%. 1000 milliLiter(s) (75 mL/Hr) IV Continuous <Continuous>    MEDICATIONS  (PRN):  acetaminophen   Tablet .. 1000 milliGRAM(s) Oral every 6 hours PRN Mild Pain (1 - 3), Moderate Pain (4 - 6), Severe Pain (7 - 10)  meclizine 25 milliGRAM(s) Oral three times a day PRN Dizziness  ondansetron Injectable 4 milliGRAM(s) IV Push every 6 hours PRN Nausea and/or Vomiting    Vital Signs Last 24 Hrs  T(C): 36.7 (21 Apr 2021 15:03), Max: 37.1 (21 Apr 2021 05:22)  T(F): 98 (21 Apr 2021 15:03), Max: 98.7 (21 Apr 2021 05:22)  HR: 54 (21 Apr 2021 15:03) (53 - 57)  BP: 149/69 (21 Apr 2021 15:03) (121/63 - 156/77)  BP(mean): --  RR: 18 (21 Apr 2021 15:03) (18 - 19)  SpO2: 100% (21 Apr 2021 15:03) (99% - 100%)    Constitutional: No fever, fatigue  Skin: No rash.  Eyes: No recent vision problems or eye pain.  ENT: No congestion, ear pain, or sore throat.  Cardiovascular: No chest pain or palpation.  Respiratory: No cough, shortness of breath, congestion, or wheezing.  Gastrointestinal: No abdominal pain, nausea, vomiting, or diarrhea.  Genitourinary: No dysuria.  Musculoskeletal: No joint swelling.  Neurologic: No headache.    PHYSICAL EXAM:  GENERAL: NAD  EYES: EOMI, PERRLA  NECK: Supple, No JVD  CHEST/LUNG: dec breath sounds rt base  HEART:  S1 , S2 +  ABDOMEN: soft , bs+  EXTREMITIES:  no edema  NEUROLOGY:alert awake    LABS:  04-21    133<L>  |  96<L>  |  25<H>  ----------------------------<  104<H>  3.8   |  29  |  1.34<H>    Ca    9.0      21 Apr 2021 06:42  Phos  3.6     04-21  Mg     2.6     04-21    TPro  6.9  /  Alb  3.5  /  TBili  0.3  /  DBili      /  AST  16  /  ALT  10  /  AlkPhos  118  04-21    Creatinine Trend: 1.34 <--, 1.07 <--, 1.21 <--, 1.28 <--, 1.37 <--, 1.21 <--, 1.15 <--                        10.5   3.72  )-----------( 287      ( 21 Apr 2021 06:42 )             30.4     Urine Studies:            LIVER FUNCTIONS - ( 21 Apr 2021 06:42 )  Alb: 3.5 g/dL / Pro: 6.9 g/dL / ALK PHOS: 118 U/L / ALT: 10 U/L / AST: 16 U/L / GGT: x

## 2021-04-21 NOTE — CHART NOTE - NSCHARTNOTEFT_GEN_A_CORE
ACP MEDICINE COVERAGE - Medicine Subsequent Hospital Care Note    CC:  HPI/Subjective:    ROS:  Denies fever, chills, diaphoresis, malaise, night sweats, generalized weakness, headache, changes in vision, dizziness, cough, sputum production, wheezing, hemoptysis, chest pain, palpitations, shortness of breath, dyspnea on exertion, PND, dysphagia, new abdominal pain, nausea, vomiting, diarrhea, constipation, melena, hematochezia, dysuria, increased urinary frequency/urgency, hematuria, nocturia, numbness/weakness/tingling, any other complaints.    [  ] I spoke with the attending, nurse, and family to obtain the history.  [  ] Unable to obtain history due to ___________.    Vital Signs Last 24 Hrs  T(C): 36.7 (21 @ 15:03), Max: 37.1 (21 @ 05:22)  T(F): 98 (21 @ 15:03), Max: 98.7 (21 @ 05:22)  HR: 54 (21 @ 15:03) (53 - 75)  BP: 149/69 (21 @ 15:03) (121/63 - 156/77)  RR: 18 (21 @ 15:03) (17 - 19)  SpO2: 100% (21 @ 15:03) (99% - 100%) on (O2)    PHYSICAL EXAM:  Constitutional: NAD, well-developed, well-nourished  Ears, nose, Mouth, and Throat: normal external ears and nose, normal hearing, moist oral mucosa  Eyes: normal conjunctiva, EOMI, PEERL  Neck: supple, no JVD  Respiratory: Clear to auscultation bilaterally. No wheezes, rales or rhonchi. Normal respiratory effort  Cardiovascular: regular rate and rhythm, S1 and S2, no murmurs, rubs or gallops, no edema, 2+ peripheral pulses  Gastrointestinal: soft, nontender, nondistended, +bowel sounds, no hernia  Skin: warm, dry, no rash  Neurologic: sensation grossly intact, CN grossly intact, non-focal exam  Musculoskeletal: no clubbing, no cyanosis, no joint swelling  Psychiatric: A&Ox3, appropriate mood, affect    LABS:                        10.5   3.72  )-----------( 287      ( 2021 06:42 )             30.4         133<L>  |  96<L>  |  25<H>  ----------------------------<  104<H>  3.8   |  29  |  1.34<H>    Ca    9.0      2021 06:42  Phos  3.6       Mg     2.6         TPro  6.9  /  Alb  3.5  /  TBili  0.3  /  DBili  x   /  AST  16  /  ALT  10  /  AlkPhos  118      PT/INR: PT: 12.7 sec | INR: 1.11 ratio (21 @ 07:26)  PTT: 35.9 sec (21 @ 07:26)  PT/INR: PT: 14.6 sec | INR: 1.28 ratio (21 @ 22:28)  PTT: 34.7 sec (21 @ 22:28)    CARDIAC ENZYMES    Creatine Kinase, Serum: 120 U/L (21 @ 22:28)          Serum Pro-Brain Natriuretic Peptide:   D-Dimer Assay:     Urinanalysis Basic (21 @ 18:27):  Color: Yellow / Appearance: Clear / S.022 / pH: --  Gluc: -- / Ketone: Small / Bili: Negative / Urobili: 3 mg/dL  Blood: -- / Protein: 100 mg/dL / Nitrite: Negative  Leuk Esterase: Negative / RBC: 15 / WBC: 1  Sq Epi: -- / Non Sq Epi: -- / Bacteria: Negative      Blood Gas Venous (21 @ 18:27):  pH: 7.50 | HCO3: 27 | pCO2: 36 | pO2: <24 | Lactate: 2.1      Blood Gas Arterial (21 @ 18:27):      CAPILLARY BLOOD GLUCOSE:  POCT Blood Glucose: 184 mg/dL (21 @ 17:49)  POCT Blood Glucose: 155 mg/dL (21 @ 12:59)  POCT Blood Glucose: 151 mg/dL (21 @ 13:12)      COVID PCR:  [facility] (21 @ 17:33)  [facility] ([facility] @ 11:[facility]  [facility] (21 @ 13:17)      RADIOLOGY:    MEDICATIONS  (STANDING):  amLODIPine   Tablet 10 milliGRAM(s) Oral daily  enoxaparin Injectable 40 milliGRAM(s) SubCutaneous daily  hydrochlorothiazide 25 milliGRAM(s) Oral daily  isosorbide   mononitrate ER Tablet (IMDUR) 30 milliGRAM(s) Oral daily  lidocaine   Patch 1 Patch Transdermal every 24 hours  lisinopril 40 milliGRAM(s) Oral daily  OXcarbazepine 450 milliGRAM(s) Oral two times a day  sodium chloride 0.9%. 500 milliLiter(s) (100 mL/Hr) IV Continuous <Continuous>  sodium chloride 0.9%. 1000 milliLiter(s) (75 mL/Hr) IV Continuous <Continuous>    MEDICATIONS  (PRN):  acetaminophen   Tablet .. 1000 milliGRAM(s) Oral every 6 hours PRN Mild Pain (1 - 3), Moderate Pain (4 - 6), Severe Pain (7 - 10)  meclizine 25 milliGRAM(s) Oral three times a day PRN Dizziness  ondansetron Injectable 4 milliGRAM(s) IV Push every 6 hours PRN Nausea and/or Vomiting    I&O's Summary    2021 07:  -  2021 07:00  --------------------------------------------------------  IN: 0 mL / OUT: 300 mL / NET: -300 mL    2021 07:  -  2021 18:27  --------------------------------------------------------  IN: 0 mL / OUT: 250 mL / NET: -250 mL      I reviewed the above labs, radiology, medications, tests, telemetry, and EKG interpretation.    ASSESSMENT/PLAN:    - Clinical findings, labs, tests, telemetry, and ekg reviewed with attending. Will monitor patient closely.       Low complexity/risk (30min)  orthos pos   D/w Dr. Bassem lima ok to add IVF 75ml for a total of 1 liter   cr slightly elevated will give ivf and repeat in am

## 2021-04-21 NOTE — PROGRESS NOTE ADULT - ASSESSMENT
Assessment: 74 yo female with a PMHx of HTN, HLD, and seizures who presented to [facility] on 04/04 after being found down. PE non-focal. EEG with moderate slowing. episode of LOC 2 days prior    Impression: event from is more consistent with syncope than seizure       -C/W Trileptal  450 BID  -Orthostatics  -No further inpt Neuro w/u

## 2021-04-21 NOTE — PROGRESS NOTE ADULT - SUBJECTIVE AND OBJECTIVE BOX
MRN-[id number]    Subjective: 75 yo female seen and examined at bedside. s/p ILR  PAST MEDICAL & SURGICAL HISTORY:  Hypertension    Seizure    Hyperlipidemia    Diverticulosis    Borderline diabetes    Obesity    No Past Surgical History    History of appendectomy    FAMILY HISTORY:  Family history of cerebrovascular accident (CVA) (Sibling)      MEDICATIONS  (STANDING):  amLODIPine   Tablet 10 milliGRAM(s) Oral daily  enoxaparin Injectable 40 milliGRAM(s) SubCutaneous daily  hydrochlorothiazide 25 milliGRAM(s) Oral daily  isosorbide   mononitrate ER Tablet (IMDUR) 30 milliGRAM(s) Oral daily  lidocaine   Patch 1 Patch Transdermal every 24 hours  lisinopril 40 milliGRAM(s) Oral daily  OXcarbazepine 450 milliGRAM(s) Oral two times a day  sodium chloride 0.9%. 500 milliLiter(s) (100 mL/Hr) IV Continuous <Continuous>    MEDICATIONS  (PRN):  acetaminophen   Tablet .. 1000 milliGRAM(s) Oral every 6 hours PRN Mild Pain (1 - 3), Moderate Pain (4 - 6), Severe Pain (7 - 10)  meclizine 25 milliGRAM(s) Oral three times a day PRN Dizziness  ondansetron Injectable 4 milliGRAM(s) IV Push every 6 hours PRN Nausea and/or Vomiting          Allergies  allergy to Lobster (Swelling)  No Known Drug Allergies  seaford (Unknown)    Intolerances  lactose (Stomach Upset)    ROS: Pertinent positives above, all other ROS were reviewed and are negative.      Vital Signs Last 24 Hrs  T(C): 37 (21 Apr 2021 12:00), Max: 37.1 (21 Apr 2021 05:22)  T(F): 98.6 (21 Apr 2021 12:00), Max: 98.7 (21 Apr 2021 05:22)  HR: 57 (21 Apr 2021 12:00) (53 - 75)  BP: 156/77 (21 Apr 2021 12:00) (121/63 - 156/77)  BP(mean): --  RR: 18 (21 Apr 2021 12:00) (17 - 19)  SpO2: 100% (21 Apr 2021 12:00) (99% - 100%)  GENERAL EXAM:  Constitutional: awake and alert. NAD.    NEUROLOGICAL EXAM:  MS: Alert, eyes open spontaneously. Oriented to self, age, location, month, year. Speech is clear, fluent. Follows commands.  CN: PERRL. VFF. EOMI. V1-V3 intact. Face symmetric. Tongue midline.   Motor: All extremities antigravity.   Sensory: Intact to LT throughout.  Coordination: No dysmetria on FNF or on HTS bilaterally.  Gait: Deferred.      Radiology:  -04/03 CTH: No displaced calvarial fracture or acute intracranial hemorrhage.  -04/03 Cervical spine CT: No acute fracture. Cervical spondylosis. Heterogeneous thyromegaly.  -04/11 CTH: No hydrocephalus, acute intracranial hemorrhage, mass effect, or brain edema. Moderate to severe white matter microvascular ischemic disease.      EEG SUMMARY/CLASSIFICATION    Abnormal EEG in the awake, drowsy and asleep states.  - Moderate generalized slowing with GRDA   _____________________________________________________________  EEG IMPRESSION/CLINICAL CORRELATE    Abnormal EEG study.  1. Moderate nonspecific diffuse or multifocal cerebral dysfunction.   2. No epileptiform pattern or seizure seen.      _____________________________________________________________    Serjio Galeano   Epilepsy Fellow  [facility]    Epilepsy Reading Room Ph# [phone]  Epilepsy Answering Service after 5 pm and before 8:30 am: Ph# [phone]

## 2021-04-21 NOTE — PROGRESS NOTE ADULT - SUBJECTIVE AND OBJECTIVE BOX
Ulises Luevano MD  Interventional Cardiology / Advance Heart Failure and Cardiac Transplant Specialist  [place] Office : [address] [place] [address] [place]. [address]  Tel:   [place] Office : [address] [facility] [place] [address]  Tel: [phone]  Cell : [phone]    Subjective/Overnight events: Pt is lying in bed comfortable not in distress, no chest pains no SOB no palpitations  	  MEDICATIONS:  amLODIPine   Tablet 10 milliGRAM(s) Oral daily  enoxaparin Injectable 40 milliGRAM(s) SubCutaneous daily  hydrochlorothiazide 25 milliGRAM(s) Oral daily  isosorbide   mononitrate ER Tablet (IMDUR) 30 milliGRAM(s) Oral daily  lisinopril 40 milliGRAM(s) Oral daily        acetaminophen   Tablet .. 1000 milliGRAM(s) Oral every 6 hours PRN  meclizine 25 milliGRAM(s) Oral three times a day PRN  ondansetron Injectable 4 milliGRAM(s) IV Push every 6 hours PRN  OXcarbazepine 450 milliGRAM(s) Oral two times a day        lidocaine   Patch 1 Patch Transdermal every 24 hours  sodium chloride 0.9%. 500 milliLiter(s) IV Continuous <Continuous>      PAST MEDICAL/SURGICAL HISTORY  PAST MEDICAL & SURGICAL HISTORY:  Hypertension    Seizure    Hyperlipidemia    Diverticulosis    Borderline diabetes    Obesity    No Past Surgical History    History of appendectomy        SOCIAL HISTORY: Substance Use (street drugs): ( x ) never used  (  ) other:    FAMILY HISTORY:  Family history of cerebrovascular accident (CVA) (Sibling)        REVIEW OF SYSTEMS:  CONSTITUTIONAL: No fever, weight loss, or fatigue  EYES: No eye pain, visual disturbances, or discharge  ENMT:  No difficulty hearing, tinnitus, vertigo; No sinus or throat pain  BREASTS: No pain, masses, or nipple discharge  GASTROINTESTINAL: No abdominal or epigastric pain. No nausea, vomiting, or hematemesis; No diarrhea or constipation. No melena or hematochezia.  GENITOURINARY: No dysuria, frequency, hematuria, or incontinence  NEUROLOGICAL: No headaches, memory loss, loss of strength, numbness, or tremors  ENDOCRINE: No heat or cold intolerance; No hair loss  MUSCULOSKELETAL: No joint pain or swelling; No muscle, back, or extremity pain  PSYCHIATRIC: No depression, anxiety, mood swings, or difficulty sleeping  HEME/LYMPH: No easy bruising, or bleeding gums  All others negative    PHYSICAL EXAM:  T(C): 37.1 (04-21-21 @ 05:22), Max: 37.1 (04-21-21 @ 05:22)  HR: 53 (04-21-21 @ 05:22) (53 - 75)  BP: 121/63 (04-21-21 @ 05:22) (121/63 - 156/62)  RR: 19 (04-21-21 @ 05:22) (17 - 19)  SpO2: 99% (04-21-21 @ 05:22) (99% - 100%)  Wt(kg): --  I&O's Summary    20 Apr 2021 07:01  -  21 Apr 2021 07:00  --------------------------------------------------------  IN: 0 mL / OUT: 300 mL / NET: -300 mL    21 Apr 2021 07:01  -  21 Apr 2021 11:27  --------------------------------------------------------  IN: 0 mL / OUT: 250 mL / NET: -250 mL            GENERAL: NAD  EYES: EOMI, PERRLA, conjunctiva and sclera clear  ENMT: No tonsillar erythema, exudates, or enlargement;  Cardiovascular: Normal S1 S2, No JVD, No murmurs, No edema  Respiratory: Lungs clear to auscultation	  Gastrointestinal:  Soft, suprapubic tenderness , + BS	  Extremities: no edema                                    10.5   3.72  )-----------( 287      ( 21 Apr 2021 06:42 )             30.4     04-21    133<L>  |  96<L>  |  25<H>  ----------------------------<  104<H>  3.8   |  29  |  1.34<H>    Ca    9.0      21 Apr 2021 06:42  Phos  3.6     04-21  Mg     2.6     04-21    TPro  6.9  /  Alb  3.5  /  TBili  0.3  /  DBili  x   /  AST  16  /  ALT  10  /  AlkPhos  118  04-21    proBNP:   Lipid Profile:   HgA1c:   TSH:     Consultant(s) Notes Reviewed:  [x ] YES  [ ] NO    Care Discussed with Consultants/Other Providers [ x] YES  [ ] NO    Imaging Personally Reviewed independently:  [x] YES  [ ] NO    All labs, radiologic studies, vitals, orders and medications list reviewed. Patient is seen and examined at bedside. Case discussed with medical team.

## 2021-04-22 LAB
ALBUMIN SERPL ELPH-MCNC: 3.5 G/DL — SIGNIFICANT CHANGE UP (ref 3.3–5)
ALP SERPL-CCNC: 126 U/L — HIGH (ref 40–120)
ALT FLD-CCNC: 11 U/L — SIGNIFICANT CHANGE UP (ref 4–33)
ANION GAP SERPL CALC-SCNC: 14 MMOL/L — SIGNIFICANT CHANGE UP (ref 7–14)
AST SERPL-CCNC: 20 U/L — SIGNIFICANT CHANGE UP (ref 4–32)
BILIRUB SERPL-MCNC: 0.3 MG/DL — SIGNIFICANT CHANGE UP (ref 0.2–1.2)
BUN SERPL-MCNC: 24 MG/DL — HIGH (ref 7–23)
CALCIUM SERPL-MCNC: 9.1 MG/DL — SIGNIFICANT CHANGE UP (ref 8.4–10.5)
CHLORIDE SERPL-SCNC: 97 MMOL/L — LOW (ref 98–107)
CO2 SERPL-SCNC: 24 MMOL/L — SIGNIFICANT CHANGE UP (ref 22–31)
CREAT SERPL-MCNC: 1.11 MG/DL — SIGNIFICANT CHANGE UP (ref 0.5–1.3)
GLUCOSE SERPL-MCNC: 100 MG/DL — HIGH (ref 70–99)
HCT VFR BLD CALC: 34.5 % — SIGNIFICANT CHANGE UP (ref 34.5–45)
HGB BLD-MCNC: 11.8 G/DL — SIGNIFICANT CHANGE UP (ref 11.5–15.5)
MAGNESIUM SERPL-MCNC: 2.5 MG/DL — SIGNIFICANT CHANGE UP (ref 1.6–2.6)
MCHC RBC-ENTMCNC: 23.4 PG — LOW (ref 27–34)
MCHC RBC-ENTMCNC: 34.2 GM/DL — SIGNIFICANT CHANGE UP (ref 32–36)
MCV RBC AUTO: 68.5 FL — LOW (ref 80–100)
NRBC # BLD: 0 /100 WBCS — SIGNIFICANT CHANGE UP
NRBC # FLD: 0 K/UL — SIGNIFICANT CHANGE UP
PHOSPHATE SERPL-MCNC: 2.9 MG/DL — SIGNIFICANT CHANGE UP (ref 2.5–4.5)
PLATELET # BLD AUTO: 273 K/UL — SIGNIFICANT CHANGE UP (ref 150–400)
POTASSIUM SERPL-MCNC: 4 MMOL/L — SIGNIFICANT CHANGE UP (ref 3.5–5.3)
POTASSIUM SERPL-SCNC: 4 MMOL/L — SIGNIFICANT CHANGE UP (ref 3.5–5.3)
PROT SERPL-MCNC: 7.2 G/DL — SIGNIFICANT CHANGE UP (ref 6–8.3)
RBC # BLD: 5.04 M/UL — SIGNIFICANT CHANGE UP (ref 3.8–5.2)
RBC # FLD: 16.4 % — HIGH (ref 10.3–14.5)
SARS-COV-2 RNA SPEC QL NAA+PROBE: SIGNIFICANT CHANGE UP
SODIUM SERPL-SCNC: 135 MMOL/L — SIGNIFICANT CHANGE UP (ref 135–145)
WBC # BLD: 3.74 K/UL — LOW (ref 3.8–10.5)
WBC # FLD AUTO: 3.74 K/UL — LOW (ref 3.8–10.5)

## 2021-04-22 RX ORDER — ISOSORBIDE MONONITRATE 60 MG/1
60 TABLET, EXTENDED RELEASE ORAL DAILY
Refills: 0 | Status: DISCONTINUED | OUTPATIENT
Start: 2021-04-22 | End: 2021-04-23

## 2021-04-22 RX ADMIN — ENOXAPARIN SODIUM 40 MILLIGRAM(S): 100 INJECTION SUBCUTANEOUS at 12:37

## 2021-04-22 RX ADMIN — Medication 25 MILLIGRAM(S): at 06:09

## 2021-04-22 RX ADMIN — OXCARBAZEPINE 450 MILLIGRAM(S): 300 TABLET, FILM COATED ORAL at 17:59

## 2021-04-22 RX ADMIN — ISOSORBIDE MONONITRATE 30 MILLIGRAM(S): 60 TABLET, EXTENDED RELEASE ORAL at 12:37

## 2021-04-22 RX ADMIN — OXCARBAZEPINE 450 MILLIGRAM(S): 300 TABLET, FILM COATED ORAL at 06:09

## 2021-04-22 RX ADMIN — LIDOCAINE 1 PATCH: 4 CREAM TOPICAL at 12:34

## 2021-04-22 RX ADMIN — AMLODIPINE BESYLATE 10 MILLIGRAM(S): 2.5 TABLET ORAL at 06:09

## 2021-04-22 RX ADMIN — LISINOPRIL 40 MILLIGRAM(S): 2.5 TABLET ORAL at 06:09

## 2021-04-22 NOTE — PROGRESS NOTE ADULT - PROBLEM SELECTOR PLAN 4
dispo: PT eval   dvt ppx: lovenox
diet: pending bedside S+S  dispo: PT eval   dvt ppx: lovenox
diet: pending bedside S+S  dispo: PT eval   dvt ppx: lovenox
dispo: PT eval   dvt ppx: lovenox
diet: pending bedside S+S  dispo: PT eval   dvt ppx: lovenox
dispo: PT eval   dvt ppx: lovenox
dispo: PT eval   dvt ppx: lovenox
diet: pending bedside S+S  dispo: PT eval   dvt ppx: lovenox
dispo: PT eval   dvt ppx: lovenox
diet: pending bedside S+S  dispo: PT eval   dvt ppx: lovenox
dispo: PT eval   dvt ppx: lovenox
dispo: PT eval   dvt ppx: lovenox

## 2021-04-22 NOTE — PROGRESS NOTE ADULT - PROBLEM SELECTOR PLAN 1
likely 2/2 deconditioning vs syncope vs seizure   pt with hx of falls - presenting with unwitnessed fall out of bed - unclear circumstances, including LOC/head trauma  -CT head/cervical spine/chest/pelvis - without fracture   -CXR: clear   -PT evaluated pt- dc pt TOM  -monitor on tele, fall precautions  nl lvfx, rvfx  pt had altered mental status - pt at present mental status is at baseline   eeg with  Moderate nonspecific diffuse or multifocal cerebral dysfunction.   2. No epileptiform pattern or seizure seen.  ep eval for bradycardia  abd ct for nausea  discussed management plan with pts daughter and pt
likely 2/2 deconditioning vs syncope vs seizure   pt with hx of falls - presenting with unwitnessed fall out of bed - unclear circumstances, including LOC/head trauma  -CT head/cervical spine/chest/pelvis - without fracture   -CXR: clear   -PT evaluated pt- dc pt TOM  -monitor on tele, fall precautions  nl lvfx, rvfx
likely 2/2 deconditioning vs syncope vs seizure   pt with hx of falls - presenting with unwitnessed fall out of bed - unclear circumstances, including LOC/head trauma  -CT head/cervical spine/chest/pelvis - without fracture   -CXR: clear   -PT evaluated pt- dc pt TOM  -monitor on tele, fall precautions  nl lvfx, rvfx  pt had altered mental status - pt at present mental status is at baseline- eeg
likely 2/2 deconditioning vs syncope vs seizure   pt with hx of falls - presenting with unwitnessed fall out of bed - unclear circumstances, including LOC/head trauma  -CT head/cervical spine/chest/pelvis - without fracture   -CXR: clear   -f/u prolactin  -f/u orthostatic   -PT eval   -monitor on tele, fall precautions    -TTE ordered
likely 2/2 deconditioning vs syncope vs seizure   pt with hx of falls - presenting with unwitnessed fall out of bed - unclear circumstances, including LOC/head trauma  -CT head/cervical spine/chest/pelvis - without fracture   -CXR: clear   -  -monitor on tele, fall precautions  nl lvfx, rvfx  pt had altered mental status - pt at present mental status is at baseline   eeg with  Moderate nonspecific diffuse or multifocal cerebral dysfunction.   2. No epileptiform pattern or seizure seen.  ep evaluated  for bradycardia    discussed management plan with pts daughter and pt
likely 2/2 deconditioning vs syncope vs seizure   pt with hx of falls - presenting with unwitnessed fall out of bed - unclear circumstances, including LOC/head trauma  -CT head/cervical spine/chest/pelvis - without fracture   -CXR: clear   -  -monitor on tele, fall precautions  nl lvfx, rvfx  pt had altered mental status - pt at present mental status is at baseline   eeg with  Moderate nonspecific diffuse or multifocal cerebral dysfunction.   2. No epileptiform pattern or seizure seen.  ep evaluated  for bradycardia    discussed management plan with pts daughter and pt
likely 2/2 deconditioning vs syncope vs seizure   pt with hx of falls - presenting with unwitnessed fall out of bed - unclear circumstances, including LOC/head trauma  -CT head/cervical spine/chest/pelvis - without fracture   -CXR: clear   -PT evaluated pt- dc pt TOM  -monitor on tele, fall precautions  nl lvfx, rvfx
likely 2/2 deconditioning vs syncope vs seizure   pt with hx of falls - presenting with unwitnessed fall out of bed - unclear circumstances, including LOC/head trauma  -CT head/cervical spine/chest/pelvis - without fracture   -CXR: clear   -PT evaluated pt- dc pt TOM  -monitor on tele, fall precautions  nl lvfx, rvfx  pt had altered mental status - pt at present mental status is at baseline   eeg with  Moderate nonspecific diffuse or multifocal cerebral dysfunction.   2. No epileptiform pattern or seizure seen.  ep eval for bradycardia  abd ct for nausea  discussed management plan with pts daughter and pt
likely 2/2 deconditioning vs syncope vs seizure   pt with hx of falls - presenting with unwitnessed fall out of bed - unclear circumstances, including LOC/head trauma  -CT head/cervical spine/chest/pelvis - without fracture   -CXR: clear   -PT evaluated pt- dc pt TOM  -monitor on tele, fall precautions  nl lvfx, rvfx
likely 2/2 deconditioning vs syncope vs seizure   pt with hx of falls - presenting with unwitnessed fall out of bed - unclear circumstances, including LOC/head trauma  -CT head/cervical spine/chest/pelvis - without fracture   -CXR: clear   -PT evaluated pt- dc pt TOM  -monitor on tele, fall precautions  nl lvfx, rvfx  pt had altered mental status - pt at present mental status is at baseline   eeg with  Moderate nonspecific diffuse or multifocal cerebral dysfunction.   2. No epileptiform pattern or seizure seen.  ep eval for bradycardia  abd ct for nausea  discussed management plan with pts daughter and pt
likely 2/2 deconditioning vs syncope vs seizure   pt with hx of falls - presenting with unwitnessed fall out of bed - unclear circumstances, including LOC/head trauma  -CT head/cervical spine/chest/pelvis - without fracture   -CXR: clear   -PT evaluated pt- dc pt TOM  -monitor on tele, fall precautions  nl lvfx, rvfx  pt had altered mental status - pt at present mental status is at baseline- eeg
likely 2/2 deconditioning vs syncope vs seizure   pt with hx of falls - presenting with unwitnessed fall out of bed - unclear circumstances, including LOC/head trauma  -CT head/cervical spine/chest/pelvis - without fracture   -CXR: clear   -f/u prolactin  -f/u orthostatic   -PT eval   -monitor on tele, fall precautions    -TTE ordered
likely 2/2 deconditioning vs syncope vs seizure   pt with hx of falls - presenting with unwitnessed fall out of bed - unclear circumstances, including LOC/head trauma  -CT head/cervical spine/chest/pelvis - without fracture   -CXR: clear   -f/u prolactin  -f/u orthostatic   -PT eval   -monitor on tele, fall precautions  nl lvfx, rvfx
likely 2/2 deconditioning vs syncope vs seizure   pt with hx of falls - presenting with unwitnessed fall out of bed - unclear circumstances, including LOC/head trauma  -CT head/cervical spine/chest/pelvis - without fracture   -CXR: clear   -  -monitor on tele, fall precautions  nl lvfx, rvfx  pt had altered mental status - pt at present mental status is at baseline   eeg with  Moderate nonspecific diffuse or multifocal cerebral dysfunction.   2. No epileptiform pattern or seizure seen.  ep evaluated  for bradycardia    discussed management plan with pts daughter and pt
likely 2/2 deconditioning vs syncope vs seizure   pt with hx of falls - presenting with unwitnessed fall out of bed - unclear circumstances, including LOC/head trauma  -CT head/cervical spine/chest/pelvis - without fracture   -CXR: clear   -PT evaluated pt- dc pt TOM  -monitor on tele, fall precautions  nl lvfx, rvfx  pt had altered mental status - pt at present mental status is at baseline   eeg with  Moderate nonspecific diffuse or multifocal cerebral dysfunction.   2. No epileptiform pattern or seizure seen.  ep eval for bradycardia  abd ct for nausea  discussed management plan with pts daughter and pt
likely 2/2 deconditioning vs syncope vs seizure   pt with hx of falls - presenting with unwitnessed fall out of bed - unclear circumstances, including LOC/head trauma  -CT head/cervical spine/chest/pelvis - without fracture   -CXR: clear   -PT evaluated pt- dc pt TOM  -monitor on tele, fall precautions  nl lvfx, rvfx  pt had altered mental status - pt at present mental status is at baseline   eeg with  Moderate nonspecific diffuse or multifocal cerebral dysfunction.   2. No epileptiform pattern or seizure seen.  ep eval for bradycardia  abd ct for nausea  discussed management plan with pts daughter and pt
likely 2/2 deconditioning vs syncope vs seizure   pt with hx of falls - presenting with unwitnessed fall out of bed - unclear circumstances, including LOC/head trauma  -CT head/cervical spine/chest/pelvis - without fracture   -CXR: clear   -f/u prolactin  -f/u orthostatic   -PT eval   -monitor on tele, fall precautions    -TTE ordered
likely 2/2 deconditioning vs syncope vs seizure   pt with hx of falls - presenting with unwitnessed fall out of bed - unclear circumstances, including LOC/head trauma  -CT head/cervical spine/chest/pelvis - without fracture   -CXR: clear   -PT evaluated pt- dc pt TOM  -monitor on tele, fall precautions  nl lvfx, rvfx

## 2021-04-22 NOTE — PROGRESS NOTE ADULT - PROBLEM SELECTOR PLAN 3
cont current htn med regimen

## 2021-04-22 NOTE — PROGRESS NOTE ADULT - ASSESSMENT
76 y/o F with HTN, seizures presents after fall.     EKG: NSR with poor R wave progression  Tele: SB 50s    1. s/p fall  -CT head neg  -orthostatic positive s/p IVF.  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 HTN  -improving  - c/w lisinopril,  norvasc 10mg daily  -HCTZ d/leo 2/2 positive orthostatics   -increased imdur to 60mg daily   - continue to monitor BP    3. AMS  - CT head with no acute pathologies  - EEG with moderate slowing, appreciate neuro recs  - EP onboard planned for PPM cancelled 2/2 RRT . s/p ILR     4. JOSE LUIS  -improved s/p IVF  -HCTZ discontinued

## 2021-04-22 NOTE — PROGRESS NOTE ADULT - PROBLEM SELECTOR PROBLEM 2
Seizure

## 2021-04-22 NOTE — PROGRESS NOTE ADULT - SUBJECTIVE AND OBJECTIVE BOX
SUBJECTIVE / OVERNIGHT EVENTS: pt seen and examined    MEDICATIONS  (STANDING):  amLODIPine   Tablet 10 milliGRAM(s) Oral daily  enoxaparin Injectable 40 milliGRAM(s) SubCutaneous daily  isosorbide   mononitrate ER Tablet (IMDUR) 60 milliGRAM(s) Oral daily  lidocaine   Patch 1 Patch Transdermal every 24 hours  lisinopril 40 milliGRAM(s) Oral daily  OXcarbazepine 450 milliGRAM(s) Oral two times a day    MEDICATIONS  (PRN):  acetaminophen   Tablet .. 1000 milliGRAM(s) Oral every 6 hours PRN Mild Pain (1 - 3), Moderate Pain (4 - 6), Severe Pain (7 - 10)  meclizine 25 milliGRAM(s) Oral three times a day PRN Dizziness  ondansetron Injectable 4 milliGRAM(s) IV Push every 6 hours PRN Nausea and/or Vomiting    Vital Signs Last 24 Hrs  T(C): 36.7 (22 Apr 2021 21:19), Max: 36.8 (22 Apr 2021 06:07)  T(F): 98.1 (22 Apr 2021 21:19), Max: 98.3 (22 Apr 2021 06:07)  HR: 57 (22 Apr 2021 21:19) (57 - 70)  BP: 154/55 (22 Apr 2021 21:19) (150/82 - 166/74)  BP(mean): --  RR: 18 (22 Apr 2021 21:19) (18 - 18)  SpO2: 97% (22 Apr 2021 21:19) (97% - 100%)    Constitutional: No fever, fatigue  Skin: No rash.  Eyes: No recent vision problems or eye pain.  ENT: No congestion, ear pain, or sore throat.  Cardiovascular: No chest pain or palpation.  Respiratory: No cough, shortness of breath, congestion, or wheezing.  Gastrointestinal: No abdominal pain, nausea, vomiting, or diarrhea.  Genitourinary: No dysuria.  Musculoskeletal: No joint swelling.  Neurologic: No headache.    PHYSICAL EXAM:  GENERAL: NAD  EYES: EOMI, PERRLA  NECK: Supple, No JVD  CHEST/LUNG: dec breath sounds rt base  HEART:  S1 , S2 +  ABDOMEN: soft , bs+  EXTREMITIES:  no edema  NEUROLOGY:alert awake    LABS:  04-22    135  |  97<L>  |  24<H>  ----------------------------<  100<H>  4.0   |  24  |  1.11    Ca    9.1      22 Apr 2021 07:13  Phos  2.9     04-22  Mg     2.5     04-22    TPro  7.2  /  Alb  3.5  /  TBili  0.3  /  DBili      /  AST  20  /  ALT  11  /  AlkPhos  126<H>  04-22    Creatinine Trend: 1.11 <--, 1.34 <--, 1.07 <--, 1.21 <--, 1.28 <--, 1.37 <--, 1.21 <--                        11.8   3.74  )-----------( 273      ( 22 Apr 2021 07:13 )             34.5     Urine Studies:            LIVER FUNCTIONS - ( 22 Apr 2021 07:13 )  Alb: 3.5 g/dL / Pro: 7.2 g/dL / ALK PHOS: 126 U/L / ALT: 11 U/L / AST: 20 U/L / GGT: x

## 2021-04-22 NOTE — PROGRESS NOTE ADULT - SUBJECTIVE AND OBJECTIVE BOX
Ulises Luevano MD  Interventional Cardiology / Advance Heart Failure and Cardiac Transplant Specialist  [place] Office : [address] [place] [address] [place]. [address]  Tel:   [place] Office : [address] [facility] [place] [address]  Tel: [phone]  Cell : [phone]    Subjective/Overnight events: Pt is lying in bed comfortable not in distress, no chest pains no SOB no palpitations  	  MEDICATIONS:  amLODIPine   Tablet 10 milliGRAM(s) Oral daily  enoxaparin Injectable 40 milliGRAM(s) SubCutaneous daily  hydrochlorothiazide 25 milliGRAM(s) Oral daily  isosorbide   mononitrate ER Tablet (IMDUR) 30 milliGRAM(s) Oral daily  lisinopril 40 milliGRAM(s) Oral daily        acetaminophen   Tablet .. 1000 milliGRAM(s) Oral every 6 hours PRN  meclizine 25 milliGRAM(s) Oral three times a day PRN  ondansetron Injectable 4 milliGRAM(s) IV Push every 6 hours PRN  OXcarbazepine 450 milliGRAM(s) Oral two times a day        lidocaine   Patch 1 Patch Transdermal every 24 hours  sodium chloride 0.9%. 500 milliLiter(s) IV Continuous <Continuous>  sodium chloride 0.9%. 1000 milliLiter(s) IV Continuous <Continuous>      PAST MEDICAL/SURGICAL HISTORY  PAST MEDICAL & SURGICAL HISTORY:  Hypertension    Seizure    Hyperlipidemia    Diverticulosis    Borderline diabetes    Obesity    No Past Surgical History    History of appendectomy        SOCIAL HISTORY: Substance Use (street drugs): ( x ) never used  (  ) other:    FAMILY HISTORY:  Family history of cerebrovascular accident (CVA) (Sibling)        REVIEW OF SYSTEMS:  CONSTITUTIONAL: No fever, weight loss, or fatigue  EYES: No eye pain, visual disturbances, or discharge  ENMT:  No difficulty hearing, tinnitus, vertigo; No sinus or throat pain  BREASTS: No pain, masses, or nipple discharge  GASTROINTESTINAL: No abdominal or epigastric pain. No nausea, vomiting, or hematemesis; No diarrhea or constipation. No melena or hematochezia.  GENITOURINARY: No dysuria, frequency, hematuria, or incontinence  NEUROLOGICAL: No headaches, memory loss, loss of strength, numbness, or tremors  ENDOCRINE: No heat or cold intolerance; No hair loss  MUSCULOSKELETAL: No joint pain or swelling; No muscle, back, or extremity pain  PSYCHIATRIC: No depression, anxiety, mood swings, or difficulty sleeping  HEME/LYMPH: No easy bruising, or bleeding gums  All others negative    PHYSICAL EXAM:  T(C): 36.8 (04-22-21 @ 06:07), Max: 36.8 (04-21-21 @ 20:30)  HR: 57 (04-22-21 @ 12:36) (54 - 70)  BP: 166/74 (04-22-21 @ 12:36) (144/70 - 166/74)  RR: 18 (04-22-21 @ 06:07) (18 - 18)  SpO2: 100% (04-22-21 @ 06:07) (100% - 100%)  Wt(kg): --  I&O's Summary    21 Apr 2021 07:01  -  22 Apr 2021 07:00  --------------------------------------------------------  IN: 867 mL / OUT: 1300 mL / NET: -433 mL        GENERAL: NAD  EYES: EOMI, PERRLA, conjunctiva and sclera clear  ENMT: No tonsillar erythema, exudates, or enlargement;  Cardiovascular: Normal S1 S2, No JVD, No murmurs, No edema  Respiratory: Lungs clear to auscultation	  Gastrointestinal:  Soft, suprapubic tenderness , + BS	  Extremities: no edema                                      11.8   3.74  )-----------( 273      ( 22 Apr 2021 07:13 )             34.5     04-22    135  |  97<L>  |  24<H>  ----------------------------<  100<H>  4.0   |  24  |  1.11    Ca    9.1      22 Apr 2021 07:13  Phos  2.9     04-22  Mg     2.5     04-22    TPro  7.2  /  Alb  3.5  /  TBili  0.3  /  DBili  x   /  AST  20  /  ALT  11  /  AlkPhos  126<H>  04-22    proBNP:   Lipid Profile:   HgA1c:   TSH:     Consultant(s) Notes Reviewed:  [x ] YES  [ ] NO    Care Discussed with Consultants/Other Providers [ x] YES  [ ] NO    Imaging Personally Reviewed independently:  [x] YES  [ ] NO    All labs, radiologic studies, vitals, orders and medications list reviewed. Patient is seen and examined at bedside. Case discussed with medical team.

## 2021-04-22 NOTE — PROGRESS NOTE ADULT - PROBLEM SELECTOR PROBLEM 1
Syncope and collapse

## 2021-04-22 NOTE — PROGRESS NOTE ADULT - PROBLEM SELECTOR PROBLEM 3
Hypertension

## 2021-04-22 NOTE — PROGRESS NOTE ADULT - ATTENDING COMMENTS
agree with above
agree with above
pt seen and examined agree with plan above
76 yo female with a PMHx of HTN, HLD, and seizures who presented to [facility] on 04/04 after being found on the floor. RRT called on 4/11/21 for episode of emesis followed by unresponsiveness for a few minutes. EEG with moderate slowing. EKG and telemetry shows sinus rhythm with HR 40s-60s. Has + orthostatics. Patient had episode of vomiting with bradycardia today. Bradycardia likely vagally mediated as there is p-p prolongation. She had another RRT for AMS during which she was bradycardic to 30s, likely 2/2 high vagal tone.    #Bradycardia  Vagally mediated however will plan for ppm implantation with rate drop response to prevent extreme bradycardia during vagal episodes.  - NPO at midnight for ppm 4/19  - type and screen, hold 2 units
Patient was scheduled for ILR implantation today. Patient had an episode of change in mental status while having BM. She was not on telemetry at the time. Patient's HR noted to be 30s when she was back on tele. Patient most likely had another episode of hypervagotonia causing severe bradycardia. Discussed with Dr. Mendenhall and it was decided that patient would benefit from pacemaker with rate drop response to prevent extreme bradycardia during vagal episodes. Discussed with patient's daughter and she was agreeable. However patient was found to be with acute sharp lower abdominal pain and is awaiting CT abdomen. Will follow.
agree with above
agree with above
pt seen and examined agree with plan above

## 2021-04-22 NOTE — PROGRESS NOTE ADULT - PROBLEM SELECTOR PLAN 2
neuro f/u
neuro f/u  eeg
neuro f/u
neuro f/u  eeg
neuro f/u
neuro f/u  eeg
neuro f/u

## 2021-04-23 ENCOUNTER — TRANSCRIPTION ENCOUNTER (OUTPATIENT)
Age: 76
End: 2021-04-23

## 2021-04-23 VITALS
RESPIRATION RATE: 17 BRPM | SYSTOLIC BLOOD PRESSURE: 149 MMHG | TEMPERATURE: 99 F | OXYGEN SATURATION: 97 % | HEART RATE: 73 BPM | DIASTOLIC BLOOD PRESSURE: 74 MMHG

## 2021-04-23 LAB
ALBUMIN SERPL ELPH-MCNC: 3.5 G/DL — SIGNIFICANT CHANGE UP (ref 3.3–5)
ALP SERPL-CCNC: 122 U/L — HIGH (ref 40–120)
ALT FLD-CCNC: 11 U/L — SIGNIFICANT CHANGE UP (ref 4–33)
ANION GAP SERPL CALC-SCNC: 9 MMOL/L — SIGNIFICANT CHANGE UP (ref 7–14)
AST SERPL-CCNC: 16 U/L — SIGNIFICANT CHANGE UP (ref 4–32)
BILIRUB SERPL-MCNC: 0.3 MG/DL — SIGNIFICANT CHANGE UP (ref 0.2–1.2)
BUN SERPL-MCNC: 30 MG/DL — HIGH (ref 7–23)
CALCIUM SERPL-MCNC: 9.1 MG/DL — SIGNIFICANT CHANGE UP (ref 8.4–10.5)
CHLORIDE SERPL-SCNC: 96 MMOL/L — LOW (ref 98–107)
CO2 SERPL-SCNC: 28 MMOL/L — SIGNIFICANT CHANGE UP (ref 22–31)
CREAT SERPL-MCNC: 1.19 MG/DL — SIGNIFICANT CHANGE UP (ref 0.5–1.3)
GLUCOSE SERPL-MCNC: 107 MG/DL — HIGH (ref 70–99)
HCT VFR BLD CALC: 32.9 % — LOW (ref 34.5–45)
HGB BLD-MCNC: 11.3 G/DL — LOW (ref 11.5–15.5)
MAGNESIUM SERPL-MCNC: 2.6 MG/DL — SIGNIFICANT CHANGE UP (ref 1.6–2.6)
MCHC RBC-ENTMCNC: 23.4 PG — LOW (ref 27–34)
MCHC RBC-ENTMCNC: 34.3 GM/DL — SIGNIFICANT CHANGE UP (ref 32–36)
MCV RBC AUTO: 68.1 FL — LOW (ref 80–100)
NRBC # BLD: 0 /100 WBCS — SIGNIFICANT CHANGE UP
NRBC # FLD: 0 K/UL — SIGNIFICANT CHANGE UP
PHOSPHATE SERPL-MCNC: 3 MG/DL — SIGNIFICANT CHANGE UP (ref 2.5–4.5)
PLATELET # BLD AUTO: 263 K/UL — SIGNIFICANT CHANGE UP (ref 150–400)
POTASSIUM SERPL-MCNC: 3.6 MMOL/L — SIGNIFICANT CHANGE UP (ref 3.5–5.3)
POTASSIUM SERPL-SCNC: 3.6 MMOL/L — SIGNIFICANT CHANGE UP (ref 3.5–5.3)
PROT SERPL-MCNC: 7.1 G/DL — SIGNIFICANT CHANGE UP (ref 6–8.3)
RBC # BLD: 4.83 M/UL — SIGNIFICANT CHANGE UP (ref 3.8–5.2)
RBC # FLD: 16.2 % — HIGH (ref 10.3–14.5)
SODIUM SERPL-SCNC: 133 MMOL/L — LOW (ref 135–145)
WBC # BLD: 5.01 K/UL — SIGNIFICANT CHANGE UP (ref 3.8–10.5)
WBC # FLD AUTO: 5.01 K/UL — SIGNIFICANT CHANGE UP (ref 3.8–10.5)

## 2021-04-23 RX ORDER — LIDOCAINE 4 G/100G
1 CREAM TOPICAL
Qty: 0 | Refills: 0 | DISCHARGE
Start: 2021-04-23

## 2021-04-23 RX ORDER — OXCARBAZEPINE 300 MG/1
3 TABLET, FILM COATED ORAL
Qty: 0 | Refills: 0 | DISCHARGE
Start: 2021-04-23

## 2021-04-23 RX ORDER — ISOSORBIDE MONONITRATE 60 MG/1
1 TABLET, EXTENDED RELEASE ORAL
Qty: 0 | Refills: 0 | DISCHARGE
Start: 2021-04-23

## 2021-04-23 RX ORDER — AMLODIPINE BESYLATE 2.5 MG/1
1 TABLET ORAL
Qty: 0 | Refills: 0 | DISCHARGE
Start: 2021-04-23

## 2021-04-23 RX ORDER — MECLIZINE HCL 12.5 MG
1 TABLET ORAL
Qty: 0 | Refills: 0 | DISCHARGE
Start: 2021-04-23

## 2021-04-23 RX ADMIN — AMLODIPINE BESYLATE 10 MILLIGRAM(S): 2.5 TABLET ORAL at 07:09

## 2021-04-23 RX ADMIN — LISINOPRIL 40 MILLIGRAM(S): 2.5 TABLET ORAL at 07:09

## 2021-04-23 RX ADMIN — OXCARBAZEPINE 450 MILLIGRAM(S): 300 TABLET, FILM COATED ORAL at 07:09

## 2021-04-23 RX ADMIN — ISOSORBIDE MONONITRATE 60 MILLIGRAM(S): 60 TABLET, EXTENDED RELEASE ORAL at 11:38

## 2021-04-23 RX ADMIN — OXCARBAZEPINE 450 MILLIGRAM(S): 300 TABLET, FILM COATED ORAL at 19:09

## 2021-04-23 RX ADMIN — ENOXAPARIN SODIUM 40 MILLIGRAM(S): 100 INJECTION SUBCUTANEOUS at 11:38

## 2021-04-23 NOTE — PROGRESS NOTE ADULT - PROVIDER SPECIALTY LIST ADULT
Cardiology
Cardiology
Neurology
Cardiology
Neurology
Cardiology
Internal Medicine
Neurology
Cardiology
Cardiology
Electrophysiology
Internal Medicine
Neurology
Neurology
Cardiology
Electrophysiology
Internal Medicine
Neurology
Internal Medicine

## 2021-04-23 NOTE — DISCHARGE NOTE NURSING/CASE MANAGEMENT/SOCIAL WORK - PATIENT PORTAL LINK FT
You can access the FollowMyHealth Patient Portal offered by [facility] by registering at the following website: [email or web address]. By joining [email or web address]’s FollowMyHealth portal, you will also be able to view your health information using other applications (apps) compatible with our system.

## 2021-04-23 NOTE — PROGRESS NOTE ADULT - SUBJECTIVE AND OBJECTIVE BOX
Ulises Luevano MD  Interventional Cardiology / Advance Heart Failure and Cardiac Transplant Specialist  [place] Office : [address] [place] [address] [place]. [address]  Tel:   [place] Office : [address] [facility] [place] [address]  Tel: [phone]  Cell : [phone]    Pt is lying in bed comfortable not in distress, no chest pains no SOB no palpitations  	  MEDICATIONS:  amLODIPine   Tablet 10 milliGRAM(s) Oral daily  enoxaparin Injectable 40 milliGRAM(s) SubCutaneous daily  isosorbide   mononitrate ER Tablet (IMDUR) 60 milliGRAM(s) Oral daily  lisinopril 40 milliGRAM(s) Oral daily        acetaminophen   Tablet .. 1000 milliGRAM(s) Oral every 6 hours PRN  meclizine 25 milliGRAM(s) Oral three times a day PRN  ondansetron Injectable 4 milliGRAM(s) IV Push every 6 hours PRN  OXcarbazepine 450 milliGRAM(s) Oral two times a day        lidocaine   Patch 1 Patch Transdermal every 24 hours      PAST MEDICAL/SURGICAL HISTORY  PAST MEDICAL & SURGICAL HISTORY:  Hypertension    Seizure    Hyperlipidemia    Diverticulosis    Borderline diabetes    Obesity    No Past Surgical History    History of appendectomy        SOCIAL HISTORY: Substance Use (street drugs): ( x ) never used  (  ) other:    FAMILY HISTORY:  Family history of cerebrovascular accident (CVA) (Sibling)        REVIEW OF SYSTEMS:  CONSTITUTIONAL: No fever, weight loss, or fatigue  EYES: No eye pain, visual disturbances, or discharge  ENMT:  No difficulty hearing, tinnitus, vertigo; No sinus or throat pain  BREASTS: No pain, masses, or nipple discharge  GASTROINTESTINAL: No abdominal or epigastric pain. No nausea, vomiting, or hematemesis; No diarrhea or constipation. No melena or hematochezia.  GENITOURINARY: No dysuria, frequency, hematuria, or incontinence  NEUROLOGICAL: No headaches, memory loss, loss of strength, numbness, or tremors  ENDOCRINE: No heat or cold intolerance; No hair loss  MUSCULOSKELETAL: No joint pain or swelling; No muscle, back, or extremity pain  PSYCHIATRIC: No depression, anxiety, mood swings, or difficulty sleeping  HEME/LYMPH: No easy bruising, or bleeding gums  All others negative    PHYSICAL EXAM:  T(C): 36.5 (04-23-21 @ 10:45), Max: 36.8 (04-23-21 @ 07:04)  HR: 113 (04-23-21 @ 10:45) (57 - 113)  BP: 142/61 (04-23-21 @ 10:45) (142/61 - 156/57)  RR: 18 (04-23-21 @ 10:45) (18 - 18)  SpO2: 100% (04-23-21 @ 10:45) (97% - 100%)  Wt(kg): --  I&O's Summary        GENERAL: NAD  EYES: EOMI, PERRLA, conjunctiva and sclera clear  ENMT: No tonsillar erythema, exudates, or enlargement; Moist mucous membranes, Good dentition, No lesions  Cardiovascular: Normal S1 S2, No JVD, No murmurs, No edema  Respiratory: Lungs clear to auscultation	  Gastrointestinal:  Soft, Non-tender, + BS	  Extremities: Normal range of motion, No clubbing, cyanosis or edema  LYMPH: No lymphadenopathy noted  NERVOUS SYSTEM:  Alert & Oriented X3, Good concentration; Motor Strength 5/5 B/L upper and lower extremities; DTRs 2+ intact and symmetric                                    11.3   5.01  )-----------( 263      ( 23 Apr 2021 07:12 )             32.9     04-23    133<L>  |  96<L>  |  30<H>  ----------------------------<  107<H>  3.6   |  28  |  1.19    Ca    9.1      23 Apr 2021 07:12  Phos  3.0     04-23  Mg     2.6     04-23    TPro  7.1  /  Alb  3.5  /  TBili  0.3  /  DBili  x   /  AST  16  /  ALT  11  /  AlkPhos  122<H>  04-23    proBNP:   Lipid Profile:   HgA1c:   TSH:     Consultant(s) Notes Reviewed:  [x ] YES  [ ] NO    Care Discussed with Consultants/Other Providers [ x] YES  [ ] NO    Imaging Personally Reviewed independently:  [x] YES  [ ] NO    All labs, radiologic studies, vitals, orders and medications list reviewed. Patient is seen and examined at bedside. Case discussed with medical team.

## 2021-04-23 NOTE — PROGRESS NOTE ADULT - NSICDXPILOT_GEN_ALL_CORE
[place]

## 2021-04-23 NOTE — PROGRESS NOTE ADULT - REASON FOR ADMISSION
Fall

## 2021-04-24 LAB — OXCARBAZEPINE SERPL-MCNC: 34 UG/ML — SIGNIFICANT CHANGE UP (ref 10–35)

## 2021-05-01 ENCOUNTER — INPATIENT (INPATIENT)
Facility: HOSPITAL | Age: 76
LOS: 4 days | Discharge: NURSING, REHAB OR LONG-TERM CARE | End: 2021-05-06
Attending: INTERNAL MEDICINE | Admitting: INTERNAL MEDICINE
Payer: MEDICARE

## 2021-05-01 VITALS
TEMPERATURE: 98 F | DIASTOLIC BLOOD PRESSURE: 76 MMHG | HEART RATE: 55 BPM | HEIGHT: 65 IN | OXYGEN SATURATION: 96 % | RESPIRATION RATE: 18 BRPM | SYSTOLIC BLOOD PRESSURE: 164 MMHG

## 2021-05-01 DIAGNOSIS — I10 ESSENTIAL (PRIMARY) HYPERTENSION: ICD-10-CM

## 2021-05-01 DIAGNOSIS — E11.9 TYPE 2 DIABETES MELLITUS WITHOUT COMPLICATIONS: ICD-10-CM

## 2021-05-01 DIAGNOSIS — Z29.9 ENCOUNTER FOR PROPHYLACTIC MEASURES, UNSPECIFIED: ICD-10-CM

## 2021-05-01 DIAGNOSIS — D64.9 ANEMIA, UNSPECIFIED: ICD-10-CM

## 2021-05-01 DIAGNOSIS — R56.9 UNSPECIFIED CONVULSIONS: ICD-10-CM

## 2021-05-01 DIAGNOSIS — R55 SYNCOPE AND COLLAPSE: ICD-10-CM

## 2021-05-01 DIAGNOSIS — Z98.89 OTHER SPECIFIED POSTPROCEDURAL STATES: Chronic | ICD-10-CM

## 2021-05-01 LAB
ALBUMIN SERPL ELPH-MCNC: 3.8 G/DL — SIGNIFICANT CHANGE UP (ref 3.3–5)
ALP SERPL-CCNC: 122 U/L — HIGH (ref 40–120)
ALT FLD-CCNC: 7 U/L — SIGNIFICANT CHANGE UP (ref 4–33)
ANION GAP SERPL CALC-SCNC: 13 MMOL/L — SIGNIFICANT CHANGE UP (ref 7–14)
AST SERPL-CCNC: 11 U/L — SIGNIFICANT CHANGE UP (ref 4–32)
BASOPHILS # BLD AUTO: 0.02 K/UL — SIGNIFICANT CHANGE UP (ref 0–0.2)
BASOPHILS NFR BLD AUTO: 0.2 % — SIGNIFICANT CHANGE UP (ref 0–2)
BILIRUB SERPL-MCNC: 0.3 MG/DL — SIGNIFICANT CHANGE UP (ref 0.2–1.2)
BUN SERPL-MCNC: 27 MG/DL — HIGH (ref 7–23)
CALCIUM SERPL-MCNC: 9.3 MG/DL — SIGNIFICANT CHANGE UP (ref 8.4–10.5)
CHLORIDE SERPL-SCNC: 99 MMOL/L — SIGNIFICANT CHANGE UP (ref 98–107)
CO2 SERPL-SCNC: 26 MMOL/L — SIGNIFICANT CHANGE UP (ref 22–31)
CREAT SERPL-MCNC: 1.17 MG/DL — SIGNIFICANT CHANGE UP (ref 0.5–1.3)
DACRYOCYTES BLD QL SMEAR: SLIGHT — SIGNIFICANT CHANGE UP
EOSINOPHIL # BLD AUTO: 0.04 K/UL — SIGNIFICANT CHANGE UP (ref 0–0.5)
EOSINOPHIL NFR BLD AUTO: 0.5 % — SIGNIFICANT CHANGE UP (ref 0–6)
GIANT PLATELETS BLD QL SMEAR: PRESENT — SIGNIFICANT CHANGE UP
GLUCOSE SERPL-MCNC: 149 MG/DL — HIGH (ref 70–99)
HCT VFR BLD CALC: 32.6 % — LOW (ref 34.5–45)
HGB BLD-MCNC: 10.9 G/DL — LOW (ref 11.5–15.5)
HYPOCHROMIA BLD QL: SLIGHT — SIGNIFICANT CHANGE UP
IANC: 6.67 K/UL — SIGNIFICANT CHANGE UP (ref 1.5–8.5)
IMM GRANULOCYTES NFR BLD AUTO: 0.2 % — SIGNIFICANT CHANGE UP (ref 0–1.5)
LYMPHOCYTES # BLD AUTO: 1.08 K/UL — SIGNIFICANT CHANGE UP (ref 1–3.3)
LYMPHOCYTES # BLD AUTO: 12.8 % — LOW (ref 13–44)
MAGNESIUM SERPL-MCNC: 2.4 MG/DL — SIGNIFICANT CHANGE UP (ref 1.6–2.6)
MCHC RBC-ENTMCNC: 23.1 PG — LOW (ref 27–34)
MCHC RBC-ENTMCNC: 33.4 GM/DL — SIGNIFICANT CHANGE UP (ref 32–36)
MCV RBC AUTO: 69.2 FL — LOW (ref 80–100)
MONOCYTES # BLD AUTO: 0.61 K/UL — SIGNIFICANT CHANGE UP (ref 0–0.9)
MONOCYTES NFR BLD AUTO: 7.2 % — SIGNIFICANT CHANGE UP (ref 2–14)
NEUTROPHILS # BLD AUTO: 6.67 K/UL — SIGNIFICANT CHANGE UP (ref 1.8–7.4)
NEUTROPHILS NFR BLD AUTO: 79.1 % — HIGH (ref 43–77)
NEUTS BAND # BLD: 2.6 % — SIGNIFICANT CHANGE UP (ref 0–6)
NRBC # BLD: 0 /100 WBCS — SIGNIFICANT CHANGE UP
NRBC # FLD: 0 K/UL — SIGNIFICANT CHANGE UP
PLAT MORPH BLD: NORMAL — SIGNIFICANT CHANGE UP
PLATELET # BLD AUTO: 273 K/UL — SIGNIFICANT CHANGE UP (ref 150–400)
PLATELET COUNT - ESTIMATE: NORMAL — SIGNIFICANT CHANGE UP
POIKILOCYTOSIS BLD QL AUTO: SLIGHT — SIGNIFICANT CHANGE UP
POLYCHROMASIA BLD QL SMEAR: SLIGHT — SIGNIFICANT CHANGE UP
POTASSIUM SERPL-MCNC: 4.8 MMOL/L — SIGNIFICANT CHANGE UP (ref 3.5–5.3)
POTASSIUM SERPL-SCNC: 4.8 MMOL/L — SIGNIFICANT CHANGE UP (ref 3.5–5.3)
PROT SERPL-MCNC: 7.5 G/DL — SIGNIFICANT CHANGE UP (ref 6–8.3)
RBC # BLD: 4.71 M/UL — SIGNIFICANT CHANGE UP (ref 3.8–5.2)
RBC # FLD: 16.5 % — HIGH (ref 10.3–14.5)
RBC BLD AUTO: ABNORMAL
SARS-COV-2 RNA SPEC QL NAA+PROBE: SIGNIFICANT CHANGE UP
SCHISTOCYTES BLD QL AUTO: SLIGHT — SIGNIFICANT CHANGE UP
SMUDGE CELLS # BLD: PRESENT — SIGNIFICANT CHANGE UP
SODIUM SERPL-SCNC: 138 MMOL/L — SIGNIFICANT CHANGE UP (ref 135–145)
TARGETS BLD QL SMEAR: SLIGHT — SIGNIFICANT CHANGE UP
TROPONIN T, HIGH SENSITIVITY RESULT: 14 NG/L — SIGNIFICANT CHANGE UP
VARIANT LYMPHS # BLD: 8.8 % — HIGH (ref 0–6)
WBC # BLD: 8.44 K/UL — SIGNIFICANT CHANGE UP (ref 3.8–10.5)
WBC # FLD AUTO: 8.44 K/UL — SIGNIFICANT CHANGE UP (ref 3.8–10.5)

## 2021-05-01 PROCEDURE — 71045 X-RAY EXAM CHEST 1 VIEW: CPT | Mod: 26

## 2021-05-01 PROCEDURE — 93010 ELECTROCARDIOGRAM REPORT: CPT

## 2021-05-01 PROCEDURE — 99285 EMERGENCY DEPT VISIT HI MDM: CPT | Mod: 25

## 2021-05-01 PROCEDURE — 99223 1ST HOSP IP/OBS HIGH 75: CPT

## 2021-05-01 RX ORDER — SODIUM CHLORIDE 9 MG/ML
1000 INJECTION, SOLUTION INTRAVENOUS ONCE
Refills: 0 | Status: COMPLETED | OUTPATIENT
Start: 2021-05-01 | End: 2021-05-01

## 2021-05-01 RX ORDER — AMLODIPINE BESYLATE 2.5 MG/1
10 TABLET ORAL DAILY
Refills: 0 | Status: DISCONTINUED | OUTPATIENT
Start: 2021-05-01 | End: 2021-05-06

## 2021-05-01 RX ORDER — AMLODIPINE BESYLATE 2.5 MG/1
0 TABLET ORAL
Qty: 0 | Refills: 0 | DISCHARGE

## 2021-05-01 RX ORDER — ISOSORBIDE MONONITRATE 60 MG/1
60 TABLET, EXTENDED RELEASE ORAL DAILY
Refills: 0 | Status: DISCONTINUED | OUTPATIENT
Start: 2021-05-01 | End: 2021-05-04

## 2021-05-01 RX ORDER — CARVEDILOL PHOSPHATE 80 MG/1
0 CAPSULE, EXTENDED RELEASE ORAL
Qty: 0 | Refills: 0 | DISCHARGE

## 2021-05-01 RX ORDER — CARVEDILOL PHOSPHATE 80 MG/1
1 CAPSULE, EXTENDED RELEASE ORAL
Qty: 0 | Refills: 0 | DISCHARGE

## 2021-05-01 RX ORDER — ONDANSETRON 8 MG/1
4 TABLET, FILM COATED ORAL ONCE
Refills: 0 | Status: COMPLETED | OUTPATIENT
Start: 2021-05-01 | End: 2021-05-01

## 2021-05-01 RX ORDER — LISINOPRIL 2.5 MG/1
40 TABLET ORAL DAILY
Refills: 0 | Status: DISCONTINUED | OUTPATIENT
Start: 2021-05-01 | End: 2021-05-06

## 2021-05-01 RX ORDER — OXCARBAZEPINE 300 MG/1
450 TABLET, FILM COATED ORAL
Refills: 0 | Status: DISCONTINUED | OUTPATIENT
Start: 2021-05-01 | End: 2021-05-06

## 2021-05-01 RX ORDER — ENOXAPARIN SODIUM 100 MG/ML
40 INJECTION SUBCUTANEOUS DAILY
Refills: 0 | Status: DISCONTINUED | OUTPATIENT
Start: 2021-05-01 | End: 2021-05-06

## 2021-05-01 RX ADMIN — ONDANSETRON 4 MILLIGRAM(S): 8 TABLET, FILM COATED ORAL at 20:26

## 2021-05-01 RX ADMIN — OXCARBAZEPINE 450 MILLIGRAM(S): 300 TABLET, FILM COATED ORAL at 19:46

## 2021-05-01 RX ADMIN — ENOXAPARIN SODIUM 40 MILLIGRAM(S): 100 INJECTION SUBCUTANEOUS at 18:09

## 2021-05-01 RX ADMIN — AMLODIPINE BESYLATE 10 MILLIGRAM(S): 2.5 TABLET ORAL at 18:09

## 2021-05-01 RX ADMIN — LISINOPRIL 40 MILLIGRAM(S): 2.5 TABLET ORAL at 18:09

## 2021-05-01 RX ADMIN — SODIUM CHLORIDE 1000 MILLILITER(S): 9 INJECTION, SOLUTION INTRAVENOUS at 14:50

## 2021-05-01 RX ADMIN — ISOSORBIDE MONONITRATE 60 MILLIGRAM(S): 60 TABLET, EXTENDED RELEASE ORAL at 18:09

## 2021-05-01 NOTE — H&P ADULT - PROBLEM SELECTOR PLAN 5
- Lovenox 40 mg QD  - DASH diet, S+S completed last admission  - PT evaluation - A1c 6.8 last admission at goal for pt, monitor BG w/ BMP

## 2021-05-01 NOTE — ED PROVIDER NOTE - PROGRESS NOTE DETAILS
EP called. No in house loop recording over the weekend, however information is transmitted to cardiologist. Will f/u labs and cardiac enzymes. Patient currently being monitored on telemetry here. discussion with patient's cardiologist Dr. Ulises Finch who requests admission under Dr. Magallanes for tele monitoring, ILR interrogation and possible PPM. discussion with patient's cardiologist Dr. Ulises Finch who requests admission under Dr. Magallanes for tele monitoring, ILR interrogation and possible PPM. States will call Dr. Magallanes and update her on the admission.

## 2021-05-01 NOTE — ED ADULT NURSE NOTE - NSIMPLEMENTINTERV_GEN_ALL_ED
Implemented All Fall Risk Interventions:  [place] to call system. Call bell, personal items and telephone within reach. Instruct patient to call for assistance. Room bathroom lighting operational. Non-slip footwear when patient is off stretcher. Physically safe environment: no spills, clutter or unnecessary equipment. Stretcher in lowest position, wheels locked, appropriate side rails in place. Provide visual cue, wrist band, yellow gown, etc. Monitor gait and stability. Monitor for mental status changes and reorient to person, place, and time. Review medications for side effects contributing to fall risk. Reinforce activity limits and safety measures with patient and family.

## 2021-05-01 NOTE — ED PROVIDER NOTE - OBJECTIVE STATEMENT
76 F with HTN, HLD, seizures, and DM p/w syncopal episode. Patient was at [facility], using the toilet when she nodded off for about 1 minute, noticed by nursing staff. The patient states she remembers straining and then became dizzy and fainted, does not know for how long. She states when she woke up, her stomach was hurting. Otherwise, she denies chest pain, SOB, abdominal symptoms. Of note, patient discharged 4/23 after being admitted for same thing. Her course was c/b multiple RRT for syncopal episodes, all vasovagal. Cardiac workup grossly unremarkable, except for bradycardia with plan for PPM. Given her RRTs, the PPM was deferred and an implantable loop recorder was placed. Currently, the patient denies any weakkness or symptoms, and feels back to her baseline

## 2021-05-01 NOTE — ED PROVIDER NOTE - ATTENDING CONTRIBUTION TO CARE
76F with pmh HTN, HLD, seizure disorder [facility]. Per staff, patient was on toilet with witnessed syncope. LOC <1 minute, no trauma or fall. Awoke slightly confused but returned back to baseline within minutes. Patient with recent admission for syncope with extensive workup including CT, EEG, cath which were unremarkable for etiology. Per chart review, plan for possible PPM however was placed in ILR instead with planned outpatient f/u. Patient states she feels a bit constipated but otherwise is feeling well. Denies fever, chills, cp, sob, nausea, vomiting, diarrhea, dysuria, headache    GEN: NAD, awake, well appearing  HEENT: NCAT, MMM, normal conjunctiva, perrl  CHEST/LUNGS: Non-tachypneic, CTAB, bilateral breath sounds  CARDIAC: Non-tachycardic, s1s2, normal perfusion, no peripheral edema  ABDOMEN: Soft, NTND, No rebound/guarding  MSK: No joint tenderness, no gross deformity of extremities  SKIN: No rashes, no petechiae, no vesicles  NEURO: CN grossly intact, normal coordination, no focal motor or sensory deficits      Patient presenting with recurrent syncopal episodes of unclear etiology. Mildly bradycardic on ekg and vitals. Possible likely vasovagal syncope with predisposition due to bradycardia. Screening labs, tele monitoring. Will discuss with cardiology team who was following patient for possible PPM evaluation.

## 2021-05-01 NOTE — PATIENT PROFILE ADULT - NS PRO AD PATIENT TYPE
patient states daughter Deanna Arellano is in charge of medical decisions and signed a form just a few days ago. No form in chart as of yet/Health Care Proxy (HCP)

## 2021-05-01 NOTE — H&P ADULT - NSHPLABSRESULTS_GEN_ALL_CORE
10.9   8.44  )-----------( 273      ( 01 May 2021 15:06 )             32.6   05-01    138  |  99  |  27<H>  ----------------------------<  149<H>  4.8   |  26  |  1.17    Ca    9.3      01 May 2021 15:06  Mg     2.4     05-01    TPro  7.5  /  Alb  3.8  /  TBili  0.3  /  DBili  x   /  AST  11  /  ALT  7   /  AlkPhos  122<H>  05-01    EKG: NSR, Kaz,  ms

## 2021-05-01 NOTE — H&P ADULT - NSHPREVIEWOFSYSTEMS_GEN_ALL_CORE
ROS:    Constitutional: [ ] fevers [ ] chills [ ] weight loss [ ] weight gain  HEENT: [ ] dry eyes [ ] eye irritation [ ] postnasal drip [ ] nasal congestion  CV: [ ] chest pain [ ] orthopnea [ ] palpitations [ ] murmur  Resp: [ ] cough [ ] shortness of breath [ ] dyspnea [ ] wheezing [ ] sputum [ ] hemoptysis  GI: [ ] nausea [ ] vomiting [ ] diarrhea [ ] constipation [ ] abd pain [ ] dysphagia   : [ ] dysuria [ ] nocturia [ ] hematuria [ ] increased urinary frequency  Musculoskeletal: [ ] back pain [ ] myalgias [ ] arthralgias [ ] fracture  Skin: [ ] rash [ ] itch  Neurological: [ ] headache [ ] dizziness [ x] syncope [ ] weakness [ ] numbness  Psychiatric: [ ] anxiety [ ] depression  Endocrine: [ ] diabetes [ ] thyroid problem  Hematologic/Lymphatic: [ ] anemia [ ] bleeding problem  Allergic/Immunologic: [ ] itchy eyes [ ] nasal discharge [ ] hives [ ] angioedema  [ x] All other systems negative  [ ] Unable to assess ROS because ________

## 2021-05-01 NOTE — H&P ADULT - NSHPPHYSICALEXAM_GEN_ALL_CORE
PHYSICAL EXAM:  Vital Signs Last 24 Hrs  T(C): 36.6 (05-01-21 @ 16:33)  T(F): 97.8 (05-01-21 @ 16:33), Max: 97.8 (05-01-21 @ 13:42)  HR: 74 (05-01-21 @ 16:33) (55 - 74)  BP: 181/78 (05-01-21 @ 16:33)  BP(mean): --  RR: 18 (05-01-21 @ 16:33) (18 - 18)  SpO2: 96% (05-01-21 @ 13:42) (96% - 96%)  Wt(kg): --    Constitutional: NAD, awake and alert  EYES: EOMI  ENT:  Normal Hearing, no tonsillar exudates   Neck: Soft and supple , no thyromegaly   Respiratory: Breath sounds are clear bilaterally, No wheezing, rales or rhonchi  Cardiovascular: S1 and S2, regular rate and rhythm, no Murmurs, gallops or rubs, no JVD,    Gastrointestinal: Bowel Sounds present, soft, nontender, nondistended, no guarding, no rebound  Extremities: No cyanosis or clubbing; warm to touch  Vascular: 2+ peripheral pulses lower ex  Neurological: No focal deficits, CN II-XII intact bilaterally, sensation to light touch intact in all extremities. Pupils are equally reactive to light and symmetrical in size.   Musculoskeletal: 5/5 strength b/l upper and lower extremities; no joint swelling.  Skin: No rashes  Psych: AAOx3  HEME: no bruises, no nose bleeds

## 2021-05-01 NOTE — ED PROVIDER NOTE - PHYSICAL EXAMINATION
PHYSICAL EXAM:  GENERAL: No acute distress, well-developed  HEAD:  Atraumatic, Normocephalic  EYES: EOMI, PERRLA, conjunctiva and sclera clear  NECK: Supple, no lymphadenopathy, no JVD  CHEST/LUNG: CTAB; No wheezes, rales, or rhonchi  HEART: Regular rate and rhythm; No murmurs, rubs, or gallops  ABDOMEN: (+) suprapubic tenderness, otherwise soft and nontender  EXTREMITIES:  2+ peripheral pulses b/l, No clubbing, cyanosis, or edema  NEUROLOGY: A&O x 3, no focal deficits  SKIN: No rashes or lesions

## 2021-05-01 NOTE — ED ADULT NURSE NOTE - OBJECTIVE STATEMENT
Pt AOx4, sent in from [facility] for syncopal episode less than one minute according to triage. Pt AOx4, sent in from [facility] for syncopal episode less than one minute according to triage. pt was recently admitted for same issue in april. pt states she was trying to poop and felt dizzy. pt Pt AOx4, sent in from [facility] for syncopal episode less than one minute according to triage. pt was recently admitted for same issue in april. pt states she was trying to poop and felt dizzy. pt denies head injury. pt c/o abd pain and constipation. pt denies chest pain, sob, NVD, fever, chills, headache, dizziness. pt on cardiac monitor sinus jayla in 50's. pt arrives with NC 2L sating at 100%. 20g placed in right AC, labs sent.

## 2021-05-01 NOTE — H&P ADULT - PROBLEM SELECTOR PLAN 4
- A1c 6.8 last admission at goal for pt, monitor BG w/ BMP - microcytic, no melena BRBPR per pt, pt has had normal colonoscopy per family but unsure when last one was  - iron panel in AM

## 2021-05-01 NOTE — ED PROVIDER NOTE - NS ED ROS FT
REVIEW OF SYSTEMS:    CONSTITUTIONAL: No weakness, fevers or chills  EYES/ENT: No visual changes;  No vertigo or throat pain   NECK: No pain or stiffness  RESPIRATORY: No cough, wheezing, hemoptysis; No shortness of breath  CARDIOVASCULAR: No chest pain or palpitations  GASTROINTESTINAL: No abdominal or epigastric pain. No nausea, vomiting, or hematemesis; No diarrhea or constipation. No melena or hematochezia.  BACK: No CVA tenderness  GENITOURINARY: No dysuria, frequency or hematuria  NEUROLOGICAL: No numbness or weakness  SKIN: No itching, rashes

## 2021-05-01 NOTE — H&P ADULT - PROBLEM SELECTOR PLAN 1
- in setting of straining likely vasovagal event, pt w/ LOC but no head trauma. Denies CP/palpitations at this time. Likely vasvogal vs orthostatic. Strong consideration for chronitropic incompetence given sinus bradycardia, pt was evaluated for PPM last admission but was d/c with ILR  - EKG NSR MI interval elevated but no 1st degree block  - obtain orthostatics  - telemetry monitoring  - EP evaluation to review ILR to see if arrythmia block identified  - PT evaluation  - TTE 4/7/21 grossly normal LV  - consideration for seizure however last seizure several years prior, pt on same oxycarbamazepine dosage, no postictal state described after seizure

## 2021-05-01 NOTE — H&P ADULT - HISTORY OF PRESENT ILLNESS
The patient is a 74 yo woman with PMH of HTN, HLD, seizures, T2DM presents with mechanical fall after possible syncopal event. At this time patient is responsive to all questions, denies dyspnea, chest pain. Patient was at [facility] reports going to bathroom, straining and then being found on the floor, does not recall the event, had LOC no head trauma. Per patients family there have been no syncopal events since discharge  Case discussed with patient's daughter, Deanna who stated there have not been any infectious symptoms, no fevers/chills/ CP, palpitations or dyspnea lately. She was told the patient suffers from orthostatic hypotension. Pt recently d/c from [facility] 4/23 for syncopal workup. During that admission multiple RRTs for syncope, pt was planned from [facility] but was d/c with loop recorder. At this time patient denies any headaches, vision changes, motor or sensory deficits, chest pain, palpitations, dyspnea, N/V, abdominal pain. Family reports pt has minimal po intake at home, last seizure several years prior.

## 2021-05-01 NOTE — ED PROVIDER NOTE - CLINICAL SUMMARY MEDICAL DECISION MAKING FREE TEXT BOX
76 F with HTN, HLD, seizures, and DM p/w syncopal episode, likely vasovagal in nature. Will check CBC, CMP, troponin, and U/A given suprapubic tenderness on exam. Will call EP to evaluate loop recorder.

## 2021-05-01 NOTE — H&P ADULT - ASSESSMENT
74 yo woman with PMH of HTN, HLD, seizures, T2DM presents with mechanical fall after possible syncopal event 2/2 orthostatic hypotension vs vasovagal vs cardiogenic

## 2021-05-01 NOTE — ED ADULT TRIAGE NOTE - CHIEF COMPLAINT QUOTE
during toileting resident became unresponsive for less than a minute sent for eval of R/O TIA s/p cardiac syncope has had more confusion since fall arrives A&ox3-4

## 2021-05-02 LAB
ALBUMIN SERPL ELPH-MCNC: 3.1 G/DL — LOW (ref 3.3–5)
ALP SERPL-CCNC: 106 U/L — SIGNIFICANT CHANGE UP (ref 40–120)
ALT FLD-CCNC: 6 U/L — SIGNIFICANT CHANGE UP (ref 4–33)
ANION GAP SERPL CALC-SCNC: 9 MMOL/L — SIGNIFICANT CHANGE UP (ref 7–14)
AST SERPL-CCNC: 9 U/L — SIGNIFICANT CHANGE UP (ref 4–32)
BASOPHILS # BLD AUTO: 0.02 K/UL — SIGNIFICANT CHANGE UP (ref 0–0.2)
BASOPHILS NFR BLD AUTO: 0.4 % — SIGNIFICANT CHANGE UP (ref 0–2)
BILIRUB SERPL-MCNC: 0.3 MG/DL — SIGNIFICANT CHANGE UP (ref 0.2–1.2)
BUN SERPL-MCNC: 20 MG/DL — SIGNIFICANT CHANGE UP (ref 7–23)
CALCIUM SERPL-MCNC: 8.8 MG/DL — SIGNIFICANT CHANGE UP (ref 8.4–10.5)
CHLORIDE SERPL-SCNC: 101 MMOL/L — SIGNIFICANT CHANGE UP (ref 98–107)
CO2 SERPL-SCNC: 27 MMOL/L — SIGNIFICANT CHANGE UP (ref 22–31)
COVID-19 SPIKE DOMAIN AB INTERP: POSITIVE
COVID-19 SPIKE DOMAIN ANTIBODY RESULT: >250 U/ML — HIGH
CREAT SERPL-MCNC: 1.06 MG/DL — SIGNIFICANT CHANGE UP (ref 0.5–1.3)
EOSINOPHIL # BLD AUTO: 0.04 K/UL — SIGNIFICANT CHANGE UP (ref 0–0.5)
EOSINOPHIL NFR BLD AUTO: 0.8 % — SIGNIFICANT CHANGE UP (ref 0–6)
GLUCOSE SERPL-MCNC: 107 MG/DL — HIGH (ref 70–99)
HCT VFR BLD CALC: 27.6 % — LOW (ref 34.5–45)
HGB BLD-MCNC: 9.3 G/DL — LOW (ref 11.5–15.5)
IANC: 3.09 K/UL — SIGNIFICANT CHANGE UP (ref 1.5–8.5)
IMM GRANULOCYTES NFR BLD AUTO: 0.2 % — SIGNIFICANT CHANGE UP (ref 0–1.5)
LYMPHOCYTES # BLD AUTO: 1.49 K/UL — SIGNIFICANT CHANGE UP (ref 1–3.3)
LYMPHOCYTES # BLD AUTO: 29.2 % — SIGNIFICANT CHANGE UP (ref 13–44)
MCHC RBC-ENTMCNC: 23 PG — LOW (ref 27–34)
MCHC RBC-ENTMCNC: 33.7 GM/DL — SIGNIFICANT CHANGE UP (ref 32–36)
MCV RBC AUTO: 68.3 FL — LOW (ref 80–100)
MONOCYTES # BLD AUTO: 0.45 K/UL — SIGNIFICANT CHANGE UP (ref 0–0.9)
MONOCYTES NFR BLD AUTO: 8.8 % — SIGNIFICANT CHANGE UP (ref 2–14)
NEUTROPHILS # BLD AUTO: 3.09 K/UL — SIGNIFICANT CHANGE UP (ref 1.8–7.4)
NEUTROPHILS NFR BLD AUTO: 60.6 % — SIGNIFICANT CHANGE UP (ref 43–77)
NRBC # BLD: 0 /100 WBCS — SIGNIFICANT CHANGE UP
NRBC # FLD: 0 K/UL — SIGNIFICANT CHANGE UP
PLATELET # BLD AUTO: 257 K/UL — SIGNIFICANT CHANGE UP (ref 150–400)
POTASSIUM SERPL-MCNC: 3.7 MMOL/L — SIGNIFICANT CHANGE UP (ref 3.5–5.3)
POTASSIUM SERPL-SCNC: 3.7 MMOL/L — SIGNIFICANT CHANGE UP (ref 3.5–5.3)
PROT SERPL-MCNC: 6.5 G/DL — SIGNIFICANT CHANGE UP (ref 6–8.3)
RBC # BLD: 4.04 M/UL — SIGNIFICANT CHANGE UP (ref 3.8–5.2)
RBC # FLD: 16.6 % — HIGH (ref 10.3–14.5)
SARS-COV-2 IGG+IGM SERPL QL IA: >250 U/ML — HIGH
SARS-COV-2 IGG+IGM SERPL QL IA: POSITIVE
SODIUM SERPL-SCNC: 137 MMOL/L — SIGNIFICANT CHANGE UP (ref 135–145)
WBC # BLD: 5.1 K/UL — SIGNIFICANT CHANGE UP (ref 3.8–10.5)
WBC # FLD AUTO: 5.1 K/UL — SIGNIFICANT CHANGE UP (ref 3.8–10.5)

## 2021-05-02 RX ADMIN — ENOXAPARIN SODIUM 40 MILLIGRAM(S): 100 INJECTION SUBCUTANEOUS at 12:25

## 2021-05-02 RX ADMIN — OXCARBAZEPINE 450 MILLIGRAM(S): 300 TABLET, FILM COATED ORAL at 18:31

## 2021-05-02 RX ADMIN — AMLODIPINE BESYLATE 10 MILLIGRAM(S): 2.5 TABLET ORAL at 05:16

## 2021-05-02 RX ADMIN — OXCARBAZEPINE 450 MILLIGRAM(S): 300 TABLET, FILM COATED ORAL at 05:16

## 2021-05-02 RX ADMIN — ISOSORBIDE MONONITRATE 60 MILLIGRAM(S): 60 TABLET, EXTENDED RELEASE ORAL at 12:25

## 2021-05-02 RX ADMIN — LISINOPRIL 40 MILLIGRAM(S): 2.5 TABLET ORAL at 05:16

## 2021-05-02 NOTE — PROGRESS NOTE ADULT - ASSESSMENT
76 yo woman with PMH of HTN, HLD, seizures, T2DM presents with mechanical fall after possible syncopal event 2/2 orthostatic hypotension vs vasovagal vs cardiogenic

## 2021-05-02 NOTE — PHYSICAL THERAPY INITIAL EVALUATION ADULT - LIVES WITH, PROFILE
alone pt lives in a house with 6 steps to enter. pt also needs to take a total of 14 steps (with left rail) up to the 2nd floor of home./alone

## 2021-05-02 NOTE — PHYSICAL THERAPY INITIAL EVALUATION ADULT - PERTINENT HX OF CURRENT PROBLEM, REHAB EVAL
Patient is a 75yo admitted for a syncopal event resulting in a fall. Patient is a 75yo admitted for a syncopal event resulting in a fall. Recent admission in April 2021 for same issue, dc'd to rehab with ILR following that admission

## 2021-05-02 NOTE — PHYSICAL THERAPY INITIAL EVALUATION ADULT - ADDITIONAL COMMENTS
pt has a cane and walker mostly for use outside of the house. pt has a cane and walker mostly for use outside of the house.   orthostatics were assessed and BP was 163/70 in supine, 163/68 in sitting and 147/63 in standing. heart rate was 60bpm and SpO2 was 100% in supine, heart rate was 57bpm sitting and 57bpm standing. pt has a cane and walker mostly for use outside of the house.   orthostatics were assessed and BP was 163/70 in supine, 163/68 in sitting and 147/63 in standing. heart rate was 60bpm and SpO2 was 100% in supine, heart rate was 57bpm sitting and 61bpm standing.

## 2021-05-02 NOTE — PHYSICAL THERAPY INITIAL EVALUATION ADULT - ACTIVE RANGE OF MOTION EXAMINATION, REHAB EVAL
formal MMT not performed due to pt's risk of vasovagal syncope. pt presented with at least 3+/5 strength on b/l UE and LE.

## 2021-05-02 NOTE — PHYSICAL THERAPY INITIAL EVALUATION ADULT - GAIT DEVIATIONS NOTED, PT EVAL
decreased ruben/decreased step length/decreased stride length/decreased swing-to-stance ratio/decreased weight-shifting ability

## 2021-05-03 LAB
ANION GAP SERPL CALC-SCNC: 11 MMOL/L — SIGNIFICANT CHANGE UP (ref 7–14)
BUN SERPL-MCNC: 19 MG/DL — SIGNIFICANT CHANGE UP (ref 7–23)
CALCIUM SERPL-MCNC: 9.1 MG/DL — SIGNIFICANT CHANGE UP (ref 8.4–10.5)
CHLORIDE SERPL-SCNC: 100 MMOL/L — SIGNIFICANT CHANGE UP (ref 98–107)
CO2 SERPL-SCNC: 26 MMOL/L — SIGNIFICANT CHANGE UP (ref 22–31)
CREAT SERPL-MCNC: 1 MG/DL — SIGNIFICANT CHANGE UP (ref 0.5–1.3)
GLUCOSE SERPL-MCNC: 89 MG/DL — SIGNIFICANT CHANGE UP (ref 70–99)
HCT VFR BLD CALC: 29.9 % — LOW (ref 34.5–45)
HGB BLD-MCNC: 10.2 G/DL — LOW (ref 11.5–15.5)
MAGNESIUM SERPL-MCNC: 2.3 MG/DL — SIGNIFICANT CHANGE UP (ref 1.6–2.6)
MCHC RBC-ENTMCNC: 23.4 PG — LOW (ref 27–34)
MCHC RBC-ENTMCNC: 34.1 GM/DL — SIGNIFICANT CHANGE UP (ref 32–36)
MCV RBC AUTO: 68.7 FL — LOW (ref 80–100)
NRBC # BLD: 0 /100 WBCS — SIGNIFICANT CHANGE UP
NRBC # FLD: 0 K/UL — SIGNIFICANT CHANGE UP
PHOSPHATE SERPL-MCNC: 2.7 MG/DL — SIGNIFICANT CHANGE UP (ref 2.5–4.5)
PLATELET # BLD AUTO: 262 K/UL — SIGNIFICANT CHANGE UP (ref 150–400)
POTASSIUM SERPL-MCNC: 3.8 MMOL/L — SIGNIFICANT CHANGE UP (ref 3.5–5.3)
POTASSIUM SERPL-SCNC: 3.8 MMOL/L — SIGNIFICANT CHANGE UP (ref 3.5–5.3)
RBC # BLD: 4.35 M/UL — SIGNIFICANT CHANGE UP (ref 3.8–5.2)
RBC # FLD: 16.6 % — HIGH (ref 10.3–14.5)
SODIUM SERPL-SCNC: 137 MMOL/L — SIGNIFICANT CHANGE UP (ref 135–145)
WBC # BLD: 4.2 K/UL — SIGNIFICANT CHANGE UP (ref 3.8–10.5)
WBC # FLD AUTO: 4.2 K/UL — SIGNIFICANT CHANGE UP (ref 3.8–10.5)

## 2021-05-03 PROCEDURE — 93291 INTERROG DEV EVAL SCRMS IP: CPT | Mod: 26

## 2021-05-03 RX ORDER — SIMETHICONE 80 MG/1
80 TABLET, CHEWABLE ORAL ONCE
Refills: 0 | Status: DISCONTINUED | OUTPATIENT
Start: 2021-05-03 | End: 2021-05-06

## 2021-05-03 RX ORDER — HYDRALAZINE HCL 50 MG
2.5 TABLET ORAL ONCE
Refills: 0 | Status: COMPLETED | OUTPATIENT
Start: 2021-05-03 | End: 2021-05-03

## 2021-05-03 RX ADMIN — Medication 2.5 MILLIGRAM(S): at 22:00

## 2021-05-03 RX ADMIN — LISINOPRIL 40 MILLIGRAM(S): 2.5 TABLET ORAL at 05:47

## 2021-05-03 RX ADMIN — OXCARBAZEPINE 450 MILLIGRAM(S): 300 TABLET, FILM COATED ORAL at 05:47

## 2021-05-03 RX ADMIN — AMLODIPINE BESYLATE 10 MILLIGRAM(S): 2.5 TABLET ORAL at 05:47

## 2021-05-03 RX ADMIN — ISOSORBIDE MONONITRATE 60 MILLIGRAM(S): 60 TABLET, EXTENDED RELEASE ORAL at 12:38

## 2021-05-03 RX ADMIN — OXCARBAZEPINE 450 MILLIGRAM(S): 300 TABLET, FILM COATED ORAL at 17:13

## 2021-05-03 RX ADMIN — ENOXAPARIN SODIUM 40 MILLIGRAM(S): 100 INJECTION SUBCUTANEOUS at 12:38

## 2021-05-03 NOTE — CONSULT NOTE ADULT - ASSESSMENT
Assessment and Plan    74 yo woman with PMH of HTN, HLD, seizures, T2DM presents with mechanical fall after possible syncopal event.     EKG: Sinus Kaz 53 no STT changes  Tele: SB 50s    1. Syncope  orthostatic negative this admission (previous admission with positive orthostatics)  - s/p LHC previous admission with non obstructive CAD  -echo from 04/2021 with grossly normal LV function and mild diastolic dysfunction  -get ILR interrogated     2. HTN  -elevated  - c/w norvasc 10mg daily, imdur to 60mg daily and lisinopril  -if remains elevated will increase imdur dosage  - continue to monitor BP    3. Seizures  -on trileptal

## 2021-05-03 NOTE — CONSULT NOTE ADULT - SUBJECTIVE AND OBJECTIVE BOX
Ulises Luevano MD  Interventional Cardiology / Advance Heart Failure and Cardiac Transplant Specialist  [place] Office : [address] [place] [address] [place] [address]  Tel:   [place] Office : [address] [facility] [place] [address]  Tel: [phone]  Cell : [phone]    HISTORY OF PRESENTING ILLNESS:  76 yo woman with PMH of HTN, HLD, seizures, T2DM presents with mechanical fall after possible syncopal event. At this time patient is responsive to all questions, denies dyspnea, chest pain. Patient was at [facility] reports going to bathroom, straining and then being found on the floor, does not recall the event, had LOC no head trauma. Denies any chest pain, palpitations or SOB prior to event. Per patients family there have been no syncopal events since discharge  Case discussed with patient's daughter, Deanna who stated there have not been any infectious symptoms, no fevers/chills/ CP, palpitations or dyspnea lately. Pt recently d/c from [facility] 4/23 for syncopal workup. During that admission multiple RRTs for syncope, pt was planned from [facility] but was d/c with loop recorder. At this time patient denies any headaches, vision changes, motor or sensory deficits, chest pain, palpitations, dyspnea, N/V, abdominal pain. Family reports pt has minimal po intake at home, last seizure several years prior.  	  MEDICATIONS:  amLODIPine   Tablet 10 milliGRAM(s) Oral daily  enoxaparin Injectable 40 milliGRAM(s) SubCutaneous daily  isosorbide   mononitrate ER Tablet (IMDUR) 60 milliGRAM(s) Oral daily  lisinopril 40 milliGRAM(s) Oral daily        OXcarbazepine 450 milliGRAM(s) Oral two times a day    simethicone 80 milliGRAM(s) Chew once          PAST MEDICAL/SURGICAL HISTORY  PAST MEDICAL & SURGICAL HISTORY:  Hypertension    Seizure    Hyperlipidemia    Diverticulosis    Borderline diabetes    Obesity    No Past Surgical History    History of appendectomy        SOCIAL HISTORY: Substance Use (street drugs): ( x ) never used  (  ) other:    FAMILY HISTORY:  Family history of cerebrovascular accident (CVA) (Sibling)        REVIEW OF SYSTEMS:  CONSTITUTIONAL: No fever, weight loss, or fatigue  EYES: No eye pain, visual disturbances, or discharge  ENMT:  No difficulty hearing, tinnitus, vertigo; No sinus or throat pain  BREASTS: No pain, masses, or nipple discharge  GASTROINTESTINAL: No abdominal or epigastric pain. No nausea, vomiting, or hematemesis; No diarrhea or constipation. No melena or hematochezia.  GENITOURINARY: No dysuria, frequency, hematuria, or incontinence  NEUROLOGICAL: No headaches, memory loss, loss of strength, numbness, or tremors  ENDOCRINE: No heat or cold intolerance; No hair loss  MUSCULOSKELETAL: No joint pain or swelling; No muscle, back, or extremity pain  PSYCHIATRIC: No depression, anxiety, mood swings, or difficulty sleeping  HEME/LYMPH: No easy bruising, or bleeding gums  All others negative    PHYSICAL EXAM:  T(C): 36.4 (05-03-21 @ 05:45), Max: 36.8 (05-02-21 @ 21:03)  HR: 57 (05-03-21 @ 06:53) (54 - 63)  BP: 161/64 (05-03-21 @ 06:53) (160/73 - 198/86)  RR: 18 (05-03-21 @ 05:45) (15 - 18)  SpO2: 96% (05-03-21 @ 05:45) (96% - 99%)  Wt(kg): --  I&O's Summary              GENERAL: NAD  EYES: EOMI, PERRLA, conjunctiva and sclera clear  ENMT: No tonsillar erythema, exudates, or enlargement;  Cardiovascular: Normal S1 S2, No JVD, No murmurs, No edema  Respiratory: Lungs clear to auscultation	  Gastrointestinal:  Soft, nontender , + BS	  Extremities: no edema                                  10.2   4.20  )-----------( 262      ( 03 May 2021 07:13 )             29.9     05-03    137  |  100  |  19  ----------------------------<  89  3.8   |  26  |  1.00    Ca    9.1      03 May 2021 07:13  Phos  2.7     05-03  Mg     2.3     05-03    TPro  6.5  /  Alb  3.1<L>  /  TBili  0.3  /  DBili  x   /  AST  9   /  ALT  6   /  AlkPhos  106  05-02    proBNP:   Lipid Profile:   HgA1c:   TSH:     Consultant(s) Notes Reviewed:  [x ] YES  [ ] NO    Care Discussed with Consultants/Other Providers [ x] YES  [ ] NO    Imaging Personally Reviewed independently:  [x] YES  [ ] NO    All labs, radiologic studies, vitals, orders and medications list reviewed. Patient is seen and examined at bedside. Case discussed with medical team.

## 2021-05-04 LAB
ANION GAP SERPL CALC-SCNC: 8 MMOL/L — SIGNIFICANT CHANGE UP (ref 7–14)
BUN SERPL-MCNC: 20 MG/DL — SIGNIFICANT CHANGE UP (ref 7–23)
CALCIUM SERPL-MCNC: 9 MG/DL — SIGNIFICANT CHANGE UP (ref 8.4–10.5)
CHLORIDE SERPL-SCNC: 99 MMOL/L — SIGNIFICANT CHANGE UP (ref 98–107)
CO2 SERPL-SCNC: 27 MMOL/L — SIGNIFICANT CHANGE UP (ref 22–31)
CREAT SERPL-MCNC: 1.03 MG/DL — SIGNIFICANT CHANGE UP (ref 0.5–1.3)
GLUCOSE SERPL-MCNC: 98 MG/DL — SIGNIFICANT CHANGE UP (ref 70–99)
HCT VFR BLD CALC: 30.4 % — LOW (ref 34.5–45)
HGB BLD-MCNC: 10.3 G/DL — LOW (ref 11.5–15.5)
MAGNESIUM SERPL-MCNC: 2.3 MG/DL — SIGNIFICANT CHANGE UP (ref 1.6–2.6)
MCHC RBC-ENTMCNC: 23.3 PG — LOW (ref 27–34)
MCHC RBC-ENTMCNC: 33.9 GM/DL — SIGNIFICANT CHANGE UP (ref 32–36)
MCV RBC AUTO: 68.8 FL — LOW (ref 80–100)
NRBC # BLD: 0 /100 WBCS — SIGNIFICANT CHANGE UP
NRBC # FLD: 0 K/UL — SIGNIFICANT CHANGE UP
PHOSPHATE SERPL-MCNC: 2.9 MG/DL — SIGNIFICANT CHANGE UP (ref 2.5–4.5)
PLATELET # BLD AUTO: 259 K/UL — SIGNIFICANT CHANGE UP (ref 150–400)
POTASSIUM SERPL-MCNC: 3.8 MMOL/L — SIGNIFICANT CHANGE UP (ref 3.5–5.3)
POTASSIUM SERPL-SCNC: 3.8 MMOL/L — SIGNIFICANT CHANGE UP (ref 3.5–5.3)
RBC # BLD: 4.42 M/UL — SIGNIFICANT CHANGE UP (ref 3.8–5.2)
RBC # FLD: 16.5 % — HIGH (ref 10.3–14.5)
SARS-COV-2 RNA SPEC QL NAA+PROBE: SIGNIFICANT CHANGE UP
SODIUM SERPL-SCNC: 134 MMOL/L — LOW (ref 135–145)
WBC # BLD: 4.24 K/UL — SIGNIFICANT CHANGE UP (ref 3.8–10.5)
WBC # FLD AUTO: 4.24 K/UL — SIGNIFICANT CHANGE UP (ref 3.8–10.5)

## 2021-05-04 RX ORDER — ISOSORBIDE MONONITRATE 60 MG/1
90 TABLET, EXTENDED RELEASE ORAL DAILY
Refills: 0 | Status: DISCONTINUED | OUTPATIENT
Start: 2021-05-04 | End: 2021-05-06

## 2021-05-04 RX ADMIN — AMLODIPINE BESYLATE 10 MILLIGRAM(S): 2.5 TABLET ORAL at 05:50

## 2021-05-04 RX ADMIN — ISOSORBIDE MONONITRATE 90 MILLIGRAM(S): 60 TABLET, EXTENDED RELEASE ORAL at 12:26

## 2021-05-04 RX ADMIN — OXCARBAZEPINE 450 MILLIGRAM(S): 300 TABLET, FILM COATED ORAL at 05:50

## 2021-05-04 RX ADMIN — ENOXAPARIN SODIUM 40 MILLIGRAM(S): 100 INJECTION SUBCUTANEOUS at 12:27

## 2021-05-04 RX ADMIN — OXCARBAZEPINE 450 MILLIGRAM(S): 300 TABLET, FILM COATED ORAL at 18:23

## 2021-05-04 RX ADMIN — LISINOPRIL 40 MILLIGRAM(S): 2.5 TABLET ORAL at 05:50

## 2021-05-04 NOTE — PROGRESS NOTE ADULT - ASSESSMENT
Assessment and Plan    74 yo woman with PMH of HTN, HLD, seizures, T2DM presents with mechanical fall after possible syncopal event.     EKG: Sinus Kaz 53 no STT changes  Tele: SB 50s    1. Syncope  orthostatic negative this admission (previous admission with positive orthostatics)  - s/p LHC previous admission with non obstructive CAD  -echo from 04/2021 with grossly normal LV function and mild diastolic dysfunction  -ILR interrogation with no events     2. HTN  -elevated  - c/w norvasc 10mg daily, imdur 60mg daily and lisinopril  -if remains elevated will increase imdur dosage  - continue to monitor BP    3. Seizures  -on trileptal  Assessment and Plan    74 yo woman with PMH of HTN, HLD, seizures, T2DM presents with mechanical fall after possible syncopal event.     EKG: Sinus Kaz 53 no STT changes  Tele: SB 50s    1. Syncope  orthostatic negative this admission (previous admission with positive orthostatics)  - s/p LHC previous admission with non obstructive CAD  -echo from 04/2021 with grossly normal LV function and mild diastolic dysfunction  -ILR interrogation with no events     2. HTN  -elevated  - c/w norvasc 10mg daily, and lisinopril  -imdur increased to 90mg daily  - continue to monitor BP    3. Seizures  -on trileptal

## 2021-05-05 ENCOUNTER — APPOINTMENT (OUTPATIENT)
Dept: ELECTROPHYSIOLOGY | Facility: CLINIC | Age: 76
End: 2021-05-05

## 2021-05-05 LAB
ANION GAP SERPL CALC-SCNC: 13 MMOL/L — SIGNIFICANT CHANGE UP (ref 7–14)
BUN SERPL-MCNC: 16 MG/DL — SIGNIFICANT CHANGE UP (ref 7–23)
CALCIUM SERPL-MCNC: 9.2 MG/DL — SIGNIFICANT CHANGE UP (ref 8.4–10.5)
CHLORIDE SERPL-SCNC: 100 MMOL/L — SIGNIFICANT CHANGE UP (ref 98–107)
CO2 SERPL-SCNC: 21 MMOL/L — LOW (ref 22–31)
CREAT SERPL-MCNC: 0.92 MG/DL — SIGNIFICANT CHANGE UP (ref 0.5–1.3)
GLUCOSE SERPL-MCNC: 113 MG/DL — HIGH (ref 70–99)
HCT VFR BLD CALC: 34.2 % — LOW (ref 34.5–45)
HGB BLD-MCNC: 11.6 G/DL — SIGNIFICANT CHANGE UP (ref 11.5–15.5)
MAGNESIUM SERPL-MCNC: 2.3 MG/DL — SIGNIFICANT CHANGE UP (ref 1.6–2.6)
MCHC RBC-ENTMCNC: 23.2 PG — LOW (ref 27–34)
MCHC RBC-ENTMCNC: 33.9 GM/DL — SIGNIFICANT CHANGE UP (ref 32–36)
MCV RBC AUTO: 68.4 FL — LOW (ref 80–100)
NRBC # BLD: 0 /100 WBCS — SIGNIFICANT CHANGE UP
NRBC # FLD: 0 K/UL — SIGNIFICANT CHANGE UP
PHOSPHATE SERPL-MCNC: 2.7 MG/DL — SIGNIFICANT CHANGE UP (ref 2.5–4.5)
PLATELET # BLD AUTO: 278 K/UL — SIGNIFICANT CHANGE UP (ref 150–400)
POTASSIUM SERPL-MCNC: 4.4 MMOL/L — SIGNIFICANT CHANGE UP (ref 3.5–5.3)
POTASSIUM SERPL-SCNC: 4.4 MMOL/L — SIGNIFICANT CHANGE UP (ref 3.5–5.3)
RBC # BLD: 5 M/UL — SIGNIFICANT CHANGE UP (ref 3.8–5.2)
RBC # FLD: 16.8 % — HIGH (ref 10.3–14.5)
SODIUM SERPL-SCNC: 134 MMOL/L — LOW (ref 135–145)
WBC # BLD: 4.69 K/UL — SIGNIFICANT CHANGE UP (ref 3.8–10.5)
WBC # FLD AUTO: 4.69 K/UL — SIGNIFICANT CHANGE UP (ref 3.8–10.5)

## 2021-05-05 RX ORDER — POLYETHYLENE GLYCOL 3350 17 G/17G
17 POWDER, FOR SOLUTION ORAL DAILY
Refills: 0 | Status: DISCONTINUED | OUTPATIENT
Start: 2021-05-05 | End: 2021-05-06

## 2021-05-05 RX ORDER — SENNA PLUS 8.6 MG/1
2 TABLET ORAL AT BEDTIME
Refills: 0 | Status: DISCONTINUED | OUTPATIENT
Start: 2021-05-05 | End: 2021-05-06

## 2021-05-05 RX ORDER — HYDRALAZINE HCL 50 MG
10 TABLET ORAL ONCE
Refills: 0 | Status: COMPLETED | OUTPATIENT
Start: 2021-05-05 | End: 2021-05-05

## 2021-05-05 RX ADMIN — ENOXAPARIN SODIUM 40 MILLIGRAM(S): 100 INJECTION SUBCUTANEOUS at 12:19

## 2021-05-05 RX ADMIN — POLYETHYLENE GLYCOL 3350 17 GRAM(S): 17 POWDER, FOR SOLUTION ORAL at 12:22

## 2021-05-05 RX ADMIN — AMLODIPINE BESYLATE 10 MILLIGRAM(S): 2.5 TABLET ORAL at 05:09

## 2021-05-05 RX ADMIN — OXCARBAZEPINE 450 MILLIGRAM(S): 300 TABLET, FILM COATED ORAL at 05:09

## 2021-05-05 RX ADMIN — Medication 10 MILLIGRAM(S): at 05:08

## 2021-05-05 RX ADMIN — LISINOPRIL 40 MILLIGRAM(S): 2.5 TABLET ORAL at 05:09

## 2021-05-05 RX ADMIN — ISOSORBIDE MONONITRATE 90 MILLIGRAM(S): 60 TABLET, EXTENDED RELEASE ORAL at 12:19

## 2021-05-05 RX ADMIN — OXCARBAZEPINE 450 MILLIGRAM(S): 300 TABLET, FILM COATED ORAL at 17:30

## 2021-05-05 RX ADMIN — SENNA PLUS 2 TABLET(S): 8.6 TABLET ORAL at 20:44

## 2021-05-05 NOTE — PROGRESS NOTE ADULT - PROBLEM SELECTOR PLAN 2
- c/w home amlodipine/lisinopril
- c/w home amlodipine/lisinopril  - obtain orthostatics

## 2021-05-05 NOTE — PROGRESS NOTE ADULT - ASSESSMENT
Assessment and Plan    74 yo woman with PMH of HTN, HLD, seizures, T2DM presents with mechanical fall after possible syncopal event.     EKG: Sinus Kaz 53 no STT changes  Tele: SB 50-60s    1. Syncope  orthostatic negative this admission (previous admission with positive orthostatics)  - s/p C previous admission with non obstructive CAD  -echo from 04/2021 with grossly normal LV function and mild diastolic dysfunction  -ILR interrogation with no events     2. HTN  -improving  - c/w norvasc 10mg daily, and lisinopril  -imdur increased to 90mg daily  - continue to monitor BP    3. Seizures  -on trileptal

## 2021-05-05 NOTE — PROGRESS NOTE ADULT - PROBLEM SELECTOR PLAN 4
- microcytic, no melena BRBPR per pt, pt has had normal colonoscopy per family but unsure when last one was  - iron panel in AM
- microcytic, no melena BRBPR per pt, pt has had normal colonoscopy per family

## 2021-05-05 NOTE — PROGRESS NOTE ADULT - PROBLEM SELECTOR PLAN 3
- pt with last seizure 3 years prior per family, c/w home oxycarbamazepine

## 2021-05-05 NOTE — PROGRESS NOTE ADULT - PROBLEM SELECTOR PLAN 1
- in setting of straining likely vasovagal event, pt w/ LOC but no head trauma. Denies CP/palpitations at this time. Likely vasvogal vs orthostatic. Strong consideration for chronitropic incompetence given sinus bradycardia, pt was evaluated for PPM last admission but was d/c with ILR  - EKG NSR ME interval elevated but no 1st degree block  - obtain orthostatics  - telemetry monitoring  - EP evaluation to review ILR to see if arrythmia block identified  - PT evaluation  - TTE 4/7/21 grossly normal LV  - consideration for seizure however last seizure several years prior, pt on same oxycarbamazepine dosage, no postictal state described after seizure
- orthostatic negative this admission (previous admission with positive orthostatics)  - s/p [facility] previous admission with non obstructive CAD  -echo from 04/2021 with grossly normal LV function and mild diastolic dysfunction  -ILR interrogation with no events
- in setting of straining likely vasovagal event, pt w/ LOC but no head trauma. Denies CP/palpitations at this time. Likely vasvogal vs orthostatic. Strong consideration for chronitropic incompetence given sinus bradycardia, pt was evaluated for PPM last admission but was d/c with ILR  - EKG NSR KS interval elevated but no 1st degree block  - obtain orthostatics  - telemetry monitoring  - EP evaluation to review ILR to see if arrythmia block identified  - PT evaluation  - TTE 4/7/21 grossly normal LV  - consideration for seizure however last seizure several years prior, pt on same oxycarbamazepine dosage, no postictal state described after seizure
- orthostatic negative this admission (previous admission with positive orthostatics)  - s/p [facility] previous admission with non obstructive CAD  -echo from 04/2021 with grossly normal LV function and mild diastolic dysfunction  -ILR interrogation with no events

## 2021-05-05 NOTE — PROGRESS NOTE ADULT - PROBLEM SELECTOR PLAN 5
- A1c 6.8 last admission at goal for pt, monitor BG w/ BMP
- A1c 6.8 last admission at goal for pt, iss
- A1c 6.8 last admission at goal for pt, monitor BG w/ BMP
- A1c 6.8 last admission at goal for pt, monitor BG w/ BMP

## 2021-05-05 NOTE — PROGRESS NOTE ADULT - PROBLEM SELECTOR PLAN 6
- Lovenox 40 mg QD  - DASH diet, S+S completed last admission  - PT evaluation

## 2021-05-06 ENCOUNTER — TRANSCRIPTION ENCOUNTER (OUTPATIENT)
Age: 76
End: 2021-05-06

## 2021-05-06 VITALS
HEART RATE: 69 BPM | SYSTOLIC BLOOD PRESSURE: 150 MMHG | RESPIRATION RATE: 18 BRPM | DIASTOLIC BLOOD PRESSURE: 83 MMHG | TEMPERATURE: 98 F | OXYGEN SATURATION: 100 %

## 2021-05-06 LAB
ANION GAP SERPL CALC-SCNC: 11 MMOL/L — SIGNIFICANT CHANGE UP (ref 7–14)
BUN SERPL-MCNC: 15 MG/DL — SIGNIFICANT CHANGE UP (ref 7–23)
CALCIUM SERPL-MCNC: 9.2 MG/DL — SIGNIFICANT CHANGE UP (ref 8.4–10.5)
CHLORIDE SERPL-SCNC: 99 MMOL/L — SIGNIFICANT CHANGE UP (ref 98–107)
CO2 SERPL-SCNC: 26 MMOL/L — SIGNIFICANT CHANGE UP (ref 22–31)
CREAT SERPL-MCNC: 1.02 MG/DL — SIGNIFICANT CHANGE UP (ref 0.5–1.3)
GLUCOSE SERPL-MCNC: 103 MG/DL — HIGH (ref 70–99)
POTASSIUM SERPL-MCNC: 3.9 MMOL/L — SIGNIFICANT CHANGE UP (ref 3.5–5.3)
POTASSIUM SERPL-SCNC: 3.9 MMOL/L — SIGNIFICANT CHANGE UP (ref 3.5–5.3)
SODIUM SERPL-SCNC: 136 MMOL/L — SIGNIFICANT CHANGE UP (ref 135–145)

## 2021-05-06 RX ORDER — ISOSORBIDE MONONITRATE 60 MG/1
3 TABLET, EXTENDED RELEASE ORAL
Qty: 0 | Refills: 0 | DISCHARGE
Start: 2021-05-06

## 2021-05-06 RX ORDER — HYDRALAZINE HCL 50 MG
1 TABLET ORAL
Qty: 60 | Refills: 0
Start: 2021-05-06 | End: 2021-06-04

## 2021-05-06 RX ORDER — HYDRALAZINE HCL 50 MG
1 TABLET ORAL
Qty: 0 | Refills: 0 | DISCHARGE
Start: 2021-05-06

## 2021-05-06 RX ORDER — HYDRALAZINE HCL 50 MG
25 TABLET ORAL
Refills: 0 | Status: DISCONTINUED | OUTPATIENT
Start: 2021-05-06 | End: 2021-05-06

## 2021-05-06 RX ADMIN — Medication 25 MILLIGRAM(S): at 05:31

## 2021-05-06 RX ADMIN — ISOSORBIDE MONONITRATE 90 MILLIGRAM(S): 60 TABLET, EXTENDED RELEASE ORAL at 11:59

## 2021-05-06 RX ADMIN — AMLODIPINE BESYLATE 10 MILLIGRAM(S): 2.5 TABLET ORAL at 05:01

## 2021-05-06 RX ADMIN — LISINOPRIL 40 MILLIGRAM(S): 2.5 TABLET ORAL at 05:00

## 2021-05-06 RX ADMIN — OXCARBAZEPINE 450 MILLIGRAM(S): 300 TABLET, FILM COATED ORAL at 05:00

## 2021-05-06 NOTE — DISCHARGE NOTE NURSING/CASE MANAGEMENT/SOCIAL WORK - PATIENT PORTAL LINK FT
You can access the FollowMyHealth Patient Portal offered by [facility] by registering at the following website: [email or web address]. By joining [employer]’s FollowMyHealth portal, you will also be able to view your health information using other applications (apps) compatible with our system.

## 2021-05-06 NOTE — PROGRESS NOTE ADULT - SUBJECTIVE AND OBJECTIVE BOX
SUBJECTIVE / OVERNIGHT EVENTS: pt seen and examined    MEDICATIONS  (STANDING):  amLODIPine   Tablet 10 milliGRAM(s) Oral daily  enoxaparin Injectable 40 milliGRAM(s) SubCutaneous daily  isosorbide   mononitrate ER Tablet (IMDUR) 60 milliGRAM(s) Oral daily  lisinopril 40 milliGRAM(s) Oral daily  OXcarbazepine 450 milliGRAM(s) Oral two times a day  simethicone 80 milliGRAM(s) Chew once    MEDICATIONS  (PRN):    Vital Signs Last 24 Hrs  T(C): 36.6 (03 May 2021 21:00), Max: 36.9 (03 May 2021 12:31)  T(F): 97.9 (03 May 2021 21:00), Max: 98.4 (03 May 2021 12:31)  HR: 65 (03 May 2021 22:08) (57 - 65)  BP: 133/60 (03 May 2021 22:08) (133/60 - 198/86)  BP(mean): --  RR: 18 (03 May 2021 21:58) (18 - 18)  SpO2: 100% (03 May 2021 21:58) (96% - 100%)    Constitutional: No fever, fatigue  Skin: No rash.  Eyes: No recent vision problems or eye pain.  ENT: No congestion, ear pain, or sore throat.  Cardiovascular: No chest pain or palpation.  Respiratory: No cough, shortness of breath, congestion, or wheezing.  Gastrointestinal: No abdominal pain, nausea, vomiting, or diarrhea.  Genitourinary: No dysuria.  Musculoskeletal: No joint swelling.  Neurologic: No headache.    PHYSICAL EXAM:  GENERAL: NAD  EYES: EOMI, PERRLA  NECK: Supple, No JVD  CHEST/LUNG: dec breath sounds rt base  HEART:  S1 , S2 +  ABDOMEN: soft , bs+  EXTREMITIES:  trace edema  NEUROLOGY:alert awake    LABS:  05-03    137  |  100  |  19  ----------------------------<  89  3.8   |  26  |  1.00    Ca    9.1      03 May 2021 07:13  Phos  2.7     05-03  Mg     2.3     05-03    TPro  6.5  /  Alb  3.1<L>  /  TBili  0.3  /  DBili      /  AST  9   /  ALT  6   /  AlkPhos  106  05-02    Creatinine Trend: 1.00 <--, 1.06 <--, 1.17 <--                        10.2   4.20  )-----------( 262      ( 03 May 2021 07:13 )             29.9     Urine Studies:            LIVER FUNCTIONS - ( 02 May 2021 08:31 )  Alb: 3.1 g/dL / Pro: 6.5 g/dL / ALK PHOS: 106 U/L / ALT: 6 U/L / AST: 9 U/L / GGT: x             Consultant(s) Notes Reviewed:      Care Discussed with Consultants/Other Providers:  
Ulises Luevano MD  Interventional Cardiology / Advance Heart Failure and Cardiac Transplant Specialist  [place] Office : [address] [place] [address] [address] [address]  Tel:   [place] Office : [address] [facility] [address] [address]  Tel: [phone]  Cell : [phone]    Subjective/Overnight events: Pt is lying in bed comfortable not in distress, no chest pains no SOB no palpitations or disziness  	  MEDICATIONS:  amLODIPine   Tablet 10 milliGRAM(s) Oral daily  enoxaparin Injectable 40 milliGRAM(s) SubCutaneous daily  isosorbide   mononitrate ER Tablet (IMDUR) 60 milliGRAM(s) Oral daily  lisinopril 40 milliGRAM(s) Oral daily        OXcarbazepine 450 milliGRAM(s) Oral two times a day    simethicone 80 milliGRAM(s) Chew once          PAST MEDICAL/SURGICAL HISTORY  PAST MEDICAL & SURGICAL HISTORY:  Hypertension    Seizure    Hyperlipidemia    Diverticulosis    Borderline diabetes    Obesity    No Past Surgical History    History of appendectomy        SOCIAL HISTORY: Substance Use (street drugs): ( x ) never used  (  ) other:    FAMILY HISTORY:  Family history of cerebrovascular accident (CVA) (Sibling)        REVIEW OF SYSTEMS:  CONSTITUTIONAL: No fever, weight loss, or fatigue  EYES: No eye pain, visual disturbances, or discharge  ENMT:  No difficulty hearing, tinnitus, vertigo; No sinus or throat pain  BREASTS: No pain, masses, or nipple discharge  GASTROINTESTINAL: No abdominal or epigastric pain. No nausea, vomiting, or hematemesis; No diarrhea or constipation. No melena or hematochezia.  GENITOURINARY: No dysuria, frequency, hematuria, or incontinence  NEUROLOGICAL: No headaches, memory loss, loss of strength, numbness, or tremors  ENDOCRINE: No heat or cold intolerance; No hair loss  MUSCULOSKELETAL: No joint pain or swelling; No muscle, back, or extremity pain  PSYCHIATRIC: No depression, anxiety, mood swings, or difficulty sleeping  HEME/LYMPH: No easy bruising, or bleeding gums  All others negative    PHYSICAL EXAM:  T(C): 36.3 (05-04-21 @ 05:49), Max: 36.9 (05-03-21 @ 12:31)  HR: 62 (05-04-21 @ 05:49) (58 - 65)  BP: 177/83 (05-04-21 @ 05:49) (133/60 - 187/73)  RR: 18 (05-04-21 @ 05:49) (18 - 18)  SpO2: 100% (05-04-21 @ 05:49) (100% - 100%)  Wt(kg): --  I&O's Summary        GENERAL: NAD  EYES: EOMI, PERRLA, conjunctiva and sclera clear  ENMT: No tonsillar erythema, exudates, or enlargement;  Cardiovascular: Normal S1 S2, No JVD, No murmurs, No edema  Respiratory: Lungs clear to auscultation	  Gastrointestinal:  Soft, nontender , + BS	  Extremities: no edema                                  10.3   4.24  )-----------( 259      ( 04 May 2021 06:43 )             30.4     05-04    134<L>  |  99  |  20  ----------------------------<  98  3.8   |  27  |  1.03    Ca    9.0      04 May 2021 06:43  Phos  2.9     05-04  Mg     2.3     05-04      proBNP:   Lipid Profile:   HgA1c:   TSH:     Consultant(s) Notes Reviewed:  [x ] YES  [ ] NO    Care Discussed with Consultants/Other Providers [ x] YES  [ ] NO    Imaging Personally Reviewed independently:  [x] YES  [ ] NO    All labs, radiologic studies, vitals, orders and medications list reviewed. Patient is seen and examined at bedside. Case discussed with medical team.        
Ulises Luevano MD  Interventional Cardiology / Advance Heart Failure and Cardiac Transplant Specialist  [place] Office : [address] [place] [address]  Tel:   [place] Office : [address] [facility] [address] [address]  Tel: [phone]  Cell : [phone]    Subjective/Overnight events: Pt is lying in bed comfortable not in distress, no chest pains no SOB no palpitations  	  MEDICATIONS:  amLODIPine   Tablet 10 milliGRAM(s) Oral daily  enoxaparin Injectable 40 milliGRAM(s) SubCutaneous daily  hydrALAZINE 25 milliGRAM(s) Oral two times a day  isosorbide   mononitrate ER Tablet (IMDUR) 90 milliGRAM(s) Oral daily  lisinopril 40 milliGRAM(s) Oral daily        OXcarbazepine 450 milliGRAM(s) Oral two times a day    polyethylene glycol 3350 17 Gram(s) Oral daily  senna 2 Tablet(s) Oral at bedtime  simethicone 80 milliGRAM(s) Chew once          PAST MEDICAL/SURGICAL HISTORY  PAST MEDICAL & SURGICAL HISTORY:  Hypertension    Seizure    Hyperlipidemia    Diverticulosis    Borderline diabetes    Obesity    No Past Surgical History    History of appendectomy        SOCIAL HISTORY: Substance Use (street drugs): ( x ) never used  (  ) other:    FAMILY HISTORY:  Family history of cerebrovascular accident (CVA) (Sibling)        REVIEW OF SYSTEMS:  CONSTITUTIONAL: No fever, weight loss, or fatigue  EYES: No eye pain, visual disturbances, or discharge  ENMT:  No difficulty hearing, tinnitus, vertigo; No sinus or throat pain  BREASTS: No pain, masses, or nipple discharge  GASTROINTESTINAL: No abdominal or epigastric pain. No nausea, vomiting, or hematemesis; No diarrhea or constipation. No melena or hematochezia.  GENITOURINARY: No dysuria, frequency, hematuria, or incontinence  NEUROLOGICAL: No headaches, memory loss, loss of strength, numbness, or tremors  ENDOCRINE: No heat or cold intolerance; No hair loss  MUSCULOSKELETAL: No joint pain or swelling; No muscle, back, or extremity pain  PSYCHIATRIC: No depression, anxiety, mood swings, or difficulty sleeping  HEME/LYMPH: No easy bruising, or bleeding gums  All others negative    PHYSICAL EXAM:  T(C): 36.8 (05-06-21 @ 04:59), Max: 36.9 (05-05-21 @ 21:30)  HR: 65 (05-06-21 @ 06:00) (60 - 90)  BP: 144/62 (05-06-21 @ 06:00) (132/58 - 172/61)  RR: 19 (05-06-21 @ 06:00) (18 - 19)  SpO2: 100% (05-06-21 @ 06:00) (99% - 100%)  Wt(kg): --  I&O's Summary        GENERAL: NAD  EYES: EOMI, PERRLA, conjunctiva and sclera clear  ENMT: No tonsillar erythema, exudates, or enlargement;  Cardiovascular: Normal S1 S2, No JVD, No murmurs, No edema  Respiratory: Lungs clear to auscultation	  Gastrointestinal:  Soft, nontender , + BS	  Extremities: no edema                                    11.6   4.69  )-----------( 278      ( 05 May 2021 07:14 )             34.2     05-06    136  |  99  |  15  ----------------------------<  103<H>  3.9   |  26  |  1.02    Ca    9.2      06 May 2021 07:19  Phos  2.7     05-05  Mg     2.3     05-05      proBNP:   Lipid Profile:   HgA1c:   TSH:     Consultant(s) Notes Reviewed:  [x ] YES  [ ] NO    Care Discussed with Consultants/Other Providers [ x] YES  [ ] NO    Imaging Personally Reviewed independently:  [x] YES  [ ] NO    All labs, radiologic studies, vitals, orders and medications list reviewed. Patient is seen and examined at bedside. Case discussed with medical team.        
Ulises Luevano MD  Interventional Cardiology / Advance Heart Failure and Cardiac Transplant Specialist  [place] Office : [address] [place] [address]  Tel:   [place] Office : [address] [facility] [address] [address]  Tel: [phone]  Cell : [phone]    Subjective/Overnight events: Pt is lying in bed comfortable not in distress, no chest pains no SOB no palpitations  	  MEDICATIONS:  amLODIPine   Tablet 10 milliGRAM(s) Oral daily  enoxaparin Injectable 40 milliGRAM(s) SubCutaneous daily  isosorbide   mononitrate ER Tablet (IMDUR) 90 milliGRAM(s) Oral daily  lisinopril 40 milliGRAM(s) Oral daily        OXcarbazepine 450 milliGRAM(s) Oral two times a day    simethicone 80 milliGRAM(s) Chew once          PAST MEDICAL/SURGICAL HISTORY  PAST MEDICAL & SURGICAL HISTORY:  Hypertension    Seizure    Hyperlipidemia    Diverticulosis    Borderline diabetes    Obesity    No Past Surgical History    History of appendectomy        SOCIAL HISTORY: Substance Use (street drugs): ( x ) never used  (  ) other:    FAMILY HISTORY:  Family history of cerebrovascular accident (CVA) (Sibling)        REVIEW OF SYSTEMS:  CONSTITUTIONAL: No fever, weight loss, or fatigue  EYES: No eye pain, visual disturbances, or discharge  ENMT:  No difficulty hearing, tinnitus, vertigo; No sinus or throat pain  BREASTS: No pain, masses, or nipple discharge  GASTROINTESTINAL: No abdominal or epigastric pain. No nausea, vomiting, or hematemesis; No diarrhea or constipation. No melena or hematochezia.  GENITOURINARY: No dysuria, frequency, hematuria, or incontinence  NEUROLOGICAL: No headaches, memory loss, loss of strength, numbness, or tremors  ENDOCRINE: No heat or cold intolerance; No hair loss  MUSCULOSKELETAL: No joint pain or swelling; No muscle, back, or extremity pain  PSYCHIATRIC: No depression, anxiety, mood swings, or difficulty sleeping  HEME/LYMPH: No easy bruising, or bleeding gums  All others negative    PHYSICAL EXAM:  T(C): 36.7 (05-05-21 @ 09:39), Max: 36.8 (05-05-21 @ 05:00)  HR: 63 (05-05-21 @ 09:39) (59 - 63)  BP: 142/62 (05-05-21 @ 09:39) (142/62 - 183/70)  RR: 20 (05-05-21 @ 09:39) (16 - 20)  SpO2: 100% (05-05-21 @ 09:39) (100% - 100%)  Wt(kg): --  I&O's Summary        GENERAL: NAD  EYES: EOMI, PERRLA, conjunctiva and sclera clear  ENMT: No tonsillar erythema, exudates, or enlargement;  Cardiovascular: Normal S1 S2, No JVD, No murmurs, No edema  Respiratory: Lungs clear to auscultation	  Gastrointestinal:  Soft, nontender , + BS	  Extremities: no edema                                    11.6   4.69  )-----------( 278      ( 05 May 2021 07:14 )             34.2     05-05    134<L>  |  100  |  16  ----------------------------<  113<H>  4.4   |  21<L>  |  0.92    Ca    9.2      05 May 2021 07:14  Phos  2.7     05-05  Mg     2.3     05-05      proBNP:   Lipid Profile:   HgA1c:   TSH:     Consultant(s) Notes Reviewed:  [x ] YES  [ ] NO    Care Discussed with Consultants/Other Providers [ x] YES  [ ] NO    Imaging Personally Reviewed independently:  [x] YES  [ ] NO    All labs, radiologic studies, vitals, orders and medications list reviewed. Patient is seen and examined at bedside. Case discussed with medical team.        
    SUBJECTIVE / OVERNIGHT EVENTS: pt seen and examined      MEDICATIONS  (STANDING):  amLODIPine   Tablet 10 milliGRAM(s) Oral daily  enoxaparin Injectable 40 milliGRAM(s) SubCutaneous daily  isosorbide   mononitrate ER Tablet (IMDUR) 60 milliGRAM(s) Oral daily  lisinopril 40 milliGRAM(s) Oral daily  OXcarbazepine 450 milliGRAM(s) Oral two times a day  simethicone 80 milliGRAM(s) Chew once    MEDICATIONS  (PRN):        CAPILLARY BLOOD GLUCOSE        I&O's Summary      Constitutional: No fever, fatigue  Skin: No rash.  Eyes: No recent vision problems or eye pain.  ENT: No congestion, ear pain, or sore throat.  Cardiovascular: No chest pain or palpation.  Respiratory: No cough, shortness of breath, congestion, or wheezing.  Gastrointestinal: No abdominal pain, nausea, vomiting, or diarrhea.  Genitourinary: No dysuria.  Musculoskeletal: No joint swelling.  Neurologic: No headache.    PHYSICAL EXAM:  GENERAL: NAD  EYES: EOMI, PERRLA  NECK: Supple, No JVD  CHEST/LUNG: dec breath sounds rt base  HEART:  S1 , S2 +  ABDOMEN: soft , bs+  EXTREMITIES:  trace edema  NEUROLOGY:alert awake      LABS:                        10.2   4.20  )-----------( 262      ( 03 May 2021 07:13 )             29.9     05-03    137  |  100  |  19  ----------------------------<  89  3.8   |  26  |  1.00    Ca    9.1      03 May 2021 07:13  Phos  2.7     05-03  Mg     2.3     05-03    TPro  6.5  /  Alb  3.1<L>  /  TBili  0.3  /  DBili  x   /  AST  9   /  ALT  6   /  AlkPhos  106  05-02              RADIOLOGY & ADDITIONAL TESTS:    Imaging Personally Reviewed:    Consultant(s) Notes Reviewed:      Care Discussed with Consultants/Other Providers:  
    SUBJECTIVE / OVERNIGHT EVENTS: pt seen and examined    MEDICATIONS  (STANDING):  amLODIPine   Tablet 10 milliGRAM(s) Oral daily  enoxaparin Injectable 40 milliGRAM(s) SubCutaneous daily  isosorbide   mononitrate ER Tablet (IMDUR) 90 milliGRAM(s) Oral daily  lisinopril 40 milliGRAM(s) Oral daily  OXcarbazepine 450 milliGRAM(s) Oral two times a day  simethicone 80 milliGRAM(s) Chew once    MEDICATIONS  (PRN):    Vital Signs Last 24 Hrs  T(C): 36.7 (04 May 2021 21:32), Max: 36.7 (04 May 2021 01:25)  T(F): 98.1 (04 May 2021 21:32), Max: 98.1 (04 May 2021 12:27)  HR: 61 (04 May 2021 21:32) (58 - 62)  BP: 148/58 (04 May 2021 21:32) (148/58 - 177/83)  BP(mean): --  RR: 16 (04 May 2021 21:32) (16 - 18)  SpO2: 100% (04 May 2021 21:32) (100% - 100%)    Constitutional: No fever, fatigue  Skin: No rash.  Eyes: No recent vision problems or eye pain.  ENT: No congestion, ear pain, or sore throat.  Cardiovascular: No chest pain or palpation.  Respiratory: No cough, shortness of breath, congestion, or wheezing.  Gastrointestinal: No abdominal pain, nausea, vomiting, or diarrhea.  Genitourinary: No dysuria.  Musculoskeletal: No joint swelling.  Neurologic: No headache.    PHYSICAL EXAM:  GENERAL: NAD  EYES: EOMI, PERRLA  NECK: Supple, No JVD  CHEST/LUNG: dec breath sounds rt base  HEART:  S1 , S2 +  ABDOMEN: soft , bs+  EXTREMITIES:  trace edema  NEUROLOGY:alert awake    LABS:  05-04    134<L>  |  99  |  20  ----------------------------<  98  3.8   |  27  |  1.03    Ca    9.0      04 May 2021 06:43  Phos  2.9     05-04  Mg     2.3     05-04      Creatinine Trend: 1.03 <--, 1.00 <--, 1.06 <--, 1.17 <--                        10.3   4.24  )-----------( 259      ( 04 May 2021 06:43 )             30.4     Urine Studies:                    Consultant(s) Notes Reviewed:      Care Discussed with Consultants/Other Providers:  
    SUBJECTIVE / OVERNIGHT EVENTS: pt seen and examined    MEDICATIONS  (STANDING):  amLODIPine   Tablet 10 milliGRAM(s) Oral daily  enoxaparin Injectable 40 milliGRAM(s) SubCutaneous daily  isosorbide   mononitrate ER Tablet (IMDUR) 90 milliGRAM(s) Oral daily  lisinopril 40 milliGRAM(s) Oral daily  OXcarbazepine 450 milliGRAM(s) Oral two times a day  polyethylene glycol 3350 17 Gram(s) Oral daily  senna 2 Tablet(s) Oral at bedtime  simethicone 80 milliGRAM(s) Chew once    MEDICATIONS  (PRN):    Vital Signs Last 24 Hrs  T(C): 36.8 (05 May 2021 12:18), Max: 36.8 (05 May 2021 05:00)  T(F): 98.3 (05 May 2021 12:18), Max: 98.3 (05 May 2021 05:00)  HR: 90 (05 May 2021 12:18) (59 - 90)  BP: 149/57 (05 May 2021 12:18) (142/62 - 183/70)  BP(mean): --  RR: 18 (05 May 2021 12:18) (16 - 20)  SpO2: 99% (05 May 2021 12:18) (99% - 100%)    Constitutional: No fever, fatigue  Skin: No rash.  Eyes: No recent vision problems or eye pain.  ENT: No congestion, ear pain, or sore throat.  Cardiovascular: No chest pain or palpation.  Respiratory: No cough, shortness of breath, congestion, or wheezing.  Gastrointestinal: No abdominal pain, nausea, vomiting, or diarrhea.  Genitourinary: No dysuria.  Musculoskeletal: No joint swelling.  Neurologic: No headache.    PHYSICAL EXAM:  GENERAL: NAD  EYES: EOMI, PERRLA  NECK: Supple, No JVD  CHEST/LUNG: dec breath sounds rt base  HEART:  S1 , S2 +  ABDOMEN: soft , bs+  EXTREMITIES:  trace edema  NEUROLOGY:alert awake    LABS:  05-05    134<L>  |  100  |  16  ----------------------------<  113<H>  4.4   |  21<L>  |  0.92    Ca    9.2      05 May 2021 07:14  Phos  2.7     05-05  Mg     2.3     05-05      Creatinine Trend: 0.92 <--, 1.03 <--, 1.00 <--, 1.06 <--, 1.17 <--                        11.6   4.69  )-----------( 278      ( 05 May 2021 07:14 )             34.2     Urine Studies:                                Consultant(s) Notes Reviewed:      Care Discussed with Consultants/Other Providers:

## 2021-05-06 NOTE — PROGRESS NOTE ADULT - ASSESSMENT
Assessment and Plan    74 yo woman with PMH of HTN, HLD, seizures, T2DM presents with mechanical fall after possible syncopal event.     EKG: Sinus Kaz 53 no STT changes  Tele: SB 50-60s    1. Syncope  orthostatic negative this admission (previous admission with positive orthostatics)  - s/p C previous admission with non obstructive CAD  -echo from 04/2021 with grossly normal LV function and mild diastolic dysfunction  -ILR interrogation with no events     2. HTN  -improving  - c/w norvasc 10mg daily, and lisinopril, imdur 90mg daily  - continue to monitor BP    3. Seizures  -on trileptal

## 2021-05-06 NOTE — DISCHARGE NOTE PROVIDER - HOSPITAL COURSE
74 yo woman with PMH of HTN, HLD, seizures, T2DM presents with mechanical fall after possible syncopal event 2/2 orthostatic hypotension vs vasovagal vs cardiogenic      Problem/Plan - 1:  ·  Problem: Syncope and collapse.  Plan: - orthostatic negative this admission (previous admission with positive orthostatics)  - s/p [facility] previous admission with non obstructive CAD  -echo from 04/2021 with grossly normal LV function and mild diastolic dysfunction  -ILR interrogation with no events.      Problem/Plan - 2:  ·  Problem: Hypertension.  Plan: - c/w home amlodipine/lisinopril.      Problem/Plan - 3:  ·  Problem: Seizure.  Plan: - pt with last seizure 3 years prior per family, c/w home oxycarbamazepine.      Problem/Plan - 4:  ·  Problem: Anemia.  Plan: - microcytic, no melena BRBPR per pt, pt has had normal colonoscopy per family.      Problem/Plan - 5:  ·  Problem: Type 2 diabetes mellitus.  Plan: - A1c 6.8 last admission at goal for pt, iss.     stable for d/c to rehab today.

## 2021-05-06 NOTE — PROGRESS NOTE ADULT - ATTENDING COMMENTS
pt seen and examined agree with plan above
pt seen and examined agree with plan above
pt seen and examined, agree with plan above

## 2021-05-06 NOTE — DISCHARGE NOTE PROVIDER - NSDCCPCAREPLAN_GEN_ALL_CORE_FT
PRINCIPAL DISCHARGE DIAGNOSIS  Diagnosis: Syncope and collapse  Assessment and Plan of Treatment: cardiac and neuro work up negative  continue present medications with better blood pressure control  f/u with pmd in 1 week      SECONDARY DISCHARGE DIAGNOSES  Diagnosis: Seizure  Assessment and Plan of Treatment: CONTINUE PRESENT MEDICATIONS    Diagnosis: Type 2 diabetes mellitus  Assessment and Plan of Treatment: Monitor finger sticks pre-meal and bedtime, low salt, fat and carbohydrate diet, minimize glucose intake.  Exercise daily for at least 30 minutes and weight loss.  Follow up with primary care physician and endocrinologist for routine Hemoglobin A1C checks and management.  Follow up with your ophthalmologist for routine yearly vision exams.      Diagnosis: Hypertension  Assessment and Plan of Treatment: Low sodium and fat diet, continue anti-hypertensive medications, and follow up with primary care physician.

## 2021-05-06 NOTE — DISCHARGE NOTE PROVIDER - NSDCMRMEDTOKEN_GEN_ALL_CORE_FT
amLODIPine 10 mg oral tablet: 1 tab(s) orally once a day  hydrALAZINE 25 mg oral tablet: 1 tab(s) orally 2 times a day  isosorbide mononitrate 30 mg oral tablet, extended release: 3 tab(s) orally once a day  lisinopril 40 mg oral tablet: 1 tab(s) orally once a day  meclizine 25 mg oral tablet: 1 tab(s) orally 3 times a day, As needed, Dizziness  OXcarbazepine 150 mg oral tablet: 3 tab(s) orally 2 times a day

## 2021-05-06 NOTE — DISCHARGE NOTE NURSING/CASE MANAGEMENT/SOCIAL WORK - NSDCCRNAME_GEN_ALL_CORE_FT
[facility] address: [address] [place] [address], 1pm  via [employer]  pvt pay $63 Dtr Deanna moreira & in agreement

## 2021-05-26 ENCOUNTER — INPATIENT (INPATIENT)
Facility: HOSPITAL | Age: 76
LOS: 12 days | Discharge: NURSING, REHAB OR LONG-TERM CARE | DRG: 312 | End: 2021-06-08
Attending: INTERNAL MEDICINE | Admitting: INTERNAL MEDICINE
Payer: MEDICARE

## 2021-05-26 ENCOUNTER — NON-APPOINTMENT (OUTPATIENT)
Age: 76
End: 2021-05-26

## 2021-05-26 VITALS
TEMPERATURE: 98 F | WEIGHT: 190.04 LBS | RESPIRATION RATE: 16 BRPM | SYSTOLIC BLOOD PRESSURE: 126 MMHG | HEART RATE: 78 BPM | OXYGEN SATURATION: 100 % | DIASTOLIC BLOOD PRESSURE: 74 MMHG | HEIGHT: 65 IN

## 2021-05-26 DIAGNOSIS — Z29.9 ENCOUNTER FOR PROPHYLACTIC MEASURES, UNSPECIFIED: ICD-10-CM

## 2021-05-26 DIAGNOSIS — N30.90 CYSTITIS, UNSPECIFIED WITHOUT HEMATURIA: ICD-10-CM

## 2021-05-26 DIAGNOSIS — B35.4 TINEA CORPORIS: ICD-10-CM

## 2021-05-26 DIAGNOSIS — F03.90 UNSPECIFIED DEMENTIA WITHOUT BEHAVIORAL DISTURBANCE: ICD-10-CM

## 2021-05-26 DIAGNOSIS — I10 ESSENTIAL (PRIMARY) HYPERTENSION: ICD-10-CM

## 2021-05-26 DIAGNOSIS — Z98.89 OTHER SPECIFIED POSTPROCEDURAL STATES: Chronic | ICD-10-CM

## 2021-05-26 DIAGNOSIS — R55 SYNCOPE AND COLLAPSE: ICD-10-CM

## 2021-05-26 DIAGNOSIS — Z02.9 ENCOUNTER FOR ADMINISTRATIVE EXAMINATIONS, UNSPECIFIED: ICD-10-CM

## 2021-05-26 DIAGNOSIS — R73.03 PREDIABETES: ICD-10-CM

## 2021-05-26 DIAGNOSIS — G40.909 EPILEPSY, UNSPECIFIED, NOT INTRACTABLE, WITHOUT STATUS EPILEPTICUS: ICD-10-CM

## 2021-05-26 LAB
ALBUMIN SERPL ELPH-MCNC: 3.9 G/DL — SIGNIFICANT CHANGE UP (ref 3.3–5)
ALP SERPL-CCNC: 136 U/L — HIGH (ref 40–120)
ALT FLD-CCNC: 8 U/L — LOW (ref 10–45)
ANION GAP SERPL CALC-SCNC: 14 MMOL/L — SIGNIFICANT CHANGE UP (ref 5–17)
ANISOCYTOSIS BLD QL: SLIGHT — SIGNIFICANT CHANGE UP
APPEARANCE UR: CLEAR — SIGNIFICANT CHANGE UP
APTT BLD: 32.1 SEC — SIGNIFICANT CHANGE UP (ref 27.5–35.5)
AST SERPL-CCNC: 12 U/L — SIGNIFICANT CHANGE UP (ref 10–40)
BACTERIA # UR AUTO: NEGATIVE — SIGNIFICANT CHANGE UP
BASE EXCESS BLDV CALC-SCNC: 4.1 MMOL/L — HIGH (ref -2–2)
BASOPHILS # BLD AUTO: 0.05 K/UL — SIGNIFICANT CHANGE UP (ref 0–0.2)
BASOPHILS NFR BLD AUTO: 0.9 % — SIGNIFICANT CHANGE UP (ref 0–2)
BILIRUB SERPL-MCNC: 0.3 MG/DL — SIGNIFICANT CHANGE UP (ref 0.2–1.2)
BILIRUB UR-MCNC: NEGATIVE — SIGNIFICANT CHANGE UP
BUN SERPL-MCNC: 28 MG/DL — HIGH (ref 7–23)
CA-I SERPL-SCNC: 1.15 MMOL/L — SIGNIFICANT CHANGE UP (ref 1.12–1.3)
CALCIUM SERPL-MCNC: 9.2 MG/DL — SIGNIFICANT CHANGE UP (ref 8.4–10.5)
CHLORIDE BLDV-SCNC: 100 MMOL/L — SIGNIFICANT CHANGE UP (ref 96–108)
CHLORIDE SERPL-SCNC: 99 MMOL/L — SIGNIFICANT CHANGE UP (ref 96–108)
CO2 BLDV-SCNC: 31 MMOL/L — HIGH (ref 22–30)
CO2 SERPL-SCNC: 23 MMOL/L — SIGNIFICANT CHANGE UP (ref 22–31)
COLOR SPEC: SIGNIFICANT CHANGE UP
CREAT SERPL-MCNC: 1.28 MG/DL — SIGNIFICANT CHANGE UP (ref 0.5–1.3)
DACRYOCYTES BLD QL SMEAR: SLIGHT — SIGNIFICANT CHANGE UP
DIFF PNL FLD: NEGATIVE — SIGNIFICANT CHANGE UP
EOSINOPHIL # BLD AUTO: 0 K/UL — SIGNIFICANT CHANGE UP (ref 0–0.5)
EOSINOPHIL NFR BLD AUTO: 0 % — SIGNIFICANT CHANGE UP (ref 0–6)
EPI CELLS # UR: 0 /HPF — SIGNIFICANT CHANGE UP
GAS PNL BLDV: 135 MMOL/L — SIGNIFICANT CHANGE UP (ref 135–145)
GAS PNL BLDV: SIGNIFICANT CHANGE UP
GAS PNL BLDV: SIGNIFICANT CHANGE UP
GLUCOSE BLDC GLUCOMTR-MCNC: 126 MG/DL — HIGH (ref 70–99)
GLUCOSE BLDV-MCNC: 133 MG/DL — HIGH (ref 70–99)
GLUCOSE SERPL-MCNC: 126 MG/DL — HIGH (ref 70–99)
GLUCOSE UR QL: NEGATIVE — SIGNIFICANT CHANGE UP
HCO3 BLDV-SCNC: 29 MMOL/L — SIGNIFICANT CHANGE UP (ref 21–29)
HCT VFR BLD CALC: 32 % — LOW (ref 34.5–45)
HCT VFR BLDA CALC: 35 % — LOW (ref 39–50)
HGB BLD CALC-MCNC: 11.3 G/DL — LOW (ref 11.5–15.5)
HGB BLD-MCNC: 10.9 G/DL — LOW (ref 11.5–15.5)
HYALINE CASTS # UR AUTO: 2 /LPF — SIGNIFICANT CHANGE UP (ref 0–2)
INR BLD: 1.07 RATIO — SIGNIFICANT CHANGE UP (ref 0.88–1.16)
KETONES UR-MCNC: NEGATIVE — SIGNIFICANT CHANGE UP
LACTATE BLDV-MCNC: 1.5 MMOL/L — SIGNIFICANT CHANGE UP (ref 0.7–2)
LEUKOCYTE ESTERASE UR-ACNC: ABNORMAL
LYMPHOCYTES # BLD AUTO: 0.77 K/UL — LOW (ref 1–3.3)
LYMPHOCYTES # BLD AUTO: 13.5 % — SIGNIFICANT CHANGE UP (ref 13–44)
MAGNESIUM SERPL-MCNC: 2.5 MG/DL — SIGNIFICANT CHANGE UP (ref 1.6–2.6)
MCHC RBC-ENTMCNC: 23.4 PG — LOW (ref 27–34)
MCHC RBC-ENTMCNC: 34.1 GM/DL — SIGNIFICANT CHANGE UP (ref 32–36)
MCV RBC AUTO: 68.7 FL — LOW (ref 80–100)
MICROCYTES BLD QL: SLIGHT — SIGNIFICANT CHANGE UP
MONOCYTES # BLD AUTO: 0.36 K/UL — SIGNIFICANT CHANGE UP (ref 0–0.9)
MONOCYTES NFR BLD AUTO: 6.3 % — SIGNIFICANT CHANGE UP (ref 2–14)
NEUTROPHILS # BLD AUTO: 4.52 K/UL — SIGNIFICANT CHANGE UP (ref 1.8–7.4)
NEUTROPHILS NFR BLD AUTO: 78.4 % — HIGH (ref 43–77)
NEUTS BAND # BLD: 0.9 % — SIGNIFICANT CHANGE UP (ref 0–8)
NITRITE UR-MCNC: NEGATIVE — SIGNIFICANT CHANGE UP
NT-PROBNP SERPL-SCNC: 348 PG/ML — HIGH (ref 0–300)
PCO2 BLDV: 50 MMHG — SIGNIFICANT CHANGE UP (ref 35–50)
PH BLDV: 7.39 — SIGNIFICANT CHANGE UP (ref 7.35–7.45)
PH UR: 6.5 — SIGNIFICANT CHANGE UP (ref 5–8)
PHOSPHATE SERPL-MCNC: 3.5 MG/DL — SIGNIFICANT CHANGE UP (ref 2.5–4.5)
PLAT MORPH BLD: NORMAL — SIGNIFICANT CHANGE UP
PLATELET # BLD AUTO: 279 K/UL — SIGNIFICANT CHANGE UP (ref 150–400)
PO2 BLDV: 22 MMHG — LOW (ref 25–45)
POIKILOCYTOSIS BLD QL AUTO: SLIGHT — SIGNIFICANT CHANGE UP
POLYCHROMASIA BLD QL SMEAR: SLIGHT — SIGNIFICANT CHANGE UP
POTASSIUM BLDV-SCNC: 3.8 MMOL/L — SIGNIFICANT CHANGE UP (ref 3.5–5.3)
POTASSIUM SERPL-MCNC: 3.8 MMOL/L — SIGNIFICANT CHANGE UP (ref 3.5–5.3)
POTASSIUM SERPL-SCNC: 3.8 MMOL/L — SIGNIFICANT CHANGE UP (ref 3.5–5.3)
PROT SERPL-MCNC: 7.4 G/DL — SIGNIFICANT CHANGE UP (ref 6–8.3)
PROT UR-MCNC: SIGNIFICANT CHANGE UP
PROTHROM AB SERPL-ACNC: 12.8 SEC — SIGNIFICANT CHANGE UP (ref 10.6–13.6)
RBC # BLD: 4.66 M/UL — SIGNIFICANT CHANGE UP (ref 3.8–5.2)
RBC # FLD: 17.3 % — HIGH (ref 10.3–14.5)
RBC BLD AUTO: ABNORMAL
RBC CASTS # UR COMP ASSIST: 6 /HPF — HIGH (ref 0–4)
SAO2 % BLDV: 32 % — LOW (ref 67–88)
SARS-COV-2 RNA SPEC QL NAA+PROBE: SIGNIFICANT CHANGE UP
SODIUM SERPL-SCNC: 136 MMOL/L — SIGNIFICANT CHANGE UP (ref 135–145)
SP GR SPEC: 1.01 — SIGNIFICANT CHANGE UP (ref 1.01–1.02)
TROPONIN T, HIGH SENSITIVITY RESULT: 12 NG/L — SIGNIFICANT CHANGE UP (ref 0–51)
TROPONIN T, HIGH SENSITIVITY RESULT: 15 NG/L — SIGNIFICANT CHANGE UP (ref 0–51)
UROBILINOGEN FLD QL: NEGATIVE — SIGNIFICANT CHANGE UP
WBC # BLD: 5.7 K/UL — SIGNIFICANT CHANGE UP (ref 3.8–10.5)
WBC # FLD AUTO: 5.7 K/UL — SIGNIFICANT CHANGE UP (ref 3.8–10.5)
WBC UR QL: 97 /HPF — HIGH (ref 0–5)

## 2021-05-26 PROCEDURE — 93010 ELECTROCARDIOGRAM REPORT: CPT

## 2021-05-26 PROCEDURE — 99285 EMERGENCY DEPT VISIT HI MDM: CPT

## 2021-05-26 PROCEDURE — 99223 1ST HOSP IP/OBS HIGH 75: CPT

## 2021-05-26 PROCEDURE — 71045 X-RAY EXAM CHEST 1 VIEW: CPT | Mod: 26

## 2021-05-26 PROCEDURE — 70450 CT HEAD/BRAIN W/O DYE: CPT | Mod: 26,MA

## 2021-05-26 RX ORDER — ATORVASTATIN CALCIUM 80 MG/1
1 TABLET, FILM COATED ORAL
Qty: 0 | Refills: 0 | DISCHARGE

## 2021-05-26 RX ORDER — HEPARIN SODIUM 5000 [USP'U]/ML
5000 INJECTION INTRAVENOUS; SUBCUTANEOUS EVERY 8 HOURS
Refills: 0 | Status: DISCONTINUED | OUTPATIENT
Start: 2021-05-26 | End: 2021-06-08

## 2021-05-26 RX ORDER — GLUCAGON INJECTION, SOLUTION 0.5 MG/.1ML
1 INJECTION, SOLUTION SUBCUTANEOUS ONCE
Refills: 0 | Status: DISCONTINUED | OUTPATIENT
Start: 2021-05-26 | End: 2021-06-08

## 2021-05-26 RX ORDER — CEFTRIAXONE 500 MG/1
1000 INJECTION, POWDER, FOR SOLUTION INTRAMUSCULAR; INTRAVENOUS EVERY 24 HOURS
Refills: 0 | Status: COMPLETED | OUTPATIENT
Start: 2021-05-27 | End: 2021-05-29

## 2021-05-26 RX ORDER — SODIUM CHLORIDE 9 MG/ML
1000 INJECTION, SOLUTION INTRAVENOUS
Refills: 0 | Status: DISCONTINUED | OUTPATIENT
Start: 2021-05-26 | End: 2021-06-08

## 2021-05-26 RX ORDER — ISOSORBIDE MONONITRATE 60 MG/1
90 TABLET, EXTENDED RELEASE ORAL DAILY
Refills: 0 | Status: DISCONTINUED | OUTPATIENT
Start: 2021-05-27 | End: 2021-06-08

## 2021-05-26 RX ORDER — INSULIN LISPRO 100/ML
VIAL (ML) SUBCUTANEOUS AT BEDTIME
Refills: 0 | Status: DISCONTINUED | OUTPATIENT
Start: 2021-05-26 | End: 2021-06-08

## 2021-05-26 RX ORDER — DEXTROSE 50 % IN WATER 50 %
25 SYRINGE (ML) INTRAVENOUS ONCE
Refills: 0 | Status: DISCONTINUED | OUTPATIENT
Start: 2021-05-26 | End: 2021-06-08

## 2021-05-26 RX ORDER — SENNA PLUS 8.6 MG/1
2 TABLET ORAL AT BEDTIME
Refills: 0 | Status: DISCONTINUED | OUTPATIENT
Start: 2021-05-26 | End: 2021-06-08

## 2021-05-26 RX ORDER — OXCARBAZEPINE 300 MG/1
450 TABLET, FILM COATED ORAL
Refills: 0 | Status: DISCONTINUED | OUTPATIENT
Start: 2021-05-26 | End: 2021-05-30

## 2021-05-26 RX ORDER — ATORVASTATIN CALCIUM 80 MG/1
10 TABLET, FILM COATED ORAL AT BEDTIME
Refills: 0 | Status: DISCONTINUED | OUTPATIENT
Start: 2021-05-26 | End: 2021-06-08

## 2021-05-26 RX ORDER — DOCUSATE SODIUM 100 MG
2 CAPSULE ORAL
Qty: 0 | Refills: 0 | DISCHARGE

## 2021-05-26 RX ORDER — DEXTROSE 50 % IN WATER 50 %
12.5 SYRINGE (ML) INTRAVENOUS ONCE
Refills: 0 | Status: DISCONTINUED | OUTPATIENT
Start: 2021-05-26 | End: 2021-06-08

## 2021-05-26 RX ORDER — CHOLECALCIFEROL (VITAMIN D3) 125 MCG
1 CAPSULE ORAL
Qty: 0 | Refills: 0 | DISCHARGE

## 2021-05-26 RX ORDER — CEFTRIAXONE 500 MG/1
1000 INJECTION, POWDER, FOR SOLUTION INTRAMUSCULAR; INTRAVENOUS ONCE
Refills: 0 | Status: COMPLETED | OUTPATIENT
Start: 2021-05-26 | End: 2021-05-26

## 2021-05-26 RX ORDER — POLYETHYLENE GLYCOL 3350 17 G/17G
17 POWDER, FOR SOLUTION ORAL
Qty: 0 | Refills: 0 | DISCHARGE

## 2021-05-26 RX ORDER — DEXTROSE 50 % IN WATER 50 %
15 SYRINGE (ML) INTRAVENOUS ONCE
Refills: 0 | Status: DISCONTINUED | OUTPATIENT
Start: 2021-05-26 | End: 2021-06-08

## 2021-05-26 RX ORDER — LIDOCAINE 4 G/100G
1 CREAM TOPICAL
Qty: 0 | Refills: 0 | DISCHARGE

## 2021-05-26 RX ORDER — HYDRALAZINE HCL 50 MG
25 TABLET ORAL EVERY 8 HOURS
Refills: 0 | Status: DISCONTINUED | OUTPATIENT
Start: 2021-05-26 | End: 2021-06-08

## 2021-05-26 RX ORDER — CEFTRIAXONE 500 MG/1
INJECTION, POWDER, FOR SOLUTION INTRAMUSCULAR; INTRAVENOUS
Refills: 0 | Status: COMPLETED | OUTPATIENT
Start: 2021-05-26 | End: 2021-05-30

## 2021-05-26 RX ORDER — AMLODIPINE BESYLATE 2.5 MG/1
10 TABLET ORAL DAILY
Refills: 0 | Status: DISCONTINUED | OUTPATIENT
Start: 2021-05-27 | End: 2021-06-08

## 2021-05-26 RX ORDER — INSULIN LISPRO 100/ML
VIAL (ML) SUBCUTANEOUS
Refills: 0 | Status: DISCONTINUED | OUTPATIENT
Start: 2021-05-26 | End: 2021-06-08

## 2021-05-26 RX ORDER — NYSTATIN CREAM 100000 [USP'U]/G
1 CREAM TOPICAL
Refills: 0 | Status: DISCONTINUED | OUTPATIENT
Start: 2021-05-26 | End: 2021-06-08

## 2021-05-26 RX ORDER — SENNA PLUS 8.6 MG/1
2 TABLET ORAL
Qty: 0 | Refills: 0 | DISCHARGE

## 2021-05-26 RX ADMIN — Medication 25 MILLIGRAM(S): at 20:22

## 2021-05-26 RX ADMIN — SENNA PLUS 2 TABLET(S): 8.6 TABLET ORAL at 22:10

## 2021-05-26 RX ADMIN — CEFTRIAXONE 100 MILLIGRAM(S): 500 INJECTION, POWDER, FOR SOLUTION INTRAMUSCULAR; INTRAVENOUS at 18:51

## 2021-05-26 RX ADMIN — ATORVASTATIN CALCIUM 10 MILLIGRAM(S): 80 TABLET, FILM COATED ORAL at 22:10

## 2021-05-26 RX ADMIN — HEPARIN SODIUM 5000 UNIT(S): 5000 INJECTION INTRAVENOUS; SUBCUTANEOUS at 22:10

## 2021-05-26 NOTE — H&P ADULT - NSHPPHYSICALEXAM_GEN_ALL_CORE
Physical exam with an elderly, chronically ill appearing, nontoxic F.    Afebrile.   HR  81   RR 14  BP  126/74 - 183/83  97% on RA    HEENT< PERRL< EOMI  Neck supple  No thyromegaly  Chest clear  Cor S1 s2  Breast exam (with patient's female primary RN in attendance)  NO dominant masses or axillary adenopathy.  + tinea mild B/L inframammary areas  Abdomen soft nontender, normal bowel sounds, nontender, nondistended.  Ext poor nail hygiene, no ulcers  Neurologic Alert, oriented to self, responds ([facility]).  Poor short term recall.  NO need for prompting.  UE/LE 5/5.  NO SI/HI.

## 2021-05-26 NOTE — H&P ADULT - HISTORY OF PRESENT ILLNESS
NIGHT HOSPITALIST:   Patient UNKNOWN to me previously, assigned to me at this point via the ER and by Dr. Magallanes to admit this 75 y/o F--history from patient not reliable and obtained from prior admission records at [facility] and ED records--patient with a history of moderate cognitive impairment, seizure disorder, essential HTN and undifferentiated orthostatic hypotension, with apparently a nondiagnostic cardiac and neurologic workup at [facility], recent implanted loop recorder placed, with patient sent from [facility] following episodes of dizziness and near syncope last 3 days. NIGHT HOSPITALIST:   Patient UNKNOWN to me previously, assigned to me at this point via the ER and by Dr. Magallanes to admit this 77 y/o F--history from patient not reliable and obtained from prior admission records at [facility] and ED records--patient with a history of moderate cognitive impairment, seizure disorder, essential HTN and undifferentiated orthostatic hypotension, with apparently a nondiagnostic cardiac and neurologic workup at [facility], recent implanted loop recorder placed, with patient sent [facility] following episodes of dizziness and near syncope last 3 days.  Daughter presently not in attendance (present earlier in ED with staff) and examiner left general message with daughter. NIGHT HOSPITALIST:   Patient UNKNOWN to me previously, assigned to me at this point via the ER and by Dr. Magallanes to admit this 77 y/o F--history from patient not reliable and obtained from prior admission records at [facility] and ED records--patient with a history of moderate cognitive impairment, reported prediabetes, seizure disorder, essential HTN and undifferentiated orthostatic hypotension, with apparently a nondiagnostic cardiac and neurologic workup at [facility], recent implanted loop recorder placed, with patient sent from [facility] following episodes of dizziness and near syncope last 3 days.  Daughter presently not in attendance (present earlier in ED with staff) and examiner left general message with daughter.

## 2021-05-26 NOTE — H&P ADULT - PROBLEM SELECTOR PLAN 1
See above.   Admitted to telemetry.   Orthostatics.   Would consider formal evaluation by cardiology and neurology in the AM.

## 2021-05-26 NOTE — ED ADULT NURSE NOTE - OBJECTIVE STATEMENT
77 yo female with a PMH of seizures, HLD, diverticulosis, borderline DM, obesity, HTN presents to the ED via EMS from nursing home complaining of syncope. Patient is a poor historian at this time and is unable to provide much history, unsure if this is baseline for patient as EMS is unable to provide any further info (A&Ox1-2, unk of year and confusion to situation). As per EMS, patient has been "passing out all week." Patient was recently seen here and had a loop recorder placed--as per EMS, states that nursing home has been receiving multiple phone calls for abnormal loop recorder readings as well. EMS states that episodes have been unwitnessed and nursing home did not know. Awaiting to be evaluated by ED attending at this time, patient has no complaints. As per patient, denies head injury or trauma. Denies headache, dizziness, vision changes, chest pain, shortness of breath, abdominal pain, nausea, vomiting, diarrhea, fevers, chills, dysuria, hematuria, recent illness travel.

## 2021-05-26 NOTE — ED PROVIDER NOTE - QRS
Unable to give report at this time. Pt just brought to the floor needs attention; nurses are unavailable at this time.      104

## 2021-05-26 NOTE — H&P ADULT - REASON FOR ADMISSION
Referred from [facility] following episodes of dizziness and near syncope last 3 days.

## 2021-05-26 NOTE — ED PROVIDER NOTE - OBJECTIVE STATEMENT
76 year old fm with pmhx of DM, HTH, orthostatic Hypotension, anemia, Seizures presents to the ED after being sent in from [facility] facility for 3 days of increased episodes of syncope over the last 3 days. patient a/ox1 as per daughter Deanna via phone she reports that patient has episodes of confusion and reports being seen multiple times in [facility] ED and [facility] ED for similar episodes. Patient was given a Halter monitor 2x weeks ago for episodes of syncope. Patient denies chest pain, Shortness of Breath, abdominal pain,  falls, fevers, pain. 76 year old fm with pmhx of DM, HTH, orthostatic Hypotension, anemia, Seizures presents to the ED after being sent in from [facility] facility for 3 days of increased episodes of syncope over the last 3 days. patient a/ox1 as per daughter Deanna via phone she reports that patient has episodes of confusion and reports being seen multiple times in [facility] ED and [facility] ED for similar episodes. Patient was given a Loop recorder 2x weeks ago for episodes of syncope. Patient denies chest pain, Shortness of Breath, abdominal pain,  falls, fevers, pain. 76 year old fm with pmhx of DM, HTH, orthostatic Hypotension, anemia, Seizures presents to the ED after being sent in from [facility] facility for 3 days of increased episodes of syncope over the last 3 days. patient a/ox1 as per daughter Deanna via phone she reports that patient has episodes of confusion and reports being seen multiple times in [facility] ED and [facility] ED for similar episodes. Patient was given a Loop recorder 2x weeks ago for episodes of syncope. Patient denies chest pain, Shortness of Breath, abdominal pain,  falls, fevers, pain.      Attn - pt seen in OR62 -  pt not able to give reliable hx.  pt not endorsing any complaints on ROS.  pt BIB EMS from NH/assisted living for multiple syncopal events.  pt evaluated at [facility] for same. pt also has hx of seizures and hx of Dementia.

## 2021-05-26 NOTE — ED ADULT NURSE REASSESSMENT NOTE - NS ED NURSE REASSESS COMMENT FT1
Pt AAOx2. VSS. Right 20g IVL site patent and wdl. No c/o pain or discomfort. Call bell at the bedside. Pending bed placement.

## 2021-05-26 NOTE — H&P ADULT - PROBLEM SELECTOR PLAN 9
See above.  No breast mass appreciated on exam.   Mycostatin powder for the intertriginous areas of the breasts.

## 2021-05-26 NOTE — H&P ADULT - PROBLEM SELECTOR PLAN 2
See above.  On Trileptal.  Seizure precautions.   Would consider formal evaluation by her neurologist in the AM.

## 2021-05-26 NOTE — ED ADULT NURSE REASSESSMENT NOTE - NS ED NURSE REASSESS COMMENT FT1
Pt placed on bed pan and able to urinate. Pt able to eat as per BISHNU Richter pt given turkey sandwich, daughter Deanna at bedside

## 2021-05-26 NOTE — H&P ADULT - NSHPLABSRESULTS_GEN_ALL_CORE
WBC 5.7    78% N.   0.9% BANDS    Hgb 10.9    Platelets of 279K.    Cr 1.2 (consistent with chronic kidney disease stage 3).    Random glucose of 126    K+ 3.8    HS troponin 15.    .    Echo from April 2021 with moderate AR, mild diastolic dysfunction.    CTT head with no mass/bleed.  Chronic lacunar infarct, B/L BG.    UA with LARGE leukocyte esterase, WBC 97.    COVID-19 PCR>>negative.    Chest radiograph with loop recorder, CM.  NO infiltrate or effusion.    EKG tracing with sinus bradycardia 57 nonspecific T changes.

## 2021-05-26 NOTE — H&P ADULT - ASSESSMENT
NIGHT HOSPITALIST:   Admission for near syncopal episodes in the setting of patient with past hospitalizations for same in the setting of patient with  NIGHT HOSPITALIST:   Admission for near syncopal episodes in the setting of patient with past hospitalizations for same in the setting of patient with a complex medical history of moderate cognitive impairment, cerebrovascular disease, reported prediabetes, essential HTN, undifferentiated orthostatic hypotension, seizure disorder, loop recorder implant with recent hospitalization for syncope with an apparent cardiac and neurologic workup.   Admitted to telemetry and will assume cardiac equivalent for now.  Will temporarily HOLD the ACE given patient's CKD3-patient still with labile BP on multiple medications--will follow orthostatics.    Would consider formal evaluation by her cardiologist and neurologist in the AM.    Patient with mild confusion by history with BANDEMIA and evidence of cystitis>>will treat with IV Rocephin.  Blood and urine cultures ordered. NIGHT HOSPITALIST:   Admission for near syncopal episodes in the setting of patient with past hospitalizations for same in the setting of patient with a complex medical history of moderate cognitive impairment, cerebrovascular disease, reported prediabetes, essential HTN, undifferentiated orthostatic hypotension, seizure disorder, loop recorder implant with recent hospitalization for syncope with an apparent cardiac and neurologic workup.   Admitted to telemetry and will assume cardiac equivalent for now.  Will temporarily HOLD the ACE given patient's CKD3-patient still with labile BP on multiple medications--will follow orthostatics.    Would consider formal evaluation by her cardiologist and neurologist in the AM.    Patient with mild confusion by history with BANDEMIA and evidence of cystitis>>will treat with IV Rocephin.  Blood and urine cultures ordered.    Daughter aware of admission and agrees with plan/care as above.  Given patient's comorbidities, patient's long term prognosis is guarded.  Emotional support provided to patient.   Care reviewed with covering NP/PA for endorsement to Dr. Magallanes.    George Sousa MD  [phone] NIGHT HOSPITALIST:   Admission for near syncopal episodes in the setting of patient with past hospitalizations for same in the setting of patient with a complex medical history of moderate cognitive impairment, cerebrovascular disease, reported prediabetes, essential HTN, undifferentiated orthostatic hypotension, seizure disorder, loop recorder implant with recent hospitalization for syncope with an apparent cardiac and neurologic workup.   Admitted to telemetry and will assume cardiac equivalent for now.  Will temporarily HOLD the ACE given patient's CKD3-patient still with labile BP on multiple medications--will follow orthostatics.    Would consider formal evaluation by her cardiologist and neurologist in the AM.    Patient with mild confusion by history with BANDEMIA and evidence of cystitis>>will treat with IV Rocephin.  Blood and urine cultures ordered.    Daughter aware of admission and agrees with plan/care as above.  Given patient's comorbidities, patient's long term prognosis is guarded.  Emotional support provided to patient.   Care reviewed with covering NP/Eileen GOETZ for endorsement to Dr. Magallanes.    George Sousa MD  [phone]

## 2021-05-26 NOTE — ED PROVIDER NOTE - CLINICAL SUMMARY MEDICAL DECISION MAKING FREE TEXT BOX
Attn - pt with multiple episodes of syncope and bradycardia on EKG/cardiac monitor - likely CV in origin.  not likely seizure or neurologic.  Trop, labs, ua.  reassess.   additional hx from daughter.

## 2021-05-26 NOTE — H&P ADULT - NSHPSOURCEINFOTX_GEN_ALL_CORE
History from patient less than reliable.  Reviewed Medex from [facility] History from patient less than reliable.  Reviewed Medex from [facility].  Daughter not in attendance.   Left general message by examiner with daughter, Deanna Arellano,

## 2021-05-26 NOTE — ED PROVIDER NOTE - PHYSICAL EXAMINATION
On Physical Exam:  General: well appearing, in NAD, confused; cooperative with exam  HEENT: PERRL, MMM  Neck: no neck tenderness, no nuchal rigidity  Cardiac: normal s1, s2; RRR; no MGR  Lungs: CTABL  Abdomen: soft nontender/nondistended  : no bladder tenderness or distension  Skin: no signs of trauma,  warm, intact, no rash  Extremities: no peripheral edema, no gross deformities  Neuro: alert and oriented to person, no gross neurologic deficits On Physical Exam:  General: well appearing, in NAD, confused; cooperative with exam  HEENT: PERRL, MMM  Neck: no neck tenderness, no nuchal rigidity  Cardiac: normal s1, s2; RRR; no MGR  Lungs: CTABL  Abdomen: soft nontender/nondistended  : no bladder tenderness or distension  Skin: no signs of trauma,  warm, intact, no rash  Extremities: no peripheral edema, no gross deformities  Neuro: alert and oriented to person, no gross neurologic deficits     Attn - alert, NAD, no pallor or jaundice, PERRL 3 mm, moist mm, tongue - no signs of trauma. skin - warm and dry, Lungs - clear, no w/r/r, good BS bilaterally, Cor - rr, no M, no rub, Abdo - ND, soft, NT, no HSM, no CVAT, no guarding or rebound. Extremities - trace nonpittine ankle edema, no calf tenderness, distal pulses intact and symmetrical, Neuro - intact and non-focal

## 2021-05-27 LAB
ANION GAP SERPL CALC-SCNC: 14 MMOL/L — SIGNIFICANT CHANGE UP (ref 5–17)
BASOPHILS # BLD AUTO: 0.02 K/UL — SIGNIFICANT CHANGE UP (ref 0–0.2)
BASOPHILS NFR BLD AUTO: 0.4 % — SIGNIFICANT CHANGE UP (ref 0–2)
BUN SERPL-MCNC: 22 MG/DL — SIGNIFICANT CHANGE UP (ref 7–23)
CALCIUM SERPL-MCNC: 9.1 MG/DL — SIGNIFICANT CHANGE UP (ref 8.4–10.5)
CHLORIDE SERPL-SCNC: 102 MMOL/L — SIGNIFICANT CHANGE UP (ref 96–108)
CO2 SERPL-SCNC: 21 MMOL/L — LOW (ref 22–31)
COVID-19 SPIKE DOMAIN AB INTERP: POSITIVE
COVID-19 SPIKE DOMAIN ANTIBODY RESULT: >250 U/ML — HIGH
CREAT SERPL-MCNC: 1.02 MG/DL — SIGNIFICANT CHANGE UP (ref 0.5–1.3)
CULTURE RESULTS: SIGNIFICANT CHANGE UP
EOSINOPHIL # BLD AUTO: 0.07 K/UL — SIGNIFICANT CHANGE UP (ref 0–0.5)
EOSINOPHIL NFR BLD AUTO: 1.5 % — SIGNIFICANT CHANGE UP (ref 0–6)
GLUCOSE BLDC GLUCOMTR-MCNC: 118 MG/DL — HIGH (ref 70–99)
GLUCOSE BLDC GLUCOMTR-MCNC: 131 MG/DL — HIGH (ref 70–99)
GLUCOSE BLDC GLUCOMTR-MCNC: 151 MG/DL — HIGH (ref 70–99)
GLUCOSE BLDC GLUCOMTR-MCNC: 98 MG/DL — SIGNIFICANT CHANGE UP (ref 70–99)
GLUCOSE SERPL-MCNC: 99 MG/DL — SIGNIFICANT CHANGE UP (ref 70–99)
HCT VFR BLD CALC: 32.9 % — LOW (ref 34.5–45)
HGB BLD-MCNC: 11.3 G/DL — LOW (ref 11.5–15.5)
IMM GRANULOCYTES NFR BLD AUTO: 0.2 % — SIGNIFICANT CHANGE UP (ref 0–1.5)
LYMPHOCYTES # BLD AUTO: 1.26 K/UL — SIGNIFICANT CHANGE UP (ref 1–3.3)
LYMPHOCYTES # BLD AUTO: 26.7 % — SIGNIFICANT CHANGE UP (ref 13–44)
MCHC RBC-ENTMCNC: 23.6 PG — LOW (ref 27–34)
MCHC RBC-ENTMCNC: 34.3 GM/DL — SIGNIFICANT CHANGE UP (ref 32–36)
MCV RBC AUTO: 68.8 FL — LOW (ref 80–100)
MONOCYTES # BLD AUTO: 0.47 K/UL — SIGNIFICANT CHANGE UP (ref 0–0.9)
MONOCYTES NFR BLD AUTO: 10 % — SIGNIFICANT CHANGE UP (ref 2–14)
NEUTROPHILS # BLD AUTO: 2.89 K/UL — SIGNIFICANT CHANGE UP (ref 1.8–7.4)
NEUTROPHILS NFR BLD AUTO: 61.2 % — SIGNIFICANT CHANGE UP (ref 43–77)
NRBC # BLD: 0 /100 WBCS — SIGNIFICANT CHANGE UP (ref 0–0)
PLATELET # BLD AUTO: 251 K/UL — SIGNIFICANT CHANGE UP (ref 150–400)
POTASSIUM SERPL-MCNC: 4.4 MMOL/L — SIGNIFICANT CHANGE UP (ref 3.5–5.3)
POTASSIUM SERPL-SCNC: 4.4 MMOL/L — SIGNIFICANT CHANGE UP (ref 3.5–5.3)
RBC # BLD: 4.78 M/UL — SIGNIFICANT CHANGE UP (ref 3.8–5.2)
RBC # FLD: 17.4 % — HIGH (ref 10.3–14.5)
SARS-COV-2 IGG+IGM SERPL QL IA: >250 U/ML — HIGH
SARS-COV-2 IGG+IGM SERPL QL IA: POSITIVE
SODIUM SERPL-SCNC: 137 MMOL/L — SIGNIFICANT CHANGE UP (ref 135–145)
SPECIMEN SOURCE: SIGNIFICANT CHANGE UP
WBC # BLD: 4.72 K/UL — SIGNIFICANT CHANGE UP (ref 3.8–10.5)
WBC # FLD AUTO: 4.72 K/UL — SIGNIFICANT CHANGE UP (ref 3.8–10.5)

## 2021-05-27 RX ORDER — ACETAMINOPHEN 500 MG
650 TABLET ORAL EVERY 6 HOURS
Refills: 0 | Status: DISCONTINUED | OUTPATIENT
Start: 2021-05-27 | End: 2021-06-08

## 2021-05-27 RX ADMIN — Medication 25 MILLIGRAM(S): at 13:00

## 2021-05-27 RX ADMIN — ATORVASTATIN CALCIUM 10 MILLIGRAM(S): 80 TABLET, FILM COATED ORAL at 21:26

## 2021-05-27 RX ADMIN — CEFTRIAXONE 100 MILLIGRAM(S): 500 INJECTION, POWDER, FOR SOLUTION INTRAMUSCULAR; INTRAVENOUS at 17:08

## 2021-05-27 RX ADMIN — Medication 25 MILLIGRAM(S): at 21:26

## 2021-05-27 RX ADMIN — OXCARBAZEPINE 450 MILLIGRAM(S): 300 TABLET, FILM COATED ORAL at 00:11

## 2021-05-27 RX ADMIN — ISOSORBIDE MONONITRATE 90 MILLIGRAM(S): 60 TABLET, EXTENDED RELEASE ORAL at 11:44

## 2021-05-27 RX ADMIN — OXCARBAZEPINE 450 MILLIGRAM(S): 300 TABLET, FILM COATED ORAL at 21:26

## 2021-05-27 RX ADMIN — HEPARIN SODIUM 5000 UNIT(S): 5000 INJECTION INTRAVENOUS; SUBCUTANEOUS at 06:04

## 2021-05-27 RX ADMIN — Medication 650 MILLIGRAM(S): at 15:54

## 2021-05-27 RX ADMIN — Medication 25 MILLIGRAM(S): at 06:04

## 2021-05-27 RX ADMIN — Medication 650 MILLIGRAM(S): at 16:46

## 2021-05-27 RX ADMIN — Medication 1: at 17:08

## 2021-05-27 RX ADMIN — HEPARIN SODIUM 5000 UNIT(S): 5000 INJECTION INTRAVENOUS; SUBCUTANEOUS at 21:26

## 2021-05-27 RX ADMIN — OXCARBAZEPINE 450 MILLIGRAM(S): 300 TABLET, FILM COATED ORAL at 10:15

## 2021-05-27 RX ADMIN — NYSTATIN CREAM 1 APPLICATION(S): 100000 CREAM TOPICAL at 17:07

## 2021-05-27 RX ADMIN — AMLODIPINE BESYLATE 10 MILLIGRAM(S): 2.5 TABLET ORAL at 06:04

## 2021-05-27 RX ADMIN — HEPARIN SODIUM 5000 UNIT(S): 5000 INJECTION INTRAVENOUS; SUBCUTANEOUS at 13:00

## 2021-05-27 RX ADMIN — NYSTATIN CREAM 1 APPLICATION(S): 100000 CREAM TOPICAL at 06:04

## 2021-05-27 NOTE — PROGRESS NOTE ADULT - SUBJECTIVE AND OBJECTIVE BOX
SUBJECTIVE / OVERNIGHT EVENTS:      MEDICATIONS  (STANDING):  amLODIPine   Tablet 10 milliGRAM(s) Oral daily  atorvastatin 10 milliGRAM(s) Oral at bedtime  cefTRIAXone   IVPB 1000 milliGRAM(s) IV Intermittent every 24 hours  cefTRIAXone   IVPB      dextrose 40% Gel 15 Gram(s) Oral once  dextrose 5%. 1000 milliLiter(s) (50 mL/Hr) IV Continuous <Continuous>  dextrose 5%. 1000 milliLiter(s) (100 mL/Hr) IV Continuous <Continuous>  dextrose 50% Injectable 25 Gram(s) IV Push once  dextrose 50% Injectable 12.5 Gram(s) IV Push once  dextrose 50% Injectable 25 Gram(s) IV Push once  glucagon  Injectable 1 milliGRAM(s) IntraMuscular once  heparin   Injectable 5000 Unit(s) SubCutaneous every 8 hours  hydrALAZINE 25 milliGRAM(s) Oral every 8 hours  insulin lispro (ADMELOG) corrective regimen sliding scale   SubCutaneous three times a day before meals  insulin lispro (ADMELOG) corrective regimen sliding scale   SubCutaneous at bedtime  isosorbide   mononitrate ER Tablet (IMDUR) 90 milliGRAM(s) Oral daily  nystatin Powder 1 Application(s) Topical two times a day  OXcarbazepine 450 milliGRAM(s) Oral <User Schedule>  senna 2 Tablet(s) Oral at bedtime    MEDICATIONS  (PRN):  acetaminophen   Tablet .. 650 milliGRAM(s) Oral every 6 hours PRN Mild Pain (1 - 3)        CAPILLARY BLOOD GLUCOSE      POCT Blood Glucose.: 131 mg/dL (27 May 2021 21:06)  POCT Blood Glucose.: 151 mg/dL (27 May 2021 16:14)  POCT Blood Glucose.: 98 mg/dL (27 May 2021 11:18)  POCT Blood Glucose.: 118 mg/dL (27 May 2021 07:21)    I&O's Summary    27 May 2021 07:01  -  27 May 2021 22:47  --------------------------------------------------------  IN: 720 mL / OUT: 850 mL / NET: -130 mL        Constitutional: No fever, fatigue  Skin: No rash.  Eyes: No recent vision problems or eye pain.  ENT: No congestion, ear pain, or sore throat.  Cardiovascular: No chest pain or palpation.  Respiratory: No cough, shortness of breath, congestion, or wheezing.  Gastrointestinal: No abdominal pain, nausea, vomiting, or diarrhea.  Genitourinary: No dysuria.  Musculoskeletal: No joint swelling.  Neurologic: No headache.    PHYSICAL EXAM:  GENERAL: NAD  EYES: EOMI, PERRLA  NECK: Supple, No JVD  CHEST/LUNG: dec breath sounds rt base  HEART:  S1 , S2 +  ABDOMEN: soft , bs+  EXTREMITIES:  trace edema  NEUROLOGY:alert awake      LABS:                        11.3   4.72  )-----------( 251      ( 27 May 2021 07:02 )             32.9         137  |  102  |  22  ----------------------------<  99  4.4   |  21<L>  |  1.02    Ca    9.1      27 May 2021 06:59  Phos  3.5       Mg     2.5         TPro  7.4  /  Alb  3.9  /  TBili  0.3  /  DBili  x   /  AST  12  /  ALT  8<L>  /  AlkPhos  136<H>      PT/INR - ( 26 May 2021 19:27 )   PT: 12.8 sec;   INR: 1.07 ratio         PTT - ( 26 May 2021 19:27 )  PTT:32.1 sec      Urinalysis Basic - ( 26 May 2021 16:48 )    Color: Light Yellow / Appearance: Clear / S.011 / pH: x  Gluc: x / Ketone: Negative  / Bili: Negative / Urobili: Negative   Blood: x / Protein: Trace / Nitrite: Negative   Leuk Esterase: Large / RBC: 6 /hpf / WBC 97 /HPF   Sq Epi: x / Non Sq Epi: 0 /hpf / Bacteria: Negative        RADIOLOGY & ADDITIONAL TESTS:    Imaging Personally Reviewed:    Consultant(s) Notes Reviewed:      Care Discussed with Consultants/Other Providers:

## 2021-05-27 NOTE — CONSULT NOTE ADULT - ASSESSMENT
74 yo woman with PMH of HTN, HLD, seizures, T2DM presents with mechanical fall after possible syncopal event.     EKG: Sinus Kaz 53 no STT changes  Tele: SB 50-60s    1. Syncope  - orthostatic negative  - s/p [facility] previous admission with non obstructive CAD  -echo from 04/2021 with grossly normal LV function and mild diastolic dysfunction  - get ILR interrogation     2. HTN  -improving  - c/w norvasc 10mg daily, and lisinopril, imdur 90mg daily  - continue to monitor BP    3. Seizures  -on trileptal   -f/u neuro

## 2021-05-27 NOTE — CONSULT NOTE ADULT - SUBJECTIVE AND OBJECTIVE BOX
Ulises Luevano MD  Interventional Cardiology / Endovascular Specialist  [place] Office : [phone] [place] [address] [place] [address]  Tel:   [place] Office : [address] [facility] [place] [address]  Tel: [phone]  Cell : [phone]      HISTORY OF PRESENTING ILLNESS:    75 y/o F--history from patient not reliable and obtained from prior admission records at [facility] and ED records--patient with a history of moderate cognitive impairment, reported prediabetes, seizure disorder, essential HTN and undifferentiated orthostatic hypotension, with apparently a nondiagnostic cardiac and neurologic workup at [facility], recent implanted loop recorder placed, with patient sent from [facility] following episodes of dizziness and near syncope last 3 days.  Daughter presently not in attendance (present earlier in ED with staff) and examiner left general message with daughter.    PAST MEDICAL & SURGICAL HISTORY:  Hypertension    Seizure    Hyperlipidemia    Diverticulosis    Borderline diabetes    Obesity    No Past Surgical History    History of appendectomy        SOCIAL HISTORY: Substance Use (street drugs): ( x ) never used  (  ) other:    FAMILY HISTORY:  Family history of cerebrovascular accident (CVA) (Sibling)        REVIEW OF SYSTEMS:  CONSTITUTIONAL: No fever, weight loss, or fatigue  EYES: No eye pain, visual disturbances, or discharge  ENMT:  No difficulty hearing, tinnitus, vertigo; No sinus or throat pain  BREASTS: No pain, masses, or nipple discharge  GASTROINTESTINAL: No abdominal or epigastric pain. No nausea, vomiting, or hematemesis; No diarrhea or constipation. No melena or hematochezia.  GENITOURINARY: No dysuria, frequency, hematuria, or incontinence  NEUROLOGICAL: No headaches, memory loss, loss of strength, numbness, or tremors  ENDOCRINE: No heat or cold intolerance; No hair loss  MUSCULOSKELETAL: No joint pain or swelling; No muscle, back, or extremity pain  PSYCHIATRIC: No depression, anxiety, mood swings, or difficulty sleeping  HEME/LYMPH: No easy bruising, or bleeding gums  All others negative    MEDICATIONS:  amLODIPine   Tablet 10 milliGRAM(s) Oral daily  heparin   Injectable 5000 Unit(s) SubCutaneous every 8 hours  hydrALAZINE 25 milliGRAM(s) Oral every 8 hours  isosorbide   mononitrate ER Tablet (IMDUR) 90 milliGRAM(s) Oral daily    cefTRIAXone   IVPB 1000 milliGRAM(s) IV Intermittent every 24 hours  cefTRIAXone   IVPB          acetaminophen   Tablet .. 650 milliGRAM(s) Oral every 6 hours PRN  OXcarbazepine 450 milliGRAM(s) Oral <User Schedule>    senna 2 Tablet(s) Oral at bedtime    atorvastatin 10 milliGRAM(s) Oral at bedtime  dextrose 40% Gel 15 Gram(s) Oral once  dextrose 50% Injectable 25 Gram(s) IV Push once  dextrose 50% Injectable 12.5 Gram(s) IV Push once  dextrose 50% Injectable 25 Gram(s) IV Push once  glucagon  Injectable 1 milliGRAM(s) IntraMuscular once  insulin lispro (ADMELOG) corrective regimen sliding scale   SubCutaneous three times a day before meals  insulin lispro (ADMELOG) corrective regimen sliding scale   SubCutaneous at bedtime    dextrose 5%. 1000 milliLiter(s) IV Continuous <Continuous>  dextrose 5%. 1000 milliLiter(s) IV Continuous <Continuous>  nystatin Powder 1 Application(s) Topical two times a day      FAMILY HISTORY:  Family history of cerebrovascular accident (CVA) (Sibling)          Allergies    allergy to Lobster (Swelling)  No Known Drug Allergies  seaford (Unknown)    Intolerances    lactose (Stomach Upset)  	      PHYSICAL EXAM:  T(C): 36.7 (05-27-21 @ 20:08), Max: 37.1 (05-27-21 @ 04:50)  HR: 60 (05-27-21 @ 20:08) (58 - 80)  BP: 153/82 (05-27-21 @ 20:08) (150/80 - 169/81)  RR: 18 (05-27-21 @ 20:08) (18 - 18)  SpO2: 94% (05-27-21 @ 20:08) (94% - 97%)  Wt(kg): --  I&O's Summary    27 May 2021 07:01  -  27 May 2021 21:53  --------------------------------------------------------  IN: 720 mL / OUT: 850 mL / NET: -130 mL      GENERAL: NAD  EYES: EOMI, PERRLA, conjunctiva and sclera clear  ENMT: No tonsillar erythema, exudates, or enlargement;  Cardiovascular: Normal S1 S2, No JVD, No murmurs, No edema  Respiratory: Lungs clear to auscultation	  Gastrointestinal:  Soft, nontender , + BS	  Extremities: no edema        LABS:	 	    CARDIAC MARKERS:                                  11.3   4.72  )-----------( 251      ( 27 May 2021 07:02 )             32.9     05-27    137  |  102  |  22  ----------------------------<  99  4.4   |  21<L>  |  1.02    Ca    9.1      27 May 2021 06:59  Phos  3.5     05-26  Mg     2.5     05-26    TPro  7.4  /  Alb  3.9  /  TBili  0.3  /  DBili  x   /  AST  12  /  ALT  8<L>  /  AlkPhos  136<H>  05-26    proBNP:   Lipid Profile:   HgA1c:   TSH:     Consultant(s) Notes Reviewed:  [x ] YES  [ ] NO    Care Discussed with Consultants/Other Providers [ x] YES  [ ] NO    Imaging Personally Reviewed independently:  [x] YES  [ ] NO    All labs, radiologic studies, vitals, orders and medications list reviewed. Patient is seen and examined at bedside. Case discussed with medical team.    ASSESSMENT/PLAN:

## 2021-05-28 LAB
A1C WITH ESTIMATED AVERAGE GLUCOSE RESULT: 6.4 % — HIGH (ref 4–5.6)
A1C WITH ESTIMATED AVERAGE GLUCOSE RESULT: 6.5 % — HIGH (ref 4–5.6)
ANION GAP SERPL CALC-SCNC: 14 MMOL/L — SIGNIFICANT CHANGE UP (ref 5–17)
BUN SERPL-MCNC: 25 MG/DL — HIGH (ref 7–23)
CALCIUM SERPL-MCNC: 9.1 MG/DL — SIGNIFICANT CHANGE UP (ref 8.4–10.5)
CHLORIDE SERPL-SCNC: 99 MMOL/L — SIGNIFICANT CHANGE UP (ref 96–108)
CO2 SERPL-SCNC: 22 MMOL/L — SIGNIFICANT CHANGE UP (ref 22–31)
CREAT SERPL-MCNC: 1.23 MG/DL — SIGNIFICANT CHANGE UP (ref 0.5–1.3)
ESTIMATED AVERAGE GLUCOSE: 137 MG/DL — HIGH (ref 68–114)
ESTIMATED AVERAGE GLUCOSE: 140 MG/DL — HIGH (ref 68–114)
FERRITIN SERPL-MCNC: 52 NG/ML — SIGNIFICANT CHANGE UP (ref 15–150)
FOLATE SERPL-MCNC: 9.9 NG/ML — SIGNIFICANT CHANGE UP
GLUCOSE BLDC GLUCOMTR-MCNC: 114 MG/DL — HIGH (ref 70–99)
GLUCOSE BLDC GLUCOMTR-MCNC: 131 MG/DL — HIGH (ref 70–99)
GLUCOSE BLDC GLUCOMTR-MCNC: 141 MG/DL — HIGH (ref 70–99)
GLUCOSE BLDC GLUCOMTR-MCNC: 147 MG/DL — HIGH (ref 70–99)
GLUCOSE SERPL-MCNC: 101 MG/DL — HIGH (ref 70–99)
HCT VFR BLD CALC: 31.4 % — LOW (ref 34.5–45)
HGB BLD-MCNC: 10.7 G/DL — LOW (ref 11.5–15.5)
IRON SATN MFR SERPL: 15 % — SIGNIFICANT CHANGE UP (ref 14–50)
IRON SATN MFR SERPL: 31 UG/DL — SIGNIFICANT CHANGE UP (ref 30–160)
MCHC RBC-ENTMCNC: 23.5 PG — LOW (ref 27–34)
MCHC RBC-ENTMCNC: 34.1 GM/DL — SIGNIFICANT CHANGE UP (ref 32–36)
MCV RBC AUTO: 69 FL — LOW (ref 80–100)
NRBC # BLD: 0 /100 WBCS — SIGNIFICANT CHANGE UP (ref 0–0)
PLATELET # BLD AUTO: 245 K/UL — SIGNIFICANT CHANGE UP (ref 150–400)
POTASSIUM SERPL-MCNC: 4.3 MMOL/L — SIGNIFICANT CHANGE UP (ref 3.5–5.3)
POTASSIUM SERPL-SCNC: 4.3 MMOL/L — SIGNIFICANT CHANGE UP (ref 3.5–5.3)
PROT SERPL-MCNC: 6.9 G/DL — SIGNIFICANT CHANGE UP (ref 6–8.3)
PROT SERPL-MCNC: 6.9 G/DL — SIGNIFICANT CHANGE UP (ref 6–8.3)
RBC # BLD: 4.55 M/UL — SIGNIFICANT CHANGE UP (ref 3.8–5.2)
RBC # FLD: 17.1 % — HIGH (ref 10.3–14.5)
SODIUM SERPL-SCNC: 135 MMOL/L — SIGNIFICANT CHANGE UP (ref 135–145)
TIBC SERPL-MCNC: 215 UG/DL — LOW (ref 220–430)
UIBC SERPL-MCNC: 184 UG/DL — SIGNIFICANT CHANGE UP (ref 110–370)
VIT B12 SERPL-MCNC: 537 PG/ML — SIGNIFICANT CHANGE UP (ref 232–1245)
WBC # BLD: 4.14 K/UL — SIGNIFICANT CHANGE UP (ref 3.8–10.5)
WBC # FLD AUTO: 4.14 K/UL — SIGNIFICANT CHANGE UP (ref 3.8–10.5)

## 2021-05-28 PROCEDURE — 93291 INTERROG DEV EVAL SCRMS IP: CPT | Mod: 26

## 2021-05-28 RX ORDER — ASPIRIN/CALCIUM CARB/MAGNESIUM 324 MG
81 TABLET ORAL DAILY
Refills: 0 | Status: DISCONTINUED | OUTPATIENT
Start: 2021-05-28 | End: 2021-06-08

## 2021-05-28 RX ADMIN — Medication 81 MILLIGRAM(S): at 12:58

## 2021-05-28 RX ADMIN — AMLODIPINE BESYLATE 10 MILLIGRAM(S): 2.5 TABLET ORAL at 05:40

## 2021-05-28 RX ADMIN — CEFTRIAXONE 100 MILLIGRAM(S): 500 INJECTION, POWDER, FOR SOLUTION INTRAMUSCULAR; INTRAVENOUS at 17:46

## 2021-05-28 RX ADMIN — Medication 650 MILLIGRAM(S): at 15:09

## 2021-05-28 RX ADMIN — Medication 650 MILLIGRAM(S): at 09:51

## 2021-05-28 RX ADMIN — NYSTATIN CREAM 1 APPLICATION(S): 100000 CREAM TOPICAL at 05:40

## 2021-05-28 RX ADMIN — HEPARIN SODIUM 5000 UNIT(S): 5000 INJECTION INTRAVENOUS; SUBCUTANEOUS at 05:40

## 2021-05-28 RX ADMIN — HEPARIN SODIUM 5000 UNIT(S): 5000 INJECTION INTRAVENOUS; SUBCUTANEOUS at 12:59

## 2021-05-28 RX ADMIN — ISOSORBIDE MONONITRATE 90 MILLIGRAM(S): 60 TABLET, EXTENDED RELEASE ORAL at 12:59

## 2021-05-28 RX ADMIN — Medication 25 MILLIGRAM(S): at 21:19

## 2021-05-28 RX ADMIN — Medication 25 MILLIGRAM(S): at 12:58

## 2021-05-28 RX ADMIN — ATORVASTATIN CALCIUM 10 MILLIGRAM(S): 80 TABLET, FILM COATED ORAL at 21:19

## 2021-05-28 RX ADMIN — NYSTATIN CREAM 1 APPLICATION(S): 100000 CREAM TOPICAL at 17:40

## 2021-05-28 RX ADMIN — Medication 650 MILLIGRAM(S): at 10:15

## 2021-05-28 RX ADMIN — OXCARBAZEPINE 450 MILLIGRAM(S): 300 TABLET, FILM COATED ORAL at 21:19

## 2021-05-28 RX ADMIN — Medication 650 MILLIGRAM(S): at 15:45

## 2021-05-28 RX ADMIN — OXCARBAZEPINE 450 MILLIGRAM(S): 300 TABLET, FILM COATED ORAL at 09:51

## 2021-05-28 RX ADMIN — HEPARIN SODIUM 5000 UNIT(S): 5000 INJECTION INTRAVENOUS; SUBCUTANEOUS at 21:19

## 2021-05-28 RX ADMIN — Medication 25 MILLIGRAM(S): at 05:40

## 2021-05-28 NOTE — PROCEDURE NOTE - ADDITIONAL PROCEDURE DETAILS
Indication for Interrogation: Syncope  Presenting Rhythm: NSR 70s  Events since Last Interrogation (5/2021): one recorded episode for 3 second pause on 5/25/2021, reviewed available EGM, c/w vagally mediated pause; would not have   Changes made: none  Discussed with primary team      BINA Pittman PA-C  [id number] Indication for Interrogation: Syncope  Presenting Rhythm: NSR 70s  Events since Last Interrogation (5/2021): one recorded episode for 3 second pause on 5/25/2021, reviewed available EGM, c/w vagally mediated pause; episode unlikely to cause syncopal episode   Changes made: none  Discussed with primary team      BINA Pittman PA-C  [id number]

## 2021-05-28 NOTE — PROGRESS NOTE ADULT - SUBJECTIVE AND OBJECTIVE BOX
Ulises Luevano MD  Interventional Cardiology / Endovascular Specialist  [place] Office : [address] [place] [address] [place] [address]  Tel:   [place] Office : [address] [facility] [place] [address]  Tel: [phone]  Cell : [phone]    Pt lying in BED in NAD    PAST MEDICAL & SURGICAL HISTORY:  Hypertension    Seizure    Hyperlipidemia    Diverticulosis    Borderline diabetes    Obesity    No Past Surgical History    History of appendectomy        SOCIAL HISTORY: Substance Use (street drugs): ( x ) never used  (  ) other:    FAMILY HISTORY:  Family history of cerebrovascular accident (CVA) (Sibling)        REVIEW OF SYSTEMS:  CONSTITUTIONAL: No fever, weight loss, or fatigue  EYES: No eye pain, visual disturbances, or discharge  ENMT:  No difficulty hearing, tinnitus, vertigo; No sinus or throat pain  BREASTS: No pain, masses, or nipple discharge  GASTROINTESTINAL: No abdominal or epigastric pain. No nausea, vomiting, or hematemesis; No diarrhea or constipation. No melena or hematochezia.  GENITOURINARY: No dysuria, frequency, hematuria, or incontinence  NEUROLOGICAL: No headaches, memory loss, loss of strength, numbness, or tremors  ENDOCRINE: No heat or cold intolerance; No hair loss  MUSCULOSKELETAL: No joint pain or swelling; No muscle, back, or extremity pain  PSYCHIATRIC: No depression, anxiety, mood swings, or difficulty sleeping  HEME/LYMPH: No easy bruising, or bleeding gums  All others negative    MEDICATIONS:  amLODIPine   Tablet 10 milliGRAM(s) Oral daily  aspirin enteric coated 81 milliGRAM(s) Oral daily  heparin   Injectable 5000 Unit(s) SubCutaneous every 8 hours  hydrALAZINE 25 milliGRAM(s) Oral every 8 hours  isosorbide   mononitrate ER Tablet (IMDUR) 90 milliGRAM(s) Oral daily    cefTRIAXone   IVPB 1000 milliGRAM(s) IV Intermittent every 24 hours  cefTRIAXone   IVPB          acetaminophen   Tablet .. 650 milliGRAM(s) Oral every 6 hours PRN  OXcarbazepine 450 milliGRAM(s) Oral <User Schedule>    senna 2 Tablet(s) Oral at bedtime    atorvastatin 10 milliGRAM(s) Oral at bedtime  dextrose 40% Gel 15 Gram(s) Oral once  dextrose 50% Injectable 25 Gram(s) IV Push once  dextrose 50% Injectable 12.5 Gram(s) IV Push once  dextrose 50% Injectable 25 Gram(s) IV Push once  glucagon  Injectable 1 milliGRAM(s) IntraMuscular once  insulin lispro (ADMELOG) corrective regimen sliding scale   SubCutaneous three times a day before meals  insulin lispro (ADMELOG) corrective regimen sliding scale   SubCutaneous at bedtime    dextrose 5%. 1000 milliLiter(s) IV Continuous <Continuous>  dextrose 5%. 1000 milliLiter(s) IV Continuous <Continuous>  nystatin Powder 1 Application(s) Topical two times a day      FAMILY HISTORY:  Family history of cerebrovascular accident (CVA) (Sibling)          Allergies    allergy to Lobster (Swelling)  No Known Drug Allergies  seaford (Unknown)    Intolerances    lactose (Stomach Upset)  	      PHYSICAL EXAM:  T(C): 37.4 (05-28-21 @ 19:58), Max: 37.4 (05-28-21 @ 19:58)  HR: 60 (05-28-21 @ 19:58) (60 - 69)  BP: 115/67 (05-28-21 @ 19:58) (111/66 - 152/85)  RR: 18 (05-28-21 @ 19:58) (18 - 19)  SpO2: 96% (05-28-21 @ 19:58) (96% - 98%)  Wt(kg): --  I&O's Summary    27 May 2021 07:01  -  28 May 2021 07:00  --------------------------------------------------------  IN: 720 mL / OUT: 850 mL / NET: -130 mL    28 May 2021 07:01  -  28 May 2021 23:23  --------------------------------------------------------  IN: 270 mL / OUT: 350 mL / NET: -80 mL    GENERAL: NAD  EYES: EOMI, PERRLA, conjunctiva and sclera clear  ENMT: No tonsillar erythema, exudates, or enlargement;  Cardiovascular: Normal S1 S2, No JVD, No murmurs, No edema  Respiratory: Lungs clear to auscultation	  Gastrointestinal:  Soft, nontender , + BS	  Extremities: no edema      LABS:	 	    CARDIAC MARKERS:                                  10.7   4.14  )-----------( 245      ( 28 May 2021 06:18 )             31.4     05-28    135  |  99  |  25<H>  ----------------------------<  101<H>  4.3   |  22  |  1.23    Ca    9.1      28 May 2021 06:18    TPro  6.9  /  Alb  x   /  TBili  x   /  DBili  x   /  AST  x   /  ALT  x   /  AlkPhos  x   05-28    proBNP:   Lipid Profile:   HgA1c:   TSH:     Consultant(s) Notes Reviewed:  [x ] YES  [ ] NO    Care Discussed with Consultants/Other Providers [ x] YES  [ ] NO    Imaging Personally Reviewed independently:  [x] YES  [ ] NO    All labs, radiologic studies, vitals, orders and medications list reviewed. Patient is seen and examined at bedside. Case discussed with medical team.    ASSESSMENT/PLAN:

## 2021-05-28 NOTE — PROGRESS NOTE ADULT - ASSESSMENT
76 yo woman with PMH of HTN, HLD, seizures, T2DM presents with mechanical fall after possible syncopal event.     EKG: Sinus Kaz 53 no STT changes  Tele: SB 50-60s    1. Syncope  - orthostatic negative  - s/p [facility] previous admission with non obstructive CAD  -echo from 04/2021 with grossly normal LV function and mild diastolic dysfunction  - ILR interrogation with 3 sec pause but deemed not to be cause of syncope     2. HTN  -improving  - c/w norvasc 10mg daily, and lisinopril, imdur 90mg daily  - continue to monitor BP    3. Seizures  -on trileptal   -f/u neuro

## 2021-05-28 NOTE — CONSULT NOTE ADULT - ASSESSMENT
76 yo female with a PMHx of HTN, HLD, and seizures here with dizziness, can not get full history as limited by pt's pain. Neuro exam without focality CTH with chronic lacunar infacts. Patient on  BID level on last visit 34 upper range of sujatha Labs show a microcytic anemia      -C/W current dose of OXC  -management of cystitis/microcytic anemia per primary team  -ASA 8 1 given chronic lacunar infarcts  -If pain continues consider adding Gabapentin 100 BId and titrating up accordingly 76 yo female with a PMHx of HTN, HLD, and seizures here with dizziness, can not get full history as limited by pt's pain. Neuro exam without focality CTH with chronic lacunar infacts. Patient on  BID level on last visit 34 upper range of sujatha Labs show a microcytic anemia      -C/W current dose of OXC  -management of cystitis/microcytic anemia per primary team  -ASA 8 1 given chronic lacunar infarcts  -Consider B12, folate, TSH, B6, SPEP and Iron studies  -If pain continues consider adding Gabapentin 100 BId and titrating up accordingly

## 2021-05-28 NOTE — PROGRESS NOTE ADULT - SUBJECTIVE AND OBJECTIVE BOX
SUBJECTIVE / OVERNIGHT EVENTS: pt seen and examined    MEDICATIONS  (STANDING):  amLODIPine   Tablet 10 milliGRAM(s) Oral daily  aspirin enteric coated 81 milliGRAM(s) Oral daily  atorvastatin 10 milliGRAM(s) Oral at bedtime  cefTRIAXone   IVPB 1000 milliGRAM(s) IV Intermittent every 24 hours  cefTRIAXone   IVPB      dextrose 40% Gel 15 Gram(s) Oral once  dextrose 5%. 1000 milliLiter(s) (50 mL/Hr) IV Continuous <Continuous>  dextrose 5%. 1000 milliLiter(s) (100 mL/Hr) IV Continuous <Continuous>  dextrose 50% Injectable 25 Gram(s) IV Push once  dextrose 50% Injectable 12.5 Gram(s) IV Push once  dextrose 50% Injectable 25 Gram(s) IV Push once  glucagon  Injectable 1 milliGRAM(s) IntraMuscular once  heparin   Injectable 5000 Unit(s) SubCutaneous every 8 hours  hydrALAZINE 25 milliGRAM(s) Oral every 8 hours  insulin lispro (ADMELOG) corrective regimen sliding scale   SubCutaneous three times a day before meals  insulin lispro (ADMELOG) corrective regimen sliding scale   SubCutaneous at bedtime  isosorbide   mononitrate ER Tablet (IMDUR) 90 milliGRAM(s) Oral daily  nystatin Powder 1 Application(s) Topical two times a day  OXcarbazepine 450 milliGRAM(s) Oral <User Schedule>  senna 2 Tablet(s) Oral at bedtime    MEDICATIONS  (PRN):  acetaminophen   Tablet .. 650 milliGRAM(s) Oral every 6 hours PRN Mild Pain (1 - 3)    Vital Signs Last 24 Hrs  T(C): 37.3 (28 May 2021 16:11), Max: 37.3 (28 May 2021 16:11)  T(F): 99.1 (28 May 2021 16:11), Max: 99.1 (28 May 2021 16:11)  HR: 69 (28 May 2021 16:11) (60 - 69)  BP: 136/78 (28 May 2021 16:11) (111/66 - 153/82)  BP(mean): --  RR: 19 (28 May 2021 16:11) (18 - 19)  SpO2: 98% (28 May 2021 16:11) (94% - 98%)      Constitutional: No fever, fatigue  Skin: No rash.  Eyes: No recent vision problems or eye pain.  ENT: No congestion, ear pain, or sore throat.  Cardiovascular: No chest pain or palpation.  Respiratory: No cough, shortness of breath, congestion, or wheezing.  Gastrointestinal: No abdominal pain, nausea, vomiting, or diarrhea.  Genitourinary: No dysuria.  Musculoskeletal: No joint swelling.  Neurologic: No headache.    PHYSICAL EXAM:  GENERAL: NAD  EYES: EOMI, PERRLA  NECK: Supple, No JVD  CHEST/LUNG: dec breath sounds rt base  HEART:  S1 , S2 +  ABDOMEN: soft , bs+  EXTREMITIES:  trace edema  NEUROLOGY:alert awake    LABS:      135  |  99  |  25<H>  ----------------------------<  101<H>  4.3   |  22  |  1.23    Ca    9.1      28 May 2021 06:18      Creatinine Trend: 1.23 <--, 1.02 <--, 1.28 <--                        10.7   4.14  )-----------( 245      ( 28 May 2021 06:18 )             31.4     Urine Studies:  Urinalysis Basic - ( 26 May 2021 16:48 )    Color: Light Yellow / Appearance: Clear / S.011 / pH:   Gluc:  / Ketone: Negative  / Bili: Negative / Urobili: Negative   Blood:  / Protein: Trace / Nitrite: Negative   Leuk Esterase: Large / RBC: 6 /hpf / WBC 97 /HPF   Sq Epi:  / Non Sq Epi: 0 /hpf / Bacteria: Negative                PT/INR - ( 26 May 2021 19:27 )   PT: 12.8 sec;   INR: 1.07 ratio         PTT - ( 26 May 2021 19:27 )  PTT:32.1 sec

## 2021-05-28 NOTE — CONSULT NOTE ADULT - SUBJECTIVE AND OBJECTIVE BOX
Neurology consult    ADOLPH [id number]     Patient is a 76y old  Female who presents with a chief complaint of Referred from [facility] following episodes of dizziness and near syncope last 3 days. (27 May 2021 16:53)      HPI:  NIGHT HOSPITALIST:   Patient UNKNOWN to me previously, assigned to me at this point via the ER and by Dr. Magallanes to admit this 77 y/o F--history from patient not reliable and obtained from prior admission records at [facility] and ED records--patient with a history of moderate cognitive impairment, reported prediabetes, seizure disorder, essential HTN and undifferentiated orthostatic hypotension, with apparently a nondiagnostic cardiac and neurologic workup at [facility], recent implanted loop recorder placed, with patient sent from [facility] following episodes of dizziness and near syncope last 3 days.  Daughter presently not in attendance (present earlier in ED with staff) and examiner left general message with daughter. (26 May 2021 18:35)"     Recent admission to [facility] episodes of syncope. EEG w/u negative. Patient endorsing pain RUE and RLE    REVIEW OF SYSTEMS:    see HPi  MEDICATIONS    acetaminophen   Tablet .. 650 milliGRAM(s) Oral every 6 hours PRN  amLODIPine   Tablet 10 milliGRAM(s) Oral daily  atorvastatin 10 milliGRAM(s) Oral at bedtime  cefTRIAXone   IVPB 1000 milliGRAM(s) IV Intermittent every 24 hours  cefTRIAXone   IVPB      dextrose 40% Gel 15 Gram(s) Oral once  dextrose 5%. 1000 milliLiter(s) IV Continuous <Continuous>  dextrose 5%. 1000 milliLiter(s) IV Continuous <Continuous>  dextrose 50% Injectable 25 Gram(s) IV Push once  dextrose 50% Injectable 12.5 Gram(s) IV Push once  dextrose 50% Injectable 25 Gram(s) IV Push once  glucagon  Injectable 1 milliGRAM(s) IntraMuscular once  heparin   Injectable 5000 Unit(s) SubCutaneous every 8 hours  hydrALAZINE 25 milliGRAM(s) Oral every 8 hours  insulin lispro (ADMELOG) corrective regimen sliding scale   SubCutaneous three times a day before meals  insulin lispro (ADMELOG) corrective regimen sliding scale   SubCutaneous at bedtime  isosorbide   mononitrate ER Tablet (IMDUR) 90 milliGRAM(s) Oral daily  nystatin Powder 1 Application(s) Topical two times a day  OXcarbazepine 450 milliGRAM(s) Oral <User Schedule>  senna 2 Tablet(s) Oral at bedtime      PMH: Hypertension    Seizure    Hyperlipidemia    Diverticulosis    Borderline diabetes    Obesity         PSH: No Past Surgical History    History of appendectomy        FAMILY HISTORY:  Family history of cerebrovascular accident (CVA) (Sibling)        SOCIAL HISTORY:  No history of tobacco or alcohol use     Allergies    allergy to Lobster (Swelling)  No Known Drug Allergies  seaford (Unknown)    Intolerances    lactose (Stomach Upset)          Vital Signs Last 24 Hrs  T(C): 36.8 (28 May 2021 08:20), Max: 37.2 (28 May 2021 04:23)  T(F): 98.2 (28 May 2021 08:20), Max: 98.9 (28 May 2021 04:23)  HR: 65 (28 May 2021 08:20) (60 - 65)  BP: 111/66 (28 May 2021 08:20) (111/66 - 163/71)  BP(mean): --  RR: 18 (28 May 2021 08:20) (18 - 18)  SpO2: 97% (28 May 2021 08:20) (94% - 98%)      On Neurological Examination:    Gen: lying in be appears uncomfortable  Mental Status - Patient is alert, awake, oriented to self, in pain and does not answer all question states burning pain in legs and RUE        Cranial Nerves - PERRL, EOMI, VFF, V1-V3 intact, no gross facial asymmetry, tongue/uvula midline    Motor Exam -   moves all ext with gravity poor effort    Sensory    Intact to light touch and pinprick bilaterally    Coord: no obvious dyscoordination  Gait -  deferred                                                 LABS:  CBC Full  -  ( 28 May 2021 06:18 )  WBC Count : 4.14 K/uL  RBC Count : 4.55 M/uL  Hemoglobin : 10.7 g/dL  Hematocrit : 31.4 %  Platelet Count - Automated : 245 K/uL  Mean Cell Volume : 69.0 fl  Mean Cell Hemoglobin : 23.5 pg  Mean Cell Hemoglobin Concentration : 34.1 gm/dL  Auto Neutrophil # : x  Auto Lymphocyte # : x  Auto Monocyte # : x  Auto Eosinophil # : x  Auto Basophil # : x  Auto Neutrophil % : x  Auto Lymphocyte % : x  Auto Monocyte % : x  Auto Eosinophil % : x  Auto Basophil % : x    Urinalysis Basic - ( 26 May 2021 16:48 )    Color: Light Yellow / Appearance: Clear / S.011 / pH: x  Gluc: x / Ketone: Negative  / Bili: Negative / Urobili: Negative   Blood: x / Protein: Trace / Nitrite: Negative   Leuk Esterase: Large / RBC: 6 /hpf / WBC 97 /HPF   Sq Epi: x / Non Sq Epi: 0 /hpf / Bacteria: Negative          135  |  99  |  25<H>  ----------------------------<  101<H>  4.3   |  22  |  1.23    Ca    9.1      28 May 2021 06:18  Phos  3.5       Mg     2.5         TPro  7.4  /  Alb  3.9  /  TBili  0.3  /  DBili  x   /  AST  12  /  ALT  8<L>  /  AlkPhos  136<H>      LIVER FUNCTIONS - ( 26 May 2021 13:21 )  Alb: 3.9 g/dL / Pro: 7.4 g/dL / ALK PHOS: 136 U/L / ALT: 8 U/L / AST: 12 U/L / GGT: x           Hemoglobin A1C:       PT/INR - ( 26 May 2021 19:27 )   PT: 12.8 sec;   INR: 1.07 ratio         PTT - ( 26 May 2021 19:27 )  PTT:32.1 sec      RADIOLOGY  CTH   MRI:

## 2021-05-29 LAB
ANION GAP SERPL CALC-SCNC: 13 MMOL/L — SIGNIFICANT CHANGE UP (ref 5–17)
BUN SERPL-MCNC: 26 MG/DL — HIGH (ref 7–23)
CALCIUM SERPL-MCNC: 9.1 MG/DL — SIGNIFICANT CHANGE UP (ref 8.4–10.5)
CHLORIDE SERPL-SCNC: 102 MMOL/L — SIGNIFICANT CHANGE UP (ref 96–108)
CO2 SERPL-SCNC: 21 MMOL/L — LOW (ref 22–31)
CREAT SERPL-MCNC: 1.1 MG/DL — SIGNIFICANT CHANGE UP (ref 0.5–1.3)
FERRITIN SERPL-MCNC: 59 NG/ML — SIGNIFICANT CHANGE UP (ref 15–150)
FOLATE SERPL-MCNC: 9.7 NG/ML — SIGNIFICANT CHANGE UP
GLUCOSE BLDC GLUCOMTR-MCNC: 101 MG/DL — HIGH (ref 70–99)
GLUCOSE BLDC GLUCOMTR-MCNC: 114 MG/DL — HIGH (ref 70–99)
GLUCOSE BLDC GLUCOMTR-MCNC: 116 MG/DL — HIGH (ref 70–99)
GLUCOSE BLDC GLUCOMTR-MCNC: 119 MG/DL — HIGH (ref 70–99)
GLUCOSE SERPL-MCNC: 100 MG/DL — HIGH (ref 70–99)
HCT VFR BLD CALC: 29.6 % — LOW (ref 34.5–45)
HGB BLD-MCNC: 10.1 G/DL — LOW (ref 11.5–15.5)
IRON SATN MFR SERPL: 16 % — SIGNIFICANT CHANGE UP (ref 14–50)
IRON SATN MFR SERPL: 32 UG/DL — SIGNIFICANT CHANGE UP (ref 30–160)
MCHC RBC-ENTMCNC: 23.3 PG — LOW (ref 27–34)
MCHC RBC-ENTMCNC: 34.1 GM/DL — SIGNIFICANT CHANGE UP (ref 32–36)
MCV RBC AUTO: 68.2 FL — LOW (ref 80–100)
NRBC # BLD: 0 /100 WBCS — SIGNIFICANT CHANGE UP (ref 0–0)
PLATELET # BLD AUTO: 257 K/UL — SIGNIFICANT CHANGE UP (ref 150–400)
POTASSIUM SERPL-MCNC: 4.2 MMOL/L — SIGNIFICANT CHANGE UP (ref 3.5–5.3)
POTASSIUM SERPL-SCNC: 4.2 MMOL/L — SIGNIFICANT CHANGE UP (ref 3.5–5.3)
PROT SERPL-MCNC: 6.4 G/DL — SIGNIFICANT CHANGE UP (ref 6–8.3)
PROT SERPL-MCNC: 6.4 G/DL — SIGNIFICANT CHANGE UP (ref 6–8.3)
RBC # BLD: 4.34 M/UL — SIGNIFICANT CHANGE UP (ref 3.8–5.2)
RBC # BLD: 4.34 M/UL — SIGNIFICANT CHANGE UP (ref 3.8–5.2)
RBC # FLD: 17.3 % — HIGH (ref 10.3–14.5)
RETICS #: 26.7 K/UL — SIGNIFICANT CHANGE UP (ref 25–125)
RETICS/RBC NFR: 0.6 % — SIGNIFICANT CHANGE UP (ref 0.5–2.5)
SODIUM SERPL-SCNC: 136 MMOL/L — SIGNIFICANT CHANGE UP (ref 135–145)
TIBC SERPL-MCNC: 199 UG/DL — LOW (ref 220–430)
TSH SERPL-MCNC: 0.6 UIU/ML — SIGNIFICANT CHANGE UP (ref 0.27–4.2)
UIBC SERPL-MCNC: 167 UG/DL — SIGNIFICANT CHANGE UP (ref 110–370)
VIT B12 SERPL-MCNC: 455 PG/ML — SIGNIFICANT CHANGE UP (ref 232–1245)
WBC # BLD: 5 K/UL — SIGNIFICANT CHANGE UP (ref 3.8–10.5)
WBC # FLD AUTO: 5 K/UL — SIGNIFICANT CHANGE UP (ref 3.8–10.5)

## 2021-05-29 RX ADMIN — OXCARBAZEPINE 450 MILLIGRAM(S): 300 TABLET, FILM COATED ORAL at 21:19

## 2021-05-29 RX ADMIN — Medication 81 MILLIGRAM(S): at 11:49

## 2021-05-29 RX ADMIN — CEFTRIAXONE 100 MILLIGRAM(S): 500 INJECTION, POWDER, FOR SOLUTION INTRAMUSCULAR; INTRAVENOUS at 18:08

## 2021-05-29 RX ADMIN — Medication 650 MILLIGRAM(S): at 08:47

## 2021-05-29 RX ADMIN — ISOSORBIDE MONONITRATE 90 MILLIGRAM(S): 60 TABLET, EXTENDED RELEASE ORAL at 11:48

## 2021-05-29 RX ADMIN — HEPARIN SODIUM 5000 UNIT(S): 5000 INJECTION INTRAVENOUS; SUBCUTANEOUS at 21:19

## 2021-05-29 RX ADMIN — ATORVASTATIN CALCIUM 10 MILLIGRAM(S): 80 TABLET, FILM COATED ORAL at 21:19

## 2021-05-29 RX ADMIN — Medication 25 MILLIGRAM(S): at 05:20

## 2021-05-29 RX ADMIN — AMLODIPINE BESYLATE 10 MILLIGRAM(S): 2.5 TABLET ORAL at 05:20

## 2021-05-29 RX ADMIN — NYSTATIN CREAM 1 APPLICATION(S): 100000 CREAM TOPICAL at 05:20

## 2021-05-29 RX ADMIN — Medication 650 MILLIGRAM(S): at 09:47

## 2021-05-29 RX ADMIN — SENNA PLUS 2 TABLET(S): 8.6 TABLET ORAL at 21:19

## 2021-05-29 RX ADMIN — HEPARIN SODIUM 5000 UNIT(S): 5000 INJECTION INTRAVENOUS; SUBCUTANEOUS at 05:20

## 2021-05-29 RX ADMIN — OXCARBAZEPINE 450 MILLIGRAM(S): 300 TABLET, FILM COATED ORAL at 08:46

## 2021-05-29 RX ADMIN — NYSTATIN CREAM 1 APPLICATION(S): 100000 CREAM TOPICAL at 18:08

## 2021-05-29 RX ADMIN — Medication 25 MILLIGRAM(S): at 15:25

## 2021-05-29 RX ADMIN — HEPARIN SODIUM 5000 UNIT(S): 5000 INJECTION INTRAVENOUS; SUBCUTANEOUS at 15:26

## 2021-05-29 RX ADMIN — Medication 25 MILLIGRAM(S): at 21:19

## 2021-05-29 NOTE — PROGRESS NOTE ADULT - SUBJECTIVE AND OBJECTIVE BOX
SUBJECTIVE / OVERNIGHT EVENTS: pt seen and examined    MEDICATIONS  (STANDING):  amLODIPine   Tablet 10 milliGRAM(s) Oral daily  aspirin enteric coated 81 milliGRAM(s) Oral daily  atorvastatin 10 milliGRAM(s) Oral at bedtime  dextrose 40% Gel 15 Gram(s) Oral once  dextrose 5%. 1000 milliLiter(s) (50 mL/Hr) IV Continuous <Continuous>  dextrose 5%. 1000 milliLiter(s) (100 mL/Hr) IV Continuous <Continuous>  dextrose 50% Injectable 25 Gram(s) IV Push once  dextrose 50% Injectable 12.5 Gram(s) IV Push once  dextrose 50% Injectable 25 Gram(s) IV Push once  glucagon  Injectable 1 milliGRAM(s) IntraMuscular once  heparin   Injectable 5000 Unit(s) SubCutaneous every 8 hours  hydrALAZINE 25 milliGRAM(s) Oral every 8 hours  insulin lispro (ADMELOG) corrective regimen sliding scale   SubCutaneous three times a day before meals  insulin lispro (ADMELOG) corrective regimen sliding scale   SubCutaneous at bedtime  isosorbide   mononitrate ER Tablet (IMDUR) 90 milliGRAM(s) Oral daily  nystatin Powder 1 Application(s) Topical two times a day  OXcarbazepine 450 milliGRAM(s) Oral <User Schedule>  senna 2 Tablet(s) Oral at bedtime    MEDICATIONS  (PRN):  acetaminophen   Tablet .. 650 milliGRAM(s) Oral every 6 hours PRN Mild Pain (1 - 3)    Vital Signs Last 24 Hrs  T(C): 37.2 (29 May 2021 19:59), Max: 37.4 (29 May 2021 04:06)  T(F): 98.9 (29 May 2021 19:59), Max: 99.3 (29 May 2021 04:06)  HR: 63 (29 May 2021 19:59) (57 - 69)  BP: 186/78 (29 May 2021 19:59) (62/37 - 186/78)  BP(mean): --  RR: 18 (29 May 2021 19:59) (18 - 18)  SpO2: 97% (29 May 2021 19:59) (95% - 98%)    Constitutional: No fever, fatigue  Skin: No rash.  Eyes: No recent vision problems or eye pain.  ENT: No congestion, ear pain, or sore throat.  Cardiovascular: No chest pain or palpation.  Respiratory: No cough, shortness of breath, congestion, or wheezing.  Gastrointestinal: No abdominal pain, nausea, vomiting, or diarrhea.  Genitourinary: No dysuria.  Musculoskeletal: No joint swelling.  Neurologic: No headache.    PHYSICAL EXAM:  GENERAL: NAD  EYES: EOMI, PERRLA  NECK: Supple, No JVD  CHEST/LUNG: dec breath sounds rt base  HEART:  S1 , S2 +  ABDOMEN: soft , bs+  EXTREMITIES:  trace edema  NEUROLOGY:alert awake    LABS:      136  |  102  |  26<H>  ----------------------------<  100<H>  4.2   |  21<L>  |  1.10    Ca    9.1      29 May 2021 06:13    TPro  6.4  /  Alb      /  TBili      /  DBili      /  AST      /  ALT      /  AlkPhos          Creatinine Trend: 1.10 <--, 1.23 <--, 1.02 <--, 1.28 <--                        10.1   5.00  )-----------( 257      ( 29 May 2021 06:13 )             29.6     Urine Studies:  Urinalysis Basic - ( 26 May 2021 16:48 )    Color: Light Yellow / Appearance: Clear / S.011 / pH:   Gluc:  / Ketone: Negative  / Bili: Negative / Urobili: Negative   Blood:  / Protein: Trace / Nitrite: Negative   Leuk Esterase: Large / RBC: 6 /hpf / WBC 97 /HPF   Sq Epi:  / Non Sq Epi: 0 /hpf / Bacteria: Negative              LIVER FUNCTIONS - ( 29 May 2021 09:41 )  Alb: x     / Pro: 6.4 g/dL / ALK PHOS: x     / ALT: x     / AST: x     / GGT: x

## 2021-05-29 NOTE — PHYSICAL THERAPY INITIAL EVALUATION ADULT - PERTINENT HX OF CURRENT PROBLEM, REHAB EVAL
Pt is 76Fadmitted 5/26/21 PMHx moderate cognitive impairment, reported prediabetes, seizure disorder, essential HTN and undifferentiated orthostatic hypotension, recent implanted loop recorder placed, with patient sent from [facility] following episodes of dizziness and near syncope last 3 days.

## 2021-05-29 NOTE — PROGRESS NOTE ADULT - SUBJECTIVE AND OBJECTIVE BOX
Ulises Luevano MD  Interventional Cardiology / Endovascular Specialist  [place] Office : [phone] [place] [address] [place] [address]  Tel:   [place] Office : [address] [facility] [place] [address]  Tel: [phone]  Cell : [phone]    HISTORY OF PRESENTING ILLNESS:  HPI:  This is a 76y Female who presents with ***.  	  MEDICATIONS:  amLODIPine   Tablet 10 milliGRAM(s) Oral daily  aspirin enteric coated 81 milliGRAM(s) Oral daily  heparin   Injectable 5000 Unit(s) SubCutaneous every 8 hours  hydrALAZINE 25 milliGRAM(s) Oral every 8 hours  isosorbide   mononitrate ER Tablet (IMDUR) 90 milliGRAM(s) Oral daily        acetaminophen   Tablet .. 650 milliGRAM(s) Oral every 6 hours PRN  OXcarbazepine 450 milliGRAM(s) Oral <User Schedule>    senna 2 Tablet(s) Oral at bedtime    atorvastatin 10 milliGRAM(s) Oral at bedtime  dextrose 40% Gel 15 Gram(s) Oral once  dextrose 50% Injectable 25 Gram(s) IV Push once  dextrose 50% Injectable 12.5 Gram(s) IV Push once  dextrose 50% Injectable 25 Gram(s) IV Push once  glucagon  Injectable 1 milliGRAM(s) IntraMuscular once  insulin lispro (ADMELOG) corrective regimen sliding scale   SubCutaneous three times a day before meals  insulin lispro (ADMELOG) corrective regimen sliding scale   SubCutaneous at bedtime    dextrose 5%. 1000 milliLiter(s) IV Continuous <Continuous>  dextrose 5%. 1000 milliLiter(s) IV Continuous <Continuous>  nystatin Powder 1 Application(s) Topical two times a day      PAST MEDICAL/SURGICAL HISTORY  PAST MEDICAL & SURGICAL HISTORY:  Hypertension    Seizure    Hyperlipidemia    Diverticulosis    Borderline diabetes    Obesity    No Past Surgical History    History of appendectomy        SOCIAL HISTORY: Substance Use (street drugs): ( x ) never used  (  ) other:    FAMILY HISTORY:  Family history of cerebrovascular accident (CVA) (Sibling)        REVIEW OF SYSTEMS:  CONSTITUTIONAL: No fever, weight loss, or fatigue  EYES: No eye pain, visual disturbances, or discharge  ENMT:  No difficulty hearing, tinnitus, vertigo; No sinus or throat pain  BREASTS: No pain, masses, or nipple discharge  GASTROINTESTINAL: No abdominal or epigastric pain. No nausea, vomiting, or hematemesis; No diarrhea or constipation. No melena or hematochezia.  GENITOURINARY: No dysuria, frequency, hematuria, or incontinence  NEUROLOGICAL: No headaches, memory loss, loss of strength, numbness, or tremors  ENDOCRINE: No heat or cold intolerance; No hair loss  MUSCULOSKELETAL: No joint pain or swelling; No muscle, back, or extremity pain  PSYCHIATRIC: No depression, anxiety, mood swings, or difficulty sleeping  HEME/LYMPH: No easy bruising, or bleeding gums  All others negative    PHYSICAL EXAM:  T(C): 37.4 (05-29-21 @ 23:54), Max: 37.4 (05-29-21 @ 04:06)  HR: 61 (05-29-21 @ 23:54) (57 - 69)  BP: 147/79 (05-29-21 @ 23:54) (62/37 - 186/78)  RR: 18 (05-29-21 @ 23:54) (18 - 18)  SpO2: 98% (05-29-21 @ 23:54) (95% - 98%)  Wt(kg): --  I&O's Summary    28 May 2021 07:01  -  29 May 2021 07:00  --------------------------------------------------------  IN: 420 mL / OUT: 350 mL / NET: 70 mL    29 May 2021 07:01  -  30 May 2021 00:20  --------------------------------------------------------  IN: 640 mL / OUT: 550 mL / NET: 90 mL      GENERAL: NAD  EYES: EOMI, PERRLA, conjunctiva and sclera clear  ENMT: No tonsillar erythema, exudates, or enlargement;  Cardiovascular: Normal S1 S2, No JVD, No murmurs, No edema  Respiratory: Lungs clear to auscultation	  Gastrointestinal:  Soft, nontender , + BS	  Extremities: no edema                            10.1   5.00  )-----------( 257      ( 29 May 2021 06:13 )             29.6     05-29    136  |  102  |  26<H>  ----------------------------<  100<H>  4.2   |  21<L>  |  1.10    Ca    9.1      29 May 2021 06:13    TPro  6.4  /  Alb  x   /  TBili  x   /  DBili  x   /  AST  x   /  ALT  x   /  AlkPhos  x   05-29    proBNP:   Lipid Profile:   HgA1c:   TSH: Thyroid Stimulating Hormone, Serum: 0.60 uIU/mL (05-29 @ 10:13)      Consultant(s) Notes Reviewed:  [x ] YES  [ ] NO    Care Discussed with Consultants/Other Providers [ x] YES  [ ] NO    Imaging Personally Reviewed independently:  [x] YES  [ ] NO    All labs, radiologic studies, vitals, orders and medications list reviewed. Patient is seen and examined at bedside. Case discussed with medical team.

## 2021-05-29 NOTE — PHYSICAL THERAPY INITIAL EVALUATION ADULT - GENERAL OBSERVATIONS, REHAB EVAL
received semisupine in bed, A&OX3, following commands, forgetful at times,  pleasant & eager to participate, BS reviewed

## 2021-05-29 NOTE — PHYSICAL THERAPY INITIAL EVALUATION ADULT - GAIT DEVIATIONS NOTED, PT EVAL
decreased ruben/increased time in double stance/decreased velocity of limb motion/decreased step length/decreased weight-shifting ability

## 2021-05-29 NOTE — PROGRESS NOTE ADULT - ASSESSMENT
74 yo woman with PMH of HTN, HLD, seizures, T2DM presents with mechanical fall after possible syncopal event.     EKG: Sinus Kaz 53 no STT changes  Tele: SB 50-60s    1. Syncope  - orthostatic negative  - s/p [facility] previous admission with non obstructive CAD  -echo from 04/2021 with grossly normal LV function and mild diastolic dysfunction  - ILR interrogation with 3 sec pause but deemed not to be cause of syncope , today bradycardic to 30's consider EP eval for possible PPM     2. HTN  -improving  - c/w norvasc 10mg daily, and lisinopril, imdur 90mg daily  - continue to monitor BP    3. Seizures  -on trileptal   -f/u neuro

## 2021-05-29 NOTE — PHYSICAL THERAPY INITIAL EVALUATION ADULT - ADDITIONAL COMMENTS
Pt admitted from [facility], as per care coordination notes, pt was transitioning to LTC.

## 2021-05-30 LAB
ANION GAP SERPL CALC-SCNC: 15 MMOL/L — SIGNIFICANT CHANGE UP (ref 5–17)
BUN SERPL-MCNC: 23 MG/DL — SIGNIFICANT CHANGE UP (ref 7–23)
CALCIUM SERPL-MCNC: 9.2 MG/DL — SIGNIFICANT CHANGE UP (ref 8.4–10.5)
CHLORIDE SERPL-SCNC: 102 MMOL/L — SIGNIFICANT CHANGE UP (ref 96–108)
CO2 SERPL-SCNC: 22 MMOL/L — SIGNIFICANT CHANGE UP (ref 22–31)
CREAT SERPL-MCNC: 1.02 MG/DL — SIGNIFICANT CHANGE UP (ref 0.5–1.3)
GLUCOSE BLDC GLUCOMTR-MCNC: 104 MG/DL — HIGH (ref 70–99)
GLUCOSE BLDC GLUCOMTR-MCNC: 110 MG/DL — HIGH (ref 70–99)
GLUCOSE BLDC GLUCOMTR-MCNC: 114 MG/DL — HIGH (ref 70–99)
GLUCOSE BLDC GLUCOMTR-MCNC: 128 MG/DL — HIGH (ref 70–99)
GLUCOSE SERPL-MCNC: 92 MG/DL — SIGNIFICANT CHANGE UP (ref 70–99)
HCT VFR BLD CALC: 31.3 % — LOW (ref 34.5–45)
HGB BLD-MCNC: 10.7 G/DL — LOW (ref 11.5–15.5)
MCHC RBC-ENTMCNC: 23.5 PG — LOW (ref 27–34)
MCHC RBC-ENTMCNC: 34.2 GM/DL — SIGNIFICANT CHANGE UP (ref 32–36)
MCV RBC AUTO: 68.6 FL — LOW (ref 80–100)
NRBC # BLD: 0 /100 WBCS — SIGNIFICANT CHANGE UP (ref 0–0)
PLATELET # BLD AUTO: 254 K/UL — SIGNIFICANT CHANGE UP (ref 150–400)
POTASSIUM SERPL-MCNC: 4.4 MMOL/L — SIGNIFICANT CHANGE UP (ref 3.5–5.3)
POTASSIUM SERPL-SCNC: 4.4 MMOL/L — SIGNIFICANT CHANGE UP (ref 3.5–5.3)
RBC # BLD: 4.56 M/UL — SIGNIFICANT CHANGE UP (ref 3.8–5.2)
RBC # FLD: 17.2 % — HIGH (ref 10.3–14.5)
SODIUM SERPL-SCNC: 139 MMOL/L — SIGNIFICANT CHANGE UP (ref 135–145)
WBC # BLD: 4.68 K/UL — SIGNIFICANT CHANGE UP (ref 3.8–10.5)
WBC # FLD AUTO: 4.68 K/UL — SIGNIFICANT CHANGE UP (ref 3.8–10.5)

## 2021-05-30 RX ORDER — OXCARBAZEPINE 300 MG/1
300 TABLET, FILM COATED ORAL
Refills: 0 | Status: DISCONTINUED | OUTPATIENT
Start: 2021-05-30 | End: 2021-06-08

## 2021-05-30 RX ADMIN — OXCARBAZEPINE 300 MILLIGRAM(S): 300 TABLET, FILM COATED ORAL at 17:27

## 2021-05-30 RX ADMIN — ATORVASTATIN CALCIUM 10 MILLIGRAM(S): 80 TABLET, FILM COATED ORAL at 21:45

## 2021-05-30 RX ADMIN — NYSTATIN CREAM 1 APPLICATION(S): 100000 CREAM TOPICAL at 05:34

## 2021-05-30 RX ADMIN — ISOSORBIDE MONONITRATE 90 MILLIGRAM(S): 60 TABLET, EXTENDED RELEASE ORAL at 11:17

## 2021-05-30 RX ADMIN — Medication 650 MILLIGRAM(S): at 11:17

## 2021-05-30 RX ADMIN — Medication 650 MILLIGRAM(S): at 18:38

## 2021-05-30 RX ADMIN — Medication 650 MILLIGRAM(S): at 17:28

## 2021-05-30 RX ADMIN — HEPARIN SODIUM 5000 UNIT(S): 5000 INJECTION INTRAVENOUS; SUBCUTANEOUS at 13:12

## 2021-05-30 RX ADMIN — NYSTATIN CREAM 1 APPLICATION(S): 100000 CREAM TOPICAL at 17:30

## 2021-05-30 RX ADMIN — Medication 25 MILLIGRAM(S): at 05:34

## 2021-05-30 RX ADMIN — SENNA PLUS 2 TABLET(S): 8.6 TABLET ORAL at 21:44

## 2021-05-30 RX ADMIN — Medication 650 MILLIGRAM(S): at 12:17

## 2021-05-30 RX ADMIN — Medication 25 MILLIGRAM(S): at 21:44

## 2021-05-30 RX ADMIN — Medication 25 MILLIGRAM(S): at 13:10

## 2021-05-30 RX ADMIN — HEPARIN SODIUM 5000 UNIT(S): 5000 INJECTION INTRAVENOUS; SUBCUTANEOUS at 21:44

## 2021-05-30 RX ADMIN — HEPARIN SODIUM 5000 UNIT(S): 5000 INJECTION INTRAVENOUS; SUBCUTANEOUS at 05:34

## 2021-05-30 RX ADMIN — Medication 81 MILLIGRAM(S): at 11:18

## 2021-05-30 RX ADMIN — AMLODIPINE BESYLATE 10 MILLIGRAM(S): 2.5 TABLET ORAL at 05:34

## 2021-05-30 NOTE — PROGRESS NOTE ADULT - SUBJECTIVE AND OBJECTIVE BOX
Neurology Follow up note    Name  ADOLPH Mendez History - Patient seen and examined this am.             Vital Signs Last 24 Hrs  T(C): 37.1 (30 May 2021 04:00), Max: 37.4 (29 May 2021 23:54)  T(F): 98.7 (30 May 2021 04:00), Max: 99.3 (29 May 2021 23:54)  HR: 62 (30 May 2021 06:31) (57 - 69)  BP: 158/73 (30 May 2021 06:31) (62/37 - 186/78)  BP(mean): --  RR: 18 (30 May 2021 04:00) (18 - 18)  SpO2: 98% (30 May 2021 04:00) (95% - 98%)    PHYSICAL EXAM:      Neurological Exam:  Mental Status - eyse closed oriented to self states why are you bothering me   Cranial Nerves - PERRL, EOMI, VFF, no gross facial asymmetry    Motor Exam -   moves antigravity   nml bulk/tone    Sensory    Intact to light touch and pinprick bilaterally    Coord: FTN intact bilaterally     Gait -  deferred    Medications  acetaminophen   Tablet .. 650 milliGRAM(s) Oral every 6 hours PRN  amLODIPine   Tablet 10 milliGRAM(s) Oral daily  aspirin enteric coated 81 milliGRAM(s) Oral daily  atorvastatin 10 milliGRAM(s) Oral at bedtime  dextrose 40% Gel 15 Gram(s) Oral once  dextrose 5%. 1000 milliLiter(s) IV Continuous <Continuous>  dextrose 5%. 1000 milliLiter(s) IV Continuous <Continuous>  dextrose 50% Injectable 25 Gram(s) IV Push once  dextrose 50% Injectable 12.5 Gram(s) IV Push once  dextrose 50% Injectable 25 Gram(s) IV Push once  glucagon  Injectable 1 milliGRAM(s) IntraMuscular once  heparin   Injectable 5000 Unit(s) SubCutaneous every 8 hours  hydrALAZINE 25 milliGRAM(s) Oral every 8 hours  insulin lispro (ADMELOG) corrective regimen sliding scale   SubCutaneous three times a day before meals  insulin lispro (ADMELOG) corrective regimen sliding scale   SubCutaneous at bedtime  isosorbide   mononitrate ER Tablet (IMDUR) 90 milliGRAM(s) Oral daily  nystatin Powder 1 Application(s) Topical two times a day  OXcarbazepine 450 milliGRAM(s) Oral <User Schedule>  senna 2 Tablet(s) Oral at bedtime      Lab      Radiology

## 2021-05-30 NOTE — PROGRESS NOTE ADULT - SUBJECTIVE AND OBJECTIVE BOX
SUBJECTIVE / OVERNIGHT EVENTS: pt seen and examined    MEDICATIONS  (STANDING):  amLODIPine   Tablet 10 milliGRAM(s) Oral daily  aspirin enteric coated 81 milliGRAM(s) Oral daily  atorvastatin 10 milliGRAM(s) Oral at bedtime  dextrose 40% Gel 15 Gram(s) Oral once  dextrose 5%. 1000 milliLiter(s) (50 mL/Hr) IV Continuous <Continuous>  dextrose 5%. 1000 milliLiter(s) (100 mL/Hr) IV Continuous <Continuous>  dextrose 50% Injectable 25 Gram(s) IV Push once  dextrose 50% Injectable 12.5 Gram(s) IV Push once  dextrose 50% Injectable 25 Gram(s) IV Push once  glucagon  Injectable 1 milliGRAM(s) IntraMuscular once  heparin   Injectable 5000 Unit(s) SubCutaneous every 8 hours  hydrALAZINE 25 milliGRAM(s) Oral every 8 hours  insulin lispro (ADMELOG) corrective regimen sliding scale   SubCutaneous three times a day before meals  insulin lispro (ADMELOG) corrective regimen sliding scale   SubCutaneous at bedtime  isosorbide   mononitrate ER Tablet (IMDUR) 90 milliGRAM(s) Oral daily  nystatin Powder 1 Application(s) Topical two times a day  OXcarbazepine 300 milliGRAM(s) Oral two times a day  senna 2 Tablet(s) Oral at bedtime    MEDICATIONS  (PRN):  acetaminophen   Tablet .. 650 milliGRAM(s) Oral every 6 hours PRN Mild Pain (1 - 3)    Vital Signs Last 24 Hrs  T(C): 37 (30 May 2021 20:34), Max: 37.4 (29 May 2021 23:54)  T(F): 98.6 (30 May 2021 20:34), Max: 99.3 (29 May 2021 23:54)  HR: 59 (30 May 2021 20:34) (59 - 62)  BP: 157/79 (30 May 2021 20:34) (147/79 - 184/73)  BP(mean): --  RR: 18 (30 May 2021 20:34) (18 - 18)  SpO2: 96% (30 May 2021 20:34) (96% - 100%)  Constitutional: No fever, fatigue  Skin: No rash.  Eyes: No recent vision problems or eye pain.  ENT: No congestion, ear pain, or sore throat.  Cardiovascular: No chest pain or palpation.  Respiratory: No cough, shortness of breath, congestion, or wheezing.  Gastrointestinal: No abdominal pain, nausea, vomiting, or diarrhea.  Genitourinary: No dysuria.  Musculoskeletal: No joint swelling.  Neurologic: No headache.    PHYSICAL EXAM:  GENERAL: NAD  EYES: EOMI, PERRLA  NECK: Supple, No JVD  CHEST/LUNG: dec breath sounds rt base  HEART:  S1 , S2 +  ABDOMEN: soft , bs+  EXTREMITIES:  trace edema  NEUROLOGY:alert awake    LABS:      139  |  102  |  23  ----------------------------<  92  4.4   |  22  |  1.02    Ca    9.2      30 May 2021 06:53    TPro  6.4  /  Alb      /  TBili      /  DBili      /  AST      /  ALT      /  AlkPhos          Creatinine Trend: 1.02 <--, 1.10 <--, 1.23 <--, 1.02 <--, 1.28 <--                        10.7   4.68  )-----------( 254      ( 30 May 2021 06:53 )             31.3     Urine Studies:  Urinalysis Basic - ( 26 May 2021 16:48 )    Color: Light Yellow / Appearance: Clear / S.011 / pH:   Gluc:  / Ketone: Negative  / Bili: Negative / Urobili: Negative   Blood:  / Protein: Trace / Nitrite: Negative   Leuk Esterase: Large / RBC: 6 /hpf / WBC 97 /HPF   Sq Epi:  / Non Sq Epi: 0 /hpf / Bacteria: Negative              LIVER FUNCTIONS - ( 29 May 2021 09:41 )  Alb: x     / Pro: 6.4 g/dL / ALK PHOS: x     / ALT: x     / AST: x     / GGT: x

## 2021-05-30 NOTE — PROGRESS NOTE ADULT - ASSESSMENT
76 yo female with a PMHx of HTN, HLD, and seizures here with dizziness, can not get full history as limited by pt's pain. Neuro exam without focality CTH with chronic lacunar infacts. Patient on  BID level on last visit 34 upper range of sujatha Labs show a microcytic anemia      Unclear cause of Nausea general malaise. no clear focality  -OXC maybe playing a role was increased on last visit from 300 BId to 450 BID without clear evidence of seizure and may be playing a role, will start titrating to 300/450  -will get REEG, if normal will consider lowering further. 76 yo female with a PMHx of HTN, HLD, and seizures here with dizziness, can not get full history as limited by pt's pain. Neuro exam without focality CTH with chronic lacunar infacts. Patient on  BID level on last visit 34 upper range of sujatha Labs show a microcytic anemia      Unclear cause of Nausea general malaise. no clear focality  -OXC maybe playing a role was increased on last visit from 300 BId to 450 BID without clear evidence of seizure and may be playing a role, will lower to 300 BID, he roriginal dose, if there is improvement, there may be a role to converting to a different AED  -will get REEG

## 2021-05-30 NOTE — PROGRESS NOTE ADULT - ASSESSMENT
76 yo woman with PMH of HTN, HLD, seizures, T2DM presents with mechanical fall after possible syncopal event.     EKG: Sinus Kaz 53 no STT changes  Tele: SB 50-60s    1. Syncope  - orthostatic negative  - s/p [facility] previous admission with non obstructive CAD  -echo from 04/2021 with grossly normal LV function and mild diastolic dysfunction  - ILR interrogation with 3 sec pause but deemed not to be cause of syncope , today bradycardic to 30's  - EP eval for possible PPM     2. HTN  -improving  - c/w norvasc 10mg daily, and lisinopril, imdur 90mg daily  - continue to monitor BP    3. Seizures  -on trileptal   -f/u neuro

## 2021-05-30 NOTE — PROGRESS NOTE ADULT - SUBJECTIVE AND OBJECTIVE BOX
Ulises Luevano MD  Interventional Cardiology / Advance Heart Failure and Cardiac Transplant Specialist  [place] Office : [address] [place] [address] [place] [address]  Tel:   [place] Office : [address] [facility] [place] [address]  Tel: [phone]  Cell : [phone]    Pt is lying in bed comfortable not in distress, no chest pains no SOB no palpitations  	  MEDICATIONS:  amLODIPine   Tablet 10 milliGRAM(s) Oral daily  aspirin enteric coated 81 milliGRAM(s) Oral daily  heparin   Injectable 5000 Unit(s) SubCutaneous every 8 hours  hydrALAZINE 25 milliGRAM(s) Oral every 8 hours  isosorbide   mononitrate ER Tablet (IMDUR) 90 milliGRAM(s) Oral daily        acetaminophen   Tablet .. 650 milliGRAM(s) Oral every 6 hours PRN  OXcarbazepine 300 milliGRAM(s) Oral two times a day    senna 2 Tablet(s) Oral at bedtime    atorvastatin 10 milliGRAM(s) Oral at bedtime  dextrose 40% Gel 15 Gram(s) Oral once  dextrose 50% Injectable 12.5 Gram(s) IV Push once  dextrose 50% Injectable 25 Gram(s) IV Push once  dextrose 50% Injectable 25 Gram(s) IV Push once  glucagon  Injectable 1 milliGRAM(s) IntraMuscular once  insulin lispro (ADMELOG) corrective regimen sliding scale   SubCutaneous three times a day before meals  insulin lispro (ADMELOG) corrective regimen sliding scale   SubCutaneous at bedtime    dextrose 5%. 1000 milliLiter(s) IV Continuous <Continuous>  dextrose 5%. 1000 milliLiter(s) IV Continuous <Continuous>  nystatin Powder 1 Application(s) Topical two times a day      PAST MEDICAL/SURGICAL HISTORY  PAST MEDICAL & SURGICAL HISTORY:  Hypertension    Seizure    Hyperlipidemia    Diverticulosis    Borderline diabetes    Obesity    No Past Surgical History    History of appendectomy        SOCIAL HISTORY: Substance Use (street drugs): ( x ) never used  (  ) other:    FAMILY HISTORY:  Family history of cerebrovascular accident (CVA) (Sibling)        REVIEW OF SYSTEMS:  CONSTITUTIONAL: No fever, weight loss, or fatigue  EYES: No eye pain, visual disturbances, or discharge  ENMT:  No difficulty hearing, tinnitus, vertigo; No sinus or throat pain  BREASTS: No pain, masses, or nipple discharge  GASTROINTESTINAL: No abdominal or epigastric pain. No nausea, vomiting, or hematemesis; No diarrhea or constipation. No melena or hematochezia.  GENITOURINARY: No dysuria, frequency, hematuria, or incontinence  NEUROLOGICAL: No headaches, memory loss, loss of strength, numbness, or tremors  ENDOCRINE: No heat or cold intolerance; No hair loss  MUSCULOSKELETAL: No joint pain or swelling; No muscle, back, or extremity pain  PSYCHIATRIC: No depression, anxiety, mood swings, or difficulty sleeping  HEME/LYMPH: No easy bruising, or bleeding gums  All others negative    PHYSICAL EXAM:  T(C): 37 (05-30-21 @ 20:34), Max: 37.4 (05-29-21 @ 23:54)  HR: 59 (05-30-21 @ 20:34) (59 - 62)  BP: 157/79 (05-30-21 @ 20:34) (147/79 - 184/73)  RR: 18 (05-30-21 @ 20:34) (18 - 18)  SpO2: 96% (05-30-21 @ 20:34) (96% - 100%)  Wt(kg): --  I&O's Summary    29 May 2021 07:01  -  30 May 2021 07:00  --------------------------------------------------------  IN: 890 mL / OUT: 550 mL / NET: 340 mL    30 May 2021 07:01  -  30 May 2021 23:21  --------------------------------------------------------  IN: 140 mL / OUT: 540 mL / NET: -400 mL        GENERAL: NAD  EYES: EOMI, PERRLA, conjunctiva and sclera clear  ENMT: No tonsillar erythema, exudates, or enlargement;  Cardiovascular: Normal S1 S2, No JVD, No murmurs, No edema  Respiratory: Lungs clear to auscultation	  Gastrointestinal:  Soft, nontender , + BS	  Extremities: no edema                              10.7   4.68  )-----------( 254      ( 30 May 2021 06:53 )             31.3     05-30    139  |  102  |  23  ----------------------------<  92  4.4   |  22  |  1.02    Ca    9.2      30 May 2021 06:53    TPro  6.4  /  Alb  x   /  TBili  x   /  DBili  x   /  AST  x   /  ALT  x   /  AlkPhos  x   05-29    proBNP:   Lipid Profile:   HgA1c:   TSH:     Consultant(s) Notes Reviewed:  [x ] YES  [ ] NO    Care Discussed with Consultants/Other Providers [ x] YES  [ ] NO    Imaging Personally Reviewed independently:  [x] YES  [ ] NO    All labs, radiologic studies, vitals, orders and medications list reviewed. Patient is seen and examined at bedside. Case discussed with medical team.

## 2021-05-31 LAB
GLUCOSE BLDC GLUCOMTR-MCNC: 103 MG/DL — HIGH (ref 70–99)
GLUCOSE BLDC GLUCOMTR-MCNC: 109 MG/DL — HIGH (ref 70–99)
GLUCOSE BLDC GLUCOMTR-MCNC: 120 MG/DL — HIGH (ref 70–99)
GLUCOSE BLDC GLUCOMTR-MCNC: 147 MG/DL — HIGH (ref 70–99)

## 2021-05-31 RX ADMIN — HEPARIN SODIUM 5000 UNIT(S): 5000 INJECTION INTRAVENOUS; SUBCUTANEOUS at 14:44

## 2021-05-31 RX ADMIN — OXCARBAZEPINE 300 MILLIGRAM(S): 300 TABLET, FILM COATED ORAL at 16:59

## 2021-05-31 RX ADMIN — Medication 81 MILLIGRAM(S): at 12:29

## 2021-05-31 RX ADMIN — AMLODIPINE BESYLATE 10 MILLIGRAM(S): 2.5 TABLET ORAL at 05:33

## 2021-05-31 RX ADMIN — SENNA PLUS 2 TABLET(S): 8.6 TABLET ORAL at 21:34

## 2021-05-31 RX ADMIN — OXCARBAZEPINE 300 MILLIGRAM(S): 300 TABLET, FILM COATED ORAL at 05:34

## 2021-05-31 RX ADMIN — HEPARIN SODIUM 5000 UNIT(S): 5000 INJECTION INTRAVENOUS; SUBCUTANEOUS at 21:34

## 2021-05-31 RX ADMIN — Medication 650 MILLIGRAM(S): at 12:30

## 2021-05-31 RX ADMIN — Medication 200 MILLIGRAM(S): at 15:11

## 2021-05-31 RX ADMIN — Medication 650 MILLIGRAM(S): at 13:30

## 2021-05-31 RX ADMIN — Medication 25 MILLIGRAM(S): at 05:33

## 2021-05-31 RX ADMIN — ISOSORBIDE MONONITRATE 90 MILLIGRAM(S): 60 TABLET, EXTENDED RELEASE ORAL at 12:34

## 2021-05-31 RX ADMIN — ATORVASTATIN CALCIUM 10 MILLIGRAM(S): 80 TABLET, FILM COATED ORAL at 21:34

## 2021-05-31 RX ADMIN — NYSTATIN CREAM 1 APPLICATION(S): 100000 CREAM TOPICAL at 16:58

## 2021-05-31 RX ADMIN — HEPARIN SODIUM 5000 UNIT(S): 5000 INJECTION INTRAVENOUS; SUBCUTANEOUS at 05:34

## 2021-05-31 RX ADMIN — Medication 25 MILLIGRAM(S): at 14:44

## 2021-05-31 RX ADMIN — NYSTATIN CREAM 1 APPLICATION(S): 100000 CREAM TOPICAL at 05:34

## 2021-05-31 RX ADMIN — Medication 25 MILLIGRAM(S): at 21:34

## 2021-05-31 NOTE — PROGRESS NOTE ADULT - ASSESSMENT
74 yo woman with PMH of HTN, HLD, seizures, T2DM presents with mechanical fall after possible syncopal event.     EKG: Sinus Kaz 53 no STT changes  Tele: SB 50-60s    1. Syncope  - orthostatic negative  - s/p [facility] previous admission with non obstructive CAD  - echo from 04/2021 with grossly normal LV function and mild diastolic dysfunction  - ILR interrogation with 3 sec pause but deemed not to be cause of syncope ,  bradycardic to 30's on occasion   - f/u Neuro planned for REEG   - Recommend EP eval for possible PPM     2. HTN  -improving  - c/w norvasc 10mg daily, and lisinopril, imdur 90mg daily  - continue to monitor BP    3. Seizures  -on trileptal   -f/u neuro

## 2021-05-31 NOTE — PROGRESS NOTE ADULT - SUBJECTIVE AND OBJECTIVE BOX
Ulises Luevano MD  Interventional Cardiology / Endovascular Specialist  [place] Office : [address] [place] [address] [place] [address]  Tel:   [place] Office : [address] [facility] [place] [address]  Tel: [phone]  Cell : [phone]    Pt is lying in bed comfortable not in distress, no chest pains no SOB no palpitations  	  MEDICATIONS:  amLODIPine   Tablet 10 milliGRAM(s) Oral daily  aspirin enteric coated 81 milliGRAM(s) Oral daily  heparin   Injectable 5000 Unit(s) SubCutaneous every 8 hours  hydrALAZINE 25 milliGRAM(s) Oral every 8 hours  isosorbide   mononitrate ER Tablet (IMDUR) 90 milliGRAM(s) Oral daily      guaiFENesin Oral Liquid (Sugar-Free) 200 milliGRAM(s) Oral every 6 hours PRN    acetaminophen   Tablet .. 650 milliGRAM(s) Oral every 6 hours PRN  OXcarbazepine 300 milliGRAM(s) Oral two times a day    senna 2 Tablet(s) Oral at bedtime    atorvastatin 10 milliGRAM(s) Oral at bedtime  dextrose 40% Gel 15 Gram(s) Oral once  dextrose 50% Injectable 12.5 Gram(s) IV Push once  dextrose 50% Injectable 25 Gram(s) IV Push once  dextrose 50% Injectable 25 Gram(s) IV Push once  glucagon  Injectable 1 milliGRAM(s) IntraMuscular once  insulin lispro (ADMELOG) corrective regimen sliding scale   SubCutaneous three times a day before meals  insulin lispro (ADMELOG) corrective regimen sliding scale   SubCutaneous at bedtime    dextrose 5%. 1000 milliLiter(s) IV Continuous <Continuous>  dextrose 5%. 1000 milliLiter(s) IV Continuous <Continuous>  nystatin Powder 1 Application(s) Topical two times a day      PAST MEDICAL/SURGICAL HISTORY  PAST MEDICAL & SURGICAL HISTORY:  Hypertension    Seizure    Hyperlipidemia    Diverticulosis    Borderline diabetes    Obesity    No Past Surgical History    History of appendectomy        SOCIAL HISTORY: Substance Use (street drugs): ( x ) never used  (  ) other:    FAMILY HISTORY:  Family history of cerebrovascular accident (CVA) (Sibling)        REVIEW OF SYSTEMS:  CONSTITUTIONAL: No fever, weight loss, or fatigue  EYES: No eye pain, visual disturbances, or discharge  ENMT:  No difficulty hearing, tinnitus, vertigo; No sinus or throat pain  BREASTS: No pain, masses, or nipple discharge  GASTROINTESTINAL: No abdominal or epigastric pain. No nausea, vomiting, or hematemesis; No diarrhea or constipation. No melena or hematochezia.  GENITOURINARY: No dysuria, frequency, hematuria, or incontinence  NEUROLOGICAL: No headaches, memory loss, loss of strength, numbness, or tremors  ENDOCRINE: No heat or cold intolerance; No hair loss  MUSCULOSKELETAL: No joint pain or swelling; No muscle, back, or extremity pain  PSYCHIATRIC: No depression, anxiety, mood swings, or difficulty sleeping  HEME/LYMPH: No easy bruising, or bleeding gums  All others negative    PHYSICAL EXAM:  T(C): 37.6 (05-31-21 @ 12:13), Max: 37.6 (05-31-21 @ 12:13)  HR: 60 (05-31-21 @ 14:41) (49 - 67)  BP: 129/77 (05-31-21 @ 14:41) (129/77 - 173/76)  RR: 18 (05-31-21 @ 14:41) (18 - 18)  SpO2: 97% (05-31-21 @ 14:41) (95% - 100%)  Wt(kg): --  I&O's Summary    30 May 2021 07:01  -  31 May 2021 07:00  --------------------------------------------------------  IN: 140 mL / OUT: 840 mL / NET: -700 mL    31 May 2021 07:01  -  31 May 2021 15:42  --------------------------------------------------------  IN: 0 mL / OUT: 400 mL / NET: -400 mL        GENERAL: NAD  EYES: EOMI, PERRLA, conjunctiva and sclera clear  ENMT: No tonsillar erythema, exudates, or enlargement;  Cardiovascular: Normal S1 S2, No JVD, No murmurs, No edema  Respiratory: Lungs clear to auscultation	  Gastrointestinal:  Soft, nontender , + BS	  Extremities: no edema                            10.7   4.68  )-----------( 254      ( 30 May 2021 06:53 )             31.3     05-30    139  |  102  |  23  ----------------------------<  92  4.4   |  22  |  1.02    Ca    9.2      30 May 2021 06:53      proBNP:   Lipid Profile:   HgA1c:   TSH:     Consultant(s) Notes Reviewed:  [x ] YES  [ ] NO    Care Discussed with Consultants/Other Providers [ x] YES  [ ] NO    Imaging Personally Reviewed independently:  [x] YES  [ ] NO    All labs, radiologic studies, vitals, orders and medications list reviewed. Patient is seen and examined at bedside. Case discussed with medical team.

## 2021-06-01 LAB
ANION GAP SERPL CALC-SCNC: 13 MMOL/L — SIGNIFICANT CHANGE UP (ref 5–17)
BUN SERPL-MCNC: 25 MG/DL — HIGH (ref 7–23)
CALCIUM SERPL-MCNC: 9.4 MG/DL — SIGNIFICANT CHANGE UP (ref 8.4–10.5)
CHLORIDE SERPL-SCNC: 99 MMOL/L — SIGNIFICANT CHANGE UP (ref 96–108)
CO2 SERPL-SCNC: 22 MMOL/L — SIGNIFICANT CHANGE UP (ref 22–31)
CREAT SERPL-MCNC: 1.12 MG/DL — SIGNIFICANT CHANGE UP (ref 0.5–1.3)
CULTURE RESULTS: SIGNIFICANT CHANGE UP
CULTURE RESULTS: SIGNIFICANT CHANGE UP
GLUCOSE BLDC GLUCOMTR-MCNC: 105 MG/DL — HIGH (ref 70–99)
GLUCOSE BLDC GLUCOMTR-MCNC: 106 MG/DL — HIGH (ref 70–99)
GLUCOSE BLDC GLUCOMTR-MCNC: 139 MG/DL — HIGH (ref 70–99)
GLUCOSE BLDC GLUCOMTR-MCNC: 147 MG/DL — HIGH (ref 70–99)
GLUCOSE SERPL-MCNC: 94 MG/DL — SIGNIFICANT CHANGE UP (ref 70–99)
HCT VFR BLD CALC: 29.2 % — LOW (ref 34.5–45)
HGB BLD-MCNC: 9.9 G/DL — LOW (ref 11.5–15.5)
MCHC RBC-ENTMCNC: 23.2 PG — LOW (ref 27–34)
MCHC RBC-ENTMCNC: 33.9 GM/DL — SIGNIFICANT CHANGE UP (ref 32–36)
MCV RBC AUTO: 68.4 FL — LOW (ref 80–100)
NRBC # BLD: 0 /100 WBCS — SIGNIFICANT CHANGE UP (ref 0–0)
PLATELET # BLD AUTO: 266 K/UL — SIGNIFICANT CHANGE UP (ref 150–400)
POTASSIUM SERPL-MCNC: 4.5 MMOL/L — SIGNIFICANT CHANGE UP (ref 3.5–5.3)
POTASSIUM SERPL-SCNC: 4.5 MMOL/L — SIGNIFICANT CHANGE UP (ref 3.5–5.3)
RBC # BLD: 4.27 M/UL — SIGNIFICANT CHANGE UP (ref 3.8–5.2)
RBC # FLD: 17.1 % — HIGH (ref 10.3–14.5)
SODIUM SERPL-SCNC: 134 MMOL/L — LOW (ref 135–145)
SPECIMEN SOURCE: SIGNIFICANT CHANGE UP
SPECIMEN SOURCE: SIGNIFICANT CHANGE UP
WBC # BLD: 4.38 K/UL — SIGNIFICANT CHANGE UP (ref 3.8–10.5)
WBC # FLD AUTO: 4.38 K/UL — SIGNIFICANT CHANGE UP (ref 3.8–10.5)

## 2021-06-01 PROCEDURE — 99221 1ST HOSP IP/OBS SF/LOW 40: CPT

## 2021-06-01 RX ADMIN — Medication 25 MILLIGRAM(S): at 05:34

## 2021-06-01 RX ADMIN — HEPARIN SODIUM 5000 UNIT(S): 5000 INJECTION INTRAVENOUS; SUBCUTANEOUS at 13:29

## 2021-06-01 RX ADMIN — Medication 81 MILLIGRAM(S): at 11:36

## 2021-06-01 RX ADMIN — Medication 25 MILLIGRAM(S): at 13:29

## 2021-06-01 RX ADMIN — OXCARBAZEPINE 300 MILLIGRAM(S): 300 TABLET, FILM COATED ORAL at 17:30

## 2021-06-01 RX ADMIN — HEPARIN SODIUM 5000 UNIT(S): 5000 INJECTION INTRAVENOUS; SUBCUTANEOUS at 21:01

## 2021-06-01 RX ADMIN — OXCARBAZEPINE 300 MILLIGRAM(S): 300 TABLET, FILM COATED ORAL at 05:34

## 2021-06-01 RX ADMIN — Medication 650 MILLIGRAM(S): at 06:10

## 2021-06-01 RX ADMIN — ISOSORBIDE MONONITRATE 90 MILLIGRAM(S): 60 TABLET, EXTENDED RELEASE ORAL at 11:36

## 2021-06-01 RX ADMIN — Medication 200 MILLIGRAM(S): at 17:31

## 2021-06-01 RX ADMIN — Medication 650 MILLIGRAM(S): at 05:37

## 2021-06-01 RX ADMIN — NYSTATIN CREAM 1 APPLICATION(S): 100000 CREAM TOPICAL at 17:29

## 2021-06-01 RX ADMIN — ATORVASTATIN CALCIUM 10 MILLIGRAM(S): 80 TABLET, FILM COATED ORAL at 21:00

## 2021-06-01 RX ADMIN — SENNA PLUS 2 TABLET(S): 8.6 TABLET ORAL at 21:00

## 2021-06-01 RX ADMIN — Medication 25 MILLIGRAM(S): at 21:00

## 2021-06-01 RX ADMIN — NYSTATIN CREAM 1 APPLICATION(S): 100000 CREAM TOPICAL at 05:38

## 2021-06-01 RX ADMIN — AMLODIPINE BESYLATE 10 MILLIGRAM(S): 2.5 TABLET ORAL at 05:33

## 2021-06-01 RX ADMIN — HEPARIN SODIUM 5000 UNIT(S): 5000 INJECTION INTRAVENOUS; SUBCUTANEOUS at 05:34

## 2021-06-01 NOTE — CONSULT NOTE ADULT - ATTENDING COMMENTS
Syncope likely vasovagal (increasing P to P to bradycardia).  Patient with normal LV function and nonobstructive CAD ... favor DC imdur.  Favor low dose beta blocker.  No indication for PPM.

## 2021-06-01 NOTE — DIETITIAN INITIAL EVALUATION ADULT. - OTHER INFO
Nutrition Supplements PTA: none per pt daughter    Pt UBW: ~200 lbs and pt daughter denies any recent wt changes.  Weight history per chart: 255.9 lbs (2/19/19), 215 lbs (9/25/20), and 224.8 lbs (5/1/21).  Pt dosing wt noted as 190 lbs (stated wt). Dosing wt indicative of wt loss? Recommend new wt to verify accuracy.    Pt noted with variable po intake in-patient, consuming 0-80% of meals per nursing flow sheet. PCA reports pt is able to feed herself, she sometimes eats less if she receives food she dislikes. Pt daughter reports she assists pt with meal selections when she visits. RD obtained pt food preferences as well at this time, to be honored as able and pt to trial oral nutrition supplement.  Pt has no c/o nausea, vomiting, diarrhea, or constipation noted.

## 2021-06-01 NOTE — DIETITIAN INITIAL EVALUATION ADULT. - ORAL INTAKE PTA/DIET HISTORY
Pt daughter reports pt eating well PTA, she had been at [facility] for ~20 days PTA and eating well there. She follows a low sodium and low sugar diet in general. Pt with food allergies to lobster and seafood, as well as lactose intolerance. No chewing/swallowing difficulty reported.

## 2021-06-01 NOTE — DIETITIAN INITIAL EVALUATION ADULT. - CHIEF COMPLAINT
Per chart, pt is "77 yo woman with PMH of HTN, HLD, seizures, T2DM presents with mechanical fall after possible syncopal event." Per pt daughter, DM is pre-diabetes ?

## 2021-06-01 NOTE — DIETITIAN INITIAL EVALUATION ADULT. - REASON FOR ADMISSION
Referred from [facility] following episodes of dizziness and near syncope last 3 days.

## 2021-06-01 NOTE — PROGRESS NOTE ADULT - SUBJECTIVE AND OBJECTIVE BOX
SUBJECTIVE / OVERNIGHT EVENTS: pt seen and examined    MEDICATIONS  (STANDING):  amLODIPine   Tablet 10 milliGRAM(s) Oral daily  aspirin enteric coated 81 milliGRAM(s) Oral daily  atorvastatin 10 milliGRAM(s) Oral at bedtime  dextrose 40% Gel 15 Gram(s) Oral once  dextrose 5%. 1000 milliLiter(s) (50 mL/Hr) IV Continuous <Continuous>  dextrose 5%. 1000 milliLiter(s) (100 mL/Hr) IV Continuous <Continuous>  dextrose 50% Injectable 25 Gram(s) IV Push once  dextrose 50% Injectable 12.5 Gram(s) IV Push once  dextrose 50% Injectable 25 Gram(s) IV Push once  glucagon  Injectable 1 milliGRAM(s) IntraMuscular once  heparin   Injectable 5000 Unit(s) SubCutaneous every 8 hours  hydrALAZINE 25 milliGRAM(s) Oral every 8 hours  insulin lispro (ADMELOG) corrective  Vital Signs Last 24 Hrs  T(C): 36.4 (21 @ 12:17), Max: 37 (21 @ 20:05)  T(F): 97.5 (21 @ 12:17), Max: 98.6 (21 @ 20:05)  HR: 62 (21 @ 13:29) (53 - 64)  BP: 105/65 (21 @ 13:29) (103/54 - 171/79)  RR: 18 (21 @ 13:29) (18 - 18)  SpO2: 96% (21 @ 13:29) (95% - 98%)  Wt(kg): --     regimen sliding scale   SubCutaneous three times a day before meals  insulin lispro (ADMELOG) corrective regimen sliding scale   SubCutaneous at bedtime  isosorbide   mononitrate ER Tablet (IMDUR) 90 milliGRAM(s) Oral daily  nystatin Powder 1 Application(s) Topical two times a day  OXcarbazepine 300 milliGRAM(s) Oral two times a day  senna 2 Tablet(s) Oral at bedtime    MEDICATIONS  (PRN):  acetaminophen   Tablet .. 650 milliGRAM(s) Oral every 6 hours PRN Mild Pain (1 - 3)  guaiFENesin Oral Liquid (Sugar-Free) 200 milliGRAM(s) Oral every 6 hours PRN Cough  Constitutional: No fever, fatigue  Skin: No rash.  Eyes: No recent vision problems or eye pain.  ENT: No congestion, ear pain, or sore throat.  Cardiovascular: No chest pain or palpation.  Respiratory: No cough, shortness of breath, congestion, or wheezing.  Gastrointestinal: No abdominal pain, nausea, vomiting, or diarrhea.  Genitourinary: No dysuria.  Musculoskeletal: No joint swelling.  Neurologic: No headache.    PHYSICAL EXAM:  GENERAL: NAD  EYES: EOMI, PERRLA  NECK: Supple, No JVD  CHEST/LUNG: dec breath sounds rt base  HEART:  S1 , S2 +  ABDOMEN: soft , bs+  EXTREMITIES:  trace edema  NEUROLOGY:alert awake    LABS:      134<L>  |  99  |  25<H>  ----------------------------<  94  4.5   |  22  |  1.12    Ca    9.4      2021 05:57      Creatinine Trend: 1.12 <--, 1.02 <--, 1.10 <--, 1.23 <--, 1.02 <--, 1.28 <--                        9.9    4.38  )-----------( 266      ( 2021 05:57 )             29.2     Urine Studies:  Urinalysis Basic - ( 26 May 2021 16:48 )    Color: Light Yellow / Appearance: Clear / S.011 / pH:   Gluc:  / Ketone: Negative  / Bili: Negative / Urobili: Negative   Blood:  / Protein: Trace / Nitrite: Negative   Leuk Esterase: Large / RBC: 6 /hpf / WBC 97 /HPF   Sq Epi:  / Non Sq Epi: 0 /hpf / Bacteria: Negative                  C 97 /HPF   Sq Epi:  / Non Sq Epi: 0 /hpf / Bacteria: Negative

## 2021-06-01 NOTE — PROGRESS NOTE ADULT - ASSESSMENT
Assessment and Plan    77 yo woman with PMH of HTN, HLD, seizures, T2DM presents with mechanical fall after possible syncopal event.     EKG: Sinus Kaz 53 no STT changes  Tele: SB 50-60s    1. Syncope  - orthostatic negative  - s/p [facility] previous admission with non obstructive CAD  - echo from 04/2021 with grossly normal LV function and mild diastolic dysfunction  - ILR interrogation with 3 sec pause but deemed not to be cause of syncope ,  bradycardic to 30's on occasion   - f/u Neuro planned for REEG   - Recommend EP eval for possible PPM     2. HTN  -improving  - c/w norvasc 10mg daily,  imdur 90mg daily and hydralazine  - continue to monitor BP    3. Seizures  -on trileptal   -f/u neuro

## 2021-06-01 NOTE — CONSULT NOTE ADULT - SUBJECTIVE AND OBJECTIVE BOX
CHIEF COMPLAINT:  Syncope    HISTORY OF PRESENT ILLNESS:  77 y/o female resident of [facility] with PMH of HTN, HPL, T2DM, Seizure disorder and multiple syncopal episodes resulting in placement of an ILR by Dr Mendenhall on 4/20/21. On 4/4/21 patient presented to [facility] after being found on the floor of her facility. Then again while in the hospital on 4/11/21 she syncopized and became unresponsive after an episode of emesis. She was bradycardic with HR 40-60 bpm with no evidence of P-P prolongation. Patient had positive orthostatics. ILR was placed on 4/20/21. Then on 5/1/21 patient presented with another episode of syncope while in the bathroom. She has no recall and orthostatics where negative. The ILR was interrogated revealing no events. A cath was done revealing normal cors and preserved EF. Of note, patient is a poor historian with diminished recall. However, daughter does report that patient becomes dizzy upon standing.     Allergies    allergy to Lobster (Swelling)  No Known Drug Allergies  seaford (Unknown)    Intolerances    lactose (Stomach Upset)  	    MEDICATIONS:  amLODIPine   Tablet 10 milliGRAM(s) Oral daily  aspirin enteric coated 81 milliGRAM(s) Oral daily  heparin   Injectable 5000 Unit(s) SubCutaneous every 8 hours  hydrALAZINE 25 milliGRAM(s) Oral every 8 hours  isosorbide   mononitrate ER Tablet (IMDUR) 90 milliGRAM(s) Oral daily      guaiFENesin Oral Liquid (Sugar-Free) 200 milliGRAM(s) Oral every 6 hours PRN    acetaminophen   Tablet .. 650 milliGRAM(s) Oral every 6 hours PRN  OXcarbazepine 300 milliGRAM(s) Oral two times a day    senna 2 Tablet(s) Oral at bedtime    atorvastatin 10 milliGRAM(s) Oral at bedtime  dextrose 40% Gel 15 Gram(s) Oral once  dextrose 50% Injectable 25 Gram(s) IV Push once  dextrose 50% Injectable 12.5 Gram(s) IV Push once  dextrose 50% Injectable 25 Gram(s) IV Push once  glucagon  Injectable 1 milliGRAM(s) IntraMuscular once  insulin lispro (ADMELOG) corrective regimen sliding scale   SubCutaneous three times a day before meals  insulin lispro (ADMELOG) corrective regimen sliding scale   SubCutaneous at bedtime    dextrose 5%. 1000 milliLiter(s) IV Continuous <Continuous>  dextrose 5%. 1000 milliLiter(s) IV Continuous <Continuous>  nystatin Powder 1 Application(s) Topical two times a day      PAST MEDICAL & SURGICAL HISTORY:  Hypertension    Seizure    Hyperlipidemia    Diverticulosis    Borderline diabetes    Obesity    No Past Surgical History    History of appendectomy        FAMILY HISTORY:  Family history of cerebrovascular accident (CVA) (Sibling)        SOCIAL HISTORY:    [ ]Non-smoker  [ ] Smoker  [ ] Alcohol      REVIEW OF SYSTEMS:  See HPI. Otherwise, 10 point ROS done and otherwise negative.    PHYSICAL EXAM:  T(C): 36.4 (06-01-21 @ 12:17), Max: 37 (05-31-21 @ 20:05)  HR: 62 (06-01-21 @ 13:29) (53 - 64)  BP: 105/65 (06-01-21 @ 13:29) (103/54 - 171/79)  RR: 18 (06-01-21 @ 13:29) (18 - 18)  SpO2: 96% (06-01-21 @ 13:29) (95% - 98%)  Wt(kg): --  I&O's Summary    31 May 2021 07:01  -  01 Jun 2021 07:00  --------------------------------------------------------  IN: 180 mL / OUT: 450 mL / NET: -270 mL    01 Jun 2021 07:01  -  01 Jun 2021 15:15  --------------------------------------------------------  IN: 330 mL / OUT: 0 mL / NET: 330 mL        Appearance: Alert. NAD	  HEENT:   NC/AT	  Cardiovascular: +S1S2 RRR no m/g/r  Respiratory: CTA B/L	  Psychiatry: A & O x 3, Mood & affect appropriate  Gastrointestinal:  Soft, NT.ND., + BS	  Skin: No rashes	  Neurologic: Non-focal  Extremities: No edema BLE  Vascular: Peripheral pulses palpable 2+ bilaterally      LABS:	 	    CBC Full  -  ( 01 Jun 2021 05:57 )  WBC Count : 4.38 K/uL  Hemoglobin : 9.9 g/dL  Hematocrit : 29.2 %  Platelet Count - Automated : 266 K/uL  Mean Cell Volume : 68.4 fl  Mean Cell Hemoglobin : 23.2 pg  Mean Cell Hemoglobin Concentration : 33.9 gm/dL  Auto Neutrophil # : x  Auto Lymphocyte # : x  Auto Monocyte # : x  Auto Eosinophil # : x  Auto Basophil # : x  Auto Neutrophil % : x  Auto Lymphocyte % : x  Auto Monocyte % : x  Auto Eosinophil % : x  Auto Basophil % : x    06-01    134<L>  |  99  |  25<H>  ----------------------------<  94  4.5   |  22  |  1.12    Ca    9.4      01 Jun 2021 05:57        proBNP:   Lipid Profile:   HgA1c:   TSH:       CARDIAC MARKERS:                TELEMETRY: 	    ECG:  	  RADIOLOGY:  OTHER: 	    PREVIOUS DIAGNOSTIC TESTING:    Echocardiogram:    Catheterization:    Stress Test:  	  	  ASSESSMENT/PLAN: 	     CHIEF COMPLAINT:  Syncope    HISTORY OF PRESENT ILLNESS:  77 y/o female resident of [facility] with PMH of HTN, HPL, T2DM, Seizure disorder and multiple syncopal episodes resulting in placement of an ILR by Dr Mendenhall on 4/20/21. On 4/4/21 patient presented to [facility] after being found on the floor of her facility. Then again while in the hospital on 4/11/21 she syncopized and became unresponsive after an episode of emesis. She was bradycardic with HR 40-60 bpm with no evidence of P-P prolongation. Patient had positive orthostatics. ILR was placed on 4/20/21. Then on 5/1/21 patient presented with another episode of syncope while in the bathroom. She has no recall and orthostatics where negative. The ILR was interrogated revealing no events. A cath was done revealing normal cors and preserved EF. Of note, patient is a poor historian with diminished recall. However, daughter does report that patient becomes dizzy upon standing.     Allergies    allergy to Lobster (Swelling)  No Known Drug Allergies  seaford (Unknown)    Intolerances    lactose (Stomach Upset)  	    MEDICATIONS:  amLODIPine   Tablet 10 milliGRAM(s) Oral daily  aspirin enteric coated 81 milliGRAM(s) Oral daily  heparin   Injectable 5000 Unit(s) SubCutaneous every 8 hours  hydrALAZINE 25 milliGRAM(s) Oral every 8 hours  isosorbide   mononitrate ER Tablet (IMDUR) 90 milliGRAM(s) Oral daily      guaiFENesin Oral Liquid (Sugar-Free) 200 milliGRAM(s) Oral every 6 hours PRN    acetaminophen   Tablet .. 650 milliGRAM(s) Oral every 6 hours PRN  OXcarbazepine 300 milliGRAM(s) Oral two times a day    senna 2 Tablet(s) Oral at bedtime    atorvastatin 10 milliGRAM(s) Oral at bedtime  dextrose 40% Gel 15 Gram(s) Oral once  dextrose 50% Injectable 25 Gram(s) IV Push once  dextrose 50% Injectable 12.5 Gram(s) IV Push once  dextrose 50% Injectable 25 Gram(s) IV Push once  glucagon  Injectable 1 milliGRAM(s) IntraMuscular once  insulin lispro (ADMELOG) corrective regimen sliding scale   SubCutaneous three times a day before meals  insulin lispro (ADMELOG) corrective regimen sliding scale   SubCutaneous at bedtime    dextrose 5%. 1000 milliLiter(s) IV Continuous <Continuous>  dextrose 5%. 1000 milliLiter(s) IV Continuous <Continuous>  nystatin Powder 1 Application(s) Topical two times a day      PAST MEDICAL & SURGICAL HISTORY:  Hypertension    Seizure    Hyperlipidemia    Diverticulosis    Borderline diabetes    Obesity    No Past Surgical History    History of appendectomy        FAMILY HISTORY:  Family history of cerebrovascular accident (CVA) (Sibling)        SOCIAL HISTORY:    [ ]Non-smoker  [ ] Smoker  [ ] Alcohol      REVIEW OF SYSTEMS:  See HPI. Otherwise, 10 point ROS done and otherwise negative.    PHYSICAL EXAM:  T(C): 36.4 (06-01-21 @ 12:17), Max: 37 (05-31-21 @ 20:05)  HR: 62 (06-01-21 @ 13:29) (53 - 64)  BP: 105/65 (06-01-21 @ 13:29) (103/54 - 171/79)  RR: 18 (06-01-21 @ 13:29) (18 - 18)  SpO2: 96% (06-01-21 @ 13:29) (95% - 98%)  Wt(kg): --  I&O's Summary    31 May 2021 07:01  -  01 Jun 2021 07:00  --------------------------------------------------------  IN: 180 mL / OUT: 450 mL / NET: -270 mL    01 Jun 2021 07:01  -  01 Jun 2021 15:15  --------------------------------------------------------  IN: 330 mL / OUT: 0 mL / NET: 330 mL        Appearance: Alert. NAD	  HEENT:   NC/AT	  Cardiovascular: +S1S2 RRR no m/g/r  Respiratory: CTA B/L	  Psychiatry: A & O x 3, Mood & affect appropriate  Gastrointestinal:  Soft, NT.ND., + BS	  Skin: No rashes	  Neurologic: Non-focal  Extremities: No edema BLE  Vascular: Peripheral pulses palpable 2+ bilaterally      LABS:	 	    CBC Full  -  ( 01 Jun 2021 05:57 )  WBC Count : 4.38 K/uL  Hemoglobin : 9.9 g/dL  Hematocrit : 29.2 %  Platelet Count - Automated : 266 K/uL  Mean Cell Volume : 68.4 fl  Mean Cell Hemoglobin : 23.2 pg  Mean Cell Hemoglobin Concentration : 33.9 gm/dL  Auto Neutrophil # : x  Auto Lymphocyte # : x  Auto Monocyte # : x  Auto Eosinophil # : x  Auto Basophil # : x  Auto Neutrophil % : x  Auto Lymphocyte % : x  Auto Monocyte % : x  Auto Eosinophil % : x  Auto Basophil % : x    06-01    134<L>  |  99  |  25<H>  ----------------------------<  94  4.5   |  22  |  1.12    Ca    9.4      01 Jun 2021 05:57        proBNP:   Lipid Profile:   HgA1c:   TSH:       CARDIAC MARKERS:                TELEMETRY: 	    ECG:  	  RADIOLOGY:  OTHER: 	    PREVIOUS DIAGNOSTIC TESTING:    Echocardiogram:  Patient name: ADOLPH LANGE  YOB: 1945   Age: 76 (F)   MR#: [id number]  Study Date: 4/7/2021  [place] [facility] [place] Sheila Lakhani JOSEP  Study quality: Technically Fair  Referring Physician: Patrick Magallanes MD  Blood Pressure: 133/77 mmHg  Height: 165 cm  Weight: 106 kg  BSA: 2.1 m2  ------------------------------------------------------------------------  PROCEDURE: Transthoracic echocardiogram with 2-D, M-Mode  and complete spectral and color flow Doppler.  INDICATION: Syncope and collapse (R55)  ------------------------------------------------------------------------  DIMENSIONS:  Dimensions:     Normal Values:  LA:     4.5 cm    2.0 - 4.0 cm  Ao:     3.2 cm    2.0 - 3.8 cm  SEPTUM: 1.2 cm    0.6 - 1.2 cm  PWT:    1.2 cm    0.6 - 1.1 cm  LVIDd:  5.6 cm    3.0 - 5.6 cm  LVIDs:  3.8 cm    1.8 - 4.0 cm  Derived Variables:  LVMI: 132 g/m2  RWT: 0.42  Fractional short: 32 %  Ejection Fraction (Visual Estimate): 50-55 %  Ejection Fraction (Teicholtz): 60 %  ------------------------------------------------------------------------  OBSERVATIONS:  Mitral Valve: Mitral annular calcification, otherwise  normal mitral valve. Minimal mitral regurgitation.  Aortic Root: Normal aortic root.  Aortic Valve: Aortic valve not well visualized; appears  calcified. Mild-moderate aortic regurgitation.Pressure  halftime about 800 msec, but VC 0.36 cm.  Left Atrium: Mildly dilated left atrium.  LA volume index =  39 cc/m2.  Left Ventricle: Endocardium not well visualized; grossly  normal left ventricular systolic function. No obvious  regional wall motion abnormalities. Normal left ventricular  internal dimensions and wall thicknesses. Mild diastolic  dysfunction (Stage I).  Right Heart: Normal right atrium. The right ventricle is  not well visualized; grossly normal right ventricular  systolic function. Normal tricuspid valve. Mild tricuspid  regurgitation. Pulmonic valve not well visualized.  Pericardium/PleuraNormal pericardium with trace pericardial  effusion.  Hemodynamic: Estimated right ventricular systolic pressure  equals 37 mm Hg, assuming right atrial pressure equals 10  mm Hg, consistent with borderline pulmonary hypertension.  ------------------------------------------------------------------------  CONCLUSIONS:  1. Mitral annular calcification, otherwise normal mitral  valve. Minimal mitral regurgitation.  2. Aortic valve not well visualized; appears calcified.  Mild-moderate aortic regurgitation.Pressure halftime about  800 msec, but VC 0.36 cm.  3. Endocardium not well visualized; grossly normal left  ventricular systolic function.  4. Mild diastolic dysfunction (Stage I).  5. The right ventricle is not well visualized; grossly  normal right ventricular systolic function.  *** Comparedwith echocardiogram of 2/9/2019, no  significant changes noted.  ------------------------------------------------------------------------  Confirmed on  4/7/2021 - 19:51:35 by Bradly Huffman M.D.  ------------------------------------------------------------------------    Catheterization:    Stress Test:  	  	  ASSESSMENT/PLAN: 	     CHIEF COMPLAINT:  Syncope    HISTORY OF PRESENT ILLNESS:  77 y/o female resident of [facility] with PMH of HTN, HPL, T2DM, Seizure disorder and multiple syncopal episodes resulting in placement of an ILR by Dr Mendenhall on 21. On 21 patient presented to [facility] after being found on the floor of her facility. Then again while in the hospital on 21 she syncopized and became unresponsive after an episode of emesis. She was bradycardic with HR 40-60 bpm with no evidence of P-P prolongation. Patient had positive orthostatics. ILR was placed on 21. Then on 21 patient presented with another episode of syncope while in the bathroom. She has no recall and orthostatics where negative. The ILR was interrogated revealing no events. A cath was done revealing normal cors and preserved EF. Of note, patient is a poor historian with diminished recall. However, daughter does report that patient becomes dizzy upon standing. Most recently, she presented [facility] on  after a syncopal episode at her facility. Upon arrival orthostatics were negative. The ILR was interrogated and showed a 3 second pause with variable P-P interval. Consistent with high vagal tone.    Allergies    allergy to Lobster (Swelling)  No Known Drug Allergies  seaford (Unknown)    Intolerances    lactose (Stomach Upset)  	    MEDICATIONS:  amLODIPine   Tablet 10 milliGRAM(s) Oral daily  aspirin enteric coated 81 milliGRAM(s) Oral daily  heparin   Injectable 5000 Unit(s) SubCutaneous every 8 hours  hydrALAZINE 25 milliGRAM(s) Oral every 8 hours  isosorbide   mononitrate ER Tablet (IMDUR) 90 milliGRAM(s) Oral daily      guaiFENesin Oral Liquid (Sugar-Free) 200 milliGRAM(s) Oral every 6 hours PRN    acetaminophen   Tablet .. 650 milliGRAM(s) Oral every 6 hours PRN  OXcarbazepine 300 milliGRAM(s) Oral two times a day    senna 2 Tablet(s) Oral at bedtime    atorvastatin 10 milliGRAM(s) Oral at bedtime  dextrose 40% Gel 15 Gram(s) Oral once  dextrose 50% Injectable 25 Gram(s) IV Push once  dextrose 50% Injectable 12.5 Gram(s) IV Push once  dextrose 50% Injectable 25 Gram(s) IV Push once  glucagon  Injectable 1 milliGRAM(s) IntraMuscular once  insulin lispro (ADMELOG) corrective regimen sliding scale   SubCutaneous three times a day before meals  insulin lispro (ADMELOG) corrective regimen sliding scale   SubCutaneous at bedtime    dextrose 5%. 1000 milliLiter(s) IV Continuous <Continuous>  dextrose 5%. 1000 milliLiter(s) IV Continuous <Continuous>  nystatin Powder 1 Application(s) Topical two times a day      PAST MEDICAL & SURGICAL HISTORY:  Hypertension    Seizure    Hyperlipidemia    Diverticulosis    Borderline diabetes    Obesity    No Past Surgical History    History of appendectomy        FAMILY HISTORY:  Family history of cerebrovascular accident (CVA) (Sibling)        SOCIAL HISTORY:    [ ]Non-smoker  [ ] Smoker  [ ] Alcohol      REVIEW OF SYSTEMS:  See HPI. Otherwise, 10 point ROS done and otherwise negative.    PHYSICAL EXAM:  T(C): 36.4 (21 @ 12:17), Max: 37 (21 @ 20:05)  HR: 62 (21 @ 13:29) (53 - 64)  BP: 105/65 (21 @ 13:29) (103/54 - 171/79)  RR: 18 (21 @ 13:29) (18 - 18)  SpO2: 96% (21 @ 13:29) (95% - 98%)  Wt(kg): --  I&O's Summary    31 May 2021 07:  -  2021 07:00  --------------------------------------------------------  IN: 180 mL / OUT: 450 mL / NET: -270 mL    2021 07:01  -  2021 15:15  --------------------------------------------------------  IN: 330 mL / OUT: 0 mL / NET: 330 mL        Appearance: Alert. NAD	  HEENT:   NC/AT	  Cardiovascular: +S1S2 RRR no m/g/r  Respiratory: CTA B/L	  Gastrointestinal:  Soft, NT.ND., + BS	  Skin: No rashes	  Neurologic: Non-focal  Extremities: No edema BLE  Vascular: Peripheral pulses palpable 1+ bilaterally      LABS:	 	    CBC Full  -  ( 2021 05:57 )  WBC Count : 4.38 K/uL  Hemoglobin : 9.9 g/dL  Hematocrit : 29.2 %  Platelet Count - Automated : 266 K/uL  Mean Cell Volume : 68.4 fl  Mean Cell Hemoglobin : 23.2 pg  Mean Cell Hemoglobin Concentration : 33.9 gm/dL  Auto Neutrophil # : x  Auto Lymphocyte # : x  Auto Monocyte # : x  Auto Eosinophil # : x  Auto Basophil # : x  Auto Neutrophil % : x  Auto Lymphocyte % : x  Auto Monocyte % : x  Auto Eosinophil % : x  Auto Basophil % : x    06-    134<L>  |  99  |  25<H>  ----------------------------<  94  4.5   |  22  |  1.12    Ca    9.4      2021 05:57        proBNP:   Lipid Profile:   HgA1c:   TSH: .60      CARDIAC MARKERS:                TELEMETRY: 	SR 60's    ECG:  	Sb 57 bpm  RADIOLOGY:  EXAM:  XR CHEST PORTABLE URGENT 1V                            PROCEDURE DATE:  2021        INTERPRETATION:  Chest x-ray was obtained on May 26, 2021.    INDICATION: Chest pain.    IMPRESSION:    The heart is enlarged. The lungs are clear.No pleural effusion. No pneumothorax. A loop recorder is overlying the mid chest wall.      LAMBERT DE LA CRUZ MD; Attending Radiologist  This document has been electronically signed. May 26 2021  2:36PM    OTHER: 	    PREVIOUS DIAGNOSTIC TESTING:    Echocardiogram:  Patient name: ADOLPH LANGE  YOB: 1945   Age: 76 (F)   MR#: [id number]  Study Date: 2021  [place] [facility] [place]: Sheila Lakhani RDCS  Study quality: Technically Fair  Referring Physician: Patrick Maagllanes MD  Blood Pressure: 133/77 mmHg  Height: 165 cm  Weight: 106 kg  BSA: 2.1 m2  ------------------------------------------------------------------------  PROCEDURE: Transthoracic echocardiogram with 2-D, M-Mode  and complete spectral and color flow Doppler.  INDICATION: Syncope and collapse (R55)  ------------------------------------------------------------------------  DIMENSIONS:  Dimensions:     Normal Values:  LA:     4.5 cm    2.0 - 4.0 cm  Ao:     3.2 cm    2.0 - 3.8 cm  SEPTUM: 1.2 cm    0.6 - 1.2 cm  PWT:    1.2 cm    0.6 - 1.1 cm  LVIDd:  5.6 cm    3.0 - 5.6 cm  LVIDs:  3.8 cm    1.8 - 4.0 cm  Derived Variables:  LVMI: 132 g/m2  RWT: 0.42  Fractional short: 32 %  Ejection Fraction (Visual Estimate): 50-55 %  Ejection Fraction (Teicholtz): 60 %  ------------------------------------------------------------------------  OBSERVATIONS:  Mitral Valve: Mitral annular calcification, otherwise  normal mitral valve. Minimal mitral regurgitation.  Aortic Root: Normal aortic root.  Aortic Valve: Aortic valve not well visualized; appears  calcified. Mild-moderate aortic regurgitation.Pressure  halftime about 800 msec, but VC 0.36 cm.  Left Atrium: Mildly dilated left atrium.  LA volume index =  39 cc/m2.  Left Ventricle: Endocardium not well visualized; grossly  normal left ventricular systolic function. No obvious  regional wall motion abnormalities. Normal left ventricular  internal dimensions and wall thicknesses. Mild diastolic  dysfunction (Stage I).  Right Heart: Normal right atrium. The right ventricle is  not well visualized; grossly normal right ventricular  systolic function. Normal tricuspid valve. Mild tricuspid  regurgitation. Pulmonic valve not well visualized.  Pericardium/PleuraNormal pericardium with trace pericardial  effusion.  Hemodynamic: Estimated right ventricular systolic pressure  equals 37 mm Hg, assuming right atrial pressure equals 10  mm Hg, consistent with borderline pulmonary hypertension.  ------------------------------------------------------------------------  CONCLUSIONS:  1. Mitral annular calcification, otherwise normal mitral  valve. Minimal mitral regurgitation.  2. Aortic valve not well visualized; appears calcified.  Mild-moderate aortic regurgitation.Pressure halftime about  800 msec, but VC 0.36 cm.  3. Endocardium not well visualized; grossly normal left  ventricular systolic function.  4. Mild diastolic dysfunction (Stage I).  5. The right ventricle is not well visualized; grossly  normal right ventricular systolic function.  *** Comparedwith echocardiogram of 2019, no  significant changes noted.  ------------------------------------------------------------------------  Confirmed on  2021 - 19:51:35 by Bradly Huffman M.D.  ------------------------------------------------------------------------    Catheterization:  [facility]  [id number] [place], [place] [id number] [phone]  Cath Lab Report -- Comprehensive Report  Patient: ADOLPH LANGE  Study date: 2021  Account number: [id number]  MR number: [id number]  : 1945  Gender: Female  Race: B  Case Physician(s):  Ulises Luevano M.D.  Referring Physician:  Ulises Luevano M.D.  INDICATIONS: Unstable angina - CCS3.  HISTORY: There was no prior cardiac history. The patient has hypertension.  PROCEDURE:  --  Left coronary angiography.  --  Right coronary angiography.  TECHNIQUE: Oxygen 2 L/min. The risks and alternatives of the procedures and  conscious sedation were explained to the patient and informed consent was  obtained. Cardiac catheterization performed electively.  Local anesthetic given. Right radial artery access. Left coronary artery  angiography. The vessel was injected utilizing a catheter. Right coronary  artery angiography. The vessel was injected utilizing a catheter.  RADIATION EXPOSURE: 6.1 min.  CONTRAST GIVEN: 58 ml Optiray.  MEDICATIONS GIVEN: Fentanyl, 25 mcg, IV. Midazolam, 1 mg, IV. Fentanyl, 25  mcg, IV. Nitroglycerin, 200 mcg, intracoronary. Heparin, 5000 units, IV.  Cardene, 400 mcg. 2% Lidocaine, 3 ml, subcutaneously. 0.9% Normal saline,  250 ml, IV. 0.9% Normal saline, 53 ml, IV.  VENTRICLES: No left ventriculogram was performed.  CORONARY VESSELS: The coronary circulation is right dominant.  LM:   --  LM: Normal.  LAD:   --  LAD: Angiography showed mild atherosclerosis with no flow  limiting lesions.  CX:   --  Circumflex: Angiography showed mild atherosclerosis with no flow  limiting lesions.  RCA:   --  RCA: Angiography showed mild atherosclerosis with no flow  limiting lesions.  COMPLICATIONS: There were no complications.  DIAGNOSTIC RECOMMENDATIONS: non obstructive CAD  Prepared and signed by  Ulises Luevano M.D.  Signed 2021 12:55:20  HEMODYNAMIC TABLES  Pressures:  Baseline  Pressures:  - HR: 52  Pressures:  - Rhythm:  Pressures:  -- Aortic Pressure (S/D/M): 101/58/77  Outputs:  Baseline  Outputs:  -- OUTPUTS: O2 consumption: 264.70  Outputs:  -- OUTPUTS: Vo2 Indexed: 125.00  Outputs:  NO PHASE  Outputs:  -- CALCULATIONS[age]  Outputs:  -- CALCULATIONS: Body Surface Area: 2.12  Outputs:  -- CALCULATIONS: Height in cm: 165.00  Outputs:  -- CALCULATIONS: Sex: Female  Outputs:  -- CALCULATIONS: Weight in k.60  Outputs:  -- OUTPUTS: O2 consumption: 264.70  Outputs:  -- OUTPUTS: Vo2 Indexed: 125.00  	  	  ASSESSMENT/PLAN: 	    77 y/o female resident of [facility] with PMH of HTN, HPL, T2DM, Seizure disorder and multiple syncopal episodes resulting in placement of an ILR by Dr Mendenhall on 21. On 21 patient presented to [facility] after being found on the floor of her facility. Then again while in the hospital on 21 she syncopized and became unresponsive after an episode of emesis. She was bradycardic with HR 40-60 bpm with no evidence of P-P prolongation. Patient had positive orthostatics. ILR was placed on 21. Then on 21 patient presented with another episode of syncope while in the bathroom. She has no recall and orthostatics where negative. The ILR was interrogated revealing no events. A cath was done revealing normal cors and preserved EF. Of note, patient is a poor historian with diminished recall. However, daughter does report that patient becomes dizzy upon standing. Most recently, she presented [facility] on  after a syncopal episode at her facility. Upon arrival orthostatics were negative. The ILR was interrogated and showed a 3 second pause with variable P-P interval. Consistent with high vagal tone.    1) Syncope  -Vasovagal etiology. No PPM indicated.  -would d/c Imdur if no contraindication in order to prevent vasodilation.  -Patient is on Norvasc 10 mg daily and hydralazine 25 mg q 8 hours. Would decrease antihypertensives in order to allow higher range of BP.  -Initiate low dose beta blocker and titrate as BP allows.   -Recommend fluid and Acosta stockings.    Gregoria GOETZ CHIEF COMPLAINT:  Syncope    HISTORY OF PRESENT ILLNESS:  75 y/o female resident of [facility], [place] with PMH of HTN, HPL, T2DM, Seizure disorder and multiple syncopal episodes resulting in placement of an ILR by Dr Mendenhall on 21. On 21 patient presented to [facility] after being found on the floor of her facility. Then again while in the hospital on 21 she syncopized and became unresponsive after an episode of emesis. She was bradycardic with HR 40-60 bpm with no evidence of P-P prolongation. Patient had positive orthostatics. ILR was placed on 21. Then on 21 patient presented with another episode of syncope while in the bathroom. She has no recall and orthostatics where negative. The ILR was interrogated revealing no events. A cath was done revealing normal cors and preserved EF. Of note, patient is a poor historian with diminished recall. However, daughter does report that patient becomes dizzy upon standing. Most recently, she presented [facility] on  after a syncopal episode at her facility. Upon arrival orthostatics were negative. The ILR was interrogated and showed a 3 second pause with variable P-P interval. Consistent with high vagal tone.    Allergies    allergy to Lobster (Swelling)  No Known Drug Allergies  seaford (Unknown)    Intolerances    lactose (Stomach Upset)  	    MEDICATIONS:  amLODIPine   Tablet 10 milliGRAM(s) Oral daily  aspirin enteric coated 81 milliGRAM(s) Oral daily  heparin   Injectable 5000 Unit(s) SubCutaneous every 8 hours  hydrALAZINE 25 milliGRAM(s) Oral every 8 hours  isosorbide   mononitrate ER Tablet (IMDUR) 90 milliGRAM(s) Oral daily      guaiFENesin Oral Liquid (Sugar-Free) 200 milliGRAM(s) Oral every 6 hours PRN    acetaminophen   Tablet .. 650 milliGRAM(s) Oral every 6 hours PRN  OXcarbazepine 300 milliGRAM(s) Oral two times a day    senna 2 Tablet(s) Oral at bedtime    atorvastatin 10 milliGRAM(s) Oral at bedtime  dextrose 40% Gel 15 Gram(s) Oral once  dextrose 50% Injectable 25 Gram(s) IV Push once  dextrose 50% Injectable 12.5 Gram(s) IV Push once  dextrose 50% Injectable 25 Gram(s) IV Push once  glucagon  Injectable 1 milliGRAM(s) IntraMuscular once  insulin lispro (ADMELOG) corrective regimen sliding scale   SubCutaneous three times a day before meals  insulin lispro (ADMELOG) corrective regimen sliding scale   SubCutaneous at bedtime    dextrose 5%. 1000 milliLiter(s) IV Continuous <Continuous>  dextrose 5%. 1000 milliLiter(s) IV Continuous <Continuous>  nystatin Powder 1 Application(s) Topical two times a day      PAST MEDICAL & SURGICAL HISTORY:  Hypertension    Seizure    Hyperlipidemia    Diverticulosis    Borderline diabetes    Obesity    No Past Surgical History    History of appendectomy        FAMILY HISTORY:  Family history of cerebrovascular accident (CVA) (Sibling)        SOCIAL HISTORY:    [ ]Non-smoker  [ ] Smoker  [ ] Alcohol      REVIEW OF SYSTEMS:  See HPI. Otherwise, 10 point ROS done and otherwise negative.    PHYSICAL EXAM:  T(C): 36.4 (21 @ 12:17), Max: 37 (21 @ 20:05)  HR: 62 (21 @ 13:29) (53 - 64)  BP: 105/65 (21 @ 13:29) (103/54 - 171/79)  RR: 18 (21 @ 13:29) (18 - 18)  SpO2: 96% (21 @ 13:29) (95% - 98%)  Wt(kg): --  I&O's Summary    31 May 2021 07:  -  2021 07:00  --------------------------------------------------------  IN: 180 mL / OUT: 450 mL / NET: -270 mL    2021 07:01  -  2021 15:15  --------------------------------------------------------  IN: 330 mL / OUT: 0 mL / NET: 330 mL        Appearance: Alert. NAD	  HEENT:   NC/AT	  Cardiovascular: +S1S2 RRR no m/g/r  Respiratory: CTA B/L	  Gastrointestinal:  Soft, NT.ND., + BS	  Skin: No rashes	  Neurologic: Non-focal  Extremities: No edema BLE  Vascular: Peripheral pulses palpable 1+ bilaterally      LABS:	 	    CBC Full  -  ( 2021 05:57 )  WBC Count : 4.38 K/uL  Hemoglobin : 9.9 g/dL  Hematocrit : 29.2 %  Platelet Count - Automated : 266 K/uL  Mean Cell Volume : 68.4 fl  Mean Cell Hemoglobin : 23.2 pg  Mean Cell Hemoglobin Concentration : 33.9 gm/dL  Auto Neutrophil # : x  Auto Lymphocyte # : x  Auto Monocyte # : x  Auto Eosinophil # : x  Auto Basophil # : x  Auto Neutrophil % : x  Auto Lymphocyte % : x  Auto Monocyte % : x  Auto Eosinophil % : x  Auto Basophil % : x    06-    134<L>  |  99  |  25<H>  ----------------------------<  94  4.5   |  22  |  1.12    Ca    9.4      2021 05:57        proBNP:   Lipid Profile:   HgA1c:   TSH: .60      CARDIAC MARKERS:                TELEMETRY: 	SR 60's    ECG:  	Sb 57 bpm  RADIOLOGY:  EXAM:  XR CHEST PORTABLE URGENT 1V                            PROCEDURE DATE:  2021        INTERPRETATION:  Chest x-ray was obtained on May 26, 2021.    INDICATION: Chest pain.    IMPRESSION:    The heart is enlarged. The lungs are clear.No pleural effusion. No pneumothorax. A loop recorder is overlying the mid chest wall.      LAMBERT DE LA CRUZ MD; Attending Radiologist  This document has been electronically signed. May 26 2021  2:36PM    OTHER: 	    PREVIOUS DIAGNOSTIC TESTING:    Echocardiogram:  Patient name: ADOLPH LANGE  YOB: 1945   Age: 76 (F)   MR#: [id number]  Study Date: 2021  [facility] CathSonographer: Sheila Lakhani RDCS  Study quality: Technically Fair  Referring Physician: Patrick Magallanes MD  Blood Pressure: 133/77 mmHg  Height: 165 cm  Weight: 106 kg  BSA: 2.1 m2  ------------------------------------------------------------------------  PROCEDURE: Transthoracic echocardiogram with 2-D, M-Mode  and complete spectral and color flow Doppler.  INDICATION: Syncope and collapse (R55)  ------------------------------------------------------------------------  DIMENSIONS:  Dimensions:     Normal Values:  LA:     4.5 cm    2.0 - 4.0 cm  Ao:     3.2 cm    2.0 - 3.8 cm  SEPTUM: 1.2 cm    0.6 - 1.2 cm  PWT:    1.2 cm    0.6 - 1.1 cm  LVIDd:  5.6 cm    3.0 - 5.6 cm  LVIDs:  3.8 cm    1.8 - 4.0 cm  Derived Variables:  LVMI: 132 g/m2  RWT: 0.42  Fractional short: 32 %  Ejection Fraction (Visual Estimate): 50-55 %  Ejection Fraction (Teicholtz): 60 %  ------------------------------------------------------------------------  OBSERVATIONS:  Mitral Valve: Mitral annular calcification, otherwise  normal mitral valve. Minimal mitral regurgitation.  Aortic Root: Normal aortic root.  Aortic Valve: Aortic valve not well visualized; appears  calcified. Mild-moderate aortic regurgitation.Pressure  halftime about 800 msec, but VC 0.36 cm.  Left Atrium: Mildly dilated left atrium.  LA volume index =  39 cc/m2.  Left Ventricle: Endocardium not well visualized; grossly  normal left ventricular systolic function. No obvious  regional wall motion abnormalities. Normal left ventricular  internal dimensions and wall thicknesses. Mild diastolic  dysfunction (Stage I).  Right Heart: Normal right atrium. The right ventricle is  not well visualized; grossly normal right ventricular  systolic function. Normal tricuspid valve. Mild tricuspid  regurgitation. Pulmonic valve not well visualized.  Pericardium/PleuraNormal pericardium with trace pericardial  effusion.  Hemodynamic: Estimated right ventricular systolic pressure  equals 37 mm Hg, assuming right atrial pressure equals 10  mm Hg, consistent with borderline pulmonary hypertension.  ------------------------------------------------------------------------  CONCLUSIONS:  1. Mitral annular calcification, otherwise normal mitral  valve. Minimal mitral regurgitation.  2. Aortic valve not well visualized; appears calcified.  Mild-moderate aortic regurgitation.Pressure halftime about  800 msec, but VC 0.36 cm.  3. Endocardium not well visualized; grossly normal left  ventricular systolic function.  4. Mild diastolic dysfunction (Stage I).  5. The right ventricle is not well visualized; grossly  normal right ventricular systolic function.  *** Comparedwith echocardiogram of 2019, no  significant changes noted.  ------------------------------------------------------------------------  Confirmed on  2021 - 19:51:35 by Bradly Huffman M.D.  ------------------------------------------------------------------------    Catheterization:  [facility]  [phone] [place], [place] [address] [phone]  Cath Lab Report -- Comprehensive Report  Patient: ADOLPH LANGE  Study date: 2021  Account number: [id number]  MR number: [id number]  : 1945  Gender: Female  Race: B  Case Physician(s):  Ulises Luevano M.D.  Referring Physician:  Ulises Luevano M.D.  INDICATIONS: Unstable angina - CCS3.  HISTORY: There was no prior cardiac history. The patient has hypertension.  PROCEDURE:  --  Left coronary angiography.  --  Right coronary angiography.  TECHNIQUE: Oxygen 2 L/min. The risks and alternatives of the procedures and  conscious sedation were explained to the patient and informed consent was  obtained. Cardiac catheterization performed electively.  Local anesthetic given. Right radial artery access. Left coronary artery  angiography. The vessel was injected utilizing a catheter. Right coronary  artery angiography. The vessel was injected utilizing a catheter.  RADIATION EXPOSURE: 6.1 min.  CONTRAST GIVEN: 58 ml Optiray.  MEDICATIONS GIVEN: Fentanyl, 25 mcg, IV. Midazolam, 1 mg, IV. Fentanyl, 25  mcg, IV. Nitroglycerin, 200 mcg, intracoronary. Heparin, 5000 units, IV.  Cardene, 400 mcg. 2% Lidocaine, 3 ml, subcutaneously. 0.9% Normal saline,  250 ml, IV. 0.9% Normal saline, 53 ml, IV.  VENTRICLES: No left ventriculogram was performed.  CORONARY VESSELS: The coronary circulation is right dominant.  LM:   --  LM: Normal.  LAD:   --  LAD: Angiography showed mild atherosclerosis with no flow  limiting lesions.  CX:   --  Circumflex: Angiography showed mild atherosclerosis with no flow  limiting lesions.  RCA:   --  RCA: Angiography showed mild atherosclerosis with no flow  limiting lesions.  COMPLICATIONS: There were no complications.  DIAGNOSTIC RECOMMENDATIONS: non obstructive CAD  Prepared and signed by  Ulises Luevano M.D.  Signed 2021 12:55:20  HEMODYNAMIC TABLES  Pressures:  Baseline  Pressures:  - HR: 52  Pressures:  - Rhythm:  Pressures:  -- Aortic Pressure (S/D/M): 101/58/77  Outputs:  Baseline  Outputs:  -- OUTPUTS: O2 consumption: 264.70  Outputs:  -- OUTPUTS: Vo2 Indexed: 125.00  Outputs:  NO PHASE  Outputs:  -- CALCULATIONS[age]  Outputs:  -- CALCULATIONS: Body Surface Area: 2.12  Outputs:  -- CALCULATIONS: Height in cm: 165.00  Outputs:  -- CALCULATIONS: Sex: Female  Outputs:  -- CALCULATIONS: Weight in k.60  Outputs:  -- OUTPUTS: O2 consumption: 264.70  Outputs:  -- OUTPUTS: Vo2 Indexed: 125.00  	  	  ASSESSMENT/PLAN: 	    75 y/o female resident of [facility], [place] with PMH of HTN, HPL, T2DM, Seizure disorder and multiple syncopal episodes resulting in placement of an ILR by Dr Mendenhall on 21. On 21 patient presented to [facility] after being found on the floor of her facility. Then again while in the hospital on 21 she syncopized and became unresponsive after an episode of emesis. She was bradycardic with HR 40-60 bpm with no evidence of P-P prolongation. Patient had positive orthostatics. ILR was placed on 21. Then on 21 patient presented with another episode of syncope while in the bathroom. She has no recall and orthostatics where negative. The ILR was interrogated revealing no events. A cath was done revealing normal cors and preserved EF. Of note, patient is a poor historian with diminished recall. However, daughter does report that patient becomes dizzy upon standing. Most recently, she presented [facility] on  after a syncopal episode at her facility. Upon arrival orthostatics were negative. The ILR was interrogated and showed a 3 second pause with variable P-P interval. Consistent with high vagal tone.    1) Syncope  -Vasovagal etiology. No PPM indicated.  -would d/c Imdur if no contraindication in order to prevent vasodilation.  -Patient is on Norvasc 10 mg daily and hydralazine 25 mg q 8 hours. Would decrease antihypertensives in order to allow higher range of BP.  -Initiate low dose beta blocker and titrate as BP allows.   -Recommend fluid and Acosta stockings.    2) Neuro is following.  -Awaiting ADELAIDE GOETZ CHIEF COMPLAINT:  Syncope    HISTORY OF PRESENT ILLNESS:  75 y/[facility] [place] with PMH of HTN, HPL, T2DM, Seizure disorder and multiple syncopal episodes resulting in placement of an ILR by Dr Mendenhall on 21. On 21 patient presented to [facility] after being found on the floor of her facility. Then again while in the hospital on 21 she syncopized and became unresponsive after an episode of emesis. She was bradycardic with HR 40-60 bpm with no evidence of P-P prolongation. Patient had positive orthostatics. ILR was placed on 21. Then on 21 patient presented with another episode of syncope while in the bathroom. She has no recall and orthostatics where negative. The ILR was interrogated revealing no events. A cath was done revealing normal cors and preserved EF. Of note, patient is a poor historian with diminished recall. However, daughter does report that patient becomes dizzy upon standing. Most recently, she presented [facility] on  after a syncopal episode at her facility. Upon arrival orthostatics were negative. The ILR was interrogated and showed a 3 second pause with variable P-P interval. Consistent with high vagal tone.    Allergies    allergy to Lobster (Swelling)  No Known Drug Allergies  seaford (Unknown)    Intolerances    lactose (Stomach Upset)  	    MEDICATIONS:  amLODIPine   Tablet 10 milliGRAM(s) Oral daily  aspirin enteric coated 81 milliGRAM(s) Oral daily  heparin   Injectable 5000 Unit(s) SubCutaneous every 8 hours  hydrALAZINE 25 milliGRAM(s) Oral every 8 hours  isosorbide   mononitrate ER Tablet (IMDUR) 90 milliGRAM(s) Oral daily      guaiFENesin Oral Liquid (Sugar-Free) 200 milliGRAM(s) Oral every 6 hours PRN    acetaminophen   Tablet .. 650 milliGRAM(s) Oral every 6 hours PRN  OXcarbazepine 300 milliGRAM(s) Oral two times a day    senna 2 Tablet(s) Oral at bedtime    atorvastatin 10 milliGRAM(s) Oral at bedtime  dextrose 40% Gel 15 Gram(s) Oral once  dextrose 50% Injectable 25 Gram(s) IV Push once  dextrose 50% Injectable 12.5 Gram(s) IV Push once  dextrose 50% Injectable 25 Gram(s) IV Push once  glucagon  Injectable 1 milliGRAM(s) IntraMuscular once  insulin lispro (ADMELOG) corrective regimen sliding scale   SubCutaneous three times a day before meals  insulin lispro (ADMELOG) corrective regimen sliding scale   SubCutaneous at bedtime    dextrose 5%. 1000 milliLiter(s) IV Continuous <Continuous>  dextrose 5%. 1000 milliLiter(s) IV Continuous <Continuous>  nystatin Powder 1 Application(s) Topical two times a day      PAST MEDICAL & SURGICAL HISTORY:  Hypertension    Seizure    Hyperlipidemia    Diverticulosis    Borderline diabetes    Obesity    No Past Surgical History    History of appendectomy        FAMILY HISTORY:  Family history of cerebrovascular accident (CVA) (Sibling)        SOCIAL HISTORY:    [ ]Non-smoker  [ ] Smoker  [ ] Alcohol      REVIEW OF SYSTEMS:  See HPI. Otherwise, 10 point ROS done and otherwise negative.    PHYSICAL EXAM:  T(C): 36.4 (21 @ 12:17), Max: 37 (21 @ 20:05)  HR: 62 (21 @ 13:29) (53 - 64)  BP: 105/65 (21 @ 13:29) (103/54 - 171/79)  RR: 18 (21 @ 13:29) (18 - 18)  SpO2: 96% (21 @ 13:29) (95% - 98%)  Wt(kg): --  I&O's Summary    31 May 2021 07:  -  2021 07:00  --------------------------------------------------------  IN: 180 mL / OUT: 450 mL / NET: -270 mL    2021 07:01  -  2021 15:15  --------------------------------------------------------  IN: 330 mL / OUT: 0 mL / NET: 330 mL        Appearance: Alert. NAD	  HEENT:   NC/AT	  Cardiovascular: +S1S2 RRR no m/g/r  Respiratory: CTA B/L	  Gastrointestinal:  Soft, NT.ND., + BS	  Skin: No rashes	  Neurologic: Non-focal  Extremities: No edema BLE  Vascular: Peripheral pulses palpable 1+ bilaterally      LABS:	 	    CBC Full  -  ( 2021 05:57 )  WBC Count : 4.38 K/uL  Hemoglobin : 9.9 g/dL  Hematocrit : 29.2 %  Platelet Count - Automated : 266 K/uL  Mean Cell Volume : 68.4 fl  Mean Cell Hemoglobin : 23.2 pg  Mean Cell Hemoglobin Concentration : 33.9 gm/dL  Auto Neutrophil # : x  Auto Lymphocyte # : x  Auto Monocyte # : x  Auto Eosinophil # : x  Auto Basophil # : x  Auto Neutrophil % : x  Auto Lymphocyte % : x  Auto Monocyte % : x  Auto Eosinophil % : x  Auto Basophil % : x    06-    134<L>  |  99  |  25<H>  ----------------------------<  94  4.5   |  22  |  1.12    Ca    9.4      2021 05:57        proBNP:   Lipid Profile:   HgA1c:   TSH: .60      CARDIAC MARKERS:                TELEMETRY: 	SR 60's    ECG:  	Sb 57 bpm  RADIOLOGY:  EXAM:  XR CHEST PORTABLE URGENT 1V                            PROCEDURE DATE:  2021        INTERPRETATION:  Chest x-ray was obtained on May 26, 2021.    INDICATION: Chest pain.    IMPRESSION:    The heart is enlarged. The lungs are clear.No pleural effusion. No pneumothorax. A loop recorder is overlying the mid chest wall.      LAMBERT DE LA CRUZ MD; Attending Radiologist  This document has been electronically signed. May 26 2021  2:36PM    OTHER: 	    PREVIOUS DIAGNOSTIC TESTING:    Echocardiogram:  Patient name: ADOLPH LANGE  YOB: 1945   Age: 76 (F)   MR#: [id number]  Study Date: 2021  [place] [facility] [place] Sheila Lakhani RDCS  Study quality: Technically Fair  Referring Physician: Patrick Magallanes MD  Blood Pressure: 133/77 mmHg  Height: 165 cm  Weight: 106 kg  BSA: 2.1 m2  ------------------------------------------------------------------------  PROCEDURE: Transthoracic echocardiogram with 2-D, M-Mode  and complete spectral and color flow Doppler.  INDICATION: Syncope and collapse (R55)  ------------------------------------------------------------------------  DIMENSIONS:  Dimensions:     Normal Values:  LA:     4.5 cm    2.0 - 4.0 cm  Ao:     3.2 cm    2.0 - 3.8 cm  SEPTUM: 1.2 cm    0.6 - 1.2 cm  PWT:    1.2 cm    0.6 - 1.1 cm  LVIDd:  5.6 cm    3.0 - 5.6 cm  LVIDs:  3.8 cm    1.8 - 4.0 cm  Derived Variables:  LVMI: 132 g/m2  RWT: 0.42  Fractional short: 32 %  Ejection Fraction (Visual Estimate): 50-55 %  Ejection Fraction (Teicholtz): 60 %  ------------------------------------------------------------------------  OBSERVATIONS:  Mitral Valve: Mitral annular calcification, otherwise  normal mitral valve. Minimal mitral regurgitation.  Aortic Root: Normal aortic root.  Aortic Valve: Aortic valve not well visualized; appears  calcified. Mild-moderate aortic regurgitation.Pressure  halftime about 800 msec, but VC 0.36 cm.  Left Atrium: Mildly dilated left atrium.  LA volume index =  39 cc/m2.  Left Ventricle: Endocardium not well visualized; grossly  normal left ventricular systolic function. No obvious  regional wall motion abnormalities. Normal left ventricular  internal dimensions and wall thicknesses. Mild diastolic  dysfunction (Stage I).  Right Heart: Normal right atrium. The right ventricle is  not well visualized; grossly normal right ventricular  systolic function. Normal tricuspid valve. Mild tricuspid  regurgitation. Pulmonic valve not well visualized.  Pericardium/PleuraNormal pericardium with trace pericardial  effusion.  Hemodynamic: Estimated right ventricular systolic pressure  equals 37 mm Hg, assuming right atrial pressure equals 10  mm Hg, consistent with borderline pulmonary hypertension.  ------------------------------------------------------------------------  CONCLUSIONS:  1. Mitral annular calcification, otherwise normal mitral  valve. Minimal mitral regurgitation.  2. Aortic valve not well visualized; appears calcified.  Mild-moderate aortic regurgitation.Pressure halftime about  800 msec, but VC 0.36 cm.  3. Endocardium not well visualized; grossly normal left  ventricular systolic function.  4. Mild diastolic dysfunction (Stage I).  5. The right ventricle is not well visualized; grossly  normal right ventricular systolic function.  *** Comparedwith echocardiogram of 2019, no  significant changes noted.  ------------------------------------------------------------------------  Confirmed on  2021 - 19:51:35 by Bradly Huffman M.D.  ------------------------------------------------------------------------    Catheterization:  [facility]  [phone] [place], [place] [address] [phone]  Cath Lab Report -- Comprehensive Report  Patient: ADOLPH LANGE  Study date: 2021  Account number: [id number]  MR number: [id number]  : 1945  Gender: Female  Race: B  Case Physician(s):  Ulises Luevano M.D.  Referring Physician:  Ulises Luevano M.D.  INDICATIONS: Unstable angina - CCS3.  HISTORY: There was no prior cardiac history. The patient has hypertension.  PROCEDURE:  --  Left coronary angiography.  --  Right coronary angiography.  TECHNIQUE: Oxygen 2 L/min. The risks and alternatives of the procedures and  conscious sedation were explained to the patient and informed consent was  obtained. Cardiac catheterization performed electively.  Local anesthetic given. Right radial artery access. Left coronary artery  angiography. The vessel was injected utilizing a catheter. Right coronary  artery angiography. The vessel was injected utilizing a catheter.  RADIATION EXPOSURE: 6.1 min.  CONTRAST GIVEN: 58 ml Optiray.  MEDICATIONS GIVEN: Fentanyl, 25 mcg, IV. Midazolam, 1 mg, IV. Fentanyl, 25  mcg, IV. Nitroglycerin, 200 mcg, intracoronary. Heparin, 5000 units, IV.  Cardene, 400 mcg. 2% Lidocaine, 3 ml, subcutaneously. 0.9% Normal saline,  250 ml, IV. 0.9% Normal saline, 53 ml, IV.  VENTRICLES: No left ventriculogram was performed.  CORONARY VESSELS: The coronary circulation is right dominant.  LM:   --  LM: Normal.  LAD:   --  LAD: Angiography showed mild atherosclerosis with no flow  limiting lesions.  CX:   --  Circumflex: Angiography showed mild atherosclerosis with no flow  limiting lesions.  RCA:   --  RCA: Angiography showed mild atherosclerosis with no flow  limiting lesions.  COMPLICATIONS: There were no complications.  DIAGNOSTIC RECOMMENDATIONS: non obstructive CAD  Prepared and signed by  Ulises Luevano M.D.  Signed 2021 12:55:20  HEMODYNAMIC TABLES  Pressures:  Baseline  Pressures:  - HR: 52  Pressures:  - Rhythm:  Pressures:  -- Aortic Pressure (S/D/M): 101/58/77  Outputs:  Baseline  Outputs:  -- OUTPUTS: O2 consumption: 264.70  Outputs:  -- OUTPUTS: Vo2 Indexed: 125.00  Outputs:  NO PHASE  Outputs:  -- CALCULATIONS[age]  Outputs:  -- CALCULATIONS: Body Surface Area: 2.12  Outputs:  -- CALCULATIONS: Height in cm: 165.00  Outputs:  -- CALCULATIONS: Sex: Female  Outputs:  -- CALCULATIONS: Weight in k.60  Outputs:  -- OUTPUTS: O2 consumption: 264.70  Outputs:  -- OUTPUTS: Vo2 Indexed: 125.00  	  	  ASSESSMENT/PLAN: 	    75 y/o female resident of [facility] [place] with PMH of HTN, HPL, T2DM, Seizure disorder and multiple syncopal episodes resulting in placement of an ILR by Dr Mendenhall on 21. On 21 patient presented to [facility] after being found on the floor of her facility. Then again while in the hospital on 21 she syncopized and became unresponsive after an episode of emesis. She was bradycardic with HR 40-60 bpm with no evidence of P-P prolongation. Patient had positive orthostatics. ILR was placed on 21. Then on 21 patient presented with another episode of syncope while in the bathroom. She has no recall and orthostatics where negative. The ILR was interrogated revealing no events. A cath was done revealing normal cors and preserved EF. Of note, patient is a poor historian with diminished recall. However, daughter does report that patient becomes dizzy upon standing. Most recently, she presented [facility] on  after a syncopal episode at her facility. Upon arrival orthostatics were negative. The ILR was interrogated and showed a 3 second pause with variable P-P interval. Consistent with high vagal tone.    1) Syncope  -Vasovagal etiology. No PPM indicated.  -would d/c Imdur if no contraindication in order to prevent vasodilation.  -Patient is on Norvasc 10 mg daily and hydralazine 25 mg q 8 hours. Would decrease antihypertensives in order to allow higher range of BP.  -Initiate low dose beta blocker and titrate as BP allows.   -Recommend fluid and Acosta stockings.  -No further EP recs. EP will sign off now.    2) Neuro is following.  -Awaiting ADELAIDE GOETZ

## 2021-06-01 NOTE — PROGRESS NOTE ADULT - SUBJECTIVE AND OBJECTIVE BOX
Ulises Luevano MD  Interventional Cardiology / Advance Heart Failure and Cardiac Transplant Specialist  [place] Office : [address] [place] [address] [place] [address]  Tel:   [place] Office : [address] [facility] [place] [address]  Tel: [phone]  Cell : [phone]    Subjective/Overnight events: Pt is lying in bed comfortable not in distress, no chest pains no SOB no palpitations. continues to c/o of lightheadedness  	  MEDICATIONS:  amLODIPine   Tablet 10 milliGRAM(s) Oral daily  aspirin enteric coated 81 milliGRAM(s) Oral daily  heparin   Injectable 5000 Unit(s) SubCutaneous every 8 hours  hydrALAZINE 25 milliGRAM(s) Oral every 8 hours  isosorbide   mononitrate ER Tablet (IMDUR) 90 milliGRAM(s) Oral daily      guaiFENesin Oral Liquid (Sugar-Free) 200 milliGRAM(s) Oral every 6 hours PRN    acetaminophen   Tablet .. 650 milliGRAM(s) Oral every 6 hours PRN  OXcarbazepine 300 milliGRAM(s) Oral two times a day    senna 2 Tablet(s) Oral at bedtime    atorvastatin 10 milliGRAM(s) Oral at bedtime  dextrose 40% Gel 15 Gram(s) Oral once  dextrose 50% Injectable 25 Gram(s) IV Push once  dextrose 50% Injectable 25 Gram(s) IV Push once  dextrose 50% Injectable 12.5 Gram(s) IV Push once  glucagon  Injectable 1 milliGRAM(s) IntraMuscular once  insulin lispro (ADMELOG) corrective regimen sliding scale   SubCutaneous three times a day before meals  insulin lispro (ADMELOG) corrective regimen sliding scale   SubCutaneous at bedtime    dextrose 5%. 1000 milliLiter(s) IV Continuous <Continuous>  dextrose 5%. 1000 milliLiter(s) IV Continuous <Continuous>  nystatin Powder 1 Application(s) Topical two times a day      PAST MEDICAL/SURGICAL HISTORY  PAST MEDICAL & SURGICAL HISTORY:  Hypertension    Seizure    Hyperlipidemia    Diverticulosis    Borderline diabetes    Obesity    No Past Surgical History    History of appendectomy        SOCIAL HISTORY: Substance Use (street drugs): ( x ) never used  (  ) other:    FAMILY HISTORY:  Family history of cerebrovascular accident (CVA) (Sibling)        REVIEW OF SYSTEMS:  CONSTITUTIONAL: No fever, weight loss, or fatigue  EYES: No eye pain, visual disturbances, or discharge  ENMT:  No difficulty hearing, tinnitus, vertigo; No sinus or throat pain  BREASTS: No pain, masses, or nipple discharge  GASTROINTESTINAL: No abdominal or epigastric pain. No nausea, vomiting, or hematemesis; No diarrhea or constipation. No melena or hematochezia.  GENITOURINARY: No dysuria, frequency, hematuria, or incontinence  NEUROLOGICAL: No headaches, memory loss, loss of strength, numbness, or tremors  ENDOCRINE: No heat or cold intolerance; No hair loss  MUSCULOSKELETAL: No joint pain or swelling; No muscle, back, or extremity pain  PSYCHIATRIC: No depression, anxiety, mood swings, or difficulty sleeping  HEME/LYMPH: No easy bruising, or bleeding gums  All others negative    PHYSICAL EXAM:  T(C): 36.6 (06-01-21 @ 08:00), Max: 37.6 (05-31-21 @ 12:13)  HR: 55 (06-01-21 @ 08:00) (55 - 64)  BP: 114/70 (06-01-21 @ 08:00) (103/54 - 171/79)  RR: 18 (06-01-21 @ 08:00) (18 - 18)  SpO2: 96% (06-01-21 @ 08:00) (95% - 98%)  Wt(kg): --  I&O's Summary    31 May 2021 07:01 - 01 Jun 2021 07:00  --------------------------------------------------------  IN: 180 mL / OUT: 450 mL / NET: -270 mL    01 Jun 2021 07:01  -  01 Jun 2021 10:53  --------------------------------------------------------  IN: 180 mL / OUT: 0 mL / NET: 180 mL              GENERAL: NAD  EYES: EOMI, PERRLA, conjunctiva and sclera clear  ENMT: No tonsillar erythema, exudates, or enlargement;  Cardiovascular: Normal S1 S2, No JVD, No murmurs, No edema  Respiratory: Lungs clear to auscultation	  Gastrointestinal:  Soft, nontender , + BS	  Extremities: no edema                                  9.9    4.38  )-----------( 266      ( 01 Jun 2021 05:57 )             29.2     06-01    134<L>  |  99  |  25<H>  ----------------------------<  94  4.5   |  22  |  1.12    Ca    9.4      01 Jun 2021 05:57      proBNP:   Lipid Profile:   HgA1c:   TSH:     Consultant(s) Notes Reviewed:  [x ] YES  [ ] NO    Care Discussed with Consultants/Other Providers [ x] YES  [ ] NO    Imaging Personally Reviewed independently:  [x] YES  [ ] NO    All labs, radiologic studies, vitals, orders and medications list reviewed. Patient is seen and examined at bedside. Case discussed with medical team.

## 2021-06-01 NOTE — DIETITIAN INITIAL EVALUATION ADULT. - PERTINENT LABORATORY DATA
06-01 @ 05:57: Na 134<L>, BUN 25<H>, Cr 1.12, BG 94, K+ 4.5  05-28: HbA1c 6.5%    CAPILLARY BLOOD GLUCOSE  POCT Blood Glucose.: 147 mg/dL (01 Jun 2021 11:54)  POCT Blood Glucose.: 106 mg/dL (01 Jun 2021 07:35)  POCT Blood Glucose.: 147 mg/dL (31 May 2021 21:13)  POCT Blood Glucose.: 120 mg/dL (31 May 2021 16:31)

## 2021-06-01 NOTE — PROGRESS NOTE ADULT - PROBLEM SELECTOR PLAN 1
tele   loop interrogation done - ? vagal pause+  cards  f/u tele   loop interrogation done - ? vagal pause+  cards  f/u -ep eval for?ppm

## 2021-06-01 NOTE — DIETITIAN INITIAL EVALUATION ADULT. - ADD RECOMMEND
1. Recommend liberalize diet to low sodium only and monitor for need for Consistent Carbohydrate restriction. Texture per team. Lactose restricted and no shellfish. 2. Provide 1 glucerna daily and monitor for pt acceptance 3. Will continue to monitor nutrient intake, wt, labs, f/u per protocol

## 2021-06-01 NOTE — DIETITIAN INITIAL EVALUATION ADULT. - REASON INDICATOR FOR ASSESSMENT
Pt seen for routine length of stay   Information obtained from: pt daughter (Deanna) via phone, electronic medical record, PCA; pt visited at bedside, noted as A&Ox2-3, sleepy at time of visit and unable to complete RD interview

## 2021-06-01 NOTE — DIETITIAN INITIAL EVALUATION ADULT. - PERTINENT MEDS FT
MEDICATIONS  (STANDING):  amLODIPine   Tablet 10 milliGRAM(s) Oral daily  aspirin enteric coated 81 milliGRAM(s) Oral daily  atorvastatin 10 milliGRAM(s) Oral at bedtime  dextrose 40% Gel 15 Gram(s) Oral once  dextrose 5%. 1000 milliLiter(s) (50 mL/Hr) IV Continuous <Continuous>  dextrose 5%. 1000 milliLiter(s) (100 mL/Hr) IV Continuous <Continuous>  dextrose 50% Injectable 25 Gram(s) IV Push once  dextrose 50% Injectable 12.5 Gram(s) IV Push once  dextrose 50% Injectable 25 Gram(s) IV Push once  glucagon  Injectable 1 milliGRAM(s) IntraMuscular once  heparin   Injectable 5000 Unit(s) SubCutaneous every 8 hours  hydrALAZINE 25 milliGRAM(s) Oral every 8 hours  insulin lispro (ADMELOG) corrective regimen sliding scale   SubCutaneous three times a day before meals  insulin lispro (ADMELOG) corrective regimen sliding scale   SubCutaneous at bedtime  isosorbide   mononitrate ER Tablet (IMDUR) 90 milliGRAM(s) Oral daily  nystatin Powder 1 Application(s) Topical two times a day  OXcarbazepine 300 milliGRAM(s) Oral two times a day  senna 2 Tablet(s) Oral at bedtime    MEDICATIONS  (PRN):  acetaminophen   Tablet .. 650 milliGRAM(s) Oral every 6 hours PRN Mild Pain (1 - 3)  guaiFENesin Oral Liquid (Sugar-Free) 200 milliGRAM(s) Oral every 6 hours PRN Cough

## 2021-06-02 LAB
% ALBUMIN: 47 % — SIGNIFICANT CHANGE UP
% ALBUMIN: 49.6 % — SIGNIFICANT CHANGE UP
% ALPHA 1: 6 % — SIGNIFICANT CHANGE UP
% ALPHA 1: 6.4 % — SIGNIFICANT CHANGE UP
% ALPHA 2: 11.6 % — SIGNIFICANT CHANGE UP
% ALPHA 2: 12.6 % — SIGNIFICANT CHANGE UP
% BETA: 13.7 % — SIGNIFICANT CHANGE UP
% BETA: 14.1 % — SIGNIFICANT CHANGE UP
% GAMMA: 19.1 % — SIGNIFICANT CHANGE UP
% GAMMA: 19.9 % — SIGNIFICANT CHANGE UP
ALBUMIN SERPL ELPH-MCNC: 3 G/DL — LOW (ref 3.6–5.5)
ALBUMIN SERPL ELPH-MCNC: 3.4 G/DL — LOW (ref 3.6–5.5)
ALBUMIN/GLOB SERPL ELPH: 0.9 RATIO — SIGNIFICANT CHANGE UP
ALBUMIN/GLOB SERPL ELPH: 1 RATIO — SIGNIFICANT CHANGE UP
ALPHA1 GLOB SERPL ELPH-MCNC: 0.4 G/DL — SIGNIFICANT CHANGE UP (ref 0.1–0.4)
ALPHA1 GLOB SERPL ELPH-MCNC: 0.4 G/DL — SIGNIFICANT CHANGE UP (ref 0.1–0.4)
ALPHA2 GLOB SERPL ELPH-MCNC: 0.8 G/DL — SIGNIFICANT CHANGE UP (ref 0.5–1)
ALPHA2 GLOB SERPL ELPH-MCNC: 0.8 G/DL — SIGNIFICANT CHANGE UP (ref 0.5–1)
ANION GAP SERPL CALC-SCNC: 12 MMOL/L — SIGNIFICANT CHANGE UP (ref 5–17)
B-GLOBULIN SERPL ELPH-MCNC: 0.9 G/DL — SIGNIFICANT CHANGE UP (ref 0.5–1)
B-GLOBULIN SERPL ELPH-MCNC: 0.9 G/DL — SIGNIFICANT CHANGE UP (ref 0.5–1)
BUN SERPL-MCNC: 25 MG/DL — HIGH (ref 7–23)
CALCIUM SERPL-MCNC: 9.1 MG/DL — SIGNIFICANT CHANGE UP (ref 8.4–10.5)
CHLORIDE SERPL-SCNC: 99 MMOL/L — SIGNIFICANT CHANGE UP (ref 96–108)
CO2 SERPL-SCNC: 23 MMOL/L — SIGNIFICANT CHANGE UP (ref 22–31)
CREAT SERPL-MCNC: 0.98 MG/DL — SIGNIFICANT CHANGE UP (ref 0.5–1.3)
GAMMA GLOBULIN: 1.3 G/DL — SIGNIFICANT CHANGE UP (ref 0.6–1.6)
GAMMA GLOBULIN: 1.3 G/DL — SIGNIFICANT CHANGE UP (ref 0.6–1.6)
GLUCOSE BLDC GLUCOMTR-MCNC: 101 MG/DL — HIGH (ref 70–99)
GLUCOSE BLDC GLUCOMTR-MCNC: 101 MG/DL — HIGH (ref 70–99)
GLUCOSE BLDC GLUCOMTR-MCNC: 120 MG/DL — HIGH (ref 70–99)
GLUCOSE BLDC GLUCOMTR-MCNC: 144 MG/DL — HIGH (ref 70–99)
GLUCOSE SERPL-MCNC: 97 MG/DL — SIGNIFICANT CHANGE UP (ref 70–99)
HCT VFR BLD CALC: 30.5 % — LOW (ref 34.5–45)
HGB BLD-MCNC: 10.4 G/DL — LOW (ref 11.5–15.5)
INTERPRETATION SERPL IFE-IMP: SIGNIFICANT CHANGE UP
MCHC RBC-ENTMCNC: 23.3 PG — LOW (ref 27–34)
MCHC RBC-ENTMCNC: 34.1 GM/DL — SIGNIFICANT CHANGE UP (ref 32–36)
MCV RBC AUTO: 68.2 FL — LOW (ref 80–100)
NRBC # BLD: 0 /100 WBCS — SIGNIFICANT CHANGE UP (ref 0–0)
PLATELET # BLD AUTO: 279 K/UL — SIGNIFICANT CHANGE UP (ref 150–400)
POTASSIUM SERPL-MCNC: 4.4 MMOL/L — SIGNIFICANT CHANGE UP (ref 3.5–5.3)
POTASSIUM SERPL-SCNC: 4.4 MMOL/L — SIGNIFICANT CHANGE UP (ref 3.5–5.3)
PROT PATTERN SERPL ELPH-IMP: SIGNIFICANT CHANGE UP
PROT PATTERN SERPL ELPH-IMP: SIGNIFICANT CHANGE UP
RBC # BLD: 4.47 M/UL — SIGNIFICANT CHANGE UP (ref 3.8–5.2)
RBC # FLD: 16.9 % — HIGH (ref 10.3–14.5)
SARS-COV-2 RNA SPEC QL NAA+PROBE: SIGNIFICANT CHANGE UP
SODIUM SERPL-SCNC: 134 MMOL/L — LOW (ref 135–145)
WBC # BLD: 5.42 K/UL — SIGNIFICANT CHANGE UP (ref 3.8–10.5)
WBC # FLD AUTO: 5.42 K/UL — SIGNIFICANT CHANGE UP (ref 3.8–10.5)

## 2021-06-02 PROCEDURE — 95816 EEG AWAKE AND DROWSY: CPT | Mod: 26

## 2021-06-02 RX ADMIN — HEPARIN SODIUM 5000 UNIT(S): 5000 INJECTION INTRAVENOUS; SUBCUTANEOUS at 05:00

## 2021-06-02 RX ADMIN — OXCARBAZEPINE 300 MILLIGRAM(S): 300 TABLET, FILM COATED ORAL at 17:24

## 2021-06-02 RX ADMIN — Medication 25 MILLIGRAM(S): at 05:00

## 2021-06-02 RX ADMIN — Medication 81 MILLIGRAM(S): at 12:09

## 2021-06-02 RX ADMIN — SENNA PLUS 2 TABLET(S): 8.6 TABLET ORAL at 21:39

## 2021-06-02 RX ADMIN — ISOSORBIDE MONONITRATE 90 MILLIGRAM(S): 60 TABLET, EXTENDED RELEASE ORAL at 12:09

## 2021-06-02 RX ADMIN — Medication 25 MILLIGRAM(S): at 13:19

## 2021-06-02 RX ADMIN — Medication 650 MILLIGRAM(S): at 17:33

## 2021-06-02 RX ADMIN — HEPARIN SODIUM 5000 UNIT(S): 5000 INJECTION INTRAVENOUS; SUBCUTANEOUS at 13:19

## 2021-06-02 RX ADMIN — Medication 650 MILLIGRAM(S): at 18:44

## 2021-06-02 RX ADMIN — Medication 25 MILLIGRAM(S): at 21:39

## 2021-06-02 RX ADMIN — NYSTATIN CREAM 1 APPLICATION(S): 100000 CREAM TOPICAL at 05:00

## 2021-06-02 RX ADMIN — NYSTATIN CREAM 1 APPLICATION(S): 100000 CREAM TOPICAL at 17:25

## 2021-06-02 RX ADMIN — HEPARIN SODIUM 5000 UNIT(S): 5000 INJECTION INTRAVENOUS; SUBCUTANEOUS at 21:39

## 2021-06-02 RX ADMIN — ATORVASTATIN CALCIUM 10 MILLIGRAM(S): 80 TABLET, FILM COATED ORAL at 21:39

## 2021-06-02 RX ADMIN — OXCARBAZEPINE 300 MILLIGRAM(S): 300 TABLET, FILM COATED ORAL at 05:00

## 2021-06-02 RX ADMIN — AMLODIPINE BESYLATE 10 MILLIGRAM(S): 2.5 TABLET ORAL at 05:00

## 2021-06-02 NOTE — EEG REPORT - NS EEG TEXT BOX
[facility]   COMPREHENSIVE EPILEPSY CENTER   REPORT OF ROUTINE VIDEO EEG     [facility]: [address] [place] [address] [address], [place] [address], Ph#: [phone]  [facility]: [address] [facility] [address], [place] [address], Ph#: [phone]  Office: [address] [place] [address], [address], [place] [address] Ph#: [phone]    Patient Name: ADOLPH LANGE  Age and : 76y ([birth year])  MRN #: [id number]  Location: [address] [facility]  Referring Physician: Patrick Magallanes    Study Date: 21    _____________________________________________________________  TECHNICAL INFORMATION    Placement and Labeling of Electrodes:  The EEG was performed utilizing 20 channels referential EEG connections (coronal over temporal over parasagittal montage) using all standard 10-20 electrode placements with EKG.  Recording was at a sampling rate of 256 samples per second per channel.  Time synchronized digital video recording was done simultaneously with EEG recording.  A low light infrared camera was used for low light recording.  Eric and seizure detection algorithms were utilized.    _____________________________________________________________  HISTORY    Patient is a 76y old  Female who presents with a chief complaint of Referred from [facility] following episodes of dizziness and near syncope last 3 days. (2021 10:23)      PERTINENT MEDICATION:  MEDICATIONS  (STANDING):  amLODIPine   Tablet 10 milliGRAM(s) Oral daily  aspirin enteric coated 81 milliGRAM(s) Oral daily  atorvastatin 10 milliGRAM(s) Oral at bedtime  dextrose 40% Gel 15 Gram(s) Oral once  dextrose 5%. 1000 milliLiter(s) (50 mL/Hr) IV Continuous <Continuous>  dextrose 5%. 1000 milliLiter(s) (100 mL/Hr) IV Continuous <Continuous>  dextrose 50% Injectable 25 Gram(s) IV Push once  dextrose 50% Injectable 12.5 Gram(s) IV Push once  dextrose 50% Injectable 25 Gram(s) IV Push once  glucagon  Injectable 1 milliGRAM(s) IntraMuscular once  heparin   Injectable 5000 Unit(s) SubCutaneous every 8 hours  hydrALAZINE 25 milliGRAM(s) Oral every 8 hours  insulin lispro (ADMELOG) corrective regimen sliding scale   SubCutaneous three times a day before meals  insulin lispro (ADMELOG) corrective regimen sliding scale   SubCutaneous at bedtime  isosorbide   mononitrate ER Tablet (IMDUR) 90 milliGRAM(s) Oral daily  nystatin Powder 1 Application(s) Topical two times a day  OXcarbazepine 300 milliGRAM(s) Oral two times a day  senna 2 Tablet(s) Oral at bedtime    _____________________________________________________________  STUDY INTERPRETATION    Findings: The background was continuous, spontaneously variable and reactive. During wakefulness, the posterior dominant rhythm consisted of symmetric, well-modulated 6-7 Hz activity, with amplitude to 30 uV, that attenuated to eye opening.  Low amplitude frontal beta was noted in wakefulness.      Background Slowing:  Diffuse theta and polymorphic delta slowing.    Focal Slowing:   None was present.    Sleep Background:  Stage II sleep transients were not recorded.  Drowsiness and stage II sleep transients were not recorded.    Other Non-Epileptiform Findings:  -Generalized triphasic waves, maximum bifrontal    Interictal Epileptiform Activity:   None were present.    Events:  Clinical events: None recorded.  Seizures: None recorded.    Activation Procedures:   Hyperventilation was not performed.    Photic stimulation was not performed.     Artifacts:  Intermittent myogenic and movement artifacts were noted.    ECG:  The heart rate on single channel ECG was predominantly between 60-80 BPM.    _____________________________________________________________  EEG SUMMARY/CLASSIFICATION  Abnormal EEG in the encephalopathic state.  -Generalized triphasic waves, maximum bifrontal  -Continuous diffuse theta and polymorphic delta slowing.  _____________________________________________________________  EEG IMPRESSION/CLINICAL CORRELATE    Abnormal EEG study.  1. Moderate nonspecific diffuse or multifocal cerebral dysfunction, which may have a metabolic etiology.   2. No epileptiform pattern or seizure seen.    Manohar Zhou MD  Neurology Attending Physician

## 2021-06-02 NOTE — PROGRESS NOTE ADULT - SUBJECTIVE AND OBJECTIVE BOX
Ulises Luevano MD  Interventional Cardiology / Advance Heart Failure and Cardiac Transplant Specialist  [place] Office : [address] [place] [address] [place] [address]  Tel:   [place] Office : [address] [facility] [place] [address]  Tel: [phone]  Cell : [phone]    Subjective/Overnight events: Pt is lying in bed comfortable not in distress, no chest pains no SOB no palpitations  	  MEDICATIONS:  amLODIPine   Tablet 10 milliGRAM(s) Oral daily  aspirin enteric coated 81 milliGRAM(s) Oral daily  heparin   Injectable 5000 Unit(s) SubCutaneous every 8 hours  hydrALAZINE 25 milliGRAM(s) Oral every 8 hours  isosorbide   mononitrate ER Tablet (IMDUR) 90 milliGRAM(s) Oral daily      guaiFENesin Oral Liquid (Sugar-Free) 200 milliGRAM(s) Oral every 6 hours PRN    acetaminophen   Tablet .. 650 milliGRAM(s) Oral every 6 hours PRN  OXcarbazepine 300 milliGRAM(s) Oral two times a day    senna 2 Tablet(s) Oral at bedtime    atorvastatin 10 milliGRAM(s) Oral at bedtime  dextrose 40% Gel 15 Gram(s) Oral once  dextrose 50% Injectable 25 Gram(s) IV Push once  dextrose 50% Injectable 12.5 Gram(s) IV Push once  dextrose 50% Injectable 25 Gram(s) IV Push once  glucagon  Injectable 1 milliGRAM(s) IntraMuscular once  insulin lispro (ADMELOG) corrective regimen sliding scale   SubCutaneous three times a day before meals  insulin lispro (ADMELOG) corrective regimen sliding scale   SubCutaneous at bedtime    dextrose 5%. 1000 milliLiter(s) IV Continuous <Continuous>  dextrose 5%. 1000 milliLiter(s) IV Continuous <Continuous>  nystatin Powder 1 Application(s) Topical two times a day      PAST MEDICAL/SURGICAL HISTORY  PAST MEDICAL & SURGICAL HISTORY:  Hypertension    Seizure    Hyperlipidemia    Diverticulosis    Borderline diabetes    Obesity    No Past Surgical History    History of appendectomy        SOCIAL HISTORY: Substance Use (street drugs): ( x ) never used  (  ) other:    FAMILY HISTORY:  Family history of cerebrovascular accident (CVA) (Sibling)        REVIEW OF SYSTEMS:  CONSTITUTIONAL: No fever, weight loss, or fatigue  EYES: No eye pain, visual disturbances, or discharge  ENMT:  No difficulty hearing, tinnitus, vertigo; No sinus or throat pain  BREASTS: No pain, masses, or nipple discharge  GASTROINTESTINAL: No abdominal or epigastric pain. No nausea, vomiting, or hematemesis; No diarrhea or constipation. No melena or hematochezia.  GENITOURINARY: No dysuria, frequency, hematuria, or incontinence  NEUROLOGICAL: No headaches, memory loss, loss of strength, numbness, or tremors  ENDOCRINE: No heat or cold intolerance; No hair loss  MUSCULOSKELETAL: No joint pain or swelling; No muscle, back, or extremity pain  PSYCHIATRIC: No depression, anxiety, mood swings, or difficulty sleeping  HEME/LYMPH: No easy bruising, or bleeding gums  All others negative    PHYSICAL EXAM:  T(C): 37.1 (06-02-21 @ 04:19), Max: 37.3 (06-02-21 @ 00:16)  HR: 68 (06-02-21 @ 04:19) (53 - 68)  BP: 166/85 (06-02-21 @ 04:19) (105/65 - 171/80)  RR: 18 (06-02-21 @ 04:19) (18 - 19)  SpO2: 97% (06-02-21 @ 04:19) (96% - 98%)  Wt(kg): --  I&O's Summary    01 Jun 2021 07:01  -  02 Jun 2021 07:00  --------------------------------------------------------  IN: 330 mL / OUT: 300 mL / NET: 30 mL    02 Jun 2021 07:01  -  02 Jun 2021 10:23  --------------------------------------------------------  IN: 275 mL / OUT: 200 mL / NET: 75 mL            GENERAL: NAD  EYES: EOMI, PERRLA, conjunctiva and sclera clear  ENMT: No tonsillar erythema, exudates, or enlargement;  Cardiovascular: Normal S1 S2, No JVD, No murmurs, No edema  Respiratory: Lungs clear to auscultation	  Gastrointestinal:  Soft, nontender , + BS	  Extremities: no edema                                    10.4   5.42  )-----------( 279      ( 02 Jun 2021 06:35 )             30.5     06-02    134<L>  |  99  |  25<H>  ----------------------------<  97  4.4   |  23  |  0.98    Ca    9.1      02 Jun 2021 06:35      proBNP:   Lipid Profile:   HgA1c:   TSH:     Consultant(s) Notes Reviewed:  [x ] YES  [ ] NO    Care Discussed with Consultants/Other Providers [ x] YES  [ ] NO    Imaging Personally Reviewed independently:  [x] YES  [ ] NO    All labs, radiologic studies, vitals, orders and medications list reviewed. Patient is seen and examined at bedside. Case discussed with medical team.

## 2021-06-02 NOTE — PROGRESS NOTE ADULT - SUBJECTIVE AND OBJECTIVE BOX
SUBJECTIVE / OVERNIGHT EVENTS: pt seen and examined    MEDICATIONS  (STANDING):  amLODIPine   Tablet 10 milliGRAM(s) Oral daily  aspirin enteric coated 81 milliGRAM(s) Oral daily  atorvastatin 10 milliGRAM(s) Oral at bedtime  dextrose 40% Gel 15 Gram(s) Oral once  dextrose 5%. 1000 milliLiter(s) (50 mL/Hr) IV Continuous <Continuous>  dextrose 5%. 1000 milliLiter(s) (100 mL/Hr) IV Continuous <Continuous>  dextrose 50% Injectable 25 Gram(s) IV Push once  dextrose 50% Injectable 12.5 Gram(s) IV Push once  dextrose 50% Injectable 25 Gram(s) IV Push once  glucagon  Injectable 1 milliGRAM(s) IntraMuscular once  heparin   Injectable 5000 Unit(s) SubCutaneous every 8 hours  hydrALAZINE 25 milliGRAM(s) Oral every 8 hours  insulin lispro (ADMELOG) corrective regimen sliding scale   SubCutaneous three times a day before meals  insulin lispro (ADMELOG) corrective regimen sliding scale   SubCutaneous at bedtime  isosorbide   mononitrate ER Tablet (IMDUR) 90 milliGRAM(s) Oral daily  nystatin Powder 1 Application(s) Topical two times a day  OXcarbazepine 300 milliGRAM(s) Oral two times a day  senna 2 Tablet(s) Oral at bedtime    MEDICATIONS  (PRN):  acetaminophen   Tablet .. 650 milliGRAM(s) Oral every 6 hours PRN Mild Pain (1 - 3)  guaiFENesin Oral Liquid (Sugar-Free) 200 milliGRAM(s) Oral every 6 hours PRN Cough    Vital Signs Last 24 Hrs  T(C): 36.8 (02 Jun 2021 20:27), Max: 37.3 (02 Jun 2021 00:16)  T(F): 98.3 (02 Jun 2021 20:27), Max: 99.1 (02 Jun 2021 00:16)  HR: 68 (02 Jun 2021 20:27) (55 - 69)  BP: 146/78 (02 Jun 2021 20:27) (118/71 - 184/78)  BP(mean): --  RR: 18 (02 Jun 2021 20:27) (18 - 18)  SpO2: 96% (02 Jun 2021 20:27) (96% - 97%)    Constitutional: No fever, fatigue  Skin: No rash.  Eyes: No recent vision problems or eye pain.  ENT: No congestion, ear pain, or sore throat.  Cardiovascular: No chest pain or palpation.  Respiratory: No cough, shortness of breath, congestion, or wheezing.  Gastrointestinal: No abdominal pain, nausea, vomiting, or diarrhea.  Genitourinary: No dysuria.  Musculoskeletal: No joint swelling.  Neurologic: No headache.    PHYSICAL EXAM:  GENERAL: NAD  EYES: EOMI, PERRLA  NECK: Supple, No JVD  CHEST/LUNG: dec breath sounds rt base  HEART:  S1 , S2 +  ABDOMEN: soft , bs+  EXTREMITIES:  trace edema  NEUROLOGY:alert awake    LABS:  06-02    134<L>  |  99  |  25<H>  ----------------------------<  97  4.4   |  23  |  0.98    Ca    9.1      02 Jun 2021 06:35      Creatinine Trend: 0.98 <--, 1.12 <--, 1.02 <--, 1.10 <--, 1.23 <--, 1.02 <--                        10.4   5.42  )-----------( 279      ( 02 Jun 2021 06:35 )             30.5     Urine Studies:

## 2021-06-02 NOTE — PROGRESS NOTE ADULT - ASSESSMENT
Assessment and Plan    75 yo woman with PMH of HTN, HLD, seizures, T2DM presents with mechanical fall after possible syncopal event.     EKG: Sinus Kaz 53 no STT changes  Tele: SB 50-60s    1. Syncope  - orthostatic negative  - s/p [facility] previous admission with non obstructive CAD  - echo from 04/2021 with grossly normal LV function and mild diastolic dysfunction  - ILR interrogation with 3 sec pause but deemed not to be cause of syncope ,  bradycardic to 30's on occasion   - f/u Neuro planned for REEG   -appreciate EP, no indication for PPM    2. HTN  -BP fluctuating   - c/w norvasc 10mg daily,  imdur 90mg daily and hydralazine  - continue to monitor BP    3. Seizures  -on trileptal   -f/u neuro

## 2021-06-02 NOTE — PROVIDER CONTACT NOTE (OTHER) - ACTION/TREATMENT ORDERED:
Ordered orthostatics to be done at a later time. Will continue to monitor.
Provider made aware. Will repeat bp in 1hour.

## 2021-06-02 NOTE — PROVIDER CONTACT NOTE (OTHER) - ASSESSMENT
Patient in bed no complaints of chest pain, dizziness/ligthheadedness, or vision changes. No s/s of distress of discomfort. VSS otherwise.
Pt BP was 62/37 also O2 sat 95% while sitting in the chair. Pt put back in bed and placed in Trendelenburg's position, BP improved to 131/73. Pulse Ox 95%.

## 2021-06-03 LAB
GLUCOSE BLDC GLUCOMTR-MCNC: 103 MG/DL — HIGH (ref 70–99)
GLUCOSE BLDC GLUCOMTR-MCNC: 104 MG/DL — HIGH (ref 70–99)
GLUCOSE BLDC GLUCOMTR-MCNC: 125 MG/DL — HIGH (ref 70–99)
GLUCOSE BLDC GLUCOMTR-MCNC: 98 MG/DL — SIGNIFICANT CHANGE UP (ref 70–99)

## 2021-06-03 RX ADMIN — NYSTATIN CREAM 1 APPLICATION(S): 100000 CREAM TOPICAL at 18:46

## 2021-06-03 RX ADMIN — Medication 25 MILLIGRAM(S): at 21:57

## 2021-06-03 RX ADMIN — HEPARIN SODIUM 5000 UNIT(S): 5000 INJECTION INTRAVENOUS; SUBCUTANEOUS at 06:20

## 2021-06-03 RX ADMIN — Medication 81 MILLIGRAM(S): at 13:15

## 2021-06-03 RX ADMIN — OXCARBAZEPINE 300 MILLIGRAM(S): 300 TABLET, FILM COATED ORAL at 18:46

## 2021-06-03 RX ADMIN — Medication 25 MILLIGRAM(S): at 06:19

## 2021-06-03 RX ADMIN — AMLODIPINE BESYLATE 10 MILLIGRAM(S): 2.5 TABLET ORAL at 06:19

## 2021-06-03 RX ADMIN — ISOSORBIDE MONONITRATE 90 MILLIGRAM(S): 60 TABLET, EXTENDED RELEASE ORAL at 13:16

## 2021-06-03 RX ADMIN — NYSTATIN CREAM 1 APPLICATION(S): 100000 CREAM TOPICAL at 06:20

## 2021-06-03 RX ADMIN — OXCARBAZEPINE 300 MILLIGRAM(S): 300 TABLET, FILM COATED ORAL at 06:20

## 2021-06-03 RX ADMIN — HEPARIN SODIUM 5000 UNIT(S): 5000 INJECTION INTRAVENOUS; SUBCUTANEOUS at 13:16

## 2021-06-03 RX ADMIN — Medication 25 MILLIGRAM(S): at 13:16

## 2021-06-03 RX ADMIN — ATORVASTATIN CALCIUM 10 MILLIGRAM(S): 80 TABLET, FILM COATED ORAL at 21:57

## 2021-06-03 RX ADMIN — HEPARIN SODIUM 5000 UNIT(S): 5000 INJECTION INTRAVENOUS; SUBCUTANEOUS at 21:57

## 2021-06-03 NOTE — PROGRESS NOTE ADULT - SUBJECTIVE AND OBJECTIVE BOX
Neurology Follow up note    Name  ADOLPH Mendez History - Patient seen and examined this am.         Vital Signs Last 24 Hrs  T(C): 36.3 (03 Jun 2021 04:57), Max: 36.8 (02 Jun 2021 20:27)  T(F): 97.4 (03 Jun 2021 04:57), Max: 98.3 (02 Jun 2021 20:27)  HR: 53 (03 Jun 2021 04:57) (53 - 69)  BP: 151/68 (03 Jun 2021 04:57) (118/71 - 184/78)  BP(mean): --  RR: 18 (03 Jun 2021 04:57) (18 - 18)  SpO2: 96% (03 Jun 2021 04:57) (96% - 97%)  PHYSICAL EXAM:      Neurological Exam:  Mental Status - eyse closed oriented x3 follows imple commands  Cranial Nerves - PERRL, EOMI, VFF, no gross facial asymmetry    Motor Exam -   moves antigravity   nml bulk/tone    Sensory    Intact to light touch and pinprick bilaterally    Coord: FTN intact bilaterally     Gait -  deferred    MEDICATIONS  (STANDING):  amLODIPine   Tablet 10 milliGRAM(s) Oral daily  aspirin enteric coated 81 milliGRAM(s) Oral daily  atorvastatin 10 milliGRAM(s) Oral at bedtime  dextrose 40% Gel 15 Gram(s) Oral once  dextrose 5%. 1000 milliLiter(s) (50 mL/Hr) IV Continuous <Continuous>  dextrose 5%. 1000 milliLiter(s) (100 mL/Hr) IV Continuous <Continuous>  dextrose 50% Injectable 25 Gram(s) IV Push once  dextrose 50% Injectable 12.5 Gram(s) IV Push once  dextrose 50% Injectable 25 Gram(s) IV Push once  glucagon  Injectable 1 milliGRAM(s) IntraMuscular once  heparin   Injectable 5000 Unit(s) SubCutaneous every 8 hours  hydrALAZINE 25 milliGRAM(s) Oral every 8 hours  insulin lispro (ADMELOG) corrective regimen sliding scale   SubCutaneous three times a day before meals  insulin lispro (ADMELOG) corrective regimen sliding scale   SubCutaneous at bedtime  isosorbide   mononitrate ER Tablet (IMDUR) 90 milliGRAM(s) Oral daily  nystatin Powder 1 Application(s) Topical two times a day  OXcarbazepine 300 milliGRAM(s) Oral two times a day  senna 2 Tablet(s) Oral at bedtime    MEDICATIONS  (PRN):  acetaminophen   Tablet .. 650 milliGRAM(s) Oral every 6 hours PRN Mild Pain (1 - 3)  guaiFENesin Oral Liquid (Sugar-Free) 200 milliGRAM(s) Oral every 6 hours PRN Cough        _______________________  EEG SUMMARY/CLASSIFICATION  Abnormal EEG in the encephalopathic state.  -Generalized triphasic waves, maximum bifrontal  -Continuous diffuse theta and polymorphic delta slowing.  _____________________________________________________________

## 2021-06-03 NOTE — PROGRESS NOTE ADULT - ASSESSMENT
Assessment and Plan    77 yo woman with PMH of HTN, HLD, seizures, T2DM presents with mechanical fall after possible syncopal event.     EKG: Sinus Kaz 53 no STT changes  Tele: SB 50-60s    1. Syncope  - orthostatic negative  - s/p [facility] previous admission with non obstructive CAD  - echo from 04/2021 with grossly normal LV function and mild diastolic dysfunction  - ILR interrogation with 3 sec pause but deemed not to be cause of syncope ,  bradycardic to 30's on occasion   - f/u Neuro s/p EEG  -appreciate EP, no indication for PPM    2. HTN  -BP fluctuating   - c/w norvasc 10mg daily,  imdur 90mg daily and hydralazine  - continue to monitor BP    3. Seizures  -on trileptal   -f/u neuro

## 2021-06-03 NOTE — PROGRESS NOTE ADULT - SUBJECTIVE AND OBJECTIVE BOX
SUBJECTIVE / OVERNIGHT EVENTS: pt seen and examined    MEDICATIONS  (STANDING):  amLODIPine   Tablet 10 milliGRAM(s) Oral daily  aspirin enteric coated 81 milliGRAM(s) Oral daily  atorvastatin 10 milliGRAM(s) Oral at bedtime  dextrose 40% Gel 15 Gram(s) Oral once  dextrose 5%. 1000 milliLiter(s) (50 mL/Hr) IV Continuous <Continuous>  dextrose 5%. 1000 milliLiter(s) (100 mL/Hr) IV Continuous <Continuous>  dextrose 50% Injectable 25 Gram(s) IV Push once  dextrose 50% Injectable 12.5 Gram(s) IV Push once  dextrose 50% Injectable 25 Gram(s) IV Push once  glucagon  Injectable 1 milliGRAM(s) IntraMuscular once  heparin   Injectable 5000 Unit(s) SubCutaneous every 8 hours  hydrALAZINE 25 milliGRAM(s) Oral every 8 hours  insulin lispro (ADMELOG) corrective regimen sliding scale   SubCutaneous three times a day before meals  insulin lispro (ADMELOG) corrective regimen sliding scale   SubCutaneous at bedtime  isosorbide   mononitrate ER Tablet (IMDUR) 90 milliGRAM(s) Oral daily  nystatin Powder 1 Application(s) Topical two times a day  OXcarbazepine 300 milliGRAM(s) Oral two times a day  senna 2 Tablet(s) Oral at bedtime    MEDICATIONS  (PRN):  acetaminophen   Tablet .. 650 milliGRAM(s) Oral every 6 hours PRN Mild Pain (1 - 3)  guaiFENesin Oral Liquid (Sugar-Free) 200 milliGRAM(s) Oral every 6 hours PRN Cough    Vital Signs Last 24 Hrs  T(C): 37.6 (03 Jun 2021 23:43), Max: 37.6 (03 Jun 2021 23:43)  T(F): 99.6 (03 Jun 2021 23:43), Max: 99.6 (03 Jun 2021 23:43)  HR: 87 (03 Jun 2021 23:43) (53 - 87)  BP: 146/77 (03 Jun 2021 23:43) (123/67 - 170/80)  BP(mean): --  RR: 17 (03 Jun 2021 23:43) (17 - 18)  SpO2: 96% (03 Jun 2021 23:43) (95% - 97%)    Constitutional: No fever, fatigue  Skin: No rash.  Eyes: No recent vision problems or eye pain.  ENT: No congestion, ear pain, or sore throat.  Cardiovascular: No chest pain or palpation.  Respiratory: No cough, shortness of breath, congestion, or wheezing.  Gastrointestinal: No abdominal pain, nausea, vomiting, or diarrhea.  Genitourinary: No dysuria.  Musculoskeletal: No joint swelling.  Neurologic: No headache.    PHYSICAL EXAM:  GENERAL: NAD  EYES: EOMI, PERRLA  NECK: Supple, No JVD  CHEST/LUNG: dec breath sounds rt base  HEART:  S1 , S2 +  ABDOMEN: soft , bs+  EXTREMITIES:  trace edema  NEUROLOGY:alert awake    LABS:  06-02    134<L>  |  99  |  25<H>  ----------------------------<  97  4.4   |  23  |  0.98    Ca    9.1      02 Jun 2021 06:35      Creatinine Trend: 0.98 <--, 1.12 <--, 1.02 <--, 1.10 <--, 1.23 <--                        10.4   5.42  )-----------( 279      ( 02 Jun 2021 06:35 )             30.5     Urine Studies:

## 2021-06-03 NOTE — PROGRESS NOTE ADULT - SUBJECTIVE AND OBJECTIVE BOX
Ulises Luevano MD  Interventional Cardiology / Advance Heart Failure and Cardiac Transplant Specialist  [place] Office : [address] [place] [address] [place]. [address]  Tel:   [place] Office : [address] [facility] [place] [address]  Tel: [phone]  Cell : [phone]    Subjective/Overnight events: Pt is lying in bed comfortable not in distress, no chest pains no SOB no palpitations  	  MEDICATIONS:  amLODIPine   Tablet 10 milliGRAM(s) Oral daily  aspirin enteric coated 81 milliGRAM(s) Oral daily  heparin   Injectable 5000 Unit(s) SubCutaneous every 8 hours  hydrALAZINE 25 milliGRAM(s) Oral every 8 hours  isosorbide   mononitrate ER Tablet (IMDUR) 90 milliGRAM(s) Oral daily      guaiFENesin Oral Liquid (Sugar-Free) 200 milliGRAM(s) Oral every 6 hours PRN    acetaminophen   Tablet .. 650 milliGRAM(s) Oral every 6 hours PRN  OXcarbazepine 300 milliGRAM(s) Oral two times a day    senna 2 Tablet(s) Oral at bedtime    atorvastatin 10 milliGRAM(s) Oral at bedtime  dextrose 40% Gel 15 Gram(s) Oral once  dextrose 50% Injectable 25 Gram(s) IV Push once  dextrose 50% Injectable 12.5 Gram(s) IV Push once  dextrose 50% Injectable 25 Gram(s) IV Push once  glucagon  Injectable 1 milliGRAM(s) IntraMuscular once  insulin lispro (ADMELOG) corrective regimen sliding scale   SubCutaneous three times a day before meals  insulin lispro (ADMELOG) corrective regimen sliding scale   SubCutaneous at bedtime    dextrose 5%. 1000 milliLiter(s) IV Continuous <Continuous>  dextrose 5%. 1000 milliLiter(s) IV Continuous <Continuous>  nystatin Powder 1 Application(s) Topical two times a day      PAST MEDICAL/SURGICAL HISTORY  PAST MEDICAL & SURGICAL HISTORY:  Hypertension    Seizure    Hyperlipidemia    Diverticulosis    Borderline diabetes    Obesity    No Past Surgical History    History of appendectomy        SOCIAL HISTORY: Substance Use (street drugs): ( x ) never used  (  ) other:    FAMILY HISTORY:  Family history of cerebrovascular accident (CVA) (Sibling)        REVIEW OF SYSTEMS:  CONSTITUTIONAL: No fever, weight loss, or fatigue  EYES: No eye pain, visual disturbances, or discharge  ENMT:  No difficulty hearing, tinnitus, vertigo; No sinus or throat pain  BREASTS: No pain, masses, or nipple discharge  GASTROINTESTINAL: No abdominal or epigastric pain. No nausea, vomiting, or hematemesis; No diarrhea or constipation. No melena or hematochezia.  GENITOURINARY: No dysuria, frequency, hematuria, or incontinence  NEUROLOGICAL: No headaches, memory loss, loss of strength, numbness, or tremors  ENDOCRINE: No heat or cold intolerance; No hair loss  MUSCULOSKELETAL: No joint pain or swelling; No muscle, back, or extremity pain  PSYCHIATRIC: No depression, anxiety, mood swings, or difficulty sleeping  HEME/LYMPH: No easy bruising, or bleeding gums  All others negative    PHYSICAL EXAM:  T(C): 36.3 (06-03-21 @ 04:57), Max: 36.8 (06-02-21 @ 20:27)  HR: 53 (06-03-21 @ 04:57) (53 - 69)  BP: 151/68 (06-03-21 @ 04:57) (118/71 - 184/78)  RR: 18 (06-03-21 @ 04:57) (18 - 18)  SpO2: 96% (06-03-21 @ 04:57) (96% - 97%)  Wt(kg): --  I&O's Summary    02 Jun 2021 07:01  -  03 Jun 2021 07:00  --------------------------------------------------------  IN: 375 mL / OUT: 400 mL / NET: -25 mL          GENERAL: NAD  EYES: EOMI, PERRLA, conjunctiva and sclera clear  ENMT: No tonsillar erythema, exudates, or enlargement;  Cardiovascular: Normal S1 S2, No JVD, No murmurs, No edema  Respiratory: Lungs clear to auscultation	  Gastrointestinal:  Soft, nontender , + BS	  Extremities: no edema                                    10.4   5.42  )-----------( 279      ( 02 Jun 2021 06:35 )             30.5     06-02    134<L>  |  99  |  25<H>  ----------------------------<  97  4.4   |  23  |  0.98    Ca    9.1      02 Jun 2021 06:35      proBNP:   Lipid Profile:   HgA1c:   TSH:     Consultant(s) Notes Reviewed:  [x ] YES  [ ] NO    Care Discussed with Consultants/Other Providers [ x] YES  [ ] NO    Imaging Personally Reviewed independently:  [x] YES  [ ] NO    All labs, radiologic studies, vitals, orders and medications list reviewed. Patient is seen and examined at bedside. Case discussed with medical team.

## 2021-06-03 NOTE — PROGRESS NOTE ADULT - ASSESSMENT
76 yo female with a PMHx of HTN, HLD, and seizures here with dizziness, can not get full history as limited by pt's pain. Neuro exam without focality mentation improving  CTH with chronic lacunar infacts. Patient on  BID level on last visit 34 upper range of sujatha Labs show a microcytic anemia      Unclear cause of Nausea general malaise. no clear focality    -C/W OXc 300BID  -EEG with triphasic likely underlying metabolic issue  - No further inpt w/u

## 2021-06-04 ENCOUNTER — APPOINTMENT (OUTPATIENT)
Dept: ELECTROPHYSIOLOGY | Facility: CLINIC | Age: 76
End: 2021-06-04
Payer: MEDICARE

## 2021-06-04 ENCOUNTER — NON-APPOINTMENT (OUTPATIENT)
Age: 76
End: 2021-06-04

## 2021-06-04 LAB
ALBUMIN SERPL ELPH-MCNC: 3.6 G/DL — SIGNIFICANT CHANGE UP (ref 3.3–5)
ALP SERPL-CCNC: 133 U/L — HIGH (ref 40–120)
ALT FLD-CCNC: 14 U/L — SIGNIFICANT CHANGE UP (ref 10–45)
ANION GAP SERPL CALC-SCNC: 14 MMOL/L — SIGNIFICANT CHANGE UP (ref 5–17)
APPEARANCE UR: CLEAR — SIGNIFICANT CHANGE UP
AST SERPL-CCNC: 21 U/L — SIGNIFICANT CHANGE UP (ref 10–40)
BACTERIA # UR AUTO: NEGATIVE — SIGNIFICANT CHANGE UP
BILIRUB SERPL-MCNC: 0.4 MG/DL — SIGNIFICANT CHANGE UP (ref 0.2–1.2)
BILIRUB UR-MCNC: NEGATIVE — SIGNIFICANT CHANGE UP
BUN SERPL-MCNC: 24 MG/DL — HIGH (ref 7–23)
CALCIUM SERPL-MCNC: 9.4 MG/DL — SIGNIFICANT CHANGE UP (ref 8.4–10.5)
CHLORIDE SERPL-SCNC: 98 MMOL/L — SIGNIFICANT CHANGE UP (ref 96–108)
CO2 SERPL-SCNC: 23 MMOL/L — SIGNIFICANT CHANGE UP (ref 22–31)
COLOR SPEC: YELLOW — SIGNIFICANT CHANGE UP
CREAT SERPL-MCNC: 1.01 MG/DL — SIGNIFICANT CHANGE UP (ref 0.5–1.3)
DIFF PNL FLD: NEGATIVE — SIGNIFICANT CHANGE UP
EPI CELLS # UR: 1 /HPF — SIGNIFICANT CHANGE UP
GLUCOSE BLDC GLUCOMTR-MCNC: 127 MG/DL — HIGH (ref 70–99)
GLUCOSE BLDC GLUCOMTR-MCNC: 128 MG/DL — HIGH (ref 70–99)
GLUCOSE BLDC GLUCOMTR-MCNC: 133 MG/DL — HIGH (ref 70–99)
GLUCOSE BLDC GLUCOMTR-MCNC: 165 MG/DL — HIGH (ref 70–99)
GLUCOSE SERPL-MCNC: 126 MG/DL — HIGH (ref 70–99)
GLUCOSE UR QL: NEGATIVE — SIGNIFICANT CHANGE UP
HCT VFR BLD CALC: 30.7 % — LOW (ref 34.5–45)
HGB BLD-MCNC: 10.6 G/DL — LOW (ref 11.5–15.5)
HYALINE CASTS # UR AUTO: 0 /LPF — SIGNIFICANT CHANGE UP (ref 0–2)
KETONES UR-MCNC: NEGATIVE — SIGNIFICANT CHANGE UP
LACTATE SERPL-SCNC: 0.9 MMOL/L — SIGNIFICANT CHANGE UP (ref 0.7–2)
LEUKOCYTE ESTERASE UR-ACNC: ABNORMAL
MCHC RBC-ENTMCNC: 23.5 PG — LOW (ref 27–34)
MCHC RBC-ENTMCNC: 34.5 GM/DL — SIGNIFICANT CHANGE UP (ref 32–36)
MCV RBC AUTO: 68.1 FL — LOW (ref 80–100)
NITRITE UR-MCNC: NEGATIVE — SIGNIFICANT CHANGE UP
NRBC # BLD: 0 /100 WBCS — SIGNIFICANT CHANGE UP (ref 0–0)
PH UR: 7 — SIGNIFICANT CHANGE UP (ref 5–8)
PLATELET # BLD AUTO: 329 K/UL — SIGNIFICANT CHANGE UP (ref 150–400)
POTASSIUM SERPL-MCNC: 4.4 MMOL/L — SIGNIFICANT CHANGE UP (ref 3.5–5.3)
POTASSIUM SERPL-SCNC: 4.4 MMOL/L — SIGNIFICANT CHANGE UP (ref 3.5–5.3)
PROCALCITONIN SERPL-MCNC: 0.1 NG/ML — SIGNIFICANT CHANGE UP (ref 0.02–0.1)
PROT SERPL-MCNC: 7.3 G/DL — SIGNIFICANT CHANGE UP (ref 6–8.3)
PROT UR-MCNC: ABNORMAL
PYRIDOXAL PHOS SERPL-MCNC: 1.3 UG/L — LOW (ref 2–32.8)
PYRIDOXAL PHOS SERPL-MCNC: 2.4 UG/L — SIGNIFICANT CHANGE UP (ref 2–32.8)
RAPID RVP RESULT: SIGNIFICANT CHANGE UP
RBC # BLD: 4.51 M/UL — SIGNIFICANT CHANGE UP (ref 3.8–5.2)
RBC # FLD: 16.8 % — HIGH (ref 10.3–14.5)
RBC CASTS # UR COMP ASSIST: 12 /HPF — HIGH (ref 0–4)
SARS-COV-2 RNA SPEC QL NAA+PROBE: SIGNIFICANT CHANGE UP
SODIUM SERPL-SCNC: 135 MMOL/L — SIGNIFICANT CHANGE UP (ref 135–145)
SP GR SPEC: 1.02 — SIGNIFICANT CHANGE UP (ref 1.01–1.02)
UROBILINOGEN FLD QL: NEGATIVE — SIGNIFICANT CHANGE UP
WBC # BLD: 6.67 K/UL — SIGNIFICANT CHANGE UP (ref 3.8–10.5)
WBC # FLD AUTO: 6.67 K/UL — SIGNIFICANT CHANGE UP (ref 3.8–10.5)
WBC UR QL: 39 /HPF — HIGH (ref 0–5)

## 2021-06-04 PROCEDURE — 71045 X-RAY EXAM CHEST 1 VIEW: CPT | Mod: 26

## 2021-06-04 PROCEDURE — 93298 REM INTERROG DEV EVAL SCRMS: CPT

## 2021-06-04 PROCEDURE — 74177 CT ABD & PELVIS W/CONTRAST: CPT | Mod: 26

## 2021-06-04 PROCEDURE — 99222 1ST HOSP IP/OBS MODERATE 55: CPT

## 2021-06-04 PROCEDURE — G2066: CPT

## 2021-06-04 RX ADMIN — Medication 650 MILLIGRAM(S): at 15:30

## 2021-06-04 RX ADMIN — Medication 650 MILLIGRAM(S): at 05:16

## 2021-06-04 RX ADMIN — Medication 650 MILLIGRAM(S): at 05:50

## 2021-06-04 RX ADMIN — NYSTATIN CREAM 1 APPLICATION(S): 100000 CREAM TOPICAL at 17:07

## 2021-06-04 RX ADMIN — HEPARIN SODIUM 5000 UNIT(S): 5000 INJECTION INTRAVENOUS; SUBCUTANEOUS at 21:21

## 2021-06-04 RX ADMIN — AMLODIPINE BESYLATE 10 MILLIGRAM(S): 2.5 TABLET ORAL at 05:16

## 2021-06-04 RX ADMIN — Medication 650 MILLIGRAM(S): at 14:21

## 2021-06-04 RX ADMIN — OXCARBAZEPINE 300 MILLIGRAM(S): 300 TABLET, FILM COATED ORAL at 05:16

## 2021-06-04 RX ADMIN — HEPARIN SODIUM 5000 UNIT(S): 5000 INJECTION INTRAVENOUS; SUBCUTANEOUS at 05:15

## 2021-06-04 RX ADMIN — Medication 81 MILLIGRAM(S): at 11:26

## 2021-06-04 RX ADMIN — Medication 25 MILLIGRAM(S): at 21:21

## 2021-06-04 RX ADMIN — Medication 25 MILLIGRAM(S): at 13:01

## 2021-06-04 RX ADMIN — Medication 25 MILLIGRAM(S): at 05:16

## 2021-06-04 RX ADMIN — HEPARIN SODIUM 5000 UNIT(S): 5000 INJECTION INTRAVENOUS; SUBCUTANEOUS at 13:01

## 2021-06-04 RX ADMIN — NYSTATIN CREAM 1 APPLICATION(S): 100000 CREAM TOPICAL at 05:15

## 2021-06-04 RX ADMIN — ISOSORBIDE MONONITRATE 90 MILLIGRAM(S): 60 TABLET, EXTENDED RELEASE ORAL at 13:00

## 2021-06-04 RX ADMIN — ATORVASTATIN CALCIUM 10 MILLIGRAM(S): 80 TABLET, FILM COATED ORAL at 21:21

## 2021-06-04 RX ADMIN — OXCARBAZEPINE 300 MILLIGRAM(S): 300 TABLET, FILM COATED ORAL at 17:07

## 2021-06-04 NOTE — PROGRESS NOTE ADULT - SUBJECTIVE AND OBJECTIVE BOX
Ulises Luevano MD  Interventional Cardiology / Advance Heart Failure and Cardiac Transplant Specialist  [place] Office : [address] [place] [address] [place] [address]  Tel:   [place] Office : [address] [facility] [place] [address]  Tel: [phone]  Cell : [phone]    Pt is lying in bed comfortable not in distress, no chest pains no SOB no palpitations  	  MEDICATIONS:  amLODIPine   Tablet 10 milliGRAM(s) Oral daily  aspirin enteric coated 81 milliGRAM(s) Oral daily  heparin   Injectable 5000 Unit(s) SubCutaneous every 8 hours  hydrALAZINE 25 milliGRAM(s) Oral every 8 hours  isosorbide   mononitrate ER Tablet (IMDUR) 90 milliGRAM(s) Oral daily      guaiFENesin Oral Liquid (Sugar-Free) 200 milliGRAM(s) Oral every 6 hours PRN    acetaminophen   Tablet .. 650 milliGRAM(s) Oral every 6 hours PRN  OXcarbazepine 300 milliGRAM(s) Oral two times a day    senna 2 Tablet(s) Oral at bedtime    atorvastatin 10 milliGRAM(s) Oral at bedtime  dextrose 40% Gel 15 Gram(s) Oral once  dextrose 50% Injectable 25 Gram(s) IV Push once  dextrose 50% Injectable 12.5 Gram(s) IV Push once  dextrose 50% Injectable 25 Gram(s) IV Push once  glucagon  Injectable 1 milliGRAM(s) IntraMuscular once  insulin lispro (ADMELOG) corrective regimen sliding scale   SubCutaneous three times a day before meals  insulin lispro (ADMELOG) corrective regimen sliding scale   SubCutaneous at bedtime    dextrose 5%. 1000 milliLiter(s) IV Continuous <Continuous>  dextrose 5%. 1000 milliLiter(s) IV Continuous <Continuous>  nystatin Powder 1 Application(s) Topical two times a day      PAST MEDICAL/SURGICAL HISTORY  PAST MEDICAL & SURGICAL HISTORY:  Hypertension    Seizure    Hyperlipidemia    Diverticulosis    Borderline diabetes    Obesity    No Past Surgical History    History of appendectomy        SOCIAL HISTORY: Substance Use (street drugs): ( x ) never used  (  ) other:    FAMILY HISTORY:  Family history of cerebrovascular accident (CVA) (Sibling)        REVIEW OF SYSTEMS:  CONSTITUTIONAL: No fever, weight loss, or fatigue  EYES: No eye pain, visual disturbances, or discharge  ENMT:  No difficulty hearing, tinnitus, vertigo; No sinus or throat pain  BREASTS: No pain, masses, or nipple discharge  GASTROINTESTINAL: No abdominal or epigastric pain. No nausea, vomiting, or hematemesis; No diarrhea or constipation. No melena or hematochezia.  GENITOURINARY: No dysuria, frequency, hematuria, or incontinence  NEUROLOGICAL: No headaches, memory loss, loss of strength, numbness, or tremors  ENDOCRINE: No heat or cold intolerance; No hair loss  MUSCULOSKELETAL: No joint pain or swelling; No muscle, back, or extremity pain  PSYCHIATRIC: No depression, anxiety, mood swings, or difficulty sleeping  HEME/LYMPH: No easy bruising, or bleeding gums  All others negative    PHYSICAL EXAM:  T(C): 37.2 (06-04-21 @ 23:39), Max: 38.1 (06-04-21 @ 12:48)  HR: 60 (06-04-21 @ 23:39) (60 - 79)  BP: 153/79 (06-04-21 @ 23:39) (136/72 - 165/80)  RR: 18 (06-04-21 @ 23:39) (18 - 20)  SpO2: 98% (06-04-21 @ 23:39) (96% - 99%)  Wt(kg): --  I&O's Summary    03 Jun 2021 07:01  -  04 Jun 2021 07:00  --------------------------------------------------------  IN: 760 mL / OUT: 682 mL / NET: 78 mL    04 Jun 2021 07:01  -  05 Jun 2021 00:08  --------------------------------------------------------  IN: 480 mL / OUT: 350 mL / NET: 130 mL          GENERAL: NAD  EYES: EOMI, PERRLA, conjunctiva and sclera clear  ENMT: No tonsillar erythema, exudates, or enlargement; Moist mucous membranes, Good dentition, No lesions  Cardiovascular: Normal S1 S2, No JVD, No murmurs, No edema  Respiratory: Lungs clear to auscultation	  Gastrointestinal:  Soft, Non-tender, + BS	  Extremities: Normal range of motion, No clubbing, cyanosis or edema  LYMPH: No lymphadenopathy noted  NERVOUS SYSTEM:  Alert & Oriented X3, Good concentration; Motor Strength 5/5 B/L upper and lower extremities; DTRs 2+ intact and symmetric                                    10.6   6.67  )-----------( 329      ( 04 Jun 2021 14:09 )             30.7     06-04    135  |  98  |  24<H>  ----------------------------<  126<H>  4.4   |  23  |  1.01    Ca    9.4      04 Jun 2021 14:09    TPro  7.3  /  Alb  3.6  /  TBili  0.4  /  DBili  x   /  AST  21  /  ALT  14  /  AlkPhos  133<H>  06-04    proBNP:   Lipid Profile:   HgA1c:   TSH:     Consultant(s) Notes Reviewed:  [x ] YES  [ ] NO    Care Discussed with Consultants/Other Providers [ x] YES  [ ] NO    Imaging Personally Reviewed independently:  [x] YES  [ ] NO    All labs, radiologic studies, vitals, orders and medications list reviewed. Patient is seen and examined at bedside. Case discussed with medical team.

## 2021-06-04 NOTE — CONSULT NOTE ADULT - REASON FOR ADMISSION
Referred from [facility] following episodes of dizziness and near syncope last 3 days.
Referred from [facility] following episodes of dizziness and near syncope last 3 days.
Referred from [facility] following episodes of dizziness and near syncope last 3 days.
Referred from [facility] following episodes of dizziness and near syncope last 3 days.

## 2021-06-04 NOTE — CONSULT NOTE ADULT - ASSESSMENT
76 y o f with prediabetes, seizure disorder, essential HTN and undifferentiated orthostatic hypotension, implanted loop recorder sent from [facility] following episodes of dizziness and near syncope last 3 days.  Pt s/p completion of therapy for UTI   now febrile to 100.5,   besides abdominal pain no other localizing symptom  no leucocytosis, procal normal, pt non toxic appearing.       Plan:   monitor off abx   blood cultures in lab  RVP in lab  Abnormal U/A with some pyuria but pt denies dysuria and s/p recent therapy for UTI   check CPK  Amylase and lipase   consider CT abd/pelvis to look for etiology of abdominal pain.   trend cbc for leucocytosis     Plan discussed with Medicine NP    Please call ID service for questions over weekend at [phone].     Will Arciniega  Pager: [phone]. If no response or past 5 pm call [phone].

## 2021-06-04 NOTE — PROGRESS NOTE ADULT - SUBJECTIVE AND OBJECTIVE BOX
SUBJECTIVE / OVERNIGHT EVENTS: pt seen and examined    MEDICATIONS  (STANDING):  amLODIPine   Tablet 10 milliGRAM(s) Oral daily  aspirin enteric coated 81 milliGRAM(s) Oral daily  atorvastatin 10 milliGRAM(s) Oral at bedtime  dextrose 40% Gel 15 Gram(s) Oral once  dextrose 5%. 1000 milliLiter(s) (50 mL/Hr) IV Continuous <Continuous>  dextrose 5%. 1000 milliLiter(s) (100 mL/Hr) IV Continuous <Continuous>  dextrose 50% Injectable 25 Gram(s) IV Push once  dextrose 50% Injectable 12.5 Gram(s) IV Push once  dextrose 50% Injectable 25 Gram(s) IV Push once  glucagon  Injectable 1 milliGRAM(s) IntraMuscular once  heparin   Injectable 5000 Unit(s) SubCutaneous every 8 hours  hydrALAZINE 25 milliGRAM(s) Oral every 8 hours  insulin lispro (ADMELOG) corrective regimen sliding scale   SubCutaneous three times a day before meals  insulin lispro (ADMELOG) corrective regimen sliding scale   SubCutaneous at bedtime  isosorbide   mononitrate ER Tablet (IMDUR) 90 milliGRAM(s) Oral daily  nystatin Powder 1 Application(s) Topical two times a day  OXcarbazepine 300 milliGRAM(s) Oral two times a day  senna 2 Tablet(s) Oral at bedtime    MEDICATIONS  (PRN):  acetaminophen   Tablet .. 650 milliGRAM(s) Oral every 6 hours PRN Mild Pain (1 - 3)  guaiFENesin Oral Liquid (Sugar-Free) 200 milliGRAM(s) Oral every 6 hours PRN Cough    Vital Signs Last 24 Hrs  T(C): 37.7 (2021 20:34), Max: 38.1 (2021 12:48)  T(F): 99.8 (2021 20:34), Max: 100.5 (2021 12:48)  HR: 64 (2021 20:34) (64 - 87)  BP: 156/79 (2021 20:34) (136/72 - 165/80)  BP(mean): --  RR: 18 (2021 20:34) (17 - 20)  SpO2: 97% (2021 20:34) (96% - 99%)    Constitutional: No fever, fatigue  Skin: No rash.  Eyes: No recent vision problems or eye pain.  ENT: No congestion, ear pain, or sore throat.  Cardiovascular: No chest pain or palpation.  Respiratory: No cough, shortness of breath, congestion, or wheezing.  Gastrointestinal: No abdominal pain, nausea, vomiting, or diarrhea.  Genitourinary: No dysuria.  Musculoskeletal: No joint swelling.  Neurologic: No headache.    PHYSICAL EXAM:  GENERAL: NAD  EYES: EOMI, PERRLA  NECK: Supple, No JVD  CHEST/LUNG: dec breath sounds rt base  HEART:  S1 , S2 +  ABDOMEN: soft , bs+  EXTREMITIES:  trace edema  NEUROLOGY:alert awake    LABS:      135  |  98  |  24<H>  ----------------------------<  126<H>  4.4   |  23  |  1.01    Ca    9.4      2021 14:09    TPro  7.3  /  Alb  3.6  /  TBili  0.4  /  DBili      /  AST  21  /  ALT  14  /  AlkPhos  133<H>      Creatinine Trend: 1.01 <--, 0.98 <--, 1.12 <--, 1.02 <--, 1.10 <--                        10.6   6.67  )-----------( 329      ( 2021 14:09 )             30.7     Urine Studies:  Urinalysis Basic - ( 2021 13:36 )    Color: Yellow / Appearance: Clear / S.023 / pH:   Gluc:  / Ketone: Negative  / Bili: Negative / Urobili: Negative   Blood:  / Protein: 30 mg/dL / Nitrite: Negative   Leuk Esterase: Large / RBC: 12 /hpf / WBC 39 /HPF   Sq Epi:  / Non Sq Epi: 1 /hpf / Bacteria: Negative              LIVER FUNCTIONS - ( 2021 14:09 )  Alb: 3.6 g/dL / Pro: 7.3 g/dL / ALK PHOS: 133 U/L / ALT: 14 U/L / AST: 21 U/L / GGT: x

## 2021-06-04 NOTE — CONSULT NOTE ADULT - SUBJECTIVE AND OBJECTIVE BOX
Patient is a 76y old  Female who presents with a chief complaint of Referred from [facility] following episodes of dizziness and near syncope last 3 days. (03 Jun 2021 11:16)      HPI:  75 y/o F--history from patient not reliable and obtained from prior admission records at [facility] and ED records--patient with a history of moderate cognitive impairment, reported prediabetes, seizure disorder, essential HTN and undifferentiated orthostatic hypotension, with apparently a nondiagnostic cardiac and neurologic workup at [facility], recent implanted loop recorder placed, with patient sent from [facility] following episodes of dizziness and near syncope last 3 days.  Daughter presently not in attendance (present earlier in ED with staff) and examiner left general message with daughter. (26 May 2021 18:35)  Above reviewed:   patient AAOx1 as per daughter via phone she reports that patient has episodes of confusion and reports being seen multiple  times in [facility] ED and [facility] ED for similar episodes. Patient was given a Loop recorder 2x weeks ago for episodes of syncope.   CT Head with chronic basal ganglia infarcts; Loop Recorder w/ pause; orthostatics negative, cardiology following.   s/p ceftriaxone for 5 days for UTI   pt febrile today so ID called for evaluation       PAST MEDICAL & SURGICAL HISTORY:  Hypertension    Seizure    Hyperlipidemia    Diverticulosis    Borderline diabetes    Obesity    No Past Surgical History    History of appendectomy        REVIEW OF SYSTEMS: ? reliability due to underlying dementia.   Denies all other symptoms except abdominal pain, points towards midepigastrium when asked where.   keeps repeating no, no..... otherwise, even when not asked a question.     Social history:  From rehab.       FAMILY HISTORY:  Family history of cerebrovascular accident (CVA) (Sibling)        Allergies  allergy to Lobster (Swelling)  No Known Drug Allergies  seaford (Unknown)        Antimicrobials:        Vital Signs Last 24 Hrs  T(C): 38.1 (04 Jun 2021 12:48), Max: 38.1 (04 Jun 2021 12:48)  T(F): 100.5 (04 Jun 2021 12:48), Max: 100.5 (04 Jun 2021 12:48)  HR: 76 (04 Jun 2021 12:48) (66 - 87)  BP: 165/80 (04 Jun 2021 12:48) (136/72 - 170/80)  BP(mean): --  RR: 20 (04 Jun 2021 12:48) (17 - 20)  SpO2: 98% (04 Jun 2021 12:48) (96% - 99%)      PHYSICAL EXAM: Patient in no acute distress.    Constitutional: Comfortable. Awake and alert    Eyes: No discharge or conjunctival injection    ENMT: No thrush. No pharyngeal exudate    Neck: Supple,     Respiratory: + air entry bilaterally.    Cardiovascular: S1 S2 wnl,     Gastrointestinal: Soft BS(+) ? discomfort in midepigastric area, non distended.    Genitourinary: No CVA tenderness     Extremities: No edema.    Vascular: peripheral pulses felt    Neurological: No grossly focal deficits.    Skin: No rash     Musculoskeletal: No joint swelling.    Psychiatric: pleasant.                               10.6   6.67  )-----------( 329      ( 04 Jun 2021 14:09 )             30.7       06-04    135  |  98  |  24<H>  ----------------------------<  126<H>  4.4   |  23  |  1.01    Ca    9.4      04 Jun 2021 14:09    TPro  7.3  /  Alb  3.6  /  TBili  0.4  /  DBili  x   /  AST  21  /  ALT  14  /  AlkPhos  133<H>  06-04        Urinalysis (06.04.21 @ 13:36)   Glucose Qualitative, Urine: Negative   Blood, Urine: Negative   pH Urine: 7.0   Color: Yellow   Urine Appearance: Clear   Bilirubin: Negative   Ketone - Urine: Negative   Specific Gravity: 1.023   Protein, Urine: 30 mg/dL   Urobilinogen: Negative   Nitrite: Negative   Leukocyte Esterase Concentration: Large Urine   Microscopic-Add On (NC) (06.04.21 @ 13:36)   Red Blood Cell - Urine: 12 /hpf   White Blood Cell - Urine: 39 /HPF   Hyaline Casts: 0 /lpf   Bacteria: Negative   Epithelial Cells: 1 /hpf       Radiology: Imaging reviewed and visualized personally [ x]  < from: Xray Chest 1 View- PORTABLE-Urgent (Xray Chest 1 View- PORTABLE-Urgent .) (06.04.21 @ 13:55) >  IMPRESSION:  Stable mild cardiomegaly.  Negative for acute pulmonary process.      < from: CT Head No Cont (05.26.21 @ 14:07) >    IMPRESSION:    -No acute intracranial findings.  -Chronic lacunar infarcts in the bilateral basal ganglia noted.      < from: Xray Chest 1 View- PORTABLE-Urgent (05.26.21 @ 13:40) >  IMPRESSION:    The heart is enlarged. The lungs are clear.No pleural effusion. No pneumothorax. A loop recorder is overlying the mid chest wall.

## 2021-06-04 NOTE — PROGRESS NOTE ADULT - ASSESSMENT
Assessment and Plan    77 yo woman with PMH of HTN, HLD, seizures, T2DM presents with mechanical fall after possible syncopal event.     EKG: Sinus Kaz 53 no STT changes  Tele: SB 50-60s    1. Syncope  - orthostatic negative  - s/p [facility] previous admission with non obstructive CAD  - echo from 04/2021 with grossly normal LV function and mild diastolic dysfunction  - ILR interrogation with 3 sec pause but deemed not to be cause of syncope ,  bradycardic to 30's on occasion   - f/u Neuro s/p EEG  -appreciate EP, no indication for PPM    2. HTN  -BP fluctuating   - c/w norvasc 10mg daily,  imdur 90mg daily and hydralazine  - continue to monitor BP    3. Seizures  -on trileptal   -f/u neuro

## 2021-06-05 LAB
AMYLASE P1 CFR SERPL: 57 U/L — SIGNIFICANT CHANGE UP (ref 25–125)
ANION GAP SERPL CALC-SCNC: 12 MMOL/L — SIGNIFICANT CHANGE UP (ref 5–17)
BUN SERPL-MCNC: 22 MG/DL — SIGNIFICANT CHANGE UP (ref 7–23)
CALCIUM SERPL-MCNC: 9.3 MG/DL — SIGNIFICANT CHANGE UP (ref 8.4–10.5)
CHLORIDE SERPL-SCNC: 98 MMOL/L — SIGNIFICANT CHANGE UP (ref 96–108)
CK SERPL-CCNC: 127 U/L — SIGNIFICANT CHANGE UP (ref 25–170)
CO2 SERPL-SCNC: 23 MMOL/L — SIGNIFICANT CHANGE UP (ref 22–31)
CREAT SERPL-MCNC: 1.02 MG/DL — SIGNIFICANT CHANGE UP (ref 0.5–1.3)
GLUCOSE BLDC GLUCOMTR-MCNC: 102 MG/DL — HIGH (ref 70–99)
GLUCOSE BLDC GLUCOMTR-MCNC: 102 MG/DL — HIGH (ref 70–99)
GLUCOSE BLDC GLUCOMTR-MCNC: 116 MG/DL — HIGH (ref 70–99)
GLUCOSE BLDC GLUCOMTR-MCNC: 128 MG/DL — HIGH (ref 70–99)
GLUCOSE SERPL-MCNC: 103 MG/DL — HIGH (ref 70–99)
HCT VFR BLD CALC: 29.2 % — LOW (ref 34.5–45)
HGB BLD-MCNC: 10.2 G/DL — LOW (ref 11.5–15.5)
LIDOCAIN IGE QN: 28 U/L — SIGNIFICANT CHANGE UP (ref 7–60)
MCHC RBC-ENTMCNC: 23.9 PG — LOW (ref 27–34)
MCHC RBC-ENTMCNC: 34.9 GM/DL — SIGNIFICANT CHANGE UP (ref 32–36)
MCV RBC AUTO: 68.5 FL — LOW (ref 80–100)
NRBC # BLD: 0 /100 WBCS — SIGNIFICANT CHANGE UP (ref 0–0)
PLATELET # BLD AUTO: 303 K/UL — SIGNIFICANT CHANGE UP (ref 150–400)
POTASSIUM SERPL-MCNC: 4.2 MMOL/L — SIGNIFICANT CHANGE UP (ref 3.5–5.3)
POTASSIUM SERPL-SCNC: 4.2 MMOL/L — SIGNIFICANT CHANGE UP (ref 3.5–5.3)
RBC # BLD: 4.26 M/UL — SIGNIFICANT CHANGE UP (ref 3.8–5.2)
RBC # FLD: 16.8 % — HIGH (ref 10.3–14.5)
SODIUM SERPL-SCNC: 133 MMOL/L — LOW (ref 135–145)
WBC # BLD: 5.68 K/UL — SIGNIFICANT CHANGE UP (ref 3.8–10.5)
WBC # FLD AUTO: 5.68 K/UL — SIGNIFICANT CHANGE UP (ref 3.8–10.5)

## 2021-06-05 PROCEDURE — 99232 SBSQ HOSP IP/OBS MODERATE 35: CPT

## 2021-06-05 RX ADMIN — OXCARBAZEPINE 300 MILLIGRAM(S): 300 TABLET, FILM COATED ORAL at 16:27

## 2021-06-05 RX ADMIN — HEPARIN SODIUM 5000 UNIT(S): 5000 INJECTION INTRAVENOUS; SUBCUTANEOUS at 21:42

## 2021-06-05 RX ADMIN — Medication 200 MILLIGRAM(S): at 21:43

## 2021-06-05 RX ADMIN — SENNA PLUS 2 TABLET(S): 8.6 TABLET ORAL at 21:42

## 2021-06-05 RX ADMIN — ISOSORBIDE MONONITRATE 90 MILLIGRAM(S): 60 TABLET, EXTENDED RELEASE ORAL at 11:53

## 2021-06-05 RX ADMIN — Medication 25 MILLIGRAM(S): at 21:42

## 2021-06-05 RX ADMIN — NYSTATIN CREAM 1 APPLICATION(S): 100000 CREAM TOPICAL at 05:44

## 2021-06-05 RX ADMIN — NYSTATIN CREAM 1 APPLICATION(S): 100000 CREAM TOPICAL at 16:26

## 2021-06-05 RX ADMIN — ATORVASTATIN CALCIUM 10 MILLIGRAM(S): 80 TABLET, FILM COATED ORAL at 21:42

## 2021-06-05 RX ADMIN — Medication 25 MILLIGRAM(S): at 05:43

## 2021-06-05 RX ADMIN — OXCARBAZEPINE 300 MILLIGRAM(S): 300 TABLET, FILM COATED ORAL at 05:43

## 2021-06-05 RX ADMIN — HEPARIN SODIUM 5000 UNIT(S): 5000 INJECTION INTRAVENOUS; SUBCUTANEOUS at 05:43

## 2021-06-05 RX ADMIN — Medication 25 MILLIGRAM(S): at 14:00

## 2021-06-05 RX ADMIN — AMLODIPINE BESYLATE 10 MILLIGRAM(S): 2.5 TABLET ORAL at 05:44

## 2021-06-05 RX ADMIN — Medication 81 MILLIGRAM(S): at 11:53

## 2021-06-05 RX ADMIN — HEPARIN SODIUM 5000 UNIT(S): 5000 INJECTION INTRAVENOUS; SUBCUTANEOUS at 14:00

## 2021-06-05 NOTE — PROGRESS NOTE ADULT - SUBJECTIVE AND OBJECTIVE BOX
Ulises Luevano MD  Interventional Cardiology / Advance Heart Failure and Cardiac Transplant Specialist  [place] Office : [address] [place] [address] [place]. [address]  Tel:   [place] Office : [address] [facility] [place] [address]  Tel: [phone]  Cell : [phone]    Pt is lying in bed comfortable not in distress, no chest pains no SOB no palpitations  	  MEDICATIONS:  amLODIPine   Tablet 10 milliGRAM(s) Oral daily  aspirin enteric coated 81 milliGRAM(s) Oral daily  heparin   Injectable 5000 Unit(s) SubCutaneous every 8 hours  hydrALAZINE 25 milliGRAM(s) Oral every 8 hours  isosorbide   mononitrate ER Tablet (IMDUR) 90 milliGRAM(s) Oral daily      guaiFENesin Oral Liquid (Sugar-Free) 200 milliGRAM(s) Oral every 6 hours PRN    acetaminophen   Tablet .. 650 milliGRAM(s) Oral every 6 hours PRN  OXcarbazepine 300 milliGRAM(s) Oral two times a day    senna 2 Tablet(s) Oral at bedtime    atorvastatin 10 milliGRAM(s) Oral at bedtime  dextrose 40% Gel 15 Gram(s) Oral once  dextrose 50% Injectable 25 Gram(s) IV Push once  dextrose 50% Injectable 12.5 Gram(s) IV Push once  dextrose 50% Injectable 25 Gram(s) IV Push once  glucagon  Injectable 1 milliGRAM(s) IntraMuscular once  insulin lispro (ADMELOG) corrective regimen sliding scale   SubCutaneous three times a day before meals  insulin lispro (ADMELOG) corrective regimen sliding scale   SubCutaneous at bedtime    dextrose 5%. 1000 milliLiter(s) IV Continuous <Continuous>  dextrose 5%. 1000 milliLiter(s) IV Continuous <Continuous>  nystatin Powder 1 Application(s) Topical two times a day      PAST MEDICAL/SURGICAL HISTORY  PAST MEDICAL & SURGICAL HISTORY:  Hypertension    Seizure    Hyperlipidemia    Diverticulosis    Borderline diabetes    Obesity    No Past Surgical History    History of appendectomy        SOCIAL HISTORY: Substance Use (street drugs): ( x ) never used  (  ) other:    FAMILY HISTORY:  Family history of cerebrovascular accident (CVA) (Sibling)        PHYSICAL EXAM:  T(C): 37.4 (06-05-21 @ 20:26), Max: 37.4 (06-05-21 @ 20:26)  HR: 69 (06-05-21 @ 20:26) (60 - 69)  BP: 169/78 (06-05-21 @ 20:26) (135/78 - 169/78)  RR: 18 (06-05-21 @ 20:26) (17 - 18)  SpO2: 96% (06-05-21 @ 20:26) (96% - 98%)  Wt(kg): --  I&O's Summary    04 Jun 2021 07:01  -  05 Jun 2021 07:00  --------------------------------------------------------  IN: 480 mL / OUT: 550 mL / NET: -70 mL    05 Jun 2021 07:01  -  05 Jun 2021 23:19  --------------------------------------------------------  IN: 560 mL / OUT: 0 mL / NET: 560 mL          GENERAL: NAD  EYES: EOMI, PERRLA, conjunctiva and sclera clear  ENMT: No tonsillar erythema, exudates, or enlargement; Moist mucous membranes, Good dentition, No lesions  Cardiovascular: Normal S1 S2, No JVD, No murmurs, No edema  Respiratory: Lungs clear to auscultation	  Gastrointestinal:  Soft, Non-tender, + BS	  Extremities: Normal range of motion, No clubbing, cyanosis or edema  LYMPH: No lymphadenopathy noted  NERVOUS SYSTEM:  Alert & Oriented X3, Good concentration; Motor Strength 5/5 B/L upper and lower extremities; DTRs 2+ intact and symmetric                                    10.2   5.68  )-----------( 303      ( 05 Jun 2021 06:15 )             29.2     06-05    133<L>  |  98  |  22  ----------------------------<  103<H>  4.2   |  23  |  1.02    Ca    9.3      05 Jun 2021 06:15    TPro  7.3  /  Alb  3.6  /  TBili  0.4  /  DBili  x   /  AST  21  /  ALT  14  /  AlkPhos  133<H>  06-04    proBNP:   Lipid Profile:   HgA1c:   TSH:     Consultant(s) Notes Reviewed:  [x ] YES  [ ] NO    Care Discussed with Consultants/Other Providers [ x] YES  [ ] NO    Imaging Personally Reviewed independently:  [x] YES  [ ] NO    All labs, radiologic studies, vitals, orders and medications list reviewed. Patient is seen and examined at bedside. Case discussed with medical team.

## 2021-06-05 NOTE — PROGRESS NOTE ADULT - SUBJECTIVE AND OBJECTIVE BOX
Follow Up:      Interval History/ROS:     Allergies  allergy to Lobster (Swelling)  No Known Drug Allergies  seaford (Unknown)        ANTIMICROBIALS:      OTHER MEDS:  acetaminophen   Tablet .. 650 milliGRAM(s) Oral every 6 hours PRN  amLODIPine   Tablet 10 milliGRAM(s) Oral daily  aspirin enteric coated 81 milliGRAM(s) Oral daily  atorvastatin 10 milliGRAM(s) Oral at bedtime  dextrose 40% Gel 15 Gram(s) Oral once  dextrose 5%. 1000 milliLiter(s) IV Continuous <Continuous>  dextrose 5%. 1000 milliLiter(s) IV Continuous <Continuous>  dextrose 50% Injectable 25 Gram(s) IV Push once  dextrose 50% Injectable 12.5 Gram(s) IV Push once  dextrose 50% Injectable 25 Gram(s) IV Push once  glucagon  Injectable 1 milliGRAM(s) IntraMuscular once  guaiFENesin Oral Liquid (Sugar-Free) 200 milliGRAM(s) Oral every 6 hours PRN  heparin   Injectable 5000 Unit(s) SubCutaneous every 8 hours  hydrALAZINE 25 milliGRAM(s) Oral every 8 hours  insulin lispro (ADMELOG) corrective regimen sliding scale   SubCutaneous three times a day before meals  insulin lispro (ADMELOG) corrective regimen sliding scale   SubCutaneous at bedtime  isosorbide   mononitrate ER Tablet (IMDUR) 90 milliGRAM(s) Oral daily  nystatin Powder 1 Application(s) Topical two times a day  OXcarbazepine 300 milliGRAM(s) Oral two times a day  senna 2 Tablet(s) Oral at bedtime      Vital Signs Last 24 Hrs  T(C): 36.5 (2021 16:19), Max: 37.7 (2021 20:34)  T(F): 97.7 (2021 16:19), Max: 99.8 (2021 20:34)  HR: 62 (2021 16:19) (60 - 65)  BP: 135/78 (2021 16:19) (135/78 - 166/78)  BP(mean): --  RR: 18 (2021 16:19) (17 - 18)  SpO2: 96% (2021 16:19) (96% - 98%)    Physical Exam:  General: awake, alert, non toxic  Head: atraumatic, normocephalic  Eye: normal sclera and conjunctiva  ENT: no oropharyngeal lesions, no cervical lymphadenopathy   Cardio: regular rate and rhythm   Respiratory: nonlabored on room air, clear bilaterally, no wheezing  abd: soft, bowel sounds present, no tenderness  : no CVAT, no suprapubic tenderness, no miles  Musculoskeletal: no focal joint swelling, no edema  vascular: no phlebitis   Skin: no rash  Neurologic: no focal deficit  psych: normal affect                          10.2   5.68  )-----------( 303      ( 2021 06:15 )             29.2           133<L>  |  98  |  22  ----------------------------<  103<H>  4.2   |  23  |  1.02    Ca    9.3      2021 06:15    TPro  7.3  /  Alb  3.6  /  TBili  0.4  /  DBili  x   /  AST  21  /  ALT  14  /  AlkPhos  133<H>  04      Urinalysis Basic - ( 2021 13:36 )    Color: Yellow / Appearance: Clear / S.023 / pH: x  Gluc: x / Ketone: Negative  / Bili: Negative / Urobili: Negative   Blood: x / Protein: 30 mg/dL / Nitrite: Negative   Leuk Esterase: Large / RBC: 12 /hpf / WBC 39 /HPF   Sq Epi: x / Non Sq Epi: 1 /hpf / Bacteria: Negative        MICROBIOLOGY:  v    Rapid RVP Result: NotDetec ( @ 14:20)        RADIOLOGY:  Images below reviewed personally   Follow Up:  Fever    Interval History/ROS: Afebrile. Yesterday during her fever she felt "out of it" but no chills and not sick. No myalgias.   No cough, rhinorrhea, sore throat or dyspnea.   No abdominal pain or diarrhea.   No dysuria or hematuria.   No rash or joint pain.   Slight headache.     Allergies  allergy to Lobster (Swelling)  No Known Drug Allergies  seaford (Unknown)        ANTIMICROBIALS:      OTHER MEDS:  acetaminophen   Tablet .. 650 milliGRAM(s) Oral every 6 hours PRN  amLODIPine   Tablet 10 milliGRAM(s) Oral daily  aspirin enteric coated 81 milliGRAM(s) Oral daily  atorvastatin 10 milliGRAM(s) Oral at bedtime  dextrose 40% Gel 15 Gram(s) Oral once  dextrose 5%. 1000 milliLiter(s) IV Continuous <Continuous>  dextrose 5%. 1000 milliLiter(s) IV Continuous <Continuous>  dextrose 50% Injectable 25 Gram(s) IV Push once  dextrose 50% Injectable 12.5 Gram(s) IV Push once  dextrose 50% Injectable 25 Gram(s) IV Push once  glucagon  Injectable 1 milliGRAM(s) IntraMuscular once  guaiFENesin Oral Liquid (Sugar-Free) 200 milliGRAM(s) Oral every 6 hours PRN  heparin   Injectable 5000 Unit(s) SubCutaneous every 8 hours  hydrALAZINE 25 milliGRAM(s) Oral every 8 hours  insulin lispro (ADMELOG) corrective regimen sliding scale   SubCutaneous three times a day before meals  insulin lispro (ADMELOG) corrective regimen sliding scale   SubCutaneous at bedtime  isosorbide   mononitrate ER Tablet (IMDUR) 90 milliGRAM(s) Oral daily  nystatin Powder 1 Application(s) Topical two times a day  OXcarbazepine 300 milliGRAM(s) Oral two times a day  senna 2 Tablet(s) Oral at bedtime      Vital Signs Last 24 Hrs  T(C): 36.5 (2021 16:19), Max: 37.7 (2021 20:34)  T(F): 97.7 (2021 16:19), Max: 99.8 (2021 20:34)  HR: 62 (2021 16:19) (60 - 65)  BP: 135/78 (2021 16:19) (135/78 - 166/78)  BP(mean): --  RR: 18 (2021 16:19) (17 - 18)  SpO2: 96% (2021 16:19) (96% - 98%)    Physical Exam:  General: awake, alert, non toxic, obese   Head: atraumatic, normocephalic  Eye: normal sclera   ENT: no cervical lymphadenopathy   Cardio: regular rate and rhythm   Respiratory: nonlabored on room air, clear bilaterally, no wheezing  abd: soft, bowel sounds present, no tenderness  : no CVAT, no suprapubic tenderness, no miles  Musculoskeletal: no focal joint swelling, no edema  vascular: no phlebitis   Skin: no rash  Neurologic: no focal deficit. memory isn't great.   psych: normal affect                          10.2   5.68  )-----------( 303      ( 2021 06:15 )             29.2           133<L>  |  98  |  22  ----------------------------<  103<H>  4.2   |  23  |  1.02    Ca    9.3      2021 06:15    TPro  7.3  /  Alb  3.6  /  TBili  0.4  /  DBili  x   /  AST  21  /  ALT  14  /  AlkPhos  133<H>        Urinalysis Basic - ( 2021 13:36 )    Color: Yellow / Appearance: Clear / S.023 / pH: x  Gluc: x / Ketone: Negative  / Bili: Negative / Urobili: Negative   Blood: x / Protein: 30 mg/dL / Nitrite: Negative   Leuk Esterase: Large / RBC: 12 /hpf / WBC 39 /HPF   Sq Epi: x / Non Sq Epi: 1 /hpf / Bacteria: Negative        MICROBIOLOGY:  Culture - Blood (collected 21 @ 17:14)  Source: .Blood Blood-Peripheral  Preliminary Report (21 @ 18:00):    No growth to date.    Culture - Blood (collected 21 @ 17:14)  Source: .Blood Blood-Peripheral  Preliminary Report (21 @ 18:00):    No growth to date.    Rapid RVP Result: NotDetec ( @ 14:20)      RADIOLOGY:  Images below reviewed personally  CT Abdomen and Pelvis w/ IV Cont (21 @ 22:09)   As on recent CT angiogram also performed for a history of abdominal pain, no evidence of acute inflammatory or obstructive process in the abdomen and pelvis.    Xray Chest 1 View- PORTABLE-Urgent (Xray Chest 1 View- PORTABLE-Urgent .) (21 @ 13:55)   Stable mild cardiomegaly.  Negative for acute pulmonary process.

## 2021-06-05 NOTE — PROGRESS NOTE ADULT - ASSESSMENT
Assessment and Plan    77 yo woman with PMH of HTN, HLD, seizures, T2DM presents with mechanical fall after possible syncopal event.     EKG: Sinus Kaz 53 no STT changes  Tele: SB 50-60s    1. Syncope  - orthostatic negative  - s/p [facility] previous admission with non obstructive CAD  - echo from 04/2021 with grossly normal LV function and mild diastolic dysfunction  - ILR interrogation with 3 sec pause but deemed not to be cause of syncope ,  bradycardic to 30's on occasion   - f/u Neuro s/p EEG  -appreciate EP, no indication for PPM    2. HTN  -BP fluctuating   - c/w norvasc 10mg daily,  imdur 90mg daily and hydralazine  - continue to monitor BP    3. Seizures  -on trileptal   -f/u neuro    4. Fevers   - CT A/P negative f/u ID

## 2021-06-05 NOTE — PROGRESS NOTE ADULT - SUBJECTIVE AND OBJECTIVE BOX
late note entry    SUBJECTIVE / OVERNIGHT EVENTS: pt seen and examined    MEDICATIONS  (STANDING):  amLODIPine   Tablet 10 milliGRAM(s) Oral daily  aspirin enteric coated 81 milliGRAM(s) Oral daily  atorvastatin 10 milliGRAM(s) Oral at bedtime  dextrose 40% Gel 15 Gram(s) Oral once  dextrose 5%. 1000 milliLiter(s) (50 mL/Hr) IV Continuous <Continuous>  dextrose 5%. 1000 milliLiter(s) (100 mL/Hr) IV Continuous <Continuous>  dextrose 50% Injectable 25 Gram(s) IV Push once  dextrose 50% Injectable 12.5 Gram(s) IV Push once  dextrose 50% Injectable 25 Gram(s) IV Push once  glucagon  Injectable 1 milliGRAM(s) IntraMuscular once  heparin   Injectable 5000 Unit(s) SubCutaneous every 8 hours  hydrALAZINE 25 milliGRAM(s) Oral every 8 hours  insulin lispro (ADMELOG) corrective regimen sliding scale   SubCutaneous three times a day before meals  insulin lispro (ADMELOG) corrective regimen sliding scale   SubCutaneous at bedtime  isosorbide   mononitrate ER Tablet (IMDUR) 90 milliGRAM(s) Oral daily  nystatin Powder 1 Application(s) Topical two times a day  OXcarbazepine 300 milliGRAM(s) Oral two times a day  senna 2 Tablet(s) Oral at bedtime    MEDICATIONS  (PRN):  acetaminophen   Tablet .. 650 milliGRAM(s) Oral every 6 hours PRN Mild Pain (1 - 3)  guaiFENesin Oral Liquid (Sugar-Free) 200 milliGRAM(s) Oral every 6 hours PRN Cough    Vital Signs Last 24 Hrs  T(C): 37.7 (2021 20:34), Max: 38.1 (2021 12:48)  T(F): 99.8 (2021 20:34), Max: 100.5 (2021 12:48)  HR: 64 (2021 20:34) (64 - 87)  BP: 156/79 (2021 20:34) (136/72 - 165/80)  BP(mean): --  RR: 18 (2021 20:34) (17 - 20)  SpO2: 97% (2021 20:34) (96% - 99%)    Constitutional: No fever, fatigue  Skin: No rash.  Eyes: No recent vision problems or eye pain.  ENT: No congestion, ear pain, or sore throat.  Cardiovascular: No chest pain or palpation.  Respiratory: No cough, shortness of breath, congestion, or wheezing.  Gastrointestinal: No abdominal pain, nausea, vomiting, or diarrhea.  Genitourinary: No dysuria.  Musculoskeletal: No joint swelling.  Neurologic: No headache.    PHYSICAL EXAM:  GENERAL: NAD  EYES: EOMI, PERRLA  NECK: Supple, No JVD  CHEST/LUNG: dec breath sounds rt base  HEART:  S1 , S2 +  ABDOMEN: soft , bs+  EXTREMITIES:  trace edema  NEUROLOGY:alert awake    LABS:      135  |  98  |  24<H>  ----------------------------<  126<H>  4.4   |  23  |  1.01    Ca    9.4      2021 14:09    TPro  7.3  /  Alb  3.6  /  TBili  0.4  /  DBili      /  AST  21  /  ALT  14  /  AlkPhos  133<H>      Creatinine Trend: 1.01 <--, 0.98 <--, 1.12 <--, 1.02 <--, 1.10 <--                        10.6   6.67  )-----------( 329      ( 2021 14:09 )             30.7     Urine Studies:  Urinalysis Basic - ( 2021 13:36 )    Color: Yellow / Appearance: Clear / S.023 / pH:   Gluc:  / Ketone: Negative  / Bili: Negative / Urobili: Negative   Blood:  / Protein: 30 mg/dL / Nitrite: Negative   Leuk Esterase: Large / RBC: 12 /hpf / WBC 39 /HPF   Sq Epi:  / Non Sq Epi: 1 /hpf / Bacteria: Negative              LIVER FUNCTIONS - ( 2021 14:09 )  Alb: 3.6 g/dL / Pro: 7.3 g/dL / ALK PHOS: 133 U/L / ALT: 14 U/L / AST: 21 U/L / GGT: x

## 2021-06-05 NOTE — PROGRESS NOTE ADULT - ASSESSMENT
One isolated rectal temp without clinical infection or findings on workup thus far     Calvin Gonzalez MD   Infectious Disease   Pager [phone]   After 5PM and on weekends please page fellow on call or call [phone] with reported cognitive impairment, seizure disorder and recurrent falls and syncopal episodes, admitted 5/26/21 for dizziness and near syncope.     Completed 4 days of Ceftriaxone 5/29 for UTI although prior notes report no dysuria and culture only grew few CoNS. She has no dysuria today.     Had one isolated rectal temp 100.5F yesterday. Rectal readings are consistently higher than other sites and may not reflect true core temperature. Cultures and imaging are unrevealing and in the absence a clinical infection, it's of unclear significance.     Suggest:   -monitor off antibiotics  -f/u final cultures     Calvin Gonzalez MD   Infectious Disease   Pager [phone]   After 5PM and on weekends please page fellow on call or call [phone]

## 2021-06-06 LAB
ANION GAP SERPL CALC-SCNC: 13 MMOL/L — SIGNIFICANT CHANGE UP (ref 5–17)
BUN SERPL-MCNC: 25 MG/DL — HIGH (ref 7–23)
CALCIUM SERPL-MCNC: 9.3 MG/DL — SIGNIFICANT CHANGE UP (ref 8.4–10.5)
CHLORIDE SERPL-SCNC: 97 MMOL/L — SIGNIFICANT CHANGE UP (ref 96–108)
CO2 SERPL-SCNC: 24 MMOL/L — SIGNIFICANT CHANGE UP (ref 22–31)
CREAT SERPL-MCNC: 1.04 MG/DL — SIGNIFICANT CHANGE UP (ref 0.5–1.3)
GLUCOSE BLDC GLUCOMTR-MCNC: 102 MG/DL — HIGH (ref 70–99)
GLUCOSE BLDC GLUCOMTR-MCNC: 108 MG/DL — HIGH (ref 70–99)
GLUCOSE BLDC GLUCOMTR-MCNC: 133 MG/DL — HIGH (ref 70–99)
GLUCOSE BLDC GLUCOMTR-MCNC: 158 MG/DL — HIGH (ref 70–99)
GLUCOSE BLDC GLUCOMTR-MCNC: 165 MG/DL — HIGH (ref 70–99)
GLUCOSE SERPL-MCNC: 122 MG/DL — HIGH (ref 70–99)
HCT VFR BLD CALC: 30.6 % — LOW (ref 34.5–45)
HGB BLD-MCNC: 10.6 G/DL — LOW (ref 11.5–15.5)
MAGNESIUM SERPL-MCNC: 2.4 MG/DL — SIGNIFICANT CHANGE UP (ref 1.6–2.6)
MCHC RBC-ENTMCNC: 23.8 PG — LOW (ref 27–34)
MCHC RBC-ENTMCNC: 34.6 GM/DL — SIGNIFICANT CHANGE UP (ref 32–36)
MCV RBC AUTO: 68.8 FL — LOW (ref 80–100)
NRBC # BLD: 0 /100 WBCS — SIGNIFICANT CHANGE UP (ref 0–0)
PLATELET # BLD AUTO: 329 K/UL — SIGNIFICANT CHANGE UP (ref 150–400)
POTASSIUM SERPL-MCNC: 4.4 MMOL/L — SIGNIFICANT CHANGE UP (ref 3.5–5.3)
POTASSIUM SERPL-SCNC: 4.4 MMOL/L — SIGNIFICANT CHANGE UP (ref 3.5–5.3)
RBC # BLD: 4.45 M/UL — SIGNIFICANT CHANGE UP (ref 3.8–5.2)
RBC # FLD: 16.8 % — HIGH (ref 10.3–14.5)
SODIUM SERPL-SCNC: 134 MMOL/L — LOW (ref 135–145)
WBC # BLD: 5.03 K/UL — SIGNIFICANT CHANGE UP (ref 3.8–10.5)
WBC # FLD AUTO: 5.03 K/UL — SIGNIFICANT CHANGE UP (ref 3.8–10.5)

## 2021-06-06 RX ADMIN — AMLODIPINE BESYLATE 10 MILLIGRAM(S): 2.5 TABLET ORAL at 05:11

## 2021-06-06 RX ADMIN — NYSTATIN CREAM 1 APPLICATION(S): 100000 CREAM TOPICAL at 17:56

## 2021-06-06 RX ADMIN — ISOSORBIDE MONONITRATE 90 MILLIGRAM(S): 60 TABLET, EXTENDED RELEASE ORAL at 12:17

## 2021-06-06 RX ADMIN — Medication 25 MILLIGRAM(S): at 22:32

## 2021-06-06 RX ADMIN — ATORVASTATIN CALCIUM 10 MILLIGRAM(S): 80 TABLET, FILM COATED ORAL at 22:31

## 2021-06-06 RX ADMIN — HEPARIN SODIUM 5000 UNIT(S): 5000 INJECTION INTRAVENOUS; SUBCUTANEOUS at 05:11

## 2021-06-06 RX ADMIN — Medication 25 MILLIGRAM(S): at 14:01

## 2021-06-06 RX ADMIN — OXCARBAZEPINE 300 MILLIGRAM(S): 300 TABLET, FILM COATED ORAL at 17:55

## 2021-06-06 RX ADMIN — Medication 25 MILLIGRAM(S): at 05:11

## 2021-06-06 RX ADMIN — NYSTATIN CREAM 1 APPLICATION(S): 100000 CREAM TOPICAL at 05:14

## 2021-06-06 RX ADMIN — HEPARIN SODIUM 5000 UNIT(S): 5000 INJECTION INTRAVENOUS; SUBCUTANEOUS at 22:32

## 2021-06-06 RX ADMIN — Medication 1: at 12:17

## 2021-06-06 RX ADMIN — Medication 200 MILLIGRAM(S): at 12:17

## 2021-06-06 RX ADMIN — OXCARBAZEPINE 300 MILLIGRAM(S): 300 TABLET, FILM COATED ORAL at 05:11

## 2021-06-06 RX ADMIN — HEPARIN SODIUM 5000 UNIT(S): 5000 INJECTION INTRAVENOUS; SUBCUTANEOUS at 14:01

## 2021-06-06 RX ADMIN — Medication 81 MILLIGRAM(S): at 12:17

## 2021-06-06 NOTE — PROGRESS NOTE ADULT - ASSESSMENT
Assessment and Plan    77 yo woman with PMH of HTN, HLD, seizures, T2DM presents with mechanical fall after possible syncopal event.     EKG: Sinus Kaz 53 no STT changes  Tele: SB 50-60s    1. Syncope  - orthostatic negative  - s/p [facility] previous admission with non obstructive CAD  - echo from 04/2021 with grossly normal LV function and mild diastolic dysfunction  - ILR interrogation with 3 sec pause but deemed not to be cause of syncope ,  bradycardic to 30's on occasion   - f/u Neuro s/p EEG  -appreciate EP, no indication for PPM    2. HTN  -BP fluctuating   - c/w norvasc 10mg daily,  imdur 90mg daily and hydralazine  - continue to monitor BP    3. Seizures  -on trileptal   -f/u neuro    4. Fevers   - CT A/P negative f/u ID

## 2021-06-06 NOTE — PROGRESS NOTE ADULT - SUBJECTIVE AND OBJECTIVE BOX
Ulises Luevano MD  Interventional Cardiology / Advance Heart Failure and Cardiac Transplant Specialist  [place] Office : [address] [place] [address] [place] [address]  Tel:   [place] Office : [address] [facility] [place] [address]  Tel: [phone]  Cell : [phone]    Pt is lying in bed comfortable not in distress, no chest pains no SOB no palpitations  	  MEDICATIONS:  amLODIPine   Tablet 10 milliGRAM(s) Oral daily  aspirin enteric coated 81 milliGRAM(s) Oral daily  heparin   Injectable 5000 Unit(s) SubCutaneous every 8 hours  hydrALAZINE 25 milliGRAM(s) Oral every 8 hours  isosorbide   mononitrate ER Tablet (IMDUR) 90 milliGRAM(s) Oral daily      guaiFENesin Oral Liquid (Sugar-Free) 200 milliGRAM(s) Oral every 6 hours PRN    acetaminophen   Tablet .. 650 milliGRAM(s) Oral every 6 hours PRN  OXcarbazepine 300 milliGRAM(s) Oral two times a day    senna 2 Tablet(s) Oral at bedtime    atorvastatin 10 milliGRAM(s) Oral at bedtime  dextrose 40% Gel 15 Gram(s) Oral once  dextrose 50% Injectable 12.5 Gram(s) IV Push once  dextrose 50% Injectable 25 Gram(s) IV Push once  dextrose 50% Injectable 25 Gram(s) IV Push once  glucagon  Injectable 1 milliGRAM(s) IntraMuscular once  insulin lispro (ADMELOG) corrective regimen sliding scale   SubCutaneous three times a day before meals  insulin lispro (ADMELOG) corrective regimen sliding scale   SubCutaneous at bedtime    dextrose 5%. 1000 milliLiter(s) IV Continuous <Continuous>  dextrose 5%. 1000 milliLiter(s) IV Continuous <Continuous>  nystatin Powder 1 Application(s) Topical two times a day      PAST MEDICAL/SURGICAL HISTORY  PAST MEDICAL & SURGICAL HISTORY:  Hypertension    Seizure    Hyperlipidemia    Diverticulosis    Borderline diabetes    Obesity    No Past Surgical History    History of appendectomy        SOCIAL HISTORY: Substance Use (street drugs): ( x ) never used  (  ) other:    FAMILY HISTORY:  Family history of cerebrovascular accident (CVA) (Sibling)        REVIEW OF SYSTEMS:  CONSTITUTIONAL: No fever, weight loss, or fatigue  EYES: No eye pain, visual disturbances, or discharge  ENMT:  No difficulty hearing, tinnitus, vertigo; No sinus or throat pain  BREASTS: No pain, masses, or nipple discharge  GASTROINTESTINAL: No abdominal or epigastric pain. No nausea, vomiting, or hematemesis; No diarrhea or constipation. No melena or hematochezia.  GENITOURINARY: No dysuria, frequency, hematuria, or incontinence  NEUROLOGICAL: No headaches, memory loss, loss of strength, numbness, or tremors  ENDOCRINE: No heat or cold intolerance; No hair loss  MUSCULOSKELETAL: No joint pain or swelling; No muscle, back, or extremity pain  PSYCHIATRIC: No depression, anxiety, mood swings, or difficulty sleeping  HEME/LYMPH: No easy bruising, or bleeding gums  All others negative    PHYSICAL EXAM:  T(C): 36.8 (06-06-21 @ 16:42), Max: 37.4 (06-05-21 @ 20:26)  HR: 55 (06-06-21 @ 16:42) (53 - 69)  BP: 118/69 (06-06-21 @ 16:42) (118/69 - 169/78)  RR: 18 (06-06-21 @ 16:42) (18 - 18)  SpO2: 96% (06-06-21 @ 16:42) (96% - 98%)  Wt(kg): --  I&O's Summary    05 Jun 2021 07:01  -  06 Jun 2021 07:00  --------------------------------------------------------  IN: 560 mL / OUT: 0 mL / NET: 560 mL    06 Jun 2021 07:01  -  06 Jun 2021 17:46  --------------------------------------------------------  IN: 0 mL / OUT: 1 mL / NET: -1 mL          GENERAL: NAD  EYES: EOMI, PERRLA, conjunctiva and sclera clear  ENMT: No tonsillar erythema, exudates, or enlargement; Moist mucous membranes, Good dentition, No lesions  Cardiovascular: Normal S1 S2, No JVD, No murmurs, No edema  Respiratory: Lungs clear to auscultation	  Gastrointestinal:  Soft, Non-tender, + BS	  Extremities: Normal range of motion, No clubbing, cyanosis or edema  LYMPH: No lymphadenopathy noted  NERVOUS SYSTEM:  Alert & Oriented X3, Good concentration; Motor Strength 5/5 B/L upper and lower extremities; DTRs 2+ intact and symmetric                                    10.6   5.03  )-----------( 329      ( 06 Jun 2021 10:54 )             30.6     06-06    134<L>  |  97  |  25<H>  ----------------------------<  122<H>  4.4   |  24  |  1.04    Ca    9.3      06 Jun 2021 10:54  Mg     2.4     06-06      proBNP:   Lipid Profile:   HgA1c:   TSH:     Consultant(s) Notes Reviewed:  [x ] YES  [ ] NO    Care Discussed with Consultants/Other Providers [ x] YES  [ ] NO    Imaging Personally Reviewed independently:  [x] YES  [ ] NO    All labs, radiologic studies, vitals, orders and medications list reviewed. Patient is seen and examined at bedside. Case discussed with medical team.

## 2021-06-07 LAB
ANION GAP SERPL CALC-SCNC: 12 MMOL/L — SIGNIFICANT CHANGE UP (ref 5–17)
BUN SERPL-MCNC: 27 MG/DL — HIGH (ref 7–23)
CALCIUM SERPL-MCNC: 9.5 MG/DL — SIGNIFICANT CHANGE UP (ref 8.4–10.5)
CHLORIDE SERPL-SCNC: 96 MMOL/L — SIGNIFICANT CHANGE UP (ref 96–108)
CO2 SERPL-SCNC: 23 MMOL/L — SIGNIFICANT CHANGE UP (ref 22–31)
CREAT SERPL-MCNC: 1.08 MG/DL — SIGNIFICANT CHANGE UP (ref 0.5–1.3)
GLUCOSE BLDC GLUCOMTR-MCNC: 111 MG/DL — HIGH (ref 70–99)
GLUCOSE BLDC GLUCOMTR-MCNC: 114 MG/DL — HIGH (ref 70–99)
GLUCOSE BLDC GLUCOMTR-MCNC: 131 MG/DL — HIGH (ref 70–99)
GLUCOSE BLDC GLUCOMTR-MCNC: 142 MG/DL — HIGH (ref 70–99)
GLUCOSE SERPL-MCNC: 99 MG/DL — SIGNIFICANT CHANGE UP (ref 70–99)
HCT VFR BLD CALC: 28.8 % — LOW (ref 34.5–45)
HGB BLD-MCNC: 9.8 G/DL — LOW (ref 11.5–15.5)
MCHC RBC-ENTMCNC: 23.2 PG — LOW (ref 27–34)
MCHC RBC-ENTMCNC: 34 GM/DL — SIGNIFICANT CHANGE UP (ref 32–36)
MCV RBC AUTO: 68.2 FL — LOW (ref 80–100)
NRBC # BLD: 0 /100 WBCS — SIGNIFICANT CHANGE UP (ref 0–0)
PLATELET # BLD AUTO: 340 K/UL — SIGNIFICANT CHANGE UP (ref 150–400)
POTASSIUM SERPL-MCNC: 4.3 MMOL/L — SIGNIFICANT CHANGE UP (ref 3.5–5.3)
POTASSIUM SERPL-SCNC: 4.3 MMOL/L — SIGNIFICANT CHANGE UP (ref 3.5–5.3)
RBC # BLD: 4.22 M/UL — SIGNIFICANT CHANGE UP (ref 3.8–5.2)
RBC # FLD: 16.7 % — HIGH (ref 10.3–14.5)
SARS-COV-2 RNA SPEC QL NAA+PROBE: SIGNIFICANT CHANGE UP
SODIUM SERPL-SCNC: 131 MMOL/L — LOW (ref 135–145)
WBC # BLD: 4.94 K/UL — SIGNIFICANT CHANGE UP (ref 3.8–10.5)
WBC # FLD AUTO: 4.94 K/UL — SIGNIFICANT CHANGE UP (ref 3.8–10.5)

## 2021-06-07 RX ADMIN — Medication 25 MILLIGRAM(S): at 05:48

## 2021-06-07 RX ADMIN — Medication 81 MILLIGRAM(S): at 12:11

## 2021-06-07 RX ADMIN — Medication 25 MILLIGRAM(S): at 21:40

## 2021-06-07 RX ADMIN — OXCARBAZEPINE 300 MILLIGRAM(S): 300 TABLET, FILM COATED ORAL at 17:14

## 2021-06-07 RX ADMIN — HEPARIN SODIUM 5000 UNIT(S): 5000 INJECTION INTRAVENOUS; SUBCUTANEOUS at 21:40

## 2021-06-07 RX ADMIN — NYSTATIN CREAM 1 APPLICATION(S): 100000 CREAM TOPICAL at 17:14

## 2021-06-07 RX ADMIN — AMLODIPINE BESYLATE 10 MILLIGRAM(S): 2.5 TABLET ORAL at 05:48

## 2021-06-07 RX ADMIN — HEPARIN SODIUM 5000 UNIT(S): 5000 INJECTION INTRAVENOUS; SUBCUTANEOUS at 05:48

## 2021-06-07 RX ADMIN — ATORVASTATIN CALCIUM 10 MILLIGRAM(S): 80 TABLET, FILM COATED ORAL at 21:40

## 2021-06-07 RX ADMIN — OXCARBAZEPINE 300 MILLIGRAM(S): 300 TABLET, FILM COATED ORAL at 05:48

## 2021-06-07 RX ADMIN — SENNA PLUS 2 TABLET(S): 8.6 TABLET ORAL at 21:41

## 2021-06-07 RX ADMIN — Medication 25 MILLIGRAM(S): at 13:00

## 2021-06-07 RX ADMIN — HEPARIN SODIUM 5000 UNIT(S): 5000 INJECTION INTRAVENOUS; SUBCUTANEOUS at 13:00

## 2021-06-07 RX ADMIN — ISOSORBIDE MONONITRATE 90 MILLIGRAM(S): 60 TABLET, EXTENDED RELEASE ORAL at 12:11

## 2021-06-07 RX ADMIN — NYSTATIN CREAM 1 APPLICATION(S): 100000 CREAM TOPICAL at 05:48

## 2021-06-07 NOTE — PROGRESS NOTE ADULT - SUBJECTIVE AND OBJECTIVE BOX
SUBJECTIVE / OVERNIGHT EVENTS: pt seen and examined    MEDICATIONS  (STANDING):  amLODIPine   Tablet 10 milliGRAM(s) Oral daily  aspirin enteric coated 81 milliGRAM(s) Oral daily  atorvastatin 10 milliGRAM(s) Oral at bedtime  dextrose 40% Gel 15 Gram(s) Oral once  dextrose 5%. 1000 milliLiter(s) (50 mL/Hr) IV Continuous <Continuous>  dextrose 5%. 1000 milliLiter(s) (100 mL/Hr) IV Continuous <Continuous>  dextrose 50% Injectable 25 Gram(s) IV Push once  dextrose 50% Injectable 25 Gram(s) IV Push once  dextrose 50% Injectable 12.5 Gram(s) IV Push once  glucagon  Injectable 1 milliGRAM(s) IntraMuscular once  heparin   Injectable 5000 Unit(s) SubCutaneous every 8 hours  hydrALAZINE 25 milliGRAM(s) Oral every 8 hours  insulin lispro (ADMELOG) corrective regimen sliding scale   SubCutaneous three times a day before meals  insulin lispro (ADMELOG) corrective regimen sliding scale   SubCutaneous at bedtime  isosorbide   mononitrate ER Tablet (IMDUR) 90 milliGRAM(s) Oral daily  nystatin Powder 1 Application(s) Topical two times a day  OXcarbazepine 300 milliGRAM(s) Oral two times a day  senna 2 Tablet(s) Oral at bedtime    MEDICATIONS  (PRN):  acetaminophen   Tablet .. 650 milliGRAM(s) Oral every 6 hours PRN Mild Pain (1 - 3)  guaiFENesin Oral Liquid (Sugar-Free) 200 milliGRAM(s) Oral every 6 hours PRN Cough    Vital Signs Last 24 Hrs  T(C): 36.8 (2021 20:19), Max: 37 (2021 04:47)  T(F): 98.3 (2021 20:19), Max: 98.6 (2021 04:47)  HR: 56 (2021 20:19) (54 - 67)  BP: 132/74 (2021 20:19) (109/71 - 144/65)  BP(mean): --  RR: 18 (2021 20:19) (18 - 18)  SpO2: 98% (2021 20:19) (94% - 98%)    Constitutional: No fever, fatigue  Skin: No rash.  Eyes: No recent vision problems or eye pain.  ENT: No congestion, ear pain, or sore throat.  Cardiovascular: No chest pain or palpation.  Respiratory: No cough, shortness of breath, congestion, or wheezing.  Gastrointestinal: No abdominal pain, nausea, vomiting, or diarrhea.  Genitourinary: No dysuria.  Musculoskeletal: No joint swelling.  Neurologic: No headache.    PHYSICAL EXAM:  GENERAL: NAD  EYES: EOMI, PERRLA  NECK: Supple, No JVD  CHEST/LUNG: dec breath sounds rt base  HEART:  S1 , S2 +  ABDOMEN: soft , bs+  EXTREMITIES:  trace edema  NEUROLOGY:alert awake    LABS:      131<L>  |  96  |  27<H>  ----------------------------<  99  4.3   |  23  |  1.08    Ca    9.5      2021 06:29  Mg     2.4           Creatinine Trend: 1.08 <--, 1.04 <--, 1.02 <--, 1.01 <--, 0.98 <--, 1.12 <--                        9.8    4.94  )-----------( 340      ( 2021 06:29 )             28.8     Urine Studies:  Urinalysis Basic - ( 2021 13:36 )    Color: Yellow / Appearance: Clear / S.023 / pH:   Gluc:  / Ketone: Negative  / Bili: Negative / Urobili: Negative   Blood:  / Protein: 30 mg/dL / Nitrite: Negative   Leuk Esterase: Large / RBC: 12 /hpf / WBC 39 /HPF   Sq Epi:  / Non Sq Epi: 1 /hpf / Bacteria: Negative                        LIVER FUNCTIONS - ( 2021 14:09 )  Alb: 3.6 g/dL / Pro: 7.3 g/dL / ALK PHOS: 133 U/L / ALT: 14 U/L / AST: 21 U/L / GGT: x

## 2021-06-07 NOTE — PROGRESS NOTE ADULT - PROBLEM SELECTOR PROBLEM 6
Dementia without behavioral disturbance, unspecified dementia type

## 2021-06-07 NOTE — PROGRESS NOTE ADULT - PROBLEM SELECTOR PLAN 7
Pharmacologic prophylaxis.    had extensive lengthy discussion with pts daughter about pts current clinical status , management plan , all questions answered     time spent>35 min
Pharmacologic prophylaxis.
Pharmacologic prophylaxis.
Pharmacologic prophylaxis.    had extensive lengthy discussion with pts daughter about pts current clinical status , management plan , all questions answered     time spent>35 min

## 2021-06-07 NOTE — PROGRESS NOTE ADULT - PROBLEM SELECTOR PLAN 3
See above.  Labile BP.  Suspect missed hydralazine dose.  Will temporarily HOLD the ACE given CKD3.

## 2021-06-07 NOTE — PROGRESS NOTE ADULT - SUBJECTIVE AND OBJECTIVE BOX
Neurology Follow up note    Name  ADOLPH Mendez History - Patient seen and examined this am.     Vital Signs Last 24 Hrs  T(C): 36.7 (07 Jun 2021 08:18), Max: 37 (07 Jun 2021 04:47)  T(F): 98.1 (07 Jun 2021 08:18), Max: 98.6 (07 Jun 2021 04:47)  HR: 67 (07 Jun 2021 08:18) (55 - 68)  BP: 144/65 (07 Jun 2021 08:18) (118/69 - 157/78)  BP(mean): --  RR: 18 (07 Jun 2021 08:18) (18 - 18)  SpO2: 97% (07 Jun 2021 08:18) (94% - 98%)    PHYSICAL EXAM:      Neurological Exam:  Mental Status - eyse closed oriented x3 follows simple commands  Cranial Nerves - PERRL, EOMI, VFF, no gross facial asymmetry    Motor Exam -   moves antigravity   nml bulk/tone    Sensory    Intact to light touch and pinprick bilaterally    Coord: FTN intact bilaterally     Gait -  deferred    MEDICATIONS  (STANDING):  amLODIPine   Tablet 10 milliGRAM(s) Oral daily  aspirin enteric coated 81 milliGRAM(s) Oral daily  atorvastatin 10 milliGRAM(s) Oral at bedtime  dextrose 40% Gel 15 Gram(s) Oral once  dextrose 5%. 1000 milliLiter(s) (50 mL/Hr) IV Continuous <Continuous>  dextrose 5%. 1000 milliLiter(s) (100 mL/Hr) IV Continuous <Continuous>  dextrose 50% Injectable 25 Gram(s) IV Push once  dextrose 50% Injectable 12.5 Gram(s) IV Push once  dextrose 50% Injectable 25 Gram(s) IV Push once  glucagon  Injectable 1 milliGRAM(s) IntraMuscular once  heparin   Injectable 5000 Unit(s) SubCutaneous every 8 hours  hydrALAZINE 25 milliGRAM(s) Oral every 8 hours  insulin lispro (ADMELOG) corrective regimen sliding scale   SubCutaneous three times a day before meals  insulin lispro (ADMELOG) corrective regimen sliding scale   SubCutaneous at bedtime  isosorbide   mononitrate ER Tablet (IMDUR) 90 milliGRAM(s) Oral daily  nystatin Powder 1 Application(s) Topical two times a day  OXcarbazepine 300 milliGRAM(s) Oral two times a day  senna 2 Tablet(s) Oral at bedtime    MEDICATIONS  (PRN):  acetaminophen   Tablet .. 650 milliGRAM(s) Oral every 6 hours PRN Mild Pain (1 - 3)  guaiFENesin Oral Liquid (Sugar-Free) 200 milliGRAM(s) Oral every 6 hours PRN Cough          _______________________  EEG SUMMARY/CLASSIFICATION  Abnormal EEG in the encephalopathic state.  -Generalized triphasic waves, maximum bifrontal  -Continuous diffuse theta and polymorphic delta slowing.  _____________________________________________________________

## 2021-06-07 NOTE — PROGRESS NOTE ADULT - PROBLEM SELECTOR PROBLEM 2
Seizure disorder

## 2021-06-07 NOTE — PROGRESS NOTE ADULT - ASSESSMENT
Assessment and Plan    75 yo woman with PMH of HTN, HLD, seizures, T2DM presents with mechanical fall after possible syncopal event.     EKG: Sinus Kaz 53 no STT changes    1. Syncope  - orthostatic negative  - s/p [facility] previous admission with non obstructive CAD  - echo from 04/2021 with grossly normal LV function and mild diastolic dysfunction  - ILR interrogation with 3 sec pause but deemed not to be cause of syncope ,  bradycardic to 30's on occasion   - f/u Neuro s/p EEG  -appreciate EP, no indication for PPM    2. HTN  -BP fluctuating   - c/w norvasc 10mg daily,  imdur 90mg daily and hydralazine  - continue to monitor BP    3. Seizures  -on trileptal   -f/u neuro    4. Fevers   - CT A/P negative f/u ID

## 2021-06-07 NOTE — PROGRESS NOTE ADULT - PROBLEM SELECTOR PROBLEM 9
Tinea corporis

## 2021-06-07 NOTE — PROGRESS NOTE ADULT - PROBLEM SELECTOR PLAN 4
abx   f/u cx

## 2021-06-07 NOTE — PROGRESS NOTE ADULT - PROBLEM SELECTOR PLAN 6
Enhanced supervision.  Aspiration precautions.
Enhanced supervision.
Enhanced supervision.  Aspiration precautions.
Enhanced supervision.

## 2021-06-07 NOTE — PROGRESS NOTE ADULT - PROBLEM SELECTOR PROBLEM 5
Borderline diabetes

## 2021-06-07 NOTE — PROGRESS NOTE ADULT - PROBLEM SELECTOR PROBLEM 1
Syncope, unspecified syncope type

## 2021-06-07 NOTE — PROGRESS NOTE ADULT - ASSESSMENT
76 yo female with a PMHx of HTN, HLD, and seizures here with dizziness, can not get full history as limited by pt's pain. Neuro exam without focality mentation improving  CTH with chronic lacunar infacts. Patient on  BID level on last visit 34 upper range of sujatha Labs show a microcytic anemia      Unclear cause of Nausea general malaise. no clear focality    -C/W OXc 300BID  -EEG with triphasic likely underlying metabolic issue, consider ammonia level  - No further inpt w/u

## 2021-06-07 NOTE — PROGRESS NOTE ADULT - SUBJECTIVE AND OBJECTIVE BOX
Ulises Luevano MD  Interventional Cardiology / Endovascular Specialist  [place] Office : [phone] [place] [address].[place]. [address]  Tel:   [place] Office : [address] [facility] [place] [address]  Tel: [phone]  Cell : [phone]    Subjective/Overnight events: Patient lying in bed comfortably. No acute distress. Denies chest pain, SOB or palpitations  	  MEDICATIONS:  amLODIPine   Tablet 10 milliGRAM(s) Oral daily  aspirin enteric coated 81 milliGRAM(s) Oral daily  heparin   Injectable 5000 Unit(s) SubCutaneous every 8 hours  hydrALAZINE 25 milliGRAM(s) Oral every 8 hours  isosorbide   mononitrate ER Tablet (IMDUR) 90 milliGRAM(s) Oral daily      guaiFENesin Oral Liquid (Sugar-Free) 200 milliGRAM(s) Oral every 6 hours PRN    acetaminophen   Tablet .. 650 milliGRAM(s) Oral every 6 hours PRN  OXcarbazepine 300 milliGRAM(s) Oral two times a day    senna 2 Tablet(s) Oral at bedtime    atorvastatin 10 milliGRAM(s) Oral at bedtime  dextrose 40% Gel 15 Gram(s) Oral once  dextrose 50% Injectable 25 Gram(s) IV Push once  dextrose 50% Injectable 12.5 Gram(s) IV Push once  dextrose 50% Injectable 25 Gram(s) IV Push once  glucagon  Injectable 1 milliGRAM(s) IntraMuscular once  insulin lispro (ADMELOG) corrective regimen sliding scale   SubCutaneous three times a day before meals  insulin lispro (ADMELOG) corrective regimen sliding scale   SubCutaneous at bedtime    dextrose 5%. 1000 milliLiter(s) IV Continuous <Continuous>  dextrose 5%. 1000 milliLiter(s) IV Continuous <Continuous>  nystatin Powder 1 Application(s) Topical two times a day      PAST MEDICAL/SURGICAL HISTORY  PAST MEDICAL & SURGICAL HISTORY:  Hypertension    Seizure    Hyperlipidemia    Diverticulosis    Borderline diabetes    Obesity    No Past Surgical History    History of appendectomy        SOCIAL HISTORY: Substance Use (street drugs): ( x ) never used  (  ) other:    FAMILY HISTORY:  Family history of cerebrovascular accident (CVA) (Sibling)        REVIEW OF SYSTEMS:  CONSTITUTIONAL: No fever, weight loss, or fatigue  EYES: No eye pain, visual disturbances, or discharge  ENMT:  No difficulty hearing, tinnitus, vertigo; No sinus or throat pain  BREASTS: No pain, masses, or nipple discharge  GASTROINTESTINAL: No abdominal or epigastric pain. No nausea, vomiting, or hematemesis; No diarrhea or constipation. No melena or hematochezia.  GENITOURINARY: No dysuria, frequency, hematuria, or incontinence  NEUROLOGICAL: No headaches, memory loss, loss of strength, numbness, or tremors  ENDOCRINE: No heat or cold intolerance; No hair loss  MUSCULOSKELETAL: No joint pain or swelling; No muscle, back, or extremity pain  PSYCHIATRIC: No depression, anxiety, mood swings, or difficulty sleeping  HEME/LYMPH: No easy bruising, or bleeding gums  All others negative    PHYSICAL EXAM:  T(C): 36.7 (06-07-21 @ 08:18), Max: 37 (06-07-21 @ 04:47)  HR: 67 (06-07-21 @ 08:18) (55 - 68)  BP: 144/65 (06-07-21 @ 08:18) (118/69 - 157/78)  RR: 18 (06-07-21 @ 08:18) (18 - 18)  SpO2: 97% (06-07-21 @ 08:18) (94% - 98%)  Wt(kg): --  I&O's Summary    06 Jun 2021 07:01  -  07 Jun 2021 07:00  --------------------------------------------------------  IN: 0 mL / OUT: 102 mL / NET: -102 mL    07 Jun 2021 07:01  -  07 Jun 2021 11:04  --------------------------------------------------------  IN: 270 mL / OUT: 200 mL / NET: 70 mL                GENERAL: NAD  EYES: EOMI, PERRLA, conjunctiva and sclera clear  ENMT: No tonsillar erythema, exudates, or enlargement;  Cardiovascular: Normal S1 S2, No JVD, No murmurs, No edema  Respiratory: Lungs clear to auscultation	  Gastrointestinal:  Soft, nontender , + BS	  Extremities: no edema                                    9.8    4.94  )-----------( 340      ( 07 Jun 2021 06:29 )             28.8     06-07    131<L>  |  96  |  27<H>  ----------------------------<  99  4.3   |  23  |  1.08    Ca    9.5      07 Jun 2021 06:29  Mg     2.4     06-06      proBNP:   Lipid Profile:   HgA1c:   TSH:     Consultant(s) Notes Reviewed:  [x ] YES  [ ] NO    Care Discussed with Consultants/Other Providers [ x] YES  [ ] NO    Imaging Personally Reviewed independently:  [x] YES  [ ] NO    All labs, radiologic studies, vitals, orders and medications list reviewed. Patient is seen and examined at bedside. Case discussed with medical team.

## 2021-06-07 NOTE — PROGRESS NOTE ADULT - PROBLEM SELECTOR PLAN 8
Transitions of Care Status:  1.  Name of PCP:  2.  PCP Contacted on Admission: [ ] Y    [ ] N    3.  PCP contacted at Discharge: [ ] Y    [ ] N    [ ] N/A  4.  Post-Discharge Appointment Date and Location:  5.  Summary of Handoff given to PCP:

## 2021-06-08 ENCOUNTER — TRANSCRIPTION ENCOUNTER (OUTPATIENT)
Age: 76
End: 2021-06-08

## 2021-06-08 VITALS
HEART RATE: 57 BPM | DIASTOLIC BLOOD PRESSURE: 75 MMHG | TEMPERATURE: 99 F | SYSTOLIC BLOOD PRESSURE: 125 MMHG | OXYGEN SATURATION: 97 % | RESPIRATION RATE: 18 BRPM

## 2021-06-08 LAB
ANION GAP SERPL CALC-SCNC: 13 MMOL/L — SIGNIFICANT CHANGE UP (ref 5–17)
BUN SERPL-MCNC: 31 MG/DL — HIGH (ref 7–23)
CALCIUM SERPL-MCNC: 9.4 MG/DL — SIGNIFICANT CHANGE UP (ref 8.4–10.5)
CHLORIDE SERPL-SCNC: 96 MMOL/L — SIGNIFICANT CHANGE UP (ref 96–108)
CO2 SERPL-SCNC: 23 MMOL/L — SIGNIFICANT CHANGE UP (ref 22–31)
CREAT SERPL-MCNC: 1.19 MG/DL — SIGNIFICANT CHANGE UP (ref 0.5–1.3)
GLUCOSE BLDC GLUCOMTR-MCNC: 123 MG/DL — HIGH (ref 70–99)
GLUCOSE BLDC GLUCOMTR-MCNC: 138 MG/DL — HIGH (ref 70–99)
GLUCOSE SERPL-MCNC: 97 MG/DL — SIGNIFICANT CHANGE UP (ref 70–99)
HCT VFR BLD CALC: 29.4 % — LOW (ref 34.5–45)
HGB BLD-MCNC: 10 G/DL — LOW (ref 11.5–15.5)
MCHC RBC-ENTMCNC: 23.3 PG — LOW (ref 27–34)
MCHC RBC-ENTMCNC: 34 GM/DL — SIGNIFICANT CHANGE UP (ref 32–36)
MCV RBC AUTO: 68.4 FL — LOW (ref 80–100)
NRBC # BLD: 0 /100 WBCS — SIGNIFICANT CHANGE UP (ref 0–0)
PLATELET # BLD AUTO: 375 K/UL — SIGNIFICANT CHANGE UP (ref 150–400)
POTASSIUM SERPL-MCNC: 4.6 MMOL/L — SIGNIFICANT CHANGE UP (ref 3.5–5.3)
POTASSIUM SERPL-SCNC: 4.6 MMOL/L — SIGNIFICANT CHANGE UP (ref 3.5–5.3)
RBC # BLD: 4.3 M/UL — SIGNIFICANT CHANGE UP (ref 3.8–5.2)
RBC # FLD: 16.9 % — HIGH (ref 10.3–14.5)
SODIUM SERPL-SCNC: 132 MMOL/L — LOW (ref 135–145)
WBC # BLD: 4.26 K/UL — SIGNIFICANT CHANGE UP (ref 3.8–10.5)
WBC # FLD AUTO: 4.26 K/UL — SIGNIFICANT CHANGE UP (ref 3.8–10.5)

## 2021-06-08 PROCEDURE — 82803 BLOOD GASES ANY COMBINATION: CPT

## 2021-06-08 PROCEDURE — 84145 PROCALCITONIN (PCT): CPT

## 2021-06-08 PROCEDURE — 82607 VITAMIN B-12: CPT

## 2021-06-08 PROCEDURE — 85045 AUTOMATED RETICULOCYTE COUNT: CPT

## 2021-06-08 PROCEDURE — 83550 IRON BINDING TEST: CPT

## 2021-06-08 PROCEDURE — 97530 THERAPEUTIC ACTIVITIES: CPT

## 2021-06-08 PROCEDURE — 97116 GAIT TRAINING THERAPY: CPT

## 2021-06-08 PROCEDURE — 85027 COMPLETE CBC AUTOMATED: CPT

## 2021-06-08 PROCEDURE — 84295 ASSAY OF SERUM SODIUM: CPT

## 2021-06-08 PROCEDURE — 84484 ASSAY OF TROPONIN QUANT: CPT

## 2021-06-08 PROCEDURE — 83036 HEMOGLOBIN GLYCOSYLATED A1C: CPT

## 2021-06-08 PROCEDURE — 84207 ASSAY OF VITAMIN B-6: CPT

## 2021-06-08 PROCEDURE — 82947 ASSAY GLUCOSE BLOOD QUANT: CPT

## 2021-06-08 PROCEDURE — 87040 BLOOD CULTURE FOR BACTERIA: CPT

## 2021-06-08 PROCEDURE — 71045 X-RAY EXAM CHEST 1 VIEW: CPT

## 2021-06-08 PROCEDURE — 80053 COMPREHEN METABOLIC PANEL: CPT

## 2021-06-08 PROCEDURE — 82746 ASSAY OF FOLIC ACID SERUM: CPT

## 2021-06-08 PROCEDURE — 83605 ASSAY OF LACTIC ACID: CPT

## 2021-06-08 PROCEDURE — 85018 HEMOGLOBIN: CPT

## 2021-06-08 PROCEDURE — 85730 THROMBOPLASTIN TIME PARTIAL: CPT

## 2021-06-08 PROCEDURE — 82435 ASSAY OF BLOOD CHLORIDE: CPT

## 2021-06-08 PROCEDURE — 83690 ASSAY OF LIPASE: CPT

## 2021-06-08 PROCEDURE — U0005: CPT

## 2021-06-08 PROCEDURE — 82330 ASSAY OF CALCIUM: CPT

## 2021-06-08 PROCEDURE — 85610 PROTHROMBIN TIME: CPT

## 2021-06-08 PROCEDURE — 84155 ASSAY OF PROTEIN SERUM: CPT

## 2021-06-08 PROCEDURE — 99285 EMERGENCY DEPT VISIT HI MDM: CPT | Mod: 25

## 2021-06-08 PROCEDURE — 83540 ASSAY OF IRON: CPT

## 2021-06-08 PROCEDURE — 70450 CT HEAD/BRAIN W/O DYE: CPT

## 2021-06-08 PROCEDURE — 83880 ASSAY OF NATRIURETIC PEPTIDE: CPT

## 2021-06-08 PROCEDURE — 84165 PROTEIN E-PHORESIS SERUM: CPT

## 2021-06-08 PROCEDURE — 82150 ASSAY OF AMYLASE: CPT

## 2021-06-08 PROCEDURE — 85025 COMPLETE CBC W/AUTO DIFF WBC: CPT

## 2021-06-08 PROCEDURE — 81001 URINALYSIS AUTO W/SCOPE: CPT

## 2021-06-08 PROCEDURE — 82728 ASSAY OF FERRITIN: CPT

## 2021-06-08 PROCEDURE — 86769 SARS-COV-2 COVID-19 ANTIBODY: CPT

## 2021-06-08 PROCEDURE — 95816 EEG AWAKE AND DROWSY: CPT

## 2021-06-08 PROCEDURE — 85014 HEMATOCRIT: CPT

## 2021-06-08 PROCEDURE — 86334 IMMUNOFIX E-PHORESIS SERUM: CPT

## 2021-06-08 PROCEDURE — 84443 ASSAY THYROID STIM HORMONE: CPT

## 2021-06-08 PROCEDURE — U0003: CPT

## 2021-06-08 PROCEDURE — 97161 PT EVAL LOW COMPLEX 20 MIN: CPT

## 2021-06-08 PROCEDURE — 84100 ASSAY OF PHOSPHORUS: CPT

## 2021-06-08 PROCEDURE — 97110 THERAPEUTIC EXERCISES: CPT

## 2021-06-08 PROCEDURE — 80048 BASIC METABOLIC PNL TOTAL CA: CPT

## 2021-06-08 PROCEDURE — 74177 CT ABD & PELVIS W/CONTRAST: CPT

## 2021-06-08 PROCEDURE — 82962 GLUCOSE BLOOD TEST: CPT

## 2021-06-08 PROCEDURE — 82550 ASSAY OF CK (CPK): CPT

## 2021-06-08 PROCEDURE — 84132 ASSAY OF SERUM POTASSIUM: CPT

## 2021-06-08 PROCEDURE — 83735 ASSAY OF MAGNESIUM: CPT

## 2021-06-08 PROCEDURE — 0225U NFCT DS DNA&RNA 21 SARSCOV2: CPT

## 2021-06-08 PROCEDURE — 87086 URINE CULTURE/COLONY COUNT: CPT

## 2021-06-08 RX ORDER — OXCARBAZEPINE 300 MG/1
1 TABLET, FILM COATED ORAL
Qty: 0 | Refills: 0 | DISCHARGE
Start: 2021-06-08

## 2021-06-08 RX ORDER — NYSTATIN CREAM 100000 [USP'U]/G
1 CREAM TOPICAL
Qty: 0 | Refills: 0 | DISCHARGE

## 2021-06-08 RX ORDER — NYSTATIN CREAM 100000 [USP'U]/G
1 CREAM TOPICAL
Qty: 0 | Refills: 0 | DISCHARGE
Start: 2021-06-08

## 2021-06-08 RX ORDER — ASPIRIN/CALCIUM CARB/MAGNESIUM 324 MG
1 TABLET ORAL
Qty: 0 | Refills: 0 | DISCHARGE
Start: 2021-06-08

## 2021-06-08 RX ORDER — ISOSORBIDE MONONITRATE 60 MG/1
3 TABLET, EXTENDED RELEASE ORAL
Qty: 0 | Refills: 0 | DISCHARGE
Start: 2021-06-08

## 2021-06-08 RX ORDER — ACETAMINOPHEN 500 MG
2 TABLET ORAL
Qty: 0 | Refills: 0 | DISCHARGE
Start: 2021-06-08

## 2021-06-08 RX ADMIN — Medication 25 MILLIGRAM(S): at 05:32

## 2021-06-08 RX ADMIN — Medication 0: at 12:29

## 2021-06-08 RX ADMIN — NYSTATIN CREAM 1 APPLICATION(S): 100000 CREAM TOPICAL at 05:32

## 2021-06-08 RX ADMIN — Medication 25 MILLIGRAM(S): at 13:17

## 2021-06-08 RX ADMIN — Medication 81 MILLIGRAM(S): at 12:29

## 2021-06-08 RX ADMIN — HEPARIN SODIUM 5000 UNIT(S): 5000 INJECTION INTRAVENOUS; SUBCUTANEOUS at 13:18

## 2021-06-08 RX ADMIN — OXCARBAZEPINE 300 MILLIGRAM(S): 300 TABLET, FILM COATED ORAL at 05:32

## 2021-06-08 RX ADMIN — AMLODIPINE BESYLATE 10 MILLIGRAM(S): 2.5 TABLET ORAL at 05:33

## 2021-06-08 RX ADMIN — Medication 200 MILLIGRAM(S): at 12:33

## 2021-06-08 RX ADMIN — HEPARIN SODIUM 5000 UNIT(S): 5000 INJECTION INTRAVENOUS; SUBCUTANEOUS at 05:32

## 2021-06-08 RX ADMIN — Medication 0: at 07:55

## 2021-06-08 RX ADMIN — ISOSORBIDE MONONITRATE 90 MILLIGRAM(S): 60 TABLET, EXTENDED RELEASE ORAL at 12:30

## 2021-06-08 NOTE — DISCHARGE NOTE NURSING/CASE MANAGEMENT/SOCIAL WORK - PATIENT PORTAL LINK FT
You can access the FollowMyHealth Patient Portal offered by [facility] by registering at the following website: [email or web address]. By joining [facility]’s FollowMyHealth portal, you will also be able to view your health information using other applications (apps) compatible with our system.

## 2021-06-08 NOTE — DISCHARGE NOTE PROVIDER - NSDCFUADDAPPT_GEN_ALL_CORE_FT
Please call and schedule follow up appointment with cardiology and neurology upon discharge from rehab

## 2021-06-08 NOTE — PROGRESS NOTE ADULT - SUBJECTIVE AND OBJECTIVE BOX
Ulises Luevano MD  Interventional Cardiology / Endovascular Specialist  [place] Office : [phone] [place] [address].[place] [address]  Tel:   [place] Office : [address] [facility] [place] [address]  Tel: [phone]  Cell : [phone]    Subjective/Overnight events: Patient lying in bed comfortably. No acute distress. Denies chest pain, SOB or palpitations  	  MEDICATIONS:  amLODIPine   Tablet 10 milliGRAM(s) Oral daily  aspirin enteric coated 81 milliGRAM(s) Oral daily  heparin   Injectable 5000 Unit(s) SubCutaneous every 8 hours  hydrALAZINE 25 milliGRAM(s) Oral every 8 hours  isosorbide   mononitrate ER Tablet (IMDUR) 90 milliGRAM(s) Oral daily      guaiFENesin Oral Liquid (Sugar-Free) 200 milliGRAM(s) Oral every 6 hours PRN    acetaminophen   Tablet .. 650 milliGRAM(s) Oral every 6 hours PRN  OXcarbazepine 300 milliGRAM(s) Oral two times a day    senna 2 Tablet(s) Oral at bedtime    atorvastatin 10 milliGRAM(s) Oral at bedtime  dextrose 40% Gel 15 Gram(s) Oral once  dextrose 50% Injectable 25 Gram(s) IV Push once  dextrose 50% Injectable 12.5 Gram(s) IV Push once  dextrose 50% Injectable 25 Gram(s) IV Push once  glucagon  Injectable 1 milliGRAM(s) IntraMuscular once  insulin lispro (ADMELOG) corrective regimen sliding scale   SubCutaneous three times a day before meals  insulin lispro (ADMELOG) corrective regimen sliding scale   SubCutaneous at bedtime    dextrose 5%. 1000 milliLiter(s) IV Continuous <Continuous>  dextrose 5%. 1000 milliLiter(s) IV Continuous <Continuous>  nystatin Powder 1 Application(s) Topical two times a day      PAST MEDICAL/SURGICAL HISTORY  PAST MEDICAL & SURGICAL HISTORY:  Hypertension    Seizure    Hyperlipidemia    Diverticulosis    Borderline diabetes    Obesity    No Past Surgical History    History of appendectomy        SOCIAL HISTORY: Substance Use (street drugs): ( x ) never used  (  ) other:    FAMILY HISTORY:  Family history of cerebrovascular accident (CVA) (Sibling)        REVIEW OF SYSTEMS:  CONSTITUTIONAL: No fever, weight loss, or fatigue  EYES: No eye pain, visual disturbances, or discharge  ENMT:  No difficulty hearing, tinnitus, vertigo; No sinus or throat pain  BREASTS: No pain, masses, or nipple discharge  GASTROINTESTINAL: No abdominal or epigastric pain. No nausea, vomiting, or hematemesis; No diarrhea or constipation. No melena or hematochezia.  GENITOURINARY: No dysuria, frequency, hematuria, or incontinence  NEUROLOGICAL: No headaches, memory loss, loss of strength, numbness, or tremors  ENDOCRINE: No heat or cold intolerance; No hair loss  MUSCULOSKELETAL: No joint pain or swelling; No muscle, back, or extremity pain  PSYCHIATRIC: No depression, anxiety, mood swings, or difficulty sleeping  HEME/LYMPH: No easy bruising, or bleeding gums  All others negative    PHYSICAL EXAM:  T(C): 36.5 (06-08-21 @ 04:00), Max: 36.8 (06-07-21 @ 20:19)  HR: 58 (06-08-21 @ 04:00) (54 - 64)  BP: 126/74 (06-08-21 @ 04:00) (109/71 - 132/74)  RR: 17 (06-08-21 @ 04:00) (17 - 18)  SpO2: 100% (06-08-21 @ 04:00) (95% - 100%)  Wt(kg): --  I&O's Summary    07 Jun 2021 07:01  -  08 Jun 2021 07:00  --------------------------------------------------------  IN: 270 mL / OUT: 600 mL / NET: -330 mL    08 Jun 2021 07:01  -  08 Jun 2021 10:50  --------------------------------------------------------  IN: 240 mL / OUT: 0 mL / NET: 240 mL          GENERAL: NAD  EYES: EOMI, PERRLA, conjunctiva and sclera clear  ENMT: No tonsillar erythema, exudates, or enlargement;  Cardiovascular: Normal S1 S2, No JVD, No murmurs, No edema  Respiratory: Lungs clear to auscultation	  Gastrointestinal:  Soft, nontender , + BS	  Extremities: no edema                                    10.0   4.26  )-----------( 375      ( 08 Jun 2021 06:37 )             29.4     06-08    132<L>  |  96  |  31<H>  ----------------------------<  97  4.6   |  23  |  1.19    Ca    9.4      08 Jun 2021 06:37  Mg     2.4     06-06      proBNP:   Lipid Profile:   HgA1c:   TSH:     Consultant(s) Notes Reviewed:  [x ] YES  [ ] NO    Care Discussed with Consultants/Other Providers [ x] YES  [ ] NO    Imaging Personally Reviewed independently:  [x] YES  [ ] NO    All labs, radiologic studies, vitals, orders and medications list reviewed. Patient is seen and examined at bedside. Case discussed with medical team.

## 2021-06-08 NOTE — DISCHARGE NOTE PROVIDER - HOSPITAL COURSE
75 yo woman with PMH of HTN, HLD, seizures, T2DM presents with mechanical fall after possible syncopal event.     EKG: Sinus Kaz 53 no STT changes    Syncope  - Troponin, CTH, and orthostatic negative  - s/p [facility] previous admission with non obstructive CAD  - echo from 04/2021 with grossly normal LV function and mild diastolic dysfunction  - ILR interrogation with 3 sec pause but deemed not to be cause of syncope ,  bradycardic to 30's on occasion   - f/u Neuro s/p EEG  -appreciate EP, no indication for PPM    UTI  -Completed CTX    HTN  -BP fluctuating   - c/w norvasc 10mg daily,  imdur 90mg daily and hydralazine  - continue to monitor BP    Seizures  -on trileptal   -f/u neuro    Fevers   - Infectious w/u negative  -BCx NGTD

## 2021-06-08 NOTE — PROGRESS NOTE ADULT - ATTENDING COMMENTS
agree with above
pt seen and examained agree with plan above
pt seen and examiend agree with plan above
pt seen and examined agree with plan above
agree with above

## 2021-06-08 NOTE — PROGRESS NOTE ADULT - PROVIDER SPECIALTY LIST ADULT
Cardiology
Infectious Disease
Cardiology
Neurology
Neurology
Cardiology
Cardiology
Internal Medicine
Neurology
Cardiology
Internal Medicine

## 2021-06-08 NOTE — DISCHARGE NOTE PROVIDER - NSDCCPCAREPLAN_GEN_ALL_CORE_FT
PRINCIPAL DISCHARGE DIAGNOSIS  Diagnosis: Syncope  Assessment and Plan of Treatment: Head cat scan with no acute findings  EEG without evidence of seizures  Loop recorder checked, no indication for pacemaker  HOME CARE INSTRUCTIONS  Have someone stay with you until you feel stable.  Do not drive, operate machinery, or play sports until your caregiver says it is okay.  Keep all follow-up appointments as directed by your caregiver.   Lie down right away if you start feeling like you might faint. Breathe deeply and steadily. Wait until all the symptoms have passed.Drink enough fluids to keep your urine clear or pale yellow.  If you are taking blood pressure or heart medicine, get up slowly, taking several minutes to sit and then stand. This can reduce dizziness.  SEEK IMMEDIATE MEDICAL CARE IF:  You have a severe headache.  You have unusual pain in the chest, abdomen, or back.  You are bleeding from the mouth or rectum, or you have black or tarry stool.  You have an irregular or very fast heartbeat.  You have pain with breathing.  You have repeated fainting or seizure-like jerking during an episode.  You faint when sitting or lying down.  You have confusion.  You have difficulty walking.  You have severe weakness.  You have vision problems.  Do not drive yourself to the hospital        SECONDARY DISCHARGE DIAGNOSES  Diagnosis: Cystitis  Assessment and Plan of Treatment: Completed antibiotics  HOME CARE INSTRUCTIONS  Drink enough water and fluids to keep your urine clear or pale yellow.  Avoid caffeine, tea, and carbonated beverages. They tend to irritate your bladder.  Empty your bladder often. Avoid holding urine for long periods of time.  Empty your bladder before and after sexual intercourse.  After a bowel movement, women should cleanse from front to back. Use each tissue only once.  SEEK MEDICAL CARE IF:  You have back pain.  You develop a fever.  Your symptoms do not begin to resolve within 3 days.  SEEK IMMEDIATE MEDICAL CARE IF:  You have severe back pain or lower abdominal pain.  You develop chills.  You have nausea or vomiting.  You have continued burning or discomfort with urination.      Diagnosis: Mild fever  Assessment and Plan of Treatment: Infectious work up negative  Monitor off antibiotics

## 2021-06-08 NOTE — DISCHARGE NOTE PROVIDER - NSDCMRMEDTOKEN_GEN_ALL_CORE_FT
acetaminophen 325 mg oral tablet: 2 tab(s) orally every 6 hours, As needed, Mild Pain (1 - 3)  amLODIPine 10 mg oral tablet: 1 tab(s) orally once a day  Aspercreme with Lidocaine 4% topical film: Apply topically to affected area once a day  aspirin 81 mg oral delayed release tablet: 1 tab(s) orally once a day  Colace 100 mg oral capsule: 2 cap(s) orally once a day (at bedtime)  guaiFENesin 100 mg/5 mL oral liquid: 10 milliliter(s) orally every 6 hours, As needed, Cough  hydrALAZINE 25 mg oral tablet: 1 tab(s) orally every 8 hours  (6am,2pm,10pm)  isosorbide mononitrate 30 mg oral tablet, extended release: 3 tab(s) orally once a day  Lipitor 10 mg oral tablet: 1 tab(s) orally once a day  MiraLax oral powder for reconstitution: 17 gram(s) orally once a day  nystatin 100,000 units/g topical powder: 1 application topically 2 times a day  OXcarbazepine 300 mg oral tablet: 1 tab(s) orally 2 times a day  Senna 8.6 mg oral tablet: 2 tab(s) orally once a day (at bedtime)  Vitamin D3 50,000 intl units (1250 mcg) oral capsule: 1 cap(s) orally once a week(WEDNESDAY)

## 2021-06-08 NOTE — PROGRESS NOTE ADULT - REASON FOR ADMISSION
Referred from [facility] following episodes of dizziness and near syncope last 3 days.
Referred from [facility] following episodes of dizziness and near syncope last 3 days.
Referred from [facility] following episodes of dizziness and near syncope last 3 days.
Referred from [facility] following episodes of dizziness and near syncope last 3 days.
Referred from [facility] following episodes of dizziness and near syncope last 3 days.
Referred from [facility] following episodes of dizziness and near syncope last 3 days.
Referred from [facility] following episodes of dizziness and near syncope last 3 days.
Referred from [facility] following episodes of dizziness and near syncope last 3 days.
Referred from [facility] following episodes of dizziness and near syncope last 3 days.
Referred from [facility] following episodes of dizziness and near syncope last 3 days.
Referred from [facility] following episodes of dizziness and near syncope last 3 days.
Referred from [facility] following episodes of dizziness and near syncope last 3 days.
Referred from [facility] following episodes of dizziness and near syncope last 3 days.
Referred from [facility] [place] following episodes of dizziness and near syncope last 3 days.
Referred from [facility] following episodes of dizziness and near syncope last 3 days.
Referred from [facility] following episodes of dizziness and near syncope last 3 days.
Referred from [facility] following episodes of dizziness and near syncope last 3 days.
Referred from [facility] [place] following episodes of dizziness and near syncope last 3 days.
Referred from [facility] following episodes of dizziness and near syncope last 3 days.
Referred from [facility] following episodes of dizziness and near syncope last 3 days.
Referred from [facility] following episodes of dizziness and near syncope last 3 days.
Referred from [facility] following episodes of dizziness and near syncope last 3 days.
Referred from [facility] following episodes of dizziness and near syncope last 3 days.
Referred from [facility] following episodes of dizziness and near syncope last 3 days.
Referred from [facility] following episodes of dizziness and near syncope last 3 days.
Referred from [facility] following episodes of dizziness and near syncope last 3 days.
Referred from [facility] following episodes of dizziness and near syncope last 3 days.

## 2021-06-08 NOTE — PROGRESS NOTE ADULT - NSICDXPILOT_GEN_ALL_CORE
[place]

## 2021-06-08 NOTE — PROGRESS NOTE ADULT - ASSESSMENT
Assessment and Plan    75 yo woman with PMH of HTN, HLD, seizures, T2DM presents with mechanical fall after possible syncopal event.     EKG: Sinus Kaz 53 no STT changes    1. Syncope  - orthostatic negative  - s/p [facility] previous admission with non obstructive CAD  - echo from 04/2021 with grossly normal LV function and mild diastolic dysfunction  - ILR interrogation with 3 sec pause but deemed not to be cause of syncope ,  bradycardic to 30's on occasion   - f/u Neuro s/p EEG  -appreciate EP, no indication for PPM    2. HTN  -BP fluctuating   - c/w norvasc 10mg daily,  imdur 90mg daily and hydralazine  - continue to monitor BP    3. Seizures  -on trileptal   -f/u neuro    4. Fevers   - CT A/P negative f/u ID

## 2021-06-08 NOTE — DISCHARGE NOTE PROVIDER - CARE PROVIDER_API CALL
Jamal Barney)  Neurology  [address], [address]  [place] [address]  Phone: [phone]  Fax: [phone]  Follow Up Time:     Ulises Luevano  CARDIOVASCULAR DISEASE  [address] [place] [address]  Phone: [phone]  Fax: [phone]  Follow Up Time:

## 2021-07-09 ENCOUNTER — NON-APPOINTMENT (OUTPATIENT)
Age: 76
End: 2021-07-09

## 2021-07-09 ENCOUNTER — APPOINTMENT (OUTPATIENT)
Dept: ELECTROPHYSIOLOGY | Facility: CLINIC | Age: 76
End: 2021-07-09
Payer: MEDICARE

## 2021-07-09 PROCEDURE — G2066: CPT

## 2021-07-09 PROCEDURE — 93298 REM INTERROG DEV EVAL SCRMS: CPT

## 2021-08-13 ENCOUNTER — APPOINTMENT (OUTPATIENT)
Dept: ELECTROPHYSIOLOGY | Facility: CLINIC | Age: 76
End: 2021-08-13
Payer: MEDICARE

## 2021-08-13 ENCOUNTER — NON-APPOINTMENT (OUTPATIENT)
Age: 76
End: 2021-08-13

## 2021-08-13 PROCEDURE — 93298 REM INTERROG DEV EVAL SCRMS: CPT

## 2021-08-13 PROCEDURE — G2066: CPT

## 2021-09-09 NOTE — ED CDU PROVIDER DISPOSITION NOTE - NS_OBSORDERDATE_ED_A_ED
08-Feb-2019 13:58 Called patient's sister results given. I was able to speak to the patient ,patient is interested in steroid injection.Please place ordered. He stated his left shoulder is in a lot of pain.He describes it as sharp pain radiating down to to elbow and wrist.Pain level 5/10 Norco is not helping. Is there anything else he can take?

## 2021-09-17 ENCOUNTER — APPOINTMENT (OUTPATIENT)
Dept: ELECTROPHYSIOLOGY | Facility: CLINIC | Age: 76
End: 2021-09-17
Payer: MEDICARE

## 2021-09-17 ENCOUNTER — NON-APPOINTMENT (OUTPATIENT)
Age: 76
End: 2021-09-17

## 2021-09-17 PROCEDURE — G2066: CPT

## 2021-09-17 PROCEDURE — 93298 REM INTERROG DEV EVAL SCRMS: CPT

## 2021-10-22 ENCOUNTER — NON-APPOINTMENT (OUTPATIENT)
Age: 76
End: 2021-10-22

## 2021-10-22 ENCOUNTER — APPOINTMENT (OUTPATIENT)
Dept: ELECTROPHYSIOLOGY | Facility: CLINIC | Age: 76
End: 2021-10-22
Payer: MEDICARE

## 2021-10-22 PROCEDURE — G2066: CPT | Mod: NC

## 2021-10-22 PROCEDURE — 93298 REM INTERROG DEV EVAL SCRMS: CPT

## 2021-11-24 ENCOUNTER — APPOINTMENT (OUTPATIENT)
Dept: ELECTROPHYSIOLOGY | Facility: CLINIC | Age: 76
End: 2021-11-24
Payer: MEDICARE

## 2021-11-24 ENCOUNTER — NON-APPOINTMENT (OUTPATIENT)
Age: 76
End: 2021-11-24

## 2021-11-24 PROCEDURE — G2066: CPT

## 2021-11-24 PROCEDURE — 93298 REM INTERROG DEV EVAL SCRMS: CPT

## 2021-12-28 ENCOUNTER — APPOINTMENT (OUTPATIENT)
Dept: ELECTROPHYSIOLOGY | Facility: CLINIC | Age: 76
End: 2021-12-28
Payer: MEDICARE

## 2021-12-28 ENCOUNTER — NON-APPOINTMENT (OUTPATIENT)
Age: 76
End: 2021-12-28

## 2021-12-28 PROCEDURE — G2066: CPT

## 2021-12-28 PROCEDURE — 93298 REM INTERROG DEV EVAL SCRMS: CPT

## 2022-02-01 ENCOUNTER — APPOINTMENT (OUTPATIENT)
Dept: ELECTROPHYSIOLOGY | Facility: CLINIC | Age: 77
End: 2022-02-01
Payer: COMMERCIAL

## 2022-02-01 ENCOUNTER — NON-APPOINTMENT (OUTPATIENT)
Age: 77
End: 2022-02-01

## 2022-02-01 PROCEDURE — 93298 REM INTERROG DEV EVAL SCRMS: CPT

## 2022-02-01 PROCEDURE — G2066: CPT

## 2022-03-08 ENCOUNTER — NON-APPOINTMENT (OUTPATIENT)
Age: 77
End: 2022-03-08

## 2022-03-08 ENCOUNTER — APPOINTMENT (OUTPATIENT)
Dept: ELECTROPHYSIOLOGY | Facility: CLINIC | Age: 77
End: 2022-03-08
Payer: COMMERCIAL

## 2022-03-08 PROCEDURE — 93298 REM INTERROG DEV EVAL SCRMS: CPT

## 2022-03-08 PROCEDURE — G2066: CPT

## 2022-03-08 NOTE — ED ADULT NURSE NOTE - PAIN RATING/NUMBER SCALE (0-10): REST
Vita Cronin 66 y o  male MRN: [id number]    Encounter: [id number]  Referring Provider  Bryan Allen Do  [address]  [place], [address]    Assessment/Plan     1  Type 2 diabetes mellitus on insulin therapy with hypo and hyperglycemia  2  CKD , last GFR 39  Poorly controlled with last A1c 7 9% in 07/2021     Discussed different medications that can be used for glycemic management-oral hypoglycemic agents as well as injectable insulin and non-insulin medications (GLP 1 agonist) along with risks, benefits, side effect profile  No h/o pancreatitis/ MEN syndrome/ Medullary thyroid cancer     Recommend the following at this time  Discontinue glipizide   Decrease Levemir to 20 units subcutaneously at night   Decrease Humalog to 3-4 units with meals 3 times a day   Start Ozempic 0 25 mg subcutaneously weekly   - check blood sugars qAC and HS   - send log in 3-4 weeks  - check A1c, BMP, fasting lipid panel  - will request Tayler Hernandez     This patient requires therapeutic CGM  The patient has diabetes and has been using a home glucose monitor and is performing frequent (3 times a day) home glucose monitor testing and is presently being treated with multi-dose insulin therapy (MDI) with 3 or more injections a day or with a continuous subcutaneous insulin infusion (CSII) pump  This patient's insulin treatment regimen requires frequent adjustments on the basis of therapeutic CGM testing results         - Recommended a consistent carbohydrate diet   - weight control and exercise as discussed ( ideal is 30 min, at least 5 times a week)   - home glucose monitoring and goals emphasized, requested patient bring in glucose monitor or log sheets to next visit   - discussed signs and symptoms of hypoglycemia and how to correct them appropriately  - counseled about the long term complications of uncontrolled diabetes, including, Nephropathy, Neuropathy, CVD, Retinopathy and importance  of adherence to diet, treatment plan and life style modifications   - importance of following up with Opthalmology and podiatry   - glycohemoglobin and other lab monitoring discussed, A1c goal value reviewed  - long term diabetic complications discussed    3  Hyperlipidemia  - continue statin therapy    4  Hypertension  5  CAD, CHF  Blood pressure at goal  - continue current medications including ACE-I/ ARB    6  Hypothyroidism-continue levothyroxine at current dose, check TSH, free T4    CC: Diabetes    History of Present Illness     HPI:  Renae Camacho is a 66 y o  male presents for a new visit regarding diabetes management  Also has a h/o hypertension, hyperlipidemia, CAD, CHF, CKD, hypothyroidism, Hammond's esophagus   no CVA     DM history:   Diagnosed > 15 years , on insulin for many years  FH - maternal side multiple family members  Last Eye exam: > 1 years, has an appointment scheduled next month     Current regimen:   Levemir 26 -32 units subcutaneously at night depending on BG    Humalog 5-8 units with meals 3 times a day   Glipizide 5 mg with breakfast , not taking at night     Jardiance got yeast infection     Statin:  Lipitor  ACE-I/ARB:  Lisinopril   Levothyroxine 75 mcg daily     Appetite is good  Denies recent weight gain/ loss  Denies numbness or tingling in the hands or feet  Denies changes in vision  No known retinopathy  No chest pain/ SOB  Has some constipation  No reecent urinary complaints, seeing urology tomorrow  No shakes/ tremors/ palpiataions  Exercise: Active at home, no sperate exercise     Home glucose monitorin  117  221 84 72 112 126 227  186 141 80 132 125  146     95  115    Symptoms of hypoglycemia :  Says lows are usually overnight or morning when he has eaten less    Patient would like a freestyle [place]     All other systems were reviewed and are negative      Review of Systems      Historical Information   Past Medical History:   Diagnosis Date    Aortic narrowing     Last Assessed:  9/28/15    Arthritis     Hammond esophagus     Bilateral leg edema     Bulla, lung (HCC)     CHF (congestive heart failure) (HCC)     Chronic kidney disease     stage 3    Diabetes mellitus (HCC)     Enlarged prostate     Heel pain     right heel slipped in the garage-landed on the right heel    Herniated lumbar intervertebral disc     x 4 discs-fell off a roof    Hiatal hernia     High cholesterol     History of kidney problems     Hypertension     Old myocardial infarction     x2-pt was unaware of the MI-one stent    Thyroid disease     hypothyroid    Transient cerebral ischemia     Last Assessed:  8/27/15    Wears dentures     full upper, partial lower    Wears glasses      Past Surgical History:   Procedure Laterality Date    CATARACT EXTRACTION      COLONOSCOPY      Resolved:      CORONARY ANGIOPLASTY WITH STENT PLACEMENT      Stent implanted early ,     ESOPHAGOGASTRODUODENOSCOPY      KNEE SURGERY      right knee cartilage surgery-left meniscus repair    TN XCAPSL CTRC RMVL INSJ IO LENS PROSTH W/O ECP Right 2019    Procedure: EXTRACTION EXTRACAPSULAR CATARACT PHACO INTRAOCULAR LENS (IOL); Surgeon: Tati Maciel MD;  Location: [facility] MAIN OR;  Service: Ophthalmology    TN XCAPSL CTRC RMVL INSJ IO LENS PROSTH W/O ECP Left 2019    Procedure: EXTRACTION EXTRACAPSULAR CATARACT PHACO INTRAOCULAR LENS (IOL);   Surgeon: Tati Maciel MD;  Location: [facility] MAIN OR;  Service: Ophthalmology    TONSILLECTOMY      UMBILICAL HERNIA REPAIR      ,    UPPER GASTROINTESTINAL ENDOSCOPY      Last Assessed:  8/27/15     Social History   Social History     Substance and Sexual Activity   Alcohol Use Not Currently     Social History     Substance and Sexual Activity   Drug Use No     Social History     Tobacco Use   Smoking Status Former Smoker    Packs/day: 4 00    Years: 20 00    Pack years: 80 00    Quit date: 12    Years since quittin 2   Smokeless Tobacco Never Used     Family History:   Family History   Problem Relation Age of Onset    Colon cancer Mother     Arthritis Mother     Diabetes Mother     Hypertension Mother     Cancer Mother         Breast    Hyperlipidemia Mother     Cancer Sister         ovarian    Other Brother         Cerebrovascular accident (CVA)    Diabetes Other         Sibling    Hypertension Other         Sibling    Hernia Father         umbilical   Janett Riis Hernia Son     Cancer Sister         liver    Kidney disease Sister     Kidney disease Brother     Mental illness Neg Hx        Meds/Allergies   Current Outpatient Medications   Medication Sig Dispense Refill    aspirin (ASPIR-81) 81 mg EC tablet Take 81 mg by mouth every morning        atorvastatin (LIPITOR) 40 mg tablet TAKE 1 TABLET BY MOUTH  DAILY 90 tablet 3    BD Pen Needle Sherie U/F 32G X 4 MM MISC USE 4 TIMES DAILY AS  DIRECTED 360 each 0    Coenzyme Q10 (COQ-10) 100 MG CAPS Take 200 mg by mouth daily at bedtime       furosemide (LASIX) 20 mg tablet Take 1 tablet (20 mg total) by mouth daily 90 tablet 1    glipiZIDE (GLUCOTROL) 5 mg tablet TAKE 1 TABLET BY MOUTH  TWICE DAILY BEFORE MEALS 180 tablet 3    insulin detemir (Levemir FlexTouch) 100 Units/mL injection pen Inject 32 Units under the skin every morning (Patient taking differently: Inject 32 Units under the skin every morning Inject insulin at bedtime ) 28 8 mL 1    insulin lispro (HumaLOG KwikPen) 100 units/mL injection pen Inject 2 Units under the skin 3 (three) times a day with meals (Patient taking differently: Inject under the skin 3 (three) times a day with meals 5-10 units under the skin 3 times a day with meals ) 5 4 mL 1    levothyroxine 75 mcg tablet TAKE 1 TABLET BY MOUTH  DAILY 90 tablet 0    lisinopril (ZESTRIL) 2 5 mg tablet TAKE 1 TABLET BY MOUTH  DAILY 90 tablet 3    metoprolol succinate (TOPROL-XL) 25 mg 24 hr tablet TAKE 1 TABLET BY MOUTH  DAILY 90 tablet 3    multivitamin [facility]) TABS Take 1 tablet by mouth every morning      pantoprazole (PROTONIX) 40 mg tablet TAKE 1 TABLET BY MOUTH  DAILY 90 tablet 3    albuterol (ProAir HFA) 90 mcg/act inhaler Inhale 2 puffs every 6 (six) hours as needed for wheezing (Patient not taking: Reported on 1/31/2022 ) 1 Inhaler 0    donepezil (ARICEPT) 10 mg tablet Take 1 tablet (10 mg total) by mouth daily at bedtime (Patient not taking: Reported on 3/8/2022 ) 90 tablet 1    tamsulosin (FLOMAX) 0 4 mg Take 1 capsule (0 4 mg total) by mouth daily with dinner (Patient not taking: Reported on 3/8/2022 ) 30 capsule 5     No current facility-administered medications for this visit  No Known Allergies    Objective   Vitals: Blood pressure 140/68, pulse 80, temperature (!) 97 °F (36 1 °C), height 5' 8" (1 727 m), weight 87 1 kg (192 lb)  Physical Exam  Constitutional:       General: He is not in acute distress  Appearance: He is well-developed  He is not diaphoretic  HENT:      Head: Normocephalic and atraumatic  Eyes:      Conjunctiva/sclera: Conjunctivae normal       Pupils: Pupils are equal, round, and reactive to light  Neck:      Comments: No thyromegaly   Nontender, no nodules appreciated on palpation    Cardiovascular:      Rate and Rhythm: Normal rate and regular rhythm  Heart sounds: Normal heart sounds  No murmur heard  Pulmonary:      Effort: Pulmonary effort is normal  No respiratory distress  Breath sounds: Normal breath sounds  No wheezing  Abdominal:      General: There is no distension  Palpations: Abdomen is soft  Tenderness: There is no abdominal tenderness  There is no guarding  Musculoskeletal:      Cervical back: Normal range of motion and neck supple  Feet:      Comments: Foot exam:   no cuts or breaks in the skin  DP 2+ bilaterally   Sensations intact to monofilament testing bilaterally   Onychomycosis +  Skin:     General: Skin is warm and dry  Findings: No erythema or rash  Neurological:      Mental Status: He is alert and oriented to person, place, and time  Comments: Fine tremors on the outstretched arms   Brisk DTRs      Psychiatric:         Behavior: Behavior normal          Thought Content: Thought content normal          The history was obtained from the review of the chart, patient  Lab Results:   Lab Results   Component Value Date/Time    Hemoglobin A1C 7 9 (H) 07/19/2021 08:39 AM    BUN 26 12/23/2021 12:52 PM    BUN 28 (H) 07/19/2021 08:39 AM    Potassium 4 2 07/19/2021 08:39 AM    Chloride 107 (H) 07/19/2021 08:39 AM    CO2 24 07/19/2021 08:39 AM    Creatinine 1 67 (H) 12/23/2021 12:52 PM    Creatinine 1 48 (H) 07/19/2021 08:39 AM    AST 16 07/19/2021 08:39 AM    ALT 20 07/19/2021 08:39 AM    Albumin 4 3 07/19/2021 08:39 AM    Globulin, Total 2 1 07/19/2021 08:39 AM    HDL 58 07/19/2021 08:39 AM    Triglycerides 63 07/19/2021 08:39 AM           Imaging Studies: I have personally reviewed pertinent reports  Portions of the record may have been created with voice recognition software  Occasional wrong word or "sound a like" substitutions may have occurred due to the inherent limitations of voice recognition software  Read the chart carefully and recognize, using context, where substitutions have occurred  7

## 2022-04-12 ENCOUNTER — NON-APPOINTMENT (OUTPATIENT)
Age: 77
End: 2022-04-12

## 2022-04-12 ENCOUNTER — APPOINTMENT (OUTPATIENT)
Dept: ELECTROPHYSIOLOGY | Facility: CLINIC | Age: 77
End: 2022-04-12
Payer: MEDICARE

## 2022-04-12 PROCEDURE — G2066: CPT

## 2022-04-12 PROCEDURE — 93298 REM INTERROG DEV EVAL SCRMS: CPT

## 2022-04-21 NOTE — PHYSICAL THERAPY INITIAL EVALUATION ADULT - SIT-TO-STAND BALANCE
Called patient to review self-referred Heart Scan results.   CAC score is 107.2.Patient informed this is a moderate risk result. Patient has already spoken with Dr almaraz to discuss results of Ct heart calcium and soft tissue screening.  Results of Ct heart calcium and soft tissue screening mailed to the patient.   fair balance

## 2022-05-16 ENCOUNTER — NON-APPOINTMENT (OUTPATIENT)
Age: 77
End: 2022-05-16

## 2022-05-16 ENCOUNTER — APPOINTMENT (OUTPATIENT)
Dept: ELECTROPHYSIOLOGY | Facility: CLINIC | Age: 77
End: 2022-05-16
Payer: MEDICARE

## 2022-05-16 PROCEDURE — G2066: CPT

## 2022-05-16 PROCEDURE — 93298 REM INTERROG DEV EVAL SCRMS: CPT

## 2022-06-21 ENCOUNTER — APPOINTMENT (OUTPATIENT)
Dept: ELECTROPHYSIOLOGY | Facility: CLINIC | Age: 77
End: 2022-06-21
Payer: MEDICARE

## 2022-06-21 ENCOUNTER — NON-APPOINTMENT (OUTPATIENT)
Age: 77
End: 2022-06-21

## 2022-06-21 PROCEDURE — 93298 REM INTERROG DEV EVAL SCRMS: CPT

## 2022-06-21 PROCEDURE — G2066: CPT

## 2022-07-25 ENCOUNTER — APPOINTMENT (OUTPATIENT)
Dept: ELECTROPHYSIOLOGY | Facility: CLINIC | Age: 77
End: 2022-07-25

## 2022-07-25 ENCOUNTER — NON-APPOINTMENT (OUTPATIENT)
Age: 77
End: 2022-07-25

## 2022-07-25 PROCEDURE — 93298 REM INTERROG DEV EVAL SCRMS: CPT

## 2022-07-25 PROCEDURE — G2066: CPT

## 2022-08-15 NOTE — PROGRESS NOTE ADULT - SUBJECTIVE AND OBJECTIVE BOX
Patient is having increased pain working the 6 & 8 hour shifts.  He could possibly work the 4 hour shifts but would need a note regarding that.  He understands that Layla is out of the office until Wednesday and Dr Chase is out all this week. Layla will contact him Wednesday. He was also going to contact his PCP in regards to a note.       SUBJECTIVE / OVERNIGHT EVENTS: pt seen and examined    MEDICATIONS  (STANDING):  amLODIPine   Tablet 10 milliGRAM(s) Oral daily  enoxaparin Injectable 40 milliGRAM(s) SubCutaneous daily  hydrochlorothiazide 25 milliGRAM(s) Oral daily  isosorbide   mononitrate ER Tablet (IMDUR) 30 milliGRAM(s) Oral daily  lidocaine   Patch 1 Patch Transdermal every 24 hours  lisinopril 40 milliGRAM(s) Oral daily  OXcarbazepine 300 milliGRAM(s) Oral two times a day  OXcarbazepine 150 milliGRAM(s) Oral <User Schedule>  traMADol 25 milliGRAM(s) Oral once    MEDICATIONS  (PRN):  acetaminophen   Tablet .. 650 milliGRAM(s) Oral every 6 hours PRN Temp greater or equal to 38C (100.4F), Mild Pain (1 - 3), Moderate Pain (4 - 6)  meclizine 25 milliGRAM(s) Oral three times a day PRN Dizziness    Vital Signs Last 24 Hrs  T(C): 36.8 (13 Apr 2021 21:28), Max: 36.8 (13 Apr 2021 05:20)  T(F): 98.2 (13 Apr 2021 21:28), Max: 98.2 (13 Apr 2021 05:20)  HR: 55 (13 Apr 2021 21:28) (55 - 58)  BP: 162/74 (13 Apr 2021 21:28) (108/51 - 166/72)  BP(mean): --  RR: 18 (13 Apr 2021 21:28) (18 - 18)  SpO2: 95% (13 Apr 2021 21:28) (95% - 99%)    Constitutional: No fever, fatigue  Skin: No rash.  Eyes: No recent vision problems or eye pain.  ENT: No congestion, ear pain, or sore throat.  Cardiovascular: No chest pain or palpation.  Respiratory: No cough, shortness of breath, congestion, or wheezing.  Gastrointestinal: No abdominal pain, nausea, vomiting, or diarrhea.  Genitourinary: No dysuria.  Musculoskeletal: No joint swelling.  Neurologic: No headache.    PHYSICAL EXAM:  GENERAL: NAD  EYES: EOMI, PERRLA  NECK: Supple, No JVD  CHEST/LUNG: dec breath sounds rt base  HEART:  S1 , S2 +  ABDOMEN: soft , bs+  EXTREMITIES:  no edema  NEUROLOGY:alert awake    LABS:  04-12    134<L>  |  98  |  28<H>  ----------------------------<  106<H>  4.0   |  26  |  1.06    Ca    9.1      12 Apr 2021 06:59  Phos  3.1     04-12  Mg     2.5     04-12      Creatinine Trend: 1.06 <--, 1.13 <--, 0.97 <--, 0.97 <--, 0.91 <--, 1.00 <--                        10.9   5.31  )-----------( 285      ( 12 Apr 2021 06:59 )             32.0     Urine Studies:

## 2022-08-30 ENCOUNTER — NON-APPOINTMENT (OUTPATIENT)
Age: 77
End: 2022-08-30

## 2022-08-30 ENCOUNTER — APPOINTMENT (OUTPATIENT)
Dept: ELECTROPHYSIOLOGY | Facility: CLINIC | Age: 77
End: 2022-08-30

## 2022-08-30 PROCEDURE — 93298 REM INTERROG DEV EVAL SCRMS: CPT

## 2022-08-30 PROCEDURE — G2066: CPT

## 2022-10-03 ENCOUNTER — APPOINTMENT (OUTPATIENT)
Dept: ELECTROPHYSIOLOGY | Facility: CLINIC | Age: 77
End: 2022-10-03

## 2022-10-03 ENCOUNTER — NON-APPOINTMENT (OUTPATIENT)
Age: 77
End: 2022-10-03

## 2022-10-03 PROCEDURE — 93298 REM INTERROG DEV EVAL SCRMS: CPT

## 2022-10-03 PROCEDURE — G2066: CPT

## 2022-11-02 NOTE — PROCEDURE
Impression: BR[facility] CME LEFT - Awareness late? Avastin x Oct '20 - CHECK BP
  VIT/Laser/PCIOL OS Apr '22 Ozurdex also - REDUCE injx Plan: hx: [[Marie, OD.  BRVO OS -late awareness (dementia) HMG LRG supr. BR[facility] -Vein Occl. PCP control HTN is key - Avstn OS xOct '20 - Apr '21 RECUR HMG w drop VA]] TODAY Avastin & Ozurdex OS (proc note w TAP AC today) CHECK BP Always - TOO HIGH often -- NEW IOL centered / clear OS 
      DONE w Phaco/IOL w VIT Sup Laser OS for definitive tx of BRVO Apr '22 TODAY  No CME recur / fovea intact -- NO injx Summer '22 RTC RETINA 3-4mo w pos Optos / OCT  -- TRUE PRN if OZURDEX OS 

DONE FLTx superiorly OS (or add at time of VIT Laser) ? POST op may need periodic PRN Ozurdex OS (prior CME RECUR -- NOT RESOLVED w Avastin anti-VEGF alone. DOES RESOLVE w steroid --surg / steroid to REDUCE injx). [FreeTextEntry1] : CXR 8/13/19: No active pulmonary disease. \par \par CXR 11/5/19: No acute disease. \par \par PFT 12/12/19: No obstruction noted. Moderate restriction present. There was a moderate reduction in diffusion which was in relation to the loss of lung volume. Mild response to bronchodilator noted.

## 2022-11-07 ENCOUNTER — NON-APPOINTMENT (OUTPATIENT)
Age: 77
End: 2022-11-07

## 2022-11-07 ENCOUNTER — APPOINTMENT (OUTPATIENT)
Dept: ELECTROPHYSIOLOGY | Facility: CLINIC | Age: 77
End: 2022-11-07

## 2022-11-07 PROCEDURE — 93298 REM INTERROG DEV EVAL SCRMS: CPT

## 2022-11-07 PROCEDURE — G2066: CPT

## 2022-12-12 ENCOUNTER — APPOINTMENT (OUTPATIENT)
Dept: ELECTROPHYSIOLOGY | Facility: CLINIC | Age: 77
End: 2022-12-12

## 2022-12-12 ENCOUNTER — NON-APPOINTMENT (OUTPATIENT)
Age: 77
End: 2022-12-12

## 2022-12-12 PROCEDURE — 93298 REM INTERROG DEV EVAL SCRMS: CPT

## 2022-12-12 PROCEDURE — G2066: CPT

## 2023-01-01 NOTE — ED ADULT NURSE NOTE - EXTENSIONS OF SELF_ADULT
Your baby should always be placed in a carseat in the back seat facing backward when riding in a vehicle. Always place your baby on their back to sleep in a crib or bassinet, not in bed with you. Do not place any toys, pillows, bumpers or extra items in with your baby. Avoiding exposure to tobacco smoke can also decrease your baby's risk of Sudden Infant Death Syndrome (SIDS). If your baby feels warm or is acting sick/fussy, check a rectal temperature. Call your doctor if a rectal temperature is 100.0 or more. Do not give any medication to your baby. None

## 2023-01-17 ENCOUNTER — NON-APPOINTMENT (OUTPATIENT)
Age: 78
End: 2023-01-17

## 2023-01-17 ENCOUNTER — APPOINTMENT (OUTPATIENT)
Dept: ELECTROPHYSIOLOGY | Facility: CLINIC | Age: 78
End: 2023-01-17
Payer: MEDICARE

## 2023-01-17 PROCEDURE — G2066: CPT

## 2023-01-17 PROCEDURE — 93298 REM INTERROG DEV EVAL SCRMS: CPT

## 2023-02-21 ENCOUNTER — NON-APPOINTMENT (OUTPATIENT)
Age: 78
End: 2023-02-21

## 2023-02-21 ENCOUNTER — APPOINTMENT (OUTPATIENT)
Dept: ELECTROPHYSIOLOGY | Facility: CLINIC | Age: 78
End: 2023-02-21
Payer: MEDICARE

## 2023-02-21 PROCEDURE — G2066: CPT

## 2023-02-21 PROCEDURE — 93298 REM INTERROG DEV EVAL SCRMS: CPT

## 2023-03-27 ENCOUNTER — APPOINTMENT (OUTPATIENT)
Dept: ELECTROPHYSIOLOGY | Facility: CLINIC | Age: 78
End: 2023-03-27

## 2023-04-28 ENCOUNTER — APPOINTMENT (OUTPATIENT)
Dept: ELECTROPHYSIOLOGY | Facility: CLINIC | Age: 78
End: 2023-04-28

## 2023-06-29 NOTE — PROGRESS NOTE ADULT - ASSESSMENT
74 yo female with a PMHx of HTN, HLD, and seizures who presented to [facility] on 04/04 after being found on the floor. RRT called on 4/11/21 for episode of emesis followed by unresponsiveness for a few minutes. EEG with moderate slowing. EKG and telemetry shows sinus rhythm with HR 40s-60s. Has + orthostatics. Patient had episode of vomiting with bradycardia today. Bradycardia likely vagally mediated as there is p-p prolongation. She had another RRT for AMS during which she was bradycardic to 30s, likely 2/2 high vagal tone.    #Bradycardia  Vagally mediated however will plan for ppm implantation with rate drop response to prevent extreme bradycardia during vagal episodes.  - NPO at midnight for ppm 4/19  - type and screen, hold 2 units    Caden Akins MD  Cardiology Fellow  [phone]  All Cardiology service information can be found 24/7 on [email or web address], password: [email or web address]   4 = No assist / stand by assistance

## 2024-01-09 NOTE — HISTORY OF PRESENT ILLNESS
Patient: Clara Nj    Procedure Summary       Date: 01/09/24 Room / Location: [facility] ENDOSCOPY 1 /  [facility] OR    Anesthesia Start: 0930 Anesthesia Stop: 0952    Procedure: COLONOSCOPY with polypectomy Diagnosis:       Encounter for screening colonoscopy      (Encounter for screening colonoscopy [Z12.11])    Surgeons: Kerline Diaz DO Provider: Giuliana Zayas CRNA    Anesthesia Type: MAC ASA Status: 2            Anesthesia Type: MAC    Vitals  Vitals Value Taken Time   BP 98/65 01/09/24 1011   Temp 97.9 °F (36.6 °C) 01/09/24 0953   Pulse 60 01/09/24 1018   Resp 18 01/09/24 1005   SpO2 99 % 01/09/24 1018   Vitals shown include unfiled device data.        Post Anesthesia Care and Evaluation    Patient location during evaluation: PHASE II  Patient participation: complete - patient participated  Level of consciousness: awake  Pain management: adequate    Airway patency: patent  Anesthetic complications: No anesthetic complications  PONV Status: none  Cardiovascular status: acceptable  Respiratory status: acceptable  Hydration status: acceptable     [FreeTextEntry1] : 74 year old woman with a history of hypertension, diabetes, hyperlipidemia, obesity and back pain was referred her for a chronic cough. \par \par She developed a cough in the summer of 2019 when she was at a wedding in [place]. The cough lasted for several months. \par \par She continues to cough. The cough is for the most part nonproductive. No wheezing or shortness of breath.\par \par She denies any prior history of pulmonary disease. She stopped smoking in 1990. Her occupational history is noncontributory.

## 2024-02-12 NOTE — CONSULT NOTE ADULT - CONSULT REASON
fall
FALL syncope
Fall/syncope
No previous uterine incision/Martinez Pregnancy/Less than or equal to 4 previous vaginal births/No known bleeding disorder/No other PPH risks indicated

## 2024-04-01 NOTE — ED PROVIDER NOTE - GASTROINTESTINAL [+], MLM
VOMITING/ABDOMINAL PAIN/NAUSEA Smoking even a single puff increases the likelihood of a full relapse, withdrawal symptoms peak within 1-2 weeks, but can persist for months

## 2024-04-26 NOTE — HISTORY OF PRESENT ILLNESS
VM left for patient; medication still waiting on prior authorization   [FreeTextEntry1] : 74 year old woman with a history of hypertension, diabetes, hyperlipidemia, obesity and back pain was referred her for a chronic cough. \par \par She developed a cough in the summer of 2019 when she was at a wedding in [place]. The cough lasted for several months. It has now resolved. She denies any cough, wheezing or shortness of breath. She denies any prior history of pulmonary disease. She stopped smoking in 1990. Her occupational history is noncontributory.\par \par

## 2024-07-30 NOTE — ED PROVIDER NOTE - NOSE, MLM
"  Patient Education     Exercises for sciatic pain   The Basics   Written by the doctors and editors at [facility]   What is sciatica? -- The sciatic nerve is a large nerve that starts in the lower back. It runs all the way down the back of the leg.  Something like a disc or bone spur can pinch or damage the sciatic nerve. Tight, inflamed muscles can also pinch or damage it. This can cause pain, weakness, numbness, or tingling that goes from the buttock down the leg toward the heel. When these symptoms happen, people often call it \"sciatica.\" The medical name for this is \"radiculopathy.\"  You can have sciatic pain on 1 side or both. Most of the time, it gets better without surgery.  Why do I need to do exercises if I have sciatica? -- Stretching and strengthening exercises can help ease back pain. It might also help prevent future back pain. Long term, it is important to strengthen the muscles in your lower back, buttocks, and belly. These are your \"core muscles.\" Stretching exercises are also important to keep your muscles flexible.  Below are some stretching and strengthening exercises that might help you. Other forms of movement can help ease or prevent back pain, too. For example, some people like to walk or do aerobic exercise, yoga, or ruth ann chi. The most important thing is to move your body. Your doctor, nurse, or physical therapist can help you find different types of activity that work for you.  What stretching exercises should I do? -- Warm up your muscles before stretching. This helps prevent injury. To warm up, you can walk, jog, or cycle. Below are some examples of stretching exercises.  Start by repeating each of these stretches 2 to 3 times. For your body to make changes, try to hold each stretch for 5 to 10 seconds. Try to do the stretches 2 to 3 times each day. Breathe slowly and deeply as you do the exercises. Never bounce when doing stretches.   Single knee-to-chest stretches (figure 1) ? While lying " on your back, bend your knees with your feet flat on the floor. Pull 1 knee toward your chest until you feel a stretch in your lower back and buttock area. Lower, and repeat with the other knee. If you have knee problems, pull your knee up by grabbing the back of your thigh instead of the front of your knee. You can also do this exercise by grabbing both knees at the same time.   Deep hip stretches lying down (figure 2) ? Lie on your back, and bend 1 knee, keeping that foot flat on the floor. Cross the other leg over your knee. Grab the thigh of the leg that has the foot on the floor. Slowly, pull the bottom leg toward your chest until you feel a stretch in the other buttock. Repeat using the opposite leg as the bottom leg.   Deep hip stretches sitting (figure 3) ? Sit on the floor with both legs straight. Take 1 leg and cross it over the other leg so that the ankle or foot of your top leg is next to your other knee. Now, take the elbow on the opposite side of your bent knee and bring it to the outside of the bent knee. With your elbow, slowly push the bent knee further across your body to get a good stretch in the hip.   Sit backs (figure 4) - Start on your hands and knees. Your hands should be directly under your shoulders. Your knees should be spread slightly and directly under your hips. Slowly stretch your back as you bring your hips toward your ankles. Your arms are extended forward in a relaxed position, as your upper body sinks toward the floor.  What strengthening exercises should I do? -- Below are some examples of strengthening exercises.  Start by doing each exercise 2 to 3 times. Work up to doing each exercise 10 times. Hold each exercise for 3 to 5 seconds. Try to do the exercises 2 to 3 times each day. Do all exercises slowly.   Pelvic tilts (figure 5) ? Lie on your back with your knees bent and feet flat on the floor. Breathe slowly and deeply. Press your lower back down to the floor. Tighten your  stomach muscles as you breathe deeply and slowly, then relax.   Hip lifts (figure 6) ? Lie on your back with your knees bent and feet flat on the floor. Breathe deeply and slowly. Tighten your stomach muscles, keep your back flat, and lift your buttocks off the floor. Relax. You should feel this in your buttocks, not in your lower back.  What else should I know?    Exercise, including stretching, might be slightly uncomfortable, but you should not have sharp or severe pain. If you do get severe pain, stop what you are doing. If severe pain continues, call your doctor or nurse.   Do not hold your breath when exercising. If you tend to hold your breath, try counting out loud when exercising.   Always warm up your muscles before exercising. Stretching before warming up can lead to injury.   Doing exercises before a meal can help you get into a routine.  All topics are updated as new evidence becomes available and our peer review process is complete.  This topic retrieved from [employer] on: May 15, 2024.  Topic 346234 Version 1.0  Release: 32.4.3 - C32.134  © 2024 UpToDate, Inc. and/or its affiliates. All rights reserved.  figure 1: Single knee-to-chest stretch     Lie on your back, bend your knees, and have your feet flat on the floor. Pull 1 knee toward your chest until you feel a stretch in your lower back and buttock area. Repeat with the other knee. If you have knee problems, pull your knee up by grabbing the back of your thigh instead of the front of your knee. You can also do this exercise by grabbing both knees at the same time.  Graphic [id number] Version 1.0  figure 2: Deep hip stretch lying down     Lieon your back, and bend 1 knee, keeping that foot flat on the floor. Cross theother leg over your knee. Grab the thigh of the leg that has the foot on thefloor. Slowly, pull the bottom leg toward your chest until you feel a stretchin the other buttock. Repeat using the opposite leg as the bottom leg.  Graphic [id number]  Version 1.0  figure 3: Deep hip stretch sitting     Siton the floor with both legs straight. Take 1 leg and cross it over the otherleg so that the foot of your top leg is next to your outer knee. Now, take theelbow on the opposite side of your bent knee and bring it to the outside of thebent knee. With your elbow, slowly push the bent knee further across your bodyto get a good stretch in the hip.  Graphic [id number] Version 1.0  figure 4: Sit backs     Starton your hands and knees. Your hands should be directly under your shoulders.Your knees should be spread slightly and directly under your hips. Slowly stretchyour back as you bring your hips toward your ankles. Your arms should be extendedforward in a relaxed position, as your upper body sinks toward the floor.  Graphic [id number] Version 1.0  figure 5: Pelvic tilts     Lie on your back with your knees bent and feet flat on the floor. Tighten your stomach muscles and press your lower back down to the floor. Relax.  Graphic [id number] Version 1.0  figure 6: Hip lifts     Lie on your back with your knees bent and feet flat on the floor. Tighten your stomach muscles and lift your buttocks off of the floor. Relax.  Graphic [id number] Version 1.0  Consumer Information Use and Disclaimer   Disclaimer: This generalized information is a limited summary of diagnosis, treatment, and/or medication information. It is not meant to be comprehensive and should be used as a tool to help the user understand and/or assess potential diagnostic and treatment options. It does NOT include all information about conditions, treatments, medications, side effects, or risks that may apply to a specific patient. It is not intended to be medical advice or a substitute for the medical advice, diagnosis, or treatment of a health care provider based on the health care provider's examination and assessment of a patient's specific and unique circumstances. Patients must speak with a health care provider for  abnormal/expand... complete information about their health, medical questions, and treatment options, including any risks or benefits regarding use of medications. This information does not endorse any treatments or medications as safe, effective, or approved for treating a specific patient. UpToDate, Inc. and its affiliates disclaim any warranty or liability relating to this information or the use thereof.The use of this information is governed by the Terms of Use, available at https://www.[employer]uwer.com/en/know/clinical-effectiveness-terms. 2024© UpToDate, Inc. and its affiliates and/or licensors. All rights reserved.  Copyright   © 2024 UpToDate, Inc. and/or its affiliates. All rights reserved.

## 2024-10-10 NOTE — DISCHARGE NOTE ADULT - COMPLETE THE FOLLOWING IF THE PATIENT REFUSES THE INFLUENZA VACCINE:
Visit Type: Office Visit  PCP: Jayne Thorpe NP  Yadi Alvarez is a 47 year old female for the following:Physical       ALLERGIES:  Ceclor [cefaclor], Keflex, and Sulfa antibiotics    Medications reviewed, reconciled and updated.     Health Maintenance       Hepatitis B Vaccine (1 of 3 - 19+ 3-dose series)  Never done    COVID-19 Vaccine (3 - 2023-24 season)  Overdue since 9/1/2024    Influenza Vaccine (1)  Due since 9/1/2024    Depression Screening (Yearly)  Due soon on 10/31/2024            Patient to discuss with provider and to address after.    Patient would like communication of their results via:   [phone]  Okay to leave a message? Yes    Advanced directive on file:  No  POA Activation Date: No Value Exists     Tobacco Use: Low Risk  (10/10/2024)    Patient History     Smoking Tobacco Use: Never     Smokeless Tobacco Use: Never     Passive Exposure: Not on file      Tobacco cessation:  [place] Quit [place]: 1-800-QUIT-NOW   (1-524.711.4738)   [place] Tobacco Quite Website:  http://www.ctri.[facility].edu/quitline.html     If applicable, per provider, Well Woman Questionnaire to be completed by provider.    Risks/benefits discussed with patient/surrogate/Vaccine Information Sheet (VIS) provided-VIS date: 8/07/15

## 2025-04-30 NOTE — ED ADULT NURSE NOTE - CAS DISCH BELONGINGS RETURNED
healthcare professional. [employer] [employer], [facility], disclaims any warranty or liability for your use of this information.    Personalized Preventive Plan for Mariluz Moreno - 4/30/2025  Medicare offers a range of preventive health benefits. Some of the tests and screenings are paid in full while other may be subject to a deductible, co-insurance, and/or copay.  Some of these benefits include a comprehensive review of your medical history including lifestyle, illnesses that may run in your family, and various assessments and screenings as appropriate.  After reviewing your medical record and screening and assessments performed today your provider may have ordered immunizations, labs, imaging, and/or referrals for you.  A list of these orders (if applicable) as well as your Preventive Care list are included within your After Visit Summary for your review.      
Yes

## 2025-06-17 NOTE — ED PROVIDER NOTE - NS ED ATTENDING STATEMENT MOD
Detail Level: Zone
General Sunscreen Counseling: I recommended a broad spectrum sunscreen with a SPF of 30 or higher.  I explained that SPF 30 sunscreens block approximately 97 percent of the sun's harmful rays.  Sunscreens should be applied at least 15 minutes prior to expected sun exposure and then every 2 hours after that as long as sun exposure continues. If swimming or exercising sunscreen should be reapplied every 45 minutes to an hour after getting wet or sweating.  One ounce, or the equivalent of a shot glass full of sunscreen, is adequate to protect the skin not covered by a bathing suit. I also recommended a lip balm with a sunscreen as well. Sun protective clothing can be used in lieu of sunscreen but must be worn the entire time you are exposed to the sun's rays.
I have personally seen and examined this patient.  I have fully participated in the care of this patient. I have reviewed all pertinent clinical information, including history, physical exam, plan and the Resident’s note and agree except as noted.

## 2025-06-21 NOTE — ED ADULT NURSE NOTE - NSFALLRSKHARMRISK_ED_ALL_ED
Interval History: F/u speech eval yesterday reported continued dysphagia, consequently, pt remained NPO. Diuresis was continued with good UOP. VSS. Labs stable.       Review of Systems  Objective:     Vital Signs (Most Recent):  Temp: 98.3 °F (36.8 °C) (06/21/25 0535)  Pulse: 68 (06/21/25 0659)  Resp: 18 (06/21/25 0359)  BP: (!) 156/87 (06/21/25 0359)  SpO2: 100 % (06/21/25 0359) Vital Signs (24h Range):  Temp:  [97.5 °F (36.4 °C)-98.3 °F (36.8 °C)] 98.3 °F (36.8 °C)  Pulse:  [66-75] 68  Resp:  [16-19] 18  SpO2:  [96 %-100 %] 100 %  BP: (127-156)/(57-87) 156/87     Weight: 72.1 kg (159 lb)  Body mass index is 25.66 kg/m².    Intake/Output Summary (Last 24 hours) at 6/21/2025 0714  Last data filed at 6/20/2025 1701  Gross per 24 hour   Intake 260 ml   Output 2600 ml   Net -2340 ml         Physical Exam  Constitutional:       General: She is not in acute distress.     Appearance: She is ill-appearing.   HENT:      Head: Normocephalic and atraumatic.   Cardiovascular:      Rate and Rhythm: Normal rate and regular rhythm.      Heart sounds: Murmur heard.      Comments: Diastolic murmur at apex   Pulmonary:      Effort: No respiratory distress.      Breath sounds: Rhonchi present. No wheezing.      Comments: Rhonchi on RML/RLL  Abdominal:      General: There is no distension.      Palpations: Abdomen is soft.      Tenderness: There is no abdominal tenderness.   Musculoskeletal:      Right lower leg: Edema present.      Left lower leg: Edema present.      Comments: 1+ pitting edema in BLE   Skin:     General: Skin is warm.   Neurological:      Mental Status: She is alert.               Significant Labs: All pertinent labs within the past 24 hours have been reviewed.    Significant Imaging: I have reviewed all pertinent imaging results/findings within the past 24 hours.   yes